# Patient Record
Sex: FEMALE | Race: WHITE | Employment: OTHER | URBAN - NONMETROPOLITAN AREA
[De-identification: names, ages, dates, MRNs, and addresses within clinical notes are randomized per-mention and may not be internally consistent; named-entity substitution may affect disease eponyms.]

---

## 2017-01-17 RX ORDER — PRASUGREL 10 MG/1
10 TABLET, FILM COATED ORAL DAILY
Qty: 90 TABLET | Refills: 3 | Status: SHIPPED | OUTPATIENT
Start: 2017-01-17 | End: 2017-09-13 | Stop reason: SDUPTHER

## 2017-01-17 RX ORDER — ISOSORBIDE MONONITRATE 30 MG/1
30 TABLET, EXTENDED RELEASE ORAL DAILY
Qty: 90 TABLET | Refills: 3 | Status: SHIPPED | OUTPATIENT
Start: 2017-01-17 | End: 2017-09-13 | Stop reason: SDUPTHER

## 2017-01-17 RX ORDER — PRAVASTATIN SODIUM 20 MG
20 TABLET ORAL NIGHTLY
Qty: 90 TABLET | Refills: 3 | Status: SHIPPED | OUTPATIENT
Start: 2017-01-17 | End: 2017-09-13 | Stop reason: SDUPTHER

## 2017-01-17 RX ORDER — PROPAFENONE HYDROCHLORIDE 150 MG/1
150 TABLET, FILM COATED ORAL EVERY 8 HOURS
Qty: 270 TABLET | Refills: 3 | Status: SHIPPED | OUTPATIENT
Start: 2017-01-17 | End: 2017-09-13 | Stop reason: SDUPTHER

## 2017-03-08 ENCOUNTER — OFFICE VISIT (OUTPATIENT)
Dept: CARDIOLOGY | Age: 67
End: 2017-03-08

## 2017-03-08 VITALS
DIASTOLIC BLOOD PRESSURE: 84 MMHG | HEART RATE: 68 BPM | HEIGHT: 64 IN | BODY MASS INDEX: 24.92 KG/M2 | WEIGHT: 146 LBS | SYSTOLIC BLOOD PRESSURE: 142 MMHG

## 2017-03-08 DIAGNOSIS — I25.810 CORONARY ARTERY DISEASE INVOLVING CORONARY BYPASS GRAFT OF NATIVE HEART WITHOUT ANGINA PECTORIS: ICD-10-CM

## 2017-03-08 DIAGNOSIS — R55 SYNCOPE AND COLLAPSE: ICD-10-CM

## 2017-03-08 DIAGNOSIS — I10 ESSENTIAL HYPERTENSION: Primary | ICD-10-CM

## 2017-03-08 DIAGNOSIS — E78.01 FAMILIAL HYPERCHOLESTEROLEMIA: ICD-10-CM

## 2017-03-08 PROCEDURE — G8420 CALC BMI NORM PARAMETERS: HCPCS | Performed by: NUCLEAR MEDICINE

## 2017-03-08 PROCEDURE — 1090F PRES/ABSN URINE INCON ASSESS: CPT | Performed by: NUCLEAR MEDICINE

## 2017-03-08 PROCEDURE — G8427 DOCREV CUR MEDS BY ELIG CLIN: HCPCS | Performed by: NUCLEAR MEDICINE

## 2017-03-08 PROCEDURE — 1123F ACP DISCUSS/DSCN MKR DOCD: CPT | Performed by: NUCLEAR MEDICINE

## 2017-03-08 PROCEDURE — 3017F COLORECTAL CA SCREEN DOC REV: CPT | Performed by: NUCLEAR MEDICINE

## 2017-03-08 PROCEDURE — 99213 OFFICE O/P EST LOW 20 MIN: CPT | Performed by: NUCLEAR MEDICINE

## 2017-03-08 PROCEDURE — G8400 PT W/DXA NO RESULTS DOC: HCPCS | Performed by: NUCLEAR MEDICINE

## 2017-03-08 PROCEDURE — 4040F PNEUMOC VAC/ADMIN/RCVD: CPT | Performed by: NUCLEAR MEDICINE

## 2017-03-08 PROCEDURE — G8598 ASA/ANTIPLAT THER USED: HCPCS | Performed by: NUCLEAR MEDICINE

## 2017-03-08 PROCEDURE — 3014F SCREEN MAMMO DOC REV: CPT | Performed by: NUCLEAR MEDICINE

## 2017-03-08 PROCEDURE — 1036F TOBACCO NON-USER: CPT | Performed by: NUCLEAR MEDICINE

## 2017-03-08 PROCEDURE — G8484 FLU IMMUNIZE NO ADMIN: HCPCS | Performed by: NUCLEAR MEDICINE

## 2017-09-13 ENCOUNTER — OFFICE VISIT (OUTPATIENT)
Dept: CARDIOLOGY CLINIC | Age: 67
End: 2017-09-13
Payer: MEDICARE

## 2017-09-13 VITALS
HEART RATE: 79 BPM | HEIGHT: 63 IN | SYSTOLIC BLOOD PRESSURE: 140 MMHG | DIASTOLIC BLOOD PRESSURE: 86 MMHG | WEIGHT: 144 LBS | BODY MASS INDEX: 25.52 KG/M2

## 2017-09-13 DIAGNOSIS — I10 ESSENTIAL HYPERTENSION: ICD-10-CM

## 2017-09-13 DIAGNOSIS — E78.01 FAMILIAL HYPERCHOLESTEROLEMIA: ICD-10-CM

## 2017-09-13 DIAGNOSIS — I25.10 CORONARY ARTERY DISEASE INVOLVING NATIVE CORONARY ARTERY OF NATIVE HEART WITHOUT ANGINA PECTORIS: Primary | ICD-10-CM

## 2017-09-13 PROCEDURE — G8419 CALC BMI OUT NRM PARAM NOF/U: HCPCS | Performed by: NUCLEAR MEDICINE

## 2017-09-13 PROCEDURE — 1123F ACP DISCUSS/DSCN MKR DOCD: CPT | Performed by: NUCLEAR MEDICINE

## 2017-09-13 PROCEDURE — 1036F TOBACCO NON-USER: CPT | Performed by: NUCLEAR MEDICINE

## 2017-09-13 PROCEDURE — 93000 ELECTROCARDIOGRAM COMPLETE: CPT | Performed by: NUCLEAR MEDICINE

## 2017-09-13 PROCEDURE — 4040F PNEUMOC VAC/ADMIN/RCVD: CPT | Performed by: NUCLEAR MEDICINE

## 2017-09-13 PROCEDURE — G8400 PT W/DXA NO RESULTS DOC: HCPCS | Performed by: NUCLEAR MEDICINE

## 2017-09-13 PROCEDURE — 3017F COLORECTAL CA SCREEN DOC REV: CPT | Performed by: NUCLEAR MEDICINE

## 2017-09-13 PROCEDURE — G8598 ASA/ANTIPLAT THER USED: HCPCS | Performed by: NUCLEAR MEDICINE

## 2017-09-13 PROCEDURE — 1090F PRES/ABSN URINE INCON ASSESS: CPT | Performed by: NUCLEAR MEDICINE

## 2017-09-13 PROCEDURE — G8427 DOCREV CUR MEDS BY ELIG CLIN: HCPCS | Performed by: NUCLEAR MEDICINE

## 2017-09-13 PROCEDURE — 99213 OFFICE O/P EST LOW 20 MIN: CPT | Performed by: NUCLEAR MEDICINE

## 2017-09-13 PROCEDURE — 3014F SCREEN MAMMO DOC REV: CPT | Performed by: NUCLEAR MEDICINE

## 2017-09-13 RX ORDER — PRAVASTATIN SODIUM 20 MG
20 TABLET ORAL NIGHTLY
Qty: 90 TABLET | Refills: 3 | Status: SHIPPED | OUTPATIENT
Start: 2017-09-13 | End: 2018-05-09 | Stop reason: SDUPTHER

## 2017-09-13 RX ORDER — PROPAFENONE HYDROCHLORIDE 150 MG/1
150 TABLET, FILM COATED ORAL EVERY 8 HOURS
Qty: 270 TABLET | Refills: 3 | Status: SHIPPED | OUTPATIENT
Start: 2017-09-13 | End: 2018-05-09 | Stop reason: SDUPTHER

## 2017-09-13 RX ORDER — ISOSORBIDE MONONITRATE 30 MG/1
30 TABLET, EXTENDED RELEASE ORAL DAILY
Qty: 90 TABLET | Refills: 3 | Status: SHIPPED | OUTPATIENT
Start: 2017-09-13 | End: 2018-05-09 | Stop reason: SDUPTHER

## 2017-09-13 RX ORDER — PRASUGREL 10 MG/1
10 TABLET, FILM COATED ORAL DAILY
Qty: 90 TABLET | Refills: 3 | Status: SHIPPED | OUTPATIENT
Start: 2017-09-13 | End: 2018-05-09 | Stop reason: SDUPTHER

## 2018-05-09 ENCOUNTER — OFFICE VISIT (OUTPATIENT)
Dept: CARDIOLOGY CLINIC | Age: 68
End: 2018-05-09
Payer: MEDICARE

## 2018-05-09 VITALS
HEART RATE: 64 BPM | BODY MASS INDEX: 25.34 KG/M2 | SYSTOLIC BLOOD PRESSURE: 134 MMHG | DIASTOLIC BLOOD PRESSURE: 80 MMHG | WEIGHT: 143 LBS | HEIGHT: 63 IN

## 2018-05-09 DIAGNOSIS — I10 ESSENTIAL HYPERTENSION: ICD-10-CM

## 2018-05-09 DIAGNOSIS — E78.01 FAMILIAL HYPERCHOLESTEROLEMIA: ICD-10-CM

## 2018-05-09 DIAGNOSIS — I25.810 CORONARY ARTERY DISEASE INVOLVING CORONARY BYPASS GRAFT OF NATIVE HEART WITHOUT ANGINA PECTORIS: Primary | ICD-10-CM

## 2018-05-09 PROCEDURE — 99213 OFFICE O/P EST LOW 20 MIN: CPT | Performed by: NUCLEAR MEDICINE

## 2018-05-09 RX ORDER — PROPAFENONE HYDROCHLORIDE 150 MG/1
150 TABLET, FILM COATED ORAL EVERY 8 HOURS
Qty: 270 TABLET | Refills: 3 | Status: SHIPPED | OUTPATIENT
Start: 2018-05-09 | End: 2018-06-25 | Stop reason: SDUPTHER

## 2018-05-09 RX ORDER — ISOSORBIDE MONONITRATE 30 MG/1
30 TABLET, EXTENDED RELEASE ORAL DAILY
Qty: 90 TABLET | Refills: 3 | Status: SHIPPED | OUTPATIENT
Start: 2018-05-09 | End: 2018-06-25 | Stop reason: SDUPTHER

## 2018-05-09 RX ORDER — PRAVASTATIN SODIUM 20 MG
20 TABLET ORAL NIGHTLY
Qty: 90 TABLET | Refills: 3 | Status: SHIPPED | OUTPATIENT
Start: 2018-05-09 | End: 2018-06-25 | Stop reason: SDUPTHER

## 2018-05-09 RX ORDER — PRASUGREL 10 MG/1
10 TABLET, FILM COATED ORAL DAILY
Qty: 90 TABLET | Refills: 3 | Status: SHIPPED | OUTPATIENT
Start: 2018-05-09 | End: 2018-06-25 | Stop reason: SDUPTHER

## 2018-06-25 RX ORDER — FUROSEMIDE 20 MG/1
20 TABLET ORAL PRN
Qty: 90 TABLET | Refills: 3 | Status: SHIPPED | OUTPATIENT
Start: 2018-06-25 | End: 2020-06-08 | Stop reason: SDUPTHER

## 2018-06-25 RX ORDER — PRAVASTATIN SODIUM 20 MG
20 TABLET ORAL NIGHTLY
Qty: 90 TABLET | Refills: 3 | Status: SHIPPED | OUTPATIENT
Start: 2018-06-25 | End: 2019-05-10 | Stop reason: SDUPTHER

## 2018-06-25 RX ORDER — PRASUGREL 10 MG/1
10 TABLET, FILM COATED ORAL DAILY
Qty: 90 TABLET | Refills: 3 | Status: SHIPPED | OUTPATIENT
Start: 2018-06-25 | End: 2019-05-09 | Stop reason: SDUPTHER

## 2018-06-25 RX ORDER — ISOSORBIDE MONONITRATE 30 MG/1
30 TABLET, EXTENDED RELEASE ORAL DAILY
Qty: 90 TABLET | Refills: 3 | Status: SHIPPED | OUTPATIENT
Start: 2018-06-25 | End: 2019-05-09 | Stop reason: SDUPTHER

## 2018-06-25 RX ORDER — PROPAFENONE HYDROCHLORIDE 150 MG/1
150 TABLET, FILM COATED ORAL EVERY 8 HOURS
Qty: 270 TABLET | Refills: 3 | Status: SHIPPED | OUTPATIENT
Start: 2018-06-25 | End: 2019-05-09 | Stop reason: SDUPTHER

## 2018-06-28 ENCOUNTER — OFFICE VISIT (OUTPATIENT)
Dept: CARDIOLOGY CLINIC | Age: 68
End: 2018-06-28
Payer: MEDICARE

## 2018-06-28 VITALS
SYSTOLIC BLOOD PRESSURE: 128 MMHG | BODY MASS INDEX: 24.99 KG/M2 | WEIGHT: 141.1 LBS | HEART RATE: 78 BPM | DIASTOLIC BLOOD PRESSURE: 79 MMHG

## 2018-06-28 DIAGNOSIS — I48.0 PAROXYSMAL ATRIAL FIBRILLATION (HCC): ICD-10-CM

## 2018-06-28 DIAGNOSIS — I25.10 CORONARY ARTERY DISEASE INVOLVING NATIVE CORONARY ARTERY OF NATIVE HEART WITHOUT ANGINA PECTORIS: ICD-10-CM

## 2018-06-28 DIAGNOSIS — I50.32 CHRONIC DIASTOLIC CHF (CONGESTIVE HEART FAILURE) (HCC): ICD-10-CM

## 2018-06-28 DIAGNOSIS — I10 ESSENTIAL HYPERTENSION: ICD-10-CM

## 2018-06-28 DIAGNOSIS — Z95.1 S/P CABG X 1: ICD-10-CM

## 2018-06-28 DIAGNOSIS — E78.5 HYPERLIPIDEMIA, UNSPECIFIED HYPERLIPIDEMIA TYPE: ICD-10-CM

## 2018-06-28 DIAGNOSIS — R55 SYNCOPE, UNSPECIFIED SYNCOPE TYPE: Primary | ICD-10-CM

## 2018-06-28 PROCEDURE — 93000 ELECTROCARDIOGRAM COMPLETE: CPT | Performed by: NURSE PRACTITIONER

## 2018-06-28 PROCEDURE — 99213 OFFICE O/P EST LOW 20 MIN: CPT | Performed by: NURSE PRACTITIONER

## 2018-06-29 ENCOUNTER — HOSPITAL ENCOUNTER (OUTPATIENT)
Age: 68
Discharge: HOME | End: 2018-06-29
Payer: MEDICARE

## 2018-06-29 DIAGNOSIS — E78.5 HYPERLIPIDEMIA, UNSPECIFIED HYPERLIPIDEMIA TYPE: ICD-10-CM

## 2018-06-29 DIAGNOSIS — R55 SYNCOPE, UNSPECIFIED SYNCOPE TYPE: ICD-10-CM

## 2018-06-29 LAB
ANION GAP SERPL CALCULATED.3IONS-SCNC: 11 MEQ/L (ref 8–16)
BASOPHILS # BLD: 0.4 %
BASOPHILS ABSOLUTE: 0 THOU/MM3 (ref 0–0.1)
BUN BLDV-MCNC: 17 MG/DL (ref 7–22)
CALCIUM SERPL-MCNC: 9.3 MG/DL (ref 8.5–10.5)
CHLORIDE BLD-SCNC: 106 MEQ/L (ref 98–111)
CHOLESTEROL, TOTAL: 138 MG/DL (ref 100–199)
CO2: 26 MEQ/L (ref 23–33)
CREAT SERPL-MCNC: 0.9 MG/DL (ref 0.4–1.2)
EOSINOPHIL # BLD: 6.5 %
EOSINOPHILS ABSOLUTE: 0.5 THOU/MM3 (ref 0–0.4)
ERYTHROCYTE [DISTWIDTH] IN BLOOD BY AUTOMATED COUNT: 13.5 % (ref 11.5–14.5)
ERYTHROCYTE [DISTWIDTH] IN BLOOD BY AUTOMATED COUNT: 44.3 FL (ref 35–45)
GFR SERPL CREATININE-BSD FRML MDRD: 62 ML/MIN/1.73M2
GLUCOSE BLD-MCNC: 98 MG/DL (ref 70–108)
HCT VFR BLD CALC: 42.7 % (ref 37–47)
HDLC SERPL-MCNC: 56 MG/DL
HEMOGLOBIN: 14.2 GM/DL (ref 12–16)
IMMATURE GRANS (ABS): 0.03 THOU/MM3 (ref 0–0.07)
IMMATURE GRANULOCYTES: 0.4 %
LDL CHOLESTEROL CALCULATED: 71 MG/DL
LYMPHOCYTES # BLD: 44 %
LYMPHOCYTES ABSOLUTE: 3.3 THOU/MM3 (ref 1–4.8)
MCH RBC QN AUTO: 30 PG (ref 26–33)
MCHC RBC AUTO-ENTMCNC: 33.3 GM/DL (ref 32.2–35.5)
MCV RBC AUTO: 90.3 FL (ref 81–99)
MONOCYTES # BLD: 8.2 %
MONOCYTES ABSOLUTE: 0.6 THOU/MM3 (ref 0.4–1.3)
NUCLEATED RED BLOOD CELLS: 0 /100 WBC
PLATELET # BLD: 201 THOU/MM3 (ref 130–400)
PMV BLD AUTO: 9.7 FL (ref 9.4–12.4)
POTASSIUM SERPL-SCNC: 4.6 MEQ/L (ref 3.5–5.2)
RBC # BLD: 4.73 MILL/MM3 (ref 4.2–5.4)
SEG NEUTROPHILS: 40.5 %
SEGMENTED NEUTROPHILS ABSOLUTE COUNT: 3.1 THOU/MM3 (ref 1.8–7.7)
SODIUM BLD-SCNC: 143 MEQ/L (ref 135–145)
TRIGL SERPL-MCNC: 57 MG/DL (ref 0–199)
WBC # BLD: 7.6 THOU/MM3 (ref 4.8–10.8)

## 2018-06-29 PROCEDURE — 85025 COMPLETE CBC W/AUTO DIFF WBC: CPT

## 2018-06-29 PROCEDURE — 80048 BASIC METABOLIC PNL TOTAL CA: CPT

## 2018-06-29 PROCEDURE — 36415 COLL VENOUS BLD VENIPUNCTURE: CPT

## 2018-06-29 PROCEDURE — 80061 LIPID PANEL: CPT

## 2018-07-09 ENCOUNTER — TELEPHONE (OUTPATIENT)
Dept: CARDIOLOGY CLINIC | Age: 68
End: 2018-07-09

## 2018-08-21 ENCOUNTER — TELEPHONE (OUTPATIENT)
Dept: CARDIOLOGY CLINIC | Age: 68
End: 2018-08-21

## 2018-08-21 NOTE — TELEPHONE ENCOUNTER
Patient no showed her stress and echo on 8/20/18. She stated that she is feeling fine and blood work came back fine and she didn't want to reschedule at this time.

## 2019-02-18 ENCOUNTER — HOSPITAL ENCOUNTER (EMERGENCY)
Dept: GENERAL RADIOLOGY | Age: 69
Discharge: HOME | End: 2019-02-18
Payer: MEDICARE

## 2019-02-18 ENCOUNTER — HOSPITAL ENCOUNTER (EMERGENCY)
Age: 69
Discharge: HOME | End: 2019-02-18
Payer: MEDICARE

## 2019-02-18 ENCOUNTER — APPOINTMENT (OUTPATIENT)
Dept: CT IMAGING | Age: 69
End: 2019-02-18
Payer: MEDICARE

## 2019-02-18 VITALS
OXYGEN SATURATION: 94 % | HEART RATE: 79 BPM | HEIGHT: 63 IN | SYSTOLIC BLOOD PRESSURE: 109 MMHG | RESPIRATION RATE: 18 BRPM | WEIGHT: 125 LBS | DIASTOLIC BLOOD PRESSURE: 66 MMHG | TEMPERATURE: 98.2 F | BODY MASS INDEX: 22.15 KG/M2

## 2019-02-18 DIAGNOSIS — R55 SYNCOPE AND COLLAPSE: ICD-10-CM

## 2019-02-18 DIAGNOSIS — J40 BRONCHITIS: Primary | ICD-10-CM

## 2019-02-18 LAB
ALBUMIN SERPL-MCNC: 3.8 G/DL (ref 3.5–5.1)
ALP BLD-CCNC: 92 U/L (ref 38–126)
ALT SERPL-CCNC: 8 U/L (ref 11–66)
ANION GAP SERPL CALCULATED.3IONS-SCNC: 15 MEQ/L (ref 8–16)
AST SERPL-CCNC: 18 U/L (ref 5–40)
BASOPHILS # BLD: 0.1 %
BASOPHILS ABSOLUTE: 0 THOU/MM3 (ref 0–0.1)
BILIRUB SERPL-MCNC: 1.6 MG/DL (ref 0.3–1.2)
BUN BLDV-MCNC: 13 MG/DL (ref 7–22)
CALCIUM SERPL-MCNC: 8.8 MG/DL (ref 8.5–10.5)
CHLORIDE BLD-SCNC: 99 MEQ/L (ref 98–111)
CO2: 24 MEQ/L (ref 23–33)
CREAT SERPL-MCNC: 1 MG/DL (ref 0.4–1.2)
EKG ATRIAL RATE: 86 BPM
EKG P AXIS: 54 DEGREES
EKG P-R INTERVAL: 194 MS
EKG Q-T INTERVAL: 396 MS
EKG QRS DURATION: 106 MS
EKG QTC CALCULATION (BAZETT): 473 MS
EKG R AXIS: 61 DEGREES
EKG T AXIS: 61 DEGREES
EKG VENTRICULAR RATE: 86 BPM
EOSINOPHIL # BLD: 1.5 %
EOSINOPHILS ABSOLUTE: 0.1 THOU/MM3 (ref 0–0.4)
ERYTHROCYTE [DISTWIDTH] IN BLOOD BY AUTOMATED COUNT: 13.3 % (ref 11.5–14.5)
ERYTHROCYTE [DISTWIDTH] IN BLOOD BY AUTOMATED COUNT: 42.5 FL (ref 35–45)
FLU A ANTIGEN: NEGATIVE
FLU B ANTIGEN: NEGATIVE
GFR SERPL CREATININE-BSD FRML MDRD: 55 ML/MIN/1.73M2
GLUCOSE BLD-MCNC: 118 MG/DL (ref 70–108)
GROUP A STREP CULTURE, REFLEX: NEGATIVE
HCT VFR BLD CALC: 36 % (ref 37–47)
HEMOGLOBIN: 12.1 GM/DL (ref 12–16)
IMMATURE GRANS (ABS): 0.1 THOU/MM3 (ref 0–0.07)
IMMATURE GRANULOCYTES: 1 %
LACTIC ACID: 1.7 MMOL/L (ref 0.5–2.2)
LYMPHOCYTES # BLD: 20 %
LYMPHOCYTES ABSOLUTE: 2 THOU/MM3 (ref 1–4.8)
MCH RBC QN AUTO: 29.4 PG (ref 26–33)
MCHC RBC AUTO-ENTMCNC: 33.6 GM/DL (ref 32.2–35.5)
MCV RBC AUTO: 87.4 FL (ref 81–99)
MONOCYTES # BLD: 4.1 %
MONOCYTES ABSOLUTE: 0.4 THOU/MM3 (ref 0.4–1.3)
NUCLEATED RED BLOOD CELLS: 0 /100 WBC
OSMOLALITY CALCULATION: 276.9 MOSMOL/KG (ref 275–300)
PLATELET # BLD: 287 THOU/MM3 (ref 130–400)
PMV BLD AUTO: 9.3 FL (ref 9.4–12.4)
POTASSIUM REFLEX MAGNESIUM: 3.8 MEQ/L (ref 3.5–5.2)
PRO-BNP: 717.7 PG/ML (ref 0–900)
PROCALCITONIN: 0.06 NG/ML (ref 0.01–0.09)
RBC # BLD: 4.12 MILL/MM3 (ref 4.2–5.4)
REFLEX THROAT C + S: NORMAL
SEG NEUTROPHILS: 73.3 %
SEGMENTED NEUTROPHILS ABSOLUTE COUNT: 7.3 THOU/MM3 (ref 1.8–7.7)
SODIUM BLD-SCNC: 138 MEQ/L (ref 135–145)
TOTAL PROTEIN: 6.6 G/DL (ref 6.1–8)
TROPONIN T: < 0.01 NG/ML
WBC # BLD: 9.9 THOU/MM3 (ref 4.8–10.8)

## 2019-02-18 PROCEDURE — 6360000004 HC RX CONTRAST MEDICATION: Performed by: PHYSICIAN ASSISTANT

## 2019-02-18 PROCEDURE — 6360000002 HC RX W HCPCS: Performed by: NURSE PRACTITIONER

## 2019-02-18 PROCEDURE — 6370000000 HC RX 637 (ALT 250 FOR IP): Performed by: NURSE PRACTITIONER

## 2019-02-18 PROCEDURE — 87880 STREP A ASSAY W/OPTIC: CPT

## 2019-02-18 PROCEDURE — 85025 COMPLETE CBC W/AUTO DIFF WBC: CPT

## 2019-02-18 PROCEDURE — 84484 ASSAY OF TROPONIN QUANT: CPT

## 2019-02-18 PROCEDURE — 80053 COMPREHEN METABOLIC PANEL: CPT

## 2019-02-18 PROCEDURE — 87070 CULTURE OTHR SPECIMN AEROBIC: CPT

## 2019-02-18 PROCEDURE — 96374 THER/PROPH/DIAG INJ IV PUSH: CPT

## 2019-02-18 PROCEDURE — 94640 AIRWAY INHALATION TREATMENT: CPT

## 2019-02-18 PROCEDURE — 96375 TX/PRO/DX INJ NEW DRUG ADDON: CPT

## 2019-02-18 PROCEDURE — 84145 PROCALCITONIN (PCT): CPT

## 2019-02-18 PROCEDURE — 2709999900 HC NON-CHARGEABLE SUPPLY

## 2019-02-18 PROCEDURE — 70450 CT HEAD/BRAIN W/O DYE: CPT

## 2019-02-18 PROCEDURE — 99284 EMERGENCY DEPT VISIT MOD MDM: CPT

## 2019-02-18 PROCEDURE — 6360000002 HC RX W HCPCS: Performed by: PHYSICIAN ASSISTANT

## 2019-02-18 PROCEDURE — 83605 ASSAY OF LACTIC ACID: CPT

## 2019-02-18 PROCEDURE — 93005 ELECTROCARDIOGRAM TRACING: CPT | Performed by: EMERGENCY MEDICINE

## 2019-02-18 PROCEDURE — 6370000000 HC RX 637 (ALT 250 FOR IP): Performed by: PHYSICIAN ASSISTANT

## 2019-02-18 PROCEDURE — 87804 INFLUENZA ASSAY W/OPTIC: CPT

## 2019-02-18 PROCEDURE — 71046 X-RAY EXAM CHEST 2 VIEWS: CPT

## 2019-02-18 PROCEDURE — 71275 CT ANGIOGRAPHY CHEST: CPT

## 2019-02-18 PROCEDURE — 36415 COLL VENOUS BLD VENIPUNCTURE: CPT

## 2019-02-18 PROCEDURE — 2580000003 HC RX 258: Performed by: PHYSICIAN ASSISTANT

## 2019-02-18 PROCEDURE — 99215 OFFICE O/P EST HI 40 MIN: CPT

## 2019-02-18 PROCEDURE — 83880 ASSAY OF NATRIURETIC PEPTIDE: CPT

## 2019-02-18 RX ORDER — 0.9 % SODIUM CHLORIDE 0.9 %
1000 INTRAVENOUS SOLUTION INTRAVENOUS ONCE
Status: COMPLETED | OUTPATIENT
Start: 2019-02-18 | End: 2019-02-18

## 2019-02-18 RX ORDER — DIPHENHYDRAMINE HYDROCHLORIDE 50 MG/ML
25 INJECTION INTRAMUSCULAR; INTRAVENOUS ONCE
Status: COMPLETED | OUTPATIENT
Start: 2019-02-18 | End: 2019-02-18

## 2019-02-18 RX ORDER — IPRATROPIUM BROMIDE AND ALBUTEROL SULFATE 2.5; .5 MG/3ML; MG/3ML
1 SOLUTION RESPIRATORY (INHALATION) EVERY 4 HOURS
Qty: 360 ML | Refills: 0 | Status: SHIPPED | OUTPATIENT
Start: 2019-02-18 | End: 2020-12-14

## 2019-02-18 RX ORDER — PREDNISONE 10 MG/1
TABLET ORAL
Qty: 20 TABLET | Refills: 0 | Status: SHIPPED | OUTPATIENT
Start: 2019-02-18 | End: 2019-02-28

## 2019-02-18 RX ORDER — IPRATROPIUM BROMIDE AND ALBUTEROL SULFATE 2.5; .5 MG/3ML; MG/3ML
1 SOLUTION RESPIRATORY (INHALATION) ONCE
Status: COMPLETED | OUTPATIENT
Start: 2019-02-18 | End: 2019-02-18

## 2019-02-18 RX ORDER — METHYLPREDNISOLONE SODIUM SUCCINATE 125 MG/2ML
80 INJECTION, POWDER, LYOPHILIZED, FOR SOLUTION INTRAMUSCULAR; INTRAVENOUS ONCE
Status: COMPLETED | OUTPATIENT
Start: 2019-02-18 | End: 2019-02-18

## 2019-02-18 RX ORDER — GUAIFENESIN 1200 MG/1
1200 TABLET, EXTENDED RELEASE ORAL 2 TIMES DAILY
Qty: 20 TABLET | Refills: 0 | Status: SHIPPED | OUTPATIENT
Start: 2019-02-18 | End: 2019-12-11

## 2019-02-18 RX ORDER — METHYLPREDNISOLONE SODIUM SUCCINATE 40 MG/ML
40 INJECTION, POWDER, LYOPHILIZED, FOR SOLUTION INTRAMUSCULAR; INTRAVENOUS ONCE
Status: COMPLETED | OUTPATIENT
Start: 2019-02-18 | End: 2019-02-18

## 2019-02-18 RX ADMIN — SODIUM CHLORIDE 1000 ML: 9 INJECTION, SOLUTION INTRAVENOUS at 15:51

## 2019-02-18 RX ADMIN — DIPHENHYDRAMINE HYDROCHLORIDE 25 MG: 50 INJECTION, SOLUTION INTRAMUSCULAR; INTRAVENOUS at 15:51

## 2019-02-18 RX ADMIN — IOPAMIDOL 85 ML: 755 INJECTION, SOLUTION INTRAVENOUS at 16:23

## 2019-02-18 RX ADMIN — IPRATROPIUM BROMIDE AND ALBUTEROL SULFATE 1 AMPULE: .5; 3 SOLUTION RESPIRATORY (INHALATION) at 13:25

## 2019-02-18 RX ADMIN — METHYLPREDNISOLONE SODIUM SUCCINATE 40 MG: 40 INJECTION, POWDER, FOR SOLUTION INTRAMUSCULAR; INTRAVENOUS at 15:51

## 2019-02-18 RX ADMIN — METHYLPREDNISOLONE SODIUM SUCCINATE 80 MG: 125 INJECTION, POWDER, FOR SOLUTION INTRAMUSCULAR; INTRAVENOUS at 13:24

## 2019-02-18 RX ADMIN — IPRATROPIUM BROMIDE AND ALBUTEROL SULFATE 1 AMPULE: .5; 3 SOLUTION RESPIRATORY (INHALATION) at 14:54

## 2019-02-18 ASSESSMENT — ENCOUNTER SYMPTOMS
DIARRHEA: 0
WHEEZING: 0
VOMITING: 0
SORE THROAT: 0
COUGH: 1
BACK PAIN: 0
EYE DISCHARGE: 0
EYE PAIN: 0
RHINORRHEA: 0
WHEEZING: 1
ABDOMINAL PAIN: 0
NAUSEA: 0
SHORTNESS OF BREATH: 1

## 2019-02-19 PROCEDURE — 93010 ELECTROCARDIOGRAM REPORT: CPT | Performed by: INTERNAL MEDICINE

## 2019-02-20 LAB — THROAT/NOSE CULTURE: NORMAL

## 2019-05-08 ENCOUNTER — TELEPHONE (OUTPATIENT)
Dept: CARDIOLOGY CLINIC | Age: 69
End: 2019-05-08

## 2019-05-09 NOTE — TELEPHONE ENCOUNTER
Santiago Morrow called requesting a refill on the following medications:  Requested Prescriptions     Pending Prescriptions Disp Refills    pravastatin (PRAVACHOL) 20 MG tablet 90 tablet 3     Sig: Take 1 tablet by mouth nightly    metoprolol tartrate (LOPRESSOR) 25 MG tablet 180 tablet 3     Sig: Take 1 tablet by mouth 2 times daily    propafenone (RYTHMOL) 150 MG tablet 270 tablet 3     Sig: Take 1 tablet by mouth every 8 hours    isosorbide mononitrate (IMDUR) 30 MG extended release tablet 90 tablet 3     Sig: Take 1 tablet by mouth daily    prasugrel (EFFIENT) 10 MG TABS 90 tablet 3     Sig: Take 1 tablet by mouth daily     Pharm[facility]      Date of last visit: 05-09-18   Date of next visit (if applicable): 1/3/0471

## 2019-05-10 RX ORDER — PRASUGREL 10 MG/1
10 TABLET, FILM COATED ORAL DAILY
Qty: 90 TABLET | Refills: 3 | Status: SHIPPED | OUTPATIENT
Start: 2019-05-10 | End: 2020-06-08 | Stop reason: SDUPTHER

## 2019-05-10 RX ORDER — PRAVASTATIN SODIUM 20 MG
20 TABLET ORAL NIGHTLY
Qty: 90 TABLET | Refills: 3 | Status: SHIPPED | OUTPATIENT
Start: 2019-05-10 | End: 2020-06-08 | Stop reason: SDUPTHER

## 2019-05-10 RX ORDER — PROPAFENONE HYDROCHLORIDE 150 MG/1
150 TABLET, FILM COATED ORAL EVERY 8 HOURS
Qty: 270 TABLET | Refills: 3 | Status: SHIPPED | OUTPATIENT
Start: 2019-05-10 | End: 2020-06-08 | Stop reason: SDUPTHER

## 2019-05-10 RX ORDER — ISOSORBIDE MONONITRATE 30 MG/1
30 TABLET, EXTENDED RELEASE ORAL DAILY
Qty: 90 TABLET | Refills: 3 | Status: SHIPPED | OUTPATIENT
Start: 2019-05-10 | End: 2020-06-08 | Stop reason: SDUPTHER

## 2019-06-03 ENCOUNTER — OFFICE VISIT (OUTPATIENT)
Dept: CARDIOLOGY CLINIC | Age: 69
End: 2019-06-03
Payer: MEDICARE

## 2019-06-03 VITALS
DIASTOLIC BLOOD PRESSURE: 62 MMHG | HEIGHT: 62 IN | BODY MASS INDEX: 24.29 KG/M2 | HEART RATE: 64 BPM | WEIGHT: 132 LBS | SYSTOLIC BLOOD PRESSURE: 126 MMHG

## 2019-06-03 DIAGNOSIS — I10 ESSENTIAL HYPERTENSION: ICD-10-CM

## 2019-06-03 DIAGNOSIS — Z95.1 S/P CABG (CORONARY ARTERY BYPASS GRAFT): ICD-10-CM

## 2019-06-03 DIAGNOSIS — I25.10 CORONARY ARTERY DISEASE INVOLVING NATIVE CORONARY ARTERY OF NATIVE HEART WITHOUT ANGINA PECTORIS: Primary | ICD-10-CM

## 2019-06-03 PROCEDURE — 99213 OFFICE O/P EST LOW 20 MIN: CPT | Performed by: PHYSICIAN ASSISTANT

## 2019-06-03 NOTE — PROGRESS NOTES
Patient here for 1 yr fu. Patient complains of SOB with exertion, chest pain when coughing, dizziness before she passes out, and DONOVAN. Pt states she has a cough that has been going on for at least 2 months, drainage from nose, and she has passed out a couple times (last one was this past Saturday). Patient denies palpations.

## 2019-06-03 NOTE — PROGRESS NOTES
[facility] PROFESSIONAL SERVICES  [facility] [address]   [address] [address]   Dept: [phone]   Dept Fax: [phone]   Loc: [phone]      Chief Complaint   Patient presents with    1 Year Follow Up     Patient with a history of CABG presents for follow-up. Patient states she's noticed this spring that she's had nasal congestion with clear nasal drainage and a chronic nonproductive cough. She's never had any issues with allergies in the past.  She did have a CTA of her chest and earlier this year which did not reveal any significant abnormalities. She has pleuritic chest pain with the cough. She did have his episode of syncope in the past and underwent a tilt table test which did not reveal any abnormality. She also had a stress test and echo ordered which she did not get done. Cardiologist:  Dr. Herrera Found:   No fever, no chills, No fatigue or weight loss  Pulmonary:   Intermittent dry cough with pleuritic chest painCardiac:    Denies recent chest pain   GI:     No nausea or vomiting, no abdominal pain  Neuro:    No dizziness or light headedness  Musculoskeletal:  No recent active issues  Extremities:   No edema, good peripheral pulses      Past Medical History:   Diagnosis Date    Asthma     Atrial fibrillation ([address]     Blood clot associated with vein wall inflammation     CAD (coronary artery disease)     Cerebral artery occlusion with cerebral infarction ([address]     CHF (congestive heart failure) (HCC)     COPD (chronic obstructive pulmonary disease) (HCC)     Hyperlipidemia     MI, old     Migraine     Pneumonia     PONV (postoperative nausea and vomiting)     Psychiatric problem        Allergies   Allergen Reactions    Celebrex [Celecoxib] Anaphylaxis     Unable to breath Turns red all over    Contrast [Iodides] Hives    Statins      Will obtain lipid panel and re-evaluate.      Naprosyn [Naproxen] Nausea And Vomiting    Shrimp Flavor Nausea And Vomiting       Current Outpatient Medications   Medication Sig Dispense Refill    pravastatin (PRAVACHOL) 20 MG tablet Take 1 tablet by mouth nightly 90 tablet 3    metoprolol tartrate (LOPRESSOR) 25 MG tablet Take 1 tablet by mouth 2 times daily 180 tablet 3    propafenone (RYTHMOL) 150 MG tablet Take 1 tablet by mouth every 8 hours 270 tablet 3    isosorbide mononitrate (IMDUR) 30 MG extended release tablet Take 1 tablet by mouth daily 90 tablet 3    prasugrel (EFFIENT) 10 MG TABS Take 1 tablet by mouth daily 90 tablet 3    ipratropium-albuterol (DUONEB) 0.5-2.5 (3) MG/3ML SOLN nebulizer solution Inhale 3 mLs into the lungs every 4 hours 360 mL 0    guaiFENesin 1200 MG TB12 Take 1,200 mg by mouth 2 times daily 20 tablet 0    furosemide (LASIX) 20 MG tablet Take 1 tablet by mouth as needed (leg edema) 90 tablet 3    ondansetron (ZOFRAN ODT) 4 MG disintegrating tablet Take 4 mg by mouth 4 times daily as needed for Nausea or Vomiting      aspirin 81 MG tablet Take 81 mg by mouth daily      Cetirizine HCl (ZYRTEC ALLERGY PO) Take 1 tablet by mouth daily      nitroGLYCERIN (NITROSTAT) 0.4 MG SL tablet Place 1 tablet under the tongue every 5 minutes as needed for Chest pain. 25 tablet 0     No current facility-administered medications for this visit. Social History     Socioeconomic History    Marital status:       Spouse name: Larissa Fox Number of children: 0    Years of education: 15    Highest education level: None   Occupational History    None   Social Needs    Financial resource strain: None    Food insecurity:     Worry: None     Inability: None    Transportation needs:     Medical: None     Non-medical: None   Tobacco Use    Smoking status: Former Smoker     Types: Cigarettes     Last attempt to quit: 3/3/1992     Years since quittin.2    Smokeless tobacco: Never Used   Substance and Sexual Activity    Alcohol use: Yes     Comment: occasional    Continue Dr Breann Melendez current treatment plan    I discussed with her that I would take a nonsedating antihistamine daily over the next week or 2 to see if that helps with her nasal congestion and cough. She also can take Benadryl at night as needed. Continue same current medications and with constant vigilance to changes in symptoms and also any potential side effects. Return for care if become worse or seek medical attention immediately. The patient is educated on symptoms of heart disease that include chest pain and passing out, dizziness, etc and to report them if there is any change or go to emergency room.        Follow up with Dr Sarah Kaufman as scheduled or sooner if needed  (Please note that portions of this note were completed with a voice recognition program.  Efforts were made to edit the dictation but occasionally words are mis-transcribed.)      Vonda Walton PA-C

## 2019-07-01 ENCOUNTER — HOSPITAL ENCOUNTER (OUTPATIENT)
Dept: MAMMOGRAPHY | Age: 69
Discharge: HOME | End: 2019-07-01
Payer: MEDICARE

## 2019-07-01 DIAGNOSIS — Z12.39 BREAST CANCER SCREENING: ICD-10-CM

## 2019-07-01 PROCEDURE — 77067 SCR MAMMO BI INCL CAD: CPT

## 2019-07-11 ENCOUNTER — APPOINTMENT (OUTPATIENT)
Dept: GENERAL RADIOLOGY | Age: 69
End: 2019-07-11
Payer: MEDICARE

## 2019-07-11 ENCOUNTER — APPOINTMENT (OUTPATIENT)
Dept: CT IMAGING | Age: 69
End: 2019-07-11
Payer: MEDICARE

## 2019-07-11 ENCOUNTER — HOSPITAL ENCOUNTER (EMERGENCY)
Age: 69
Discharge: HOME | End: 2019-07-11
Attending: INTERNAL MEDICINE
Payer: MEDICARE

## 2019-07-11 VITALS
HEART RATE: 82 BPM | SYSTOLIC BLOOD PRESSURE: 139 MMHG | HEIGHT: 63 IN | RESPIRATION RATE: 16 BRPM | WEIGHT: 120 LBS | BODY MASS INDEX: 21.26 KG/M2 | TEMPERATURE: 98 F | DIASTOLIC BLOOD PRESSURE: 69 MMHG | OXYGEN SATURATION: 100 %

## 2019-07-11 DIAGNOSIS — H81.13 BENIGN PAROXYSMAL POSITIONAL VERTIGO DUE TO BILATERAL VESTIBULAR DISORDER: Primary | ICD-10-CM

## 2019-07-11 LAB
ALBUMIN SERPL-MCNC: 3.8 G/DL (ref 3.5–5.1)
ALP BLD-CCNC: 83 U/L (ref 38–126)
ALT SERPL-CCNC: 6 U/L (ref 11–66)
ANION GAP SERPL CALCULATED.3IONS-SCNC: 10 MEQ/L (ref 8–16)
AST SERPL-CCNC: 14 U/L (ref 5–40)
BASOPHILS # BLD: 0.4 %
BASOPHILS ABSOLUTE: 0 THOU/MM3 (ref 0–0.1)
BILIRUB SERPL-MCNC: 0.7 MG/DL (ref 0.3–1.2)
BILIRUBIN DIRECT: < 0.2 MG/DL (ref 0–0.3)
BUN BLDV-MCNC: 19 MG/DL (ref 7–22)
CALCIUM SERPL-MCNC: 9.4 MG/DL (ref 8.5–10.5)
CHLORIDE BLD-SCNC: 107 MEQ/L (ref 98–111)
CO2: 27 MEQ/L (ref 23–33)
CREAT SERPL-MCNC: 1.1 MG/DL (ref 0.4–1.2)
EKG ATRIAL RATE: 77 BPM
EKG P AXIS: 62 DEGREES
EKG P-R INTERVAL: 142 MS
EKG Q-T INTERVAL: 398 MS
EKG QRS DURATION: 102 MS
EKG QTC CALCULATION (BAZETT): 450 MS
EKG R AXIS: 39 DEGREES
EKG T AXIS: 51 DEGREES
EKG VENTRICULAR RATE: 77 BPM
EOSINOPHIL # BLD: 4.8 %
EOSINOPHILS ABSOLUTE: 0.4 THOU/MM3 (ref 0–0.4)
ERYTHROCYTE [DISTWIDTH] IN BLOOD BY AUTOMATED COUNT: 14.5 % (ref 11.5–14.5)
ERYTHROCYTE [DISTWIDTH] IN BLOOD BY AUTOMATED COUNT: 47.3 FL (ref 35–45)
GFR SERPL CREATININE-BSD FRML MDRD: 49 ML/MIN/1.73M2
GLUCOSE BLD-MCNC: 95 MG/DL (ref 70–108)
HCT VFR BLD CALC: 42 % (ref 37–47)
HEMOGLOBIN: 13.9 GM/DL (ref 12–16)
IMMATURE GRANS (ABS): 0.01 THOU/MM3 (ref 0–0.07)
IMMATURE GRANULOCYTES: 0.1 %
LYMPHOCYTES # BLD: 35.2 %
LYMPHOCYTES ABSOLUTE: 2.6 THOU/MM3 (ref 1–4.8)
MCH RBC QN AUTO: 29.5 PG (ref 26–33)
MCHC RBC AUTO-ENTMCNC: 33.1 GM/DL (ref 32.2–35.5)
MCV RBC AUTO: 89.2 FL (ref 81–99)
MONOCYTES # BLD: 7.6 %
MONOCYTES ABSOLUTE: 0.6 THOU/MM3 (ref 0.4–1.3)
NUCLEATED RED BLOOD CELLS: 0 /100 WBC
OSMOLALITY CALCULATION: 288.9 MOSMOL/KG (ref 275–300)
PLATELET # BLD: 188 THOU/MM3 (ref 130–400)
PMV BLD AUTO: 9.5 FL (ref 9.4–12.4)
POTASSIUM SERPL-SCNC: 4.6 MEQ/L (ref 3.5–5.2)
RBC # BLD: 4.71 MILL/MM3 (ref 4.2–5.4)
SEG NEUTROPHILS: 51.9 %
SEGMENTED NEUTROPHILS ABSOLUTE COUNT: 3.8 THOU/MM3 (ref 1.8–7.7)
SODIUM BLD-SCNC: 144 MEQ/L (ref 135–145)
TOTAL PROTEIN: 6.3 G/DL (ref 6.1–8)
TROPONIN T: < 0.01 NG/ML
WBC # BLD: 7.3 THOU/MM3 (ref 4.8–10.8)

## 2019-07-11 PROCEDURE — 6360000002 HC RX W HCPCS: Performed by: INTERNAL MEDICINE

## 2019-07-11 PROCEDURE — 70450 CT HEAD/BRAIN W/O DYE: CPT

## 2019-07-11 PROCEDURE — 6370000000 HC RX 637 (ALT 250 FOR IP): Performed by: INTERNAL MEDICINE

## 2019-07-11 PROCEDURE — 2709999900 HC NON-CHARGEABLE SUPPLY

## 2019-07-11 PROCEDURE — 85025 COMPLETE CBC W/AUTO DIFF WBC: CPT

## 2019-07-11 PROCEDURE — 93005 ELECTROCARDIOGRAM TRACING: CPT | Performed by: INTERNAL MEDICINE

## 2019-07-11 PROCEDURE — 80053 COMPREHEN METABOLIC PANEL: CPT

## 2019-07-11 PROCEDURE — 36415 COLL VENOUS BLD VENIPUNCTURE: CPT

## 2019-07-11 PROCEDURE — 82248 BILIRUBIN DIRECT: CPT

## 2019-07-11 PROCEDURE — 99284 EMERGENCY DEPT VISIT MOD MDM: CPT

## 2019-07-11 PROCEDURE — 93010 ELECTROCARDIOGRAM REPORT: CPT | Performed by: INTERNAL MEDICINE

## 2019-07-11 PROCEDURE — 71045 X-RAY EXAM CHEST 1 VIEW: CPT

## 2019-07-11 PROCEDURE — 96374 THER/PROPH/DIAG INJ IV PUSH: CPT

## 2019-07-11 PROCEDURE — 96375 TX/PRO/DX INJ NEW DRUG ADDON: CPT

## 2019-07-11 PROCEDURE — 84484 ASSAY OF TROPONIN QUANT: CPT

## 2019-07-11 RX ORDER — MECLIZINE HCL 12.5 MG/1
25 TABLET ORAL ONCE
Status: COMPLETED | OUTPATIENT
Start: 2019-07-11 | End: 2019-07-11

## 2019-07-11 RX ORDER — ONDANSETRON 2 MG/ML
4 INJECTION INTRAMUSCULAR; INTRAVENOUS ONCE
Status: COMPLETED | OUTPATIENT
Start: 2019-07-11 | End: 2019-07-11

## 2019-07-11 RX ORDER — METHYLPREDNISOLONE SODIUM SUCCINATE 40 MG/ML
40 INJECTION, POWDER, LYOPHILIZED, FOR SOLUTION INTRAMUSCULAR; INTRAVENOUS ONCE
Status: COMPLETED | OUTPATIENT
Start: 2019-07-11 | End: 2019-07-11

## 2019-07-11 RX ORDER — CETIRIZINE HYDROCHLORIDE 10 MG/1
10 TABLET ORAL DAILY
Qty: 30 TABLET | Refills: 0 | Status: SHIPPED | OUTPATIENT
Start: 2019-07-11 | End: 2019-08-10

## 2019-07-11 RX ORDER — MECLIZINE HYDROCHLORIDE 25 MG/1
25 TABLET ORAL 3 TIMES DAILY PRN
Qty: 30 TABLET | Refills: 0 | Status: SHIPPED | OUTPATIENT
Start: 2019-07-11 | End: 2019-07-21

## 2019-07-11 RX ORDER — FAMOTIDINE 20 MG/1
20 TABLET, FILM COATED ORAL 2 TIMES DAILY
Qty: 60 TABLET | Refills: 0 | Status: SHIPPED | OUTPATIENT
Start: 2019-07-11 | End: 2020-12-14 | Stop reason: SDUPTHER

## 2019-07-11 RX ADMIN — MECLIZINE HYDROCHLORIDE 25 MG: 12.5 TABLET, FILM COATED ORAL at 11:48

## 2019-07-11 RX ADMIN — ONDANSETRON 4 MG: 2 INJECTION INTRAMUSCULAR; INTRAVENOUS at 11:46

## 2019-07-11 RX ADMIN — METHYLPREDNISOLONE SODIUM SUCCINATE 40 MG: 40 INJECTION, POWDER, FOR SOLUTION INTRAMUSCULAR; INTRAVENOUS at 11:46

## 2019-07-11 ASSESSMENT — ENCOUNTER SYMPTOMS
SHORTNESS OF BREATH: 0
SORE THROAT: 0
EYE DISCHARGE: 0
RHINORRHEA: 0
DIARRHEA: 0
BACK PAIN: 0
VOMITING: 0
NAUSEA: 1
WHEEZING: 0
COUGH: 1
EYE PAIN: 0
ABDOMINAL PAIN: 0

## 2019-07-11 NOTE — ED TRIAGE NOTES
Pt to rm 04 per intake c/o room spinning dizziness x 3-4 days. Pt states it gets worse with movement, makes her feel nauseated. Gets better while laying down with eyes closed. Pt appears in no acute distress,  VSS.  Will monitor

## 2019-07-11 NOTE — ED PROVIDER NOTES
treatment to the patient, who was amenable to my decision, and I answered all questions that were asked. She was discharged home in stable condition with prescriptions for Antivert, Pepcid, Zyrtec, and the patient will return to the ED if her symptoms become more severe in nature or otherwise change. I advised the patient to follow-up with her PCP in 1 day. I also discussed return to ED precautions with the patient who verbalized understanding. epley's maneuver was also done patient started feeling much better after that    CRITICAL CARE:   None     CONSULTS:  None    PROCEDURES:  None     FINAL IMPRESSION      1. Benign paroxysmal positional vertigo due to bilateral vestibular disorder          DISPOSITION/PLAN   Discharge    PATIENT REFERRED TO:  Isabelle Jalloh MD  [address] [place]  [phone]    In 1 day      [facility] [address] [address] EMERGENCY DEPT  [address] [address]  [phone]  Go in 1 day  If symptoms worsen      DISCHARGE MEDICATIONS:  Discharge Medication List as of 7/11/2019  1:00 PM      START taking these medications    Details   meclizine (ANTIVERT) 25 MG tablet Take 1 tablet by mouth 3 times daily as needed for Dizziness, Disp-30 tablet, R-0Print      famotidine (PEPCID) 20 MG tablet Take 1 tablet by mouth 2 times daily, Disp-60 tablet, R-0Print             (Please note that portions of this note were completed with a voice recognition program.  Efforts were made to edit the dictations but occasionally words are mis-transcribed.)    Scribe:  Neri Plaza 7/11/19 10:51 AM Scribing for and in the presence of Ashly Arroyo MD.    Signed by: Marcial Forte, 07/11/19 2:23 PM    Provider:  I personally performed the services described in the documentation, reviewed and edited the documentation which was dictated to the scribe in my presence, and it accurately records my words and actions.     Ashly Arroyo MD 7/11/19 2:23 PM        Ashly Arroyo MD  07/11/19 [id number]

## 2019-11-05 ENCOUNTER — OFFICE VISIT (OUTPATIENT)
Dept: CARDIOLOGY CLINIC | Age: 69
End: 2019-11-05
Payer: MEDICARE

## 2019-11-05 VITALS
WEIGHT: 136.2 LBS | HEART RATE: 100 BPM | HEIGHT: 63 IN | BODY MASS INDEX: 24.13 KG/M2 | SYSTOLIC BLOOD PRESSURE: 131 MMHG | DIASTOLIC BLOOD PRESSURE: 77 MMHG

## 2019-11-05 DIAGNOSIS — I25.10 CORONARY ARTERY DISEASE INVOLVING NATIVE CORONARY ARTERY OF NATIVE HEART WITHOUT ANGINA PECTORIS: ICD-10-CM

## 2019-11-05 DIAGNOSIS — Z95.1 S/P CABG (CORONARY ARTERY BYPASS GRAFT): ICD-10-CM

## 2019-11-05 DIAGNOSIS — I48.0 PAROXYSMAL ATRIAL FIBRILLATION (HCC): ICD-10-CM

## 2019-11-05 DIAGNOSIS — I10 ESSENTIAL HYPERTENSION: ICD-10-CM

## 2019-11-05 DIAGNOSIS — R07.2 PRECORDIAL CHEST PAIN: Primary | ICD-10-CM

## 2019-11-05 PROCEDURE — 99213 OFFICE O/P EST LOW 20 MIN: CPT | Performed by: PHYSICIAN ASSISTANT

## 2019-11-05 PROCEDURE — 93000 ELECTROCARDIOGRAM COMPLETE: CPT | Performed by: PHYSICIAN ASSISTANT

## 2019-12-03 ENCOUNTER — TELEPHONE (OUTPATIENT)
Dept: CARDIOLOGY CLINIC | Age: 69
End: 2019-12-03

## 2019-12-11 ENCOUNTER — APPOINTMENT (OUTPATIENT)
Dept: GENERAL RADIOLOGY | Age: 69
End: 2019-12-11
Payer: MEDICARE

## 2019-12-11 ENCOUNTER — APPOINTMENT (OUTPATIENT)
Dept: CT IMAGING | Age: 69
End: 2019-12-11
Payer: MEDICARE

## 2019-12-11 ENCOUNTER — HOSPITAL ENCOUNTER (EMERGENCY)
Age: 69
Discharge: HOME | End: 2019-12-11
Payer: MEDICARE

## 2019-12-11 VITALS
OXYGEN SATURATION: 98 % | BODY MASS INDEX: 21.26 KG/M2 | HEIGHT: 63 IN | RESPIRATION RATE: 18 BRPM | SYSTOLIC BLOOD PRESSURE: 132 MMHG | TEMPERATURE: 98.4 F | HEART RATE: 75 BPM | DIASTOLIC BLOOD PRESSURE: 86 MMHG | WEIGHT: 120 LBS

## 2019-12-11 DIAGNOSIS — H81.10 BENIGN PAROXYSMAL POSITIONAL VERTIGO, UNSPECIFIED LATERALITY: Primary | ICD-10-CM

## 2019-12-11 LAB
ALBUMIN SERPL-MCNC: 4 G/DL (ref 3.5–5.1)
ALP BLD-CCNC: 81 U/L (ref 38–126)
ALT SERPL-CCNC: 7 U/L (ref 11–66)
ANION GAP SERPL CALCULATED.3IONS-SCNC: 12 MEQ/L (ref 8–16)
AST SERPL-CCNC: 18 U/L (ref 5–40)
BASOPHILS # BLD: 0.3 %
BASOPHILS ABSOLUTE: 0 THOU/MM3 (ref 0–0.1)
BILIRUB SERPL-MCNC: 0.9 MG/DL (ref 0.3–1.2)
BUN BLDV-MCNC: 10 MG/DL (ref 7–22)
CALCIUM SERPL-MCNC: 9 MG/DL (ref 8.5–10.5)
CHLORIDE BLD-SCNC: 105 MEQ/L (ref 98–111)
CO2: 26 MEQ/L (ref 23–33)
CREAT SERPL-MCNC: 0.8 MG/DL (ref 0.4–1.2)
EKG ATRIAL RATE: 78 BPM
EKG P AXIS: 60 DEGREES
EKG P-R INTERVAL: 160 MS
EKG Q-T INTERVAL: 400 MS
EKG QRS DURATION: 84 MS
EKG QTC CALCULATION (BAZETT): 456 MS
EKG R AXIS: 13 DEGREES
EKG T AXIS: 42 DEGREES
EKG VENTRICULAR RATE: 78 BPM
EOSINOPHIL # BLD: 4.7 %
EOSINOPHILS ABSOLUTE: 0.4 THOU/MM3 (ref 0–0.4)
ERYTHROCYTE [DISTWIDTH] IN BLOOD BY AUTOMATED COUNT: 13.5 % (ref 11.5–14.5)
ERYTHROCYTE [DISTWIDTH] IN BLOOD BY AUTOMATED COUNT: 45.9 FL (ref 35–45)
FLU A ANTIGEN: NEGATIVE
FLU B ANTIGEN: NEGATIVE
GFR SERPL CREATININE-BSD FRML MDRD: 71 ML/MIN/1.73M2
GLUCOSE BLD-MCNC: 100 MG/DL (ref 70–108)
HCT VFR BLD CALC: 39.5 % (ref 37–47)
HEMOGLOBIN: 12.9 GM/DL (ref 12–16)
IMMATURE GRANS (ABS): 0.02 THOU/MM3 (ref 0–0.07)
IMMATURE GRANULOCYTES: 0.3 %
INR BLD: 0.96 (ref 0.85–1.13)
LYMPHOCYTES # BLD: 47.2 %
LYMPHOCYTES ABSOLUTE: 3.6 THOU/MM3 (ref 1–4.8)
MCH RBC QN AUTO: 30.4 PG (ref 26–33)
MCHC RBC AUTO-ENTMCNC: 32.7 GM/DL (ref 32.2–35.5)
MCV RBC AUTO: 93.2 FL (ref 81–99)
MONOCYTES # BLD: 6.4 %
MONOCYTES ABSOLUTE: 0.5 THOU/MM3 (ref 0.4–1.3)
NUCLEATED RED BLOOD CELLS: 0 /100 WBC
OSMOLALITY CALCULATION: 284.1 MOSMOL/KG (ref 275–300)
PLATELET # BLD: 195 THOU/MM3 (ref 130–400)
PMV BLD AUTO: 9.6 FL (ref 9.4–12.4)
POTASSIUM REFLEX MAGNESIUM: 3.6 MEQ/L (ref 3.5–5.2)
PRO-BNP: 959.5 PG/ML (ref 0–900)
RBC # BLD: 4.24 MILL/MM3 (ref 4.2–5.4)
SEG NEUTROPHILS: 41.1 %
SEGMENTED NEUTROPHILS ABSOLUTE COUNT: 3.1 THOU/MM3 (ref 1.8–7.7)
SODIUM BLD-SCNC: 143 MEQ/L (ref 135–145)
TOTAL PROTEIN: 6.4 G/DL (ref 6.1–8)
TROPONIN T: < 0.01 NG/ML
WBC # BLD: 7.6 THOU/MM3 (ref 4.8–10.8)

## 2019-12-11 PROCEDURE — 80053 COMPREHEN METABOLIC PANEL: CPT

## 2019-12-11 PROCEDURE — 6360000004 HC RX CONTRAST MEDICATION: Performed by: PHYSICIAN ASSISTANT

## 2019-12-11 PROCEDURE — 2500000003 HC RX 250 WO HCPCS: Performed by: PHYSICIAN ASSISTANT

## 2019-12-11 PROCEDURE — 71045 X-RAY EXAM CHEST 1 VIEW: CPT

## 2019-12-11 PROCEDURE — 83880 ASSAY OF NATRIURETIC PEPTIDE: CPT

## 2019-12-11 PROCEDURE — 70450 CT HEAD/BRAIN W/O DYE: CPT

## 2019-12-11 PROCEDURE — 6360000002 HC RX W HCPCS: Performed by: PHYSICIAN ASSISTANT

## 2019-12-11 PROCEDURE — 85610 PROTHROMBIN TIME: CPT

## 2019-12-11 PROCEDURE — 87804 INFLUENZA ASSAY W/OPTIC: CPT

## 2019-12-11 PROCEDURE — 6370000000 HC RX 637 (ALT 250 FOR IP): Performed by: PHYSICIAN ASSISTANT

## 2019-12-11 PROCEDURE — 96375 TX/PRO/DX INJ NEW DRUG ADDON: CPT

## 2019-12-11 PROCEDURE — 85025 COMPLETE CBC W/AUTO DIFF WBC: CPT

## 2019-12-11 PROCEDURE — 93010 ELECTROCARDIOGRAM REPORT: CPT | Performed by: NUCLEAR MEDICINE

## 2019-12-11 PROCEDURE — 2580000003 HC RX 258: Performed by: PHYSICIAN ASSISTANT

## 2019-12-11 PROCEDURE — 93005 ELECTROCARDIOGRAM TRACING: CPT | Performed by: PHYSICIAN ASSISTANT

## 2019-12-11 PROCEDURE — 84484 ASSAY OF TROPONIN QUANT: CPT

## 2019-12-11 PROCEDURE — 70498 CT ANGIOGRAPHY NECK: CPT

## 2019-12-11 PROCEDURE — 99284 EMERGENCY DEPT VISIT MOD MDM: CPT

## 2019-12-11 PROCEDURE — 70496 CT ANGIOGRAPHY HEAD: CPT

## 2019-12-11 PROCEDURE — 36415 COLL VENOUS BLD VENIPUNCTURE: CPT

## 2019-12-11 PROCEDURE — 96374 THER/PROPH/DIAG INJ IV PUSH: CPT

## 2019-12-11 RX ORDER — 0.9 % SODIUM CHLORIDE 0.9 %
500 INTRAVENOUS SOLUTION INTRAVENOUS ONCE
Status: COMPLETED | OUTPATIENT
Start: 2019-12-11 | End: 2019-12-11

## 2019-12-11 RX ORDER — MECLIZINE HYDROCHLORIDE CHEWABLE TABLETS 25 MG/1
50 TABLET, CHEWABLE ORAL ONCE
Status: COMPLETED | OUTPATIENT
Start: 2019-12-11 | End: 2019-12-11

## 2019-12-11 RX ORDER — DIAZEPAM 5 MG/1
5 TABLET ORAL ONCE
Status: COMPLETED | OUTPATIENT
Start: 2019-12-11 | End: 2019-12-11

## 2019-12-11 RX ORDER — SODIUM CHLORIDE 9 MG/ML
INJECTION, SOLUTION INTRAVENOUS CONTINUOUS
Status: DISCONTINUED | OUTPATIENT
Start: 2019-12-11 | End: 2019-12-11 | Stop reason: HOSPADM

## 2019-12-11 RX ORDER — DIAZEPAM 5 MG/1
5 TABLET ORAL EVERY 8 HOURS PRN
Qty: 10 TABLET | Refills: 0 | Status: SHIPPED | OUTPATIENT
Start: 2019-12-11 | End: 2019-12-21

## 2019-12-11 RX ORDER — MECLIZINE HYDROCHLORIDE 25 MG/1
25 TABLET ORAL 3 TIMES DAILY PRN
Qty: 30 TABLET | Refills: 0 | Status: SHIPPED | OUTPATIENT
Start: 2019-12-11 | End: 2019-12-21

## 2019-12-11 RX ORDER — METHYLPREDNISOLONE SODIUM SUCCINATE 125 MG/2ML
125 INJECTION, POWDER, LYOPHILIZED, FOR SOLUTION INTRAMUSCULAR; INTRAVENOUS ONCE
Status: COMPLETED | OUTPATIENT
Start: 2019-12-11 | End: 2019-12-11

## 2019-12-11 RX ORDER — ONDANSETRON 2 MG/ML
4 INJECTION INTRAMUSCULAR; INTRAVENOUS ONCE
Status: COMPLETED | OUTPATIENT
Start: 2019-12-11 | End: 2019-12-11

## 2019-12-11 RX ORDER — DIPHENHYDRAMINE HYDROCHLORIDE 50 MG/ML
25 INJECTION INTRAMUSCULAR; INTRAVENOUS ONCE
Status: COMPLETED | OUTPATIENT
Start: 2019-12-11 | End: 2019-12-11

## 2019-12-11 RX ADMIN — IOPAMIDOL 80 ML: 755 INJECTION, SOLUTION INTRAVENOUS at 14:50

## 2019-12-11 RX ADMIN — MECLIZINE HCL 50 MG: 25 TABLET, CHEWABLE ORAL at 14:24

## 2019-12-11 RX ADMIN — SODIUM CHLORIDE 500 ML: 9 INJECTION, SOLUTION INTRAVENOUS at 14:20

## 2019-12-11 RX ADMIN — DIPHENHYDRAMINE HYDROCHLORIDE 25 MG: 50 INJECTION INTRAMUSCULAR; INTRAVENOUS at 14:22

## 2019-12-11 RX ADMIN — ONDANSETRON 4 MG: 2 INJECTION INTRAMUSCULAR; INTRAVENOUS at 14:20

## 2019-12-11 RX ADMIN — FAMOTIDINE 20 MG: 10 INJECTION, SOLUTION INTRAVENOUS at 14:24

## 2019-12-11 RX ADMIN — METHYLPREDNISOLONE SODIUM SUCCINATE 125 MG: 125 INJECTION, POWDER, FOR SOLUTION INTRAMUSCULAR; INTRAVENOUS at 14:23

## 2019-12-11 RX ADMIN — DIAZEPAM 5 MG: 5 TABLET ORAL at 16:08

## 2019-12-11 ASSESSMENT — PAIN DESCRIPTION - LOCATION: LOCATION: HEAD

## 2019-12-11 ASSESSMENT — PAIN DESCRIPTION - DESCRIPTORS: DESCRIPTORS: THROBBING

## 2019-12-11 ASSESSMENT — ENCOUNTER SYMPTOMS
CONSTIPATION: 0
DIARRHEA: 0
NAUSEA: 0
BACK PAIN: 0
PHOTOPHOBIA: 0
VOMITING: 0
SHORTNESS OF BREATH: 0
ABDOMINAL PAIN: 0
COLOR CHANGE: 0

## 2019-12-11 ASSESSMENT — PAIN SCALES - GENERAL: PAINLEVEL_OUTOF10: 6

## 2019-12-11 ASSESSMENT — PAIN DESCRIPTION - PAIN TYPE: TYPE: ACUTE PAIN

## 2019-12-13 ENCOUNTER — CARE COORDINATION (OUTPATIENT)
Dept: CASE MANAGEMENT | Age: 69
End: 2019-12-13

## 2019-12-27 ENCOUNTER — HOSPITAL ENCOUNTER (OUTPATIENT)
Dept: PHYSICAL THERAPY | Age: 69
Setting detail: THERAPIES SERIES
Discharge: HOME | End: 2019-12-27
Payer: MEDICARE

## 2020-06-03 ENCOUNTER — HOSPITAL ENCOUNTER (OUTPATIENT)
Dept: CT IMAGING | Age: 70
Discharge: HOME | End: 2020-06-03
Payer: MEDICARE

## 2020-06-03 LAB — POC CREATININE WHOLE BLOOD: 0.8 MG/DL (ref 0.5–1.2)

## 2020-06-03 PROCEDURE — 74177 CT ABD & PELVIS W/CONTRAST: CPT

## 2020-06-03 PROCEDURE — 82565 ASSAY OF CREATININE: CPT

## 2020-06-03 PROCEDURE — 6360000004 HC RX CONTRAST MEDICATION: Performed by: FAMILY MEDICINE

## 2020-06-03 RX ADMIN — IOPAMIDOL 85 ML: 755 INJECTION, SOLUTION INTRAVENOUS at 08:47

## 2020-06-08 ENCOUNTER — OFFICE VISIT (OUTPATIENT)
Dept: CARDIOLOGY CLINIC | Age: 70
End: 2020-06-08
Payer: COMMERCIAL

## 2020-06-08 VITALS
DIASTOLIC BLOOD PRESSURE: 78 MMHG | BODY MASS INDEX: 23.14 KG/M2 | HEIGHT: 63 IN | WEIGHT: 130.6 LBS | HEART RATE: 78 BPM | SYSTOLIC BLOOD PRESSURE: 120 MMHG

## 2020-06-08 PROCEDURE — 99213 OFFICE O/P EST LOW 20 MIN: CPT | Performed by: NUCLEAR MEDICINE

## 2020-06-08 RX ORDER — PRAVASTATIN SODIUM 20 MG
20 TABLET ORAL NIGHTLY
Qty: 90 TABLET | Refills: 3 | Status: ON HOLD | OUTPATIENT
Start: 2020-06-08 | End: 2020-12-16 | Stop reason: HOSPADM

## 2020-06-08 RX ORDER — PROPAFENONE HYDROCHLORIDE 150 MG/1
150 TABLET, FILM COATED ORAL EVERY 8 HOURS
Qty: 270 TABLET | Refills: 3 | Status: SHIPPED | OUTPATIENT
Start: 2020-06-08 | End: 2021-06-10 | Stop reason: SDUPTHER

## 2020-06-08 RX ORDER — PRASUGREL 10 MG/1
10 TABLET, FILM COATED ORAL DAILY
Qty: 90 TABLET | Refills: 3 | Status: SHIPPED | OUTPATIENT
Start: 2020-06-08 | End: 2021-06-10 | Stop reason: SDUPTHER

## 2020-06-08 RX ORDER — FUROSEMIDE 20 MG/1
20 TABLET ORAL PRN
Qty: 90 TABLET | Refills: 3 | Status: ON HOLD | OUTPATIENT
Start: 2020-06-08 | End: 2021-06-05 | Stop reason: HOSPADM

## 2020-06-08 RX ORDER — ISOSORBIDE MONONITRATE 30 MG/1
30 TABLET, EXTENDED RELEASE ORAL DAILY
Qty: 90 TABLET | Refills: 3 | Status: SHIPPED | OUTPATIENT
Start: 2020-06-08 | End: 2021-06-10 | Stop reason: SDUPTHER

## 2020-10-20 ENCOUNTER — TELEPHONE (OUTPATIENT)
Dept: SURGERY | Age: 70
End: 2020-10-20

## 2020-11-03 PROBLEM — I10 HTN (HYPERTENSION): Status: RESOLVED | Noted: 2020-11-03 | Resolved: 2020-11-03

## 2020-12-14 ENCOUNTER — HOSPITAL ENCOUNTER (INPATIENT)
Age: 70
LOS: 2 days | Discharge: HOME | DRG: 287 | End: 2020-12-16
Attending: FAMILY MEDICINE | Admitting: INTERNAL MEDICINE
Payer: MEDICARE

## 2020-12-14 ENCOUNTER — APPOINTMENT (OUTPATIENT)
Dept: GENERAL RADIOLOGY | Age: 70
DRG: 287 | End: 2020-12-14
Payer: MEDICARE

## 2020-12-14 LAB
ALBUMIN SERPL-MCNC: 4.4 G/DL (ref 3.5–5.1)
ALP BLD-CCNC: 89 U/L (ref 38–126)
ALT SERPL-CCNC: 9 U/L (ref 11–66)
ANION GAP SERPL CALCULATED.3IONS-SCNC: 16 MEQ/L (ref 8–16)
APTT: 21.8 SECONDS (ref 22–38)
APTT: 81.6 SECONDS (ref 22–38)
AST SERPL-CCNC: 22 U/L (ref 5–40)
BILIRUB SERPL-MCNC: 1.2 MG/DL (ref 0.3–1.2)
BUN BLDV-MCNC: 18 MG/DL (ref 7–22)
CALCIUM SERPL-MCNC: 9.6 MG/DL (ref 8.5–10.5)
CHLORIDE BLD-SCNC: 102 MEQ/L (ref 98–111)
CO2: 24 MEQ/L (ref 23–33)
CREAT SERPL-MCNC: 1.1 MG/DL (ref 0.4–1.2)
EKG ATRIAL RATE: 98 BPM
EKG P AXIS: 45 DEGREES
EKG P-R INTERVAL: 148 MS
EKG Q-T INTERVAL: 352 MS
EKG QRS DURATION: 104 MS
EKG QTC CALCULATION (BAZETT): 449 MS
EKG R AXIS: 29 DEGREES
EKG T AXIS: 56 DEGREES
EKG VENTRICULAR RATE: 98 BPM
GFR SERPL CREATININE-BSD FRML MDRD: 49 ML/MIN/1.73M2
GLUCOSE BLD-MCNC: 106 MG/DL (ref 70–108)
INR BLD: 0.95 (ref 0.85–1.13)
OSMOLALITY CALCULATION: 285.4 MOSMOL/KG (ref 275–300)
POTASSIUM SERPL-SCNC: 3.7 MEQ/L (ref 3.5–5.2)
REASON FOR REJECTION: NORMAL
REJECTED TEST: NORMAL
SODIUM BLD-SCNC: 142 MEQ/L (ref 135–145)
TOTAL PROTEIN: 7 G/DL (ref 6.1–8)
TROPONIN T: < 0.01 NG/ML

## 2020-12-14 PROCEDURE — 2500000003 HC RX 250 WO HCPCS: Performed by: FAMILY MEDICINE

## 2020-12-14 PROCEDURE — 80053 COMPREHEN METABOLIC PANEL: CPT

## 2020-12-14 PROCEDURE — 84484 ASSAY OF TROPONIN QUANT: CPT

## 2020-12-14 PROCEDURE — 6370000000 HC RX 637 (ALT 250 FOR IP)

## 2020-12-14 PROCEDURE — 85610 PROTHROMBIN TIME: CPT

## 2020-12-14 PROCEDURE — 99284 EMERGENCY DEPT VISIT MOD MDM: CPT

## 2020-12-14 PROCEDURE — 71045 X-RAY EXAM CHEST 1 VIEW: CPT

## 2020-12-14 PROCEDURE — 2060000000 HC ICU INTERMEDIATE R&B

## 2020-12-14 PROCEDURE — 93005 ELECTROCARDIOGRAM TRACING: CPT | Performed by: FAMILY MEDICINE

## 2020-12-14 PROCEDURE — 96375 TX/PRO/DX INJ NEW DRUG ADDON: CPT

## 2020-12-14 PROCEDURE — 6360000002 HC RX W HCPCS: Performed by: FAMILY MEDICINE

## 2020-12-14 PROCEDURE — 85730 THROMBOPLASTIN TIME PARTIAL: CPT

## 2020-12-14 PROCEDURE — 36415 COLL VENOUS BLD VENIPUNCTURE: CPT

## 2020-12-14 PROCEDURE — 96365 THER/PROPH/DIAG IV INF INIT: CPT

## 2020-12-14 PROCEDURE — 6370000000 HC RX 637 (ALT 250 FOR IP): Performed by: STUDENT IN AN ORGANIZED HEALTH CARE EDUCATION/TRAINING PROGRAM

## 2020-12-14 PROCEDURE — 93010 ELECTROCARDIOGRAM REPORT: CPT | Performed by: INTERNAL MEDICINE

## 2020-12-14 RX ORDER — ONDANSETRON 4 MG/1
4 TABLET, ORALLY DISINTEGRATING ORAL 4 TIMES DAILY PRN
Status: DISCONTINUED | OUTPATIENT
Start: 2020-12-14 | End: 2020-12-16 | Stop reason: HOSPADM

## 2020-12-14 RX ORDER — HEPARIN SODIUM 1000 [USP'U]/ML
30 INJECTION, SOLUTION INTRAVENOUS; SUBCUTANEOUS PRN
Status: DISCONTINUED | OUTPATIENT
Start: 2020-12-14 | End: 2020-12-15

## 2020-12-14 RX ORDER — SODIUM CHLORIDE 0.9 % (FLUSH) 0.9 %
10 SYRINGE (ML) INJECTION EVERY 12 HOURS SCHEDULED
Status: DISCONTINUED | OUTPATIENT
Start: 2020-12-14 | End: 2020-12-15 | Stop reason: SDUPTHER

## 2020-12-14 RX ORDER — METOPROLOL TARTRATE 5 MG/5ML
5 INJECTION INTRAVENOUS ONCE
Status: COMPLETED | OUTPATIENT
Start: 2020-12-14 | End: 2020-12-14

## 2020-12-14 RX ORDER — ACETAMINOPHEN 500 MG
1000 TABLET ORAL ONCE
Status: COMPLETED | OUTPATIENT
Start: 2020-12-14 | End: 2020-12-14

## 2020-12-14 RX ORDER — ATORVASTATIN CALCIUM 80 MG/1
80 TABLET, FILM COATED ORAL NIGHTLY
Status: DISCONTINUED | OUTPATIENT
Start: 2020-12-14 | End: 2020-12-16 | Stop reason: HOSPADM

## 2020-12-14 RX ORDER — HYDROCODONE BITARTRATE AND ACETAMINOPHEN 7.5; 325 MG/1; MG/1
TABLET ORAL
Status: DISCONTINUED
Start: 2020-12-14 | End: 2020-12-14 | Stop reason: WASHOUT

## 2020-12-14 RX ORDER — PROMETHAZINE HYDROCHLORIDE 25 MG/1
12.5 TABLET ORAL EVERY 6 HOURS PRN
Status: DISCONTINUED | OUTPATIENT
Start: 2020-12-14 | End: 2020-12-16 | Stop reason: HOSPADM

## 2020-12-14 RX ORDER — POLYETHYLENE GLYCOL 3350 17 G/17G
17 POWDER, FOR SOLUTION ORAL DAILY PRN
Status: DISCONTINUED | OUTPATIENT
Start: 2020-12-14 | End: 2020-12-16 | Stop reason: HOSPADM

## 2020-12-14 RX ORDER — SODIUM CHLORIDE 0.9 % (FLUSH) 0.9 %
10 SYRINGE (ML) INJECTION PRN
Status: DISCONTINUED | OUTPATIENT
Start: 2020-12-14 | End: 2020-12-15 | Stop reason: SDUPTHER

## 2020-12-14 RX ORDER — ACETAMINOPHEN 500 MG
TABLET ORAL
Status: COMPLETED
Start: 2020-12-14 | End: 2020-12-14

## 2020-12-14 RX ORDER — NITROGLYCERIN 20 MG/100ML
10 INJECTION INTRAVENOUS CONTINUOUS
Status: DISCONTINUED | OUTPATIENT
Start: 2020-12-14 | End: 2020-12-16 | Stop reason: HOSPADM

## 2020-12-14 RX ORDER — PRASUGREL 10 MG/1
10 TABLET, FILM COATED ORAL DAILY
Status: DISCONTINUED | OUTPATIENT
Start: 2020-12-15 | End: 2020-12-16 | Stop reason: HOSPADM

## 2020-12-14 RX ORDER — PROPAFENONE HYDROCHLORIDE 150 MG/1
150 TABLET, FILM COATED ORAL EVERY 8 HOURS
Status: DISCONTINUED | OUTPATIENT
Start: 2020-12-14 | End: 2020-12-16 | Stop reason: HOSPADM

## 2020-12-14 RX ORDER — HEPARIN SODIUM 1000 [USP'U]/ML
60 INJECTION, SOLUTION INTRAVENOUS; SUBCUTANEOUS ONCE
Status: COMPLETED | OUTPATIENT
Start: 2020-12-14 | End: 2020-12-14

## 2020-12-14 RX ORDER — IPRATROPIUM BROMIDE AND ALBUTEROL SULFATE 2.5; .5 MG/3ML; MG/3ML
1 SOLUTION RESPIRATORY (INHALATION) EVERY 4 HOURS PRN
Status: DISCONTINUED | OUTPATIENT
Start: 2020-12-14 | End: 2020-12-16 | Stop reason: HOSPADM

## 2020-12-14 RX ORDER — ONDANSETRON 2 MG/ML
4 INJECTION INTRAMUSCULAR; INTRAVENOUS EVERY 6 HOURS PRN
Status: DISCONTINUED | OUTPATIENT
Start: 2020-12-14 | End: 2020-12-16 | Stop reason: HOSPADM

## 2020-12-14 RX ORDER — FAMOTIDINE 20 MG/1
20 TABLET, FILM COATED ORAL DAILY
Status: DISCONTINUED | OUTPATIENT
Start: 2020-12-15 | End: 2020-12-16 | Stop reason: HOSPADM

## 2020-12-14 RX ORDER — SODIUM CHLORIDE 9 MG/ML
INJECTION, SOLUTION INTRAVENOUS CONTINUOUS
Status: DISCONTINUED | OUTPATIENT
Start: 2020-12-14 | End: 2020-12-15 | Stop reason: ALTCHOICE

## 2020-12-14 RX ORDER — ASPIRIN 81 MG/1
81 TABLET ORAL DAILY
Status: DISCONTINUED | OUTPATIENT
Start: 2020-12-15 | End: 2020-12-16 | Stop reason: HOSPADM

## 2020-12-14 RX ORDER — HEPARIN SODIUM 1000 [USP'U]/ML
60 INJECTION, SOLUTION INTRAVENOUS; SUBCUTANEOUS PRN
Status: DISCONTINUED | OUTPATIENT
Start: 2020-12-14 | End: 2020-12-15

## 2020-12-14 RX ORDER — METOPROLOL TARTRATE 50 MG/1
25 TABLET, FILM COATED ORAL 2 TIMES DAILY
Status: DISCONTINUED | OUTPATIENT
Start: 2020-12-14 | End: 2020-12-16 | Stop reason: HOSPADM

## 2020-12-14 RX ORDER — ACETAMINOPHEN 325 MG/1
TABLET ORAL
Status: DISCONTINUED
Start: 2020-12-14 | End: 2020-12-14 | Stop reason: WASHOUT

## 2020-12-14 RX ORDER — ACETAMINOPHEN 325 MG/1
650 TABLET ORAL EVERY 4 HOURS PRN
Status: DISCONTINUED | OUTPATIENT
Start: 2020-12-14 | End: 2020-12-16 | Stop reason: HOSPADM

## 2020-12-14 RX ORDER — NITROGLYCERIN 0.3 MG/1
0.3 TABLET SUBLINGUAL EVERY 5 MIN PRN
Status: DISCONTINUED | OUTPATIENT
Start: 2020-12-14 | End: 2020-12-16 | Stop reason: HOSPADM

## 2020-12-14 RX ORDER — HEPARIN SODIUM 10000 [USP'U]/100ML
12 INJECTION, SOLUTION INTRAVENOUS CONTINUOUS
Status: DISCONTINUED | OUTPATIENT
Start: 2020-12-14 | End: 2020-12-15

## 2020-12-14 RX ORDER — FAMOTIDINE 20 MG/1
20 TABLET, FILM COATED ORAL 2 TIMES DAILY
Status: DISCONTINUED | OUTPATIENT
Start: 2020-12-14 | End: 2020-12-14

## 2020-12-14 RX ORDER — ACETAMINOPHEN 325 MG/1
650 TABLET ORAL EVERY 6 HOURS PRN
Status: DISCONTINUED | OUTPATIENT
Start: 2020-12-14 | End: 2020-12-14

## 2020-12-14 RX ADMIN — ACETAMINOPHEN 1000 MG: 500 TABLET ORAL at 15:42

## 2020-12-14 RX ADMIN — METOROPROLOL TARTRATE 5 MG: 5 INJECTION, SOLUTION INTRAVENOUS at 16:44

## 2020-12-14 RX ADMIN — ACETAMINOPHEN 650 MG: 325 TABLET ORAL at 21:35

## 2020-12-14 RX ADMIN — Medication 1000 MG: at 15:42

## 2020-12-14 RX ADMIN — PROPAFENONE HYDROCHLORIDE 150 MG: 150 TABLET, FILM COATED ORAL at 21:35

## 2020-12-14 RX ADMIN — HEPARIN SODIUM 3540 UNITS: 1000 INJECTION INTRAVENOUS; SUBCUTANEOUS at 16:49

## 2020-12-14 RX ADMIN — ATORVASTATIN CALCIUM 80 MG: 80 TABLET, FILM COATED ORAL at 21:35

## 2020-12-14 RX ADMIN — NITROGLYCERIN 10 MCG/MIN: 20 INJECTION INTRAVENOUS at 15:39

## 2020-12-14 RX ADMIN — HEPARIN SODIUM 12 UNITS/KG/HR: 10000 INJECTION, SOLUTION INTRAVENOUS at 16:51

## 2020-12-14 ASSESSMENT — PAIN DESCRIPTION - LOCATION
LOCATION: CHEST
LOCATION: BREAST;CHEST
LOCATION: CHEST

## 2020-12-14 ASSESSMENT — PAIN DESCRIPTION - FREQUENCY
FREQUENCY: CONTINUOUS

## 2020-12-14 ASSESSMENT — PAIN SCALES - GENERAL
PAINLEVEL_OUTOF10: 7
PAINLEVEL_OUTOF10: 7
PAINLEVEL_OUTOF10: 5
PAINLEVEL_OUTOF10: 7
PAINLEVEL_OUTOF10: 6
PAINLEVEL_OUTOF10: 7
PAINLEVEL_OUTOF10: 5

## 2020-12-14 ASSESSMENT — ENCOUNTER SYMPTOMS
NAUSEA: 0
VOMITING: 0
SHORTNESS OF BREATH: 0
ABDOMINAL PAIN: 0

## 2020-12-14 ASSESSMENT — PAIN DESCRIPTION - DESCRIPTORS: DESCRIPTORS: HEADACHE

## 2020-12-14 ASSESSMENT — PAIN DESCRIPTION - PAIN TYPE
TYPE: ACUTE PAIN

## 2020-12-14 ASSESSMENT — PAIN DESCRIPTION - ORIENTATION
ORIENTATION: LEFT

## 2020-12-14 NOTE — ED PROVIDER NOTES
[facility]  eMERGENCY dEPARTMENT eNCOUnter          CHIEF COMPLAINT       Chief Complaint   Patient presents with    Chest Pain       Nurses Notes reviewed and I agree except as noted in the HPI. HISTORY OF PRESENT ILLNESS    Dennise Wynn is a 69 y.o. female who presents chest pain    Location/Symptom: Left-sided chest pain  Timing/Onset: 12/14/2020 around 1400  Context/Setting: Known coronary artery disease  Previous coronary bypasses, stents  Remote pericardial window  Quit smoking many years ago  Quality: Left-sided pain  Continuous for the last 90 minutes  Slightly sweaty  No fevers or cough  Duration: 90 minutes  Modifying Factors: None  Severity: 5/10    REVIEW OF SYSTEMS     Review of Systems   Constitutional: Positive for diaphoresis. HENT: Negative for congestion. Respiratory: Negative for shortness of breath. Cardiovascular: Positive for chest pain. Negative for palpitations and leg swelling. Gastrointestinal: Negative for abdominal pain, nausea and vomiting. Genitourinary: Negative for flank pain. Musculoskeletal: Negative for joint swelling. Skin: Negative for rash. Neurological: Negative for light-headedness. Hematological:        On aspirin plus Effient   Psychiatric/Behavioral: Negative for confusion. PAST MEDICAL HISTORY    has a past medical history of Asthma, Atrial fibrillation ([address] Blood clot associated with vein wall inflammation, CAD (coronary artery disease), Cerebral artery occlusion with cerebral infarction ([address] CHF (congestive heart failure) ([address] COPD (chronic obstructive pulmonary disease) ([address] Hyperlipidemia, MI, old, Migraine, Pneumonia, PONV (postoperative nausea and vomiting), and Psychiatric problem.     SURGICAL HISTORY has a past surgical history that includes Hysterectomy; Appendectomy; Tonsillectomy; Cardiac surgery (1998 in [facility]); Cardiac surgery ( [facility]); Coronary angioplasty with stent ( ); Coronary artery bypass graft; Colonoscopy; and Endoscopy, colon, diagnostic. CURRENT MEDICATIONS       Previous Medications    ASPIRIN 81 MG TABLET    Take 81 mg by mouth daily    FAMOTIDINE (PEPCID) 20 MG TABLET    Take 1 tablet by mouth 2 times daily    FUROSEMIDE (LASIX) 20 MG TABLET    Take 1 tablet by mouth as needed (leg edema)    IPRATROPIUM-ALBUTEROL (DUONEB) 0.5-2.5 (3) MG/3ML SOLN NEBULIZER SOLUTION    Inhale 3 mLs into the lungs every 4 hours    ISOSORBIDE MONONITRATE (IMDUR) 30 MG EXTENDED RELEASE TABLET    Take 1 tablet by mouth daily    METOPROLOL TARTRATE (LOPRESSOR) 25 MG TABLET    Take 1 tablet by mouth 2 times daily    NITROGLYCERIN (NITROSTAT) 0.4 MG SL TABLET    Place 1 tablet under the tongue every 5 minutes as needed for Chest pain. ONDANSETRON (ZOFRAN ODT) 4 MG DISINTEGRATING TABLET    Take 4 mg by mouth 4 times daily as needed for Nausea or Vomiting    PRASUGREL (EFFIENT) 10 MG TABS    Take 1 tablet by mouth daily    PRAVASTATIN (PRAVACHOL) 20 MG TABLET    Take 1 tablet by mouth nightly    PROPAFENONE (RYTHMOL) 150 MG TABLET    Take 1 tablet by mouth every 8 hours       ALLERGIES     is allergic to celebrex [celecoxib]; contrast [iodides]; statins; naprosyn [naproxen]; and shrimp flavor. FAMILY HISTORY     She indicated that her mother is . She indicated that her father is . She indicated that two of her three sisters are alive. She indicated that two of her three brothers are alive. She indicated that her maternal grandmother is . She indicated that her maternal grandfather is . She indicated that her paternal grandfather is . She indicated that her maternal aunt is . She indicated that her maternal uncle is . family history includes Cancer in her brother, brother, sister, and sister; Diabetes in her brother and mother; Heart Disease in her brother, father, maternal aunt, maternal grandfather, maternal grandmother, maternal uncle, mother, and sister; High Blood Pressure in her mother; Stroke in her paternal grandfather and sister. SOCIAL HISTORY      reports that she quit smoking about 28 years ago. Her smoking use included cigarettes. She has never used smokeless tobacco. She reports current alcohol use. She reports that she does not use drugs. PHYSICAL EXAM     INITIAL VITALS:  height is 5' 3\" (1.6 m) and weight is 130 lb (59 kg). Her oral temperature is 97.9 °F (36.6 °C). Her blood pressure is 103/67 and her pulse is 75. Her respiration is 18 and oxygen saturation is 94%. Physical Exam  Vitals signs and nursing note reviewed. Constitutional:       Comments: GCS 15   HENT:      Head: Normocephalic and atraumatic. Eyes:      General: No scleral icterus. Extraocular Movements: Extraocular movements intact. Pupils: Pupils are equal, round, and reactive to light. Neck:      Musculoskeletal: Normal range of motion and neck supple. Comments: No JVD  Cardiovascular:      Rate and Rhythm: Normal rate and regular rhythm. Pulses: Normal pulses. Heart sounds: Normal heart sounds. Pulmonary:      Effort: Pulmonary effort is normal.      Breath sounds: Normal breath sounds. No wheezing. Abdominal:      Palpations: Abdomen is soft. Tenderness: There is no abdominal tenderness. There is no guarding or rebound. Musculoskeletal:         General: No swelling. Skin:     General: Skin is warm and dry. Neurological:      General: No focal deficit present. Mental Status: She is alert.    Psychiatric:         Mood and Affect: Mood normal.         Behavior: Behavior normal.           DIFFERENTIAL DIAGNOSIS:     Chest pain with known coronary disease; stents and bypasses remotely We will treat as ischemic      DIAGNOSTIC RESULTS     EKG: All EKG's are interpreted by the Emergency Department Physician who either signs or Co-signs this chart in the absence of a cardiologist.    EKG showed sinus rhythm with rate 98. QRS complexes show normal axis, 104 ms conduction. ST-T waves show ST segment sagging V1 to V3 possible anterior ischemia  Compared to prior EKG, ST segment sagging was previously present but is more prominent today      RADIOLOGY: non-plain film images(s) such as CT, Ultrasound and MRI are read by the radiologist.  The patient had a single view X-ray of the chest which demonstrates lungs were clear. Heart normal size. Anterior sternotomy wires are seen.     [x] Visualized and interpreted by me   [x] Radiologist's Wet Read Report Reviewed   [] Discussed with Radiologist.    LABS:   Labs Reviewed   APTT - Abnormal; Notable for the following components:       Result Value    aPTT 21.8 (*)     All other components within normal limits   COMPREHENSIVE METABOLIC PANEL - Abnormal; Notable for the following components:    ALT 9 (*)     All other components within normal limits   GLOMERULAR FILTRATION RATE, ESTIMATED - Abnormal; Notable for the following components:    Est, Glom Filt Rate 49 (*)     All other components within normal limits   PROTIME-INR   TROPONIN   SPECIMEN REJECTION   ANION GAP   OSMOLALITY   APTT   APTT   CBC WITH AUTO DIFFERENTIAL       EMERGENCY DEPARTMENT COURSE:   Vitals:    Vitals:    12/14/20 1526 12/14/20 1627 12/14/20 1656 12/14/20 1736   BP: 138/85 116/80 108/68 103/67   Pulse: 106 114 95 75   Resp: 16 16 16 18   Temp: 97.9 °F (36.6 °C)      TempSrc: Oral      SpO2: 97% 95% 95% 94%   Weight: 130 lb (59 kg)      Height: 5' 3\" (1.6 m)        Nursing notes reviewed    Started on nitroglycerin drip    We will start on heparin    Given 5 mg Lopressor  IV Interventional cardiology was contacted and reviewed her EKG and he was comfortable with medical admission and not acute intervention at this time    Troponin was normal    Creatinine normal    Admit to hospitalist        CRITICAL CARE:   none    CONSULTS:  Dr. Daniela Stone. Ochoa PAC    PROCEDURES:  None    FINAL IMPRESSION      1. Chest pain, unspecified type          DISPOSITION/PLAN   Admit      PATIENT REFERRED TO:  No follow-up provider specified.     DISCHARGE MEDICATIONS:  New Prescriptions    No medications on file       (Please note that portions of this note were completed with a voice recognition program.  Efforts were made to edit the dictations but occasionally words are mis-transcribed.)    MD Sandie Valdes MD  12/14/20 [id number]

## 2020-12-14 NOTE — ED NOTES
Patient presents to ED for chest pain that began approximately 1 hour ago. States this morning she woke up around 0300hrs with dizziness. 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 Patient states her sister picked her up and she took [place] when she got home and laid down for a nap. Patient reports when she woke up she had left sided chest pain. Rates pain 7/10. Shows no signs of distress at this time. Skin warm and dry. Respirations easy and unlabored.       Luisana Hooper RN  12/14/20 [id number]

## 2020-12-14 NOTE — ED NOTES
Patient resting in bed at this time, patient denies any needs. Rates pain 5/10, gradually improving. Call light within reach. Will continue to monitor.       Marco Amezcua, [place]  43/55/31 [id number]

## 2020-12-14 NOTE — ED NOTES
ED nurse-to-nurse bedside report    Chief Complaint   Patient presents with    Chest Pain      LOC: alert and orientated to name, place, date  Vital signs   Vitals:    12/14/20 1526 12/14/20 1627 12/14/20 1656 12/14/20 1736   BP: 138/85 116/80 108/68 103/67   Pulse: 106 114 95 75   Resp: 16 16 16 18   Temp: 97.9 °F (36.6 °C)      TempSrc: Oral      SpO2: 97% 95% 95% 94%   Weight: 130 lb (59 kg)      Height: 5' 3\" (1.6 m)         Pain:    Pain Interventions: nitro  Pain Goal:   Oxygen: No    Current needs required none   Telemetry: Yes  LDAs:   Peripheral IV 12/14/20 Right Antecubital (Active)   Site Assessment Clean;Dry; Intact 12/14/20 1656   Line Status Normal saline locked; Infusing 12/14/20 1656   Dressing Status Dry; Intact; Clean 12/14/20 1656   Dressing Intervention New 12/14/20 1531     Continuous Infusions:    nitroGLYCERIN 15 mcg/min (12/14/20 1655)    heparin (PORCINE) Infusion 12 Units/kg/hr (12/14/20 1651)     Mobility: Requires assistance * 1  Barrios Fall Risk Score: No flowsheet data found.   Fall Interventions: call light within reach, bed rails up  Report given to: Salma Devi RN  12/14/20 780 [facility]

## 2020-12-14 NOTE — ED NOTES
Consult to cardiology done. Dr. Jerome Dooley. Waiting on response.       Aristeo Dockery, RN  12/14/20 6862

## 2020-12-14 NOTE — H&P
David Born in a 69year old who presents to the emergency department for evaluation of chest pain. Patient has significant PMHx including previous MI, CAD s/p CABG distal LMA, ostial LAD and left circ (2016), previous pericardial window, atrial fibrillation, HLD, Asthma. Patient states that when she woke up this morning, she did not feel good, felt nauseous but did not have any chest pain or shortness of breath. 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 Patient went to her sister's home and took a nap but was awaken by the chest pain. Describes that the pain was sharp, stabbing, located on the left side of the chest, underneath the breast bone, radiating to the left back, 8/10, associated with nausea and diaphoresis but no shortness of breath. States that moving and taking a deep breath made the pain worse. She did not take any medications to make the pain better. Reports that she has been having shortness of breath on exertion and she does sleep on four pillows. Feels \"suffocating\" if she lays down. At the time of history taking, her chest pain decreased in intensity, but continues to be present. Initial vital signs: temp 97.9 afebrile, respiratory rate 16, heart rate 106, blood prssure 138/85, SpO2 97% in room air. Initial lab reveals: Sodium level 142, potassium 3.7, chloride 102, bicarb 24, BUN 18, creatinine 1.1, glucose 106, Troponin level <0.010, Chest xray does not reveal cardiopulmonary pathology, EKG reveals ST segment sagging V1 to V3 possible anterior ischemia. ED course: Patient received Lopressor 5 mg, started on Nitroglycerin gtt, Heparin gtt. Interventional cardiology was consulted.        Past Medical History:        Diagnosis Date    Asthma     Atrial fibrillation ([facility] [address]     Blood clot associated with vein wall inflammation     CAD (coronary artery disease)     Cerebral artery occlusion with cerebral infarction ([address]  CHF (congestive heart failure) (HCC)     COPD (chronic obstructive pulmonary disease) (HCC)     Hyperlipidemia     MI, old     Migraine     Pneumonia     PONV (postoperative nausea and vomiting)     Psychiatric problem        Past Surgical History:        Procedure Laterality Date    APPENDECTOMY      [facility]AC SURGERY  [facility] in [facility]    pericardial window   [address]    ablation    COLONOSCOPY      CORONARY ANGIOPLASTY WITH STENT PLACEMENT  2013     [address] [facility]    CORONARY ARTERY BYPASS GRAFT      ENDOSCOPY, COLON, DIAGNOSTIC      HYSTERECTOMY      TONSILLECTOMY         Home Medications:   No current facility-administered medications on file prior to encounter. Current Outpatient Medications on File Prior to Encounter   Medication Sig Dispense Refill    furosemide (LASIX) 20 MG tablet Take 1 tablet by mouth as needed (leg edema) 90 tablet 3    isosorbide mononitrate (IMDUR) 30 MG extended release tablet Take 1 tablet by mouth daily 90 tablet 3    metoprolol tartrate (LOPRESSOR) 25 MG tablet Take 1 tablet by mouth 2 times daily 180 tablet 3    prasugrel (EFFIENT) 10 MG TABS Take 1 tablet by mouth daily 90 tablet 3    pravastatin (PRAVACHOL) 20 MG tablet Take 1 tablet by mouth nightly 90 tablet 3    propafenone (RYTHMOL) 150 MG tablet Take 1 tablet by mouth every 8 hours 270 tablet 3    famotidine (PEPCID) 20 MG tablet Take 1 tablet by mouth 2 times daily 60 tablet 0    ipratropium-albuterol (DUONEB) 0.5-2.5 (3) MG/3ML SOLN nebulizer solution Inhale 3 mLs into the lungs every 4 hours 360 mL 0    ondansetron (ZOFRAN ODT) 4 MG disintegrating tablet Take 4 mg by mouth 4 times daily as needed for Nausea or Vomiting      aspirin 81 MG tablet Take 81 mg by mouth daily      nitroGLYCERIN (NITROSTAT) 0.4 MG SL tablet Place 1 tablet under the tongue every 5 minutes as needed for Chest pain.  25 tablet 0       Allergies: Celebrex [celecoxib], Contrast [iodides], Statins, Naprosyn [naproxen], and Shrimp flavor    Social History:    reports that she quit smoking about 28 years ago. Her smoking use included cigarettes. She has never used smokeless tobacco. She reports current alcohol use. She reports that she does not use drugs. Family History:       Problem Relation Age of Onset    Diabetes Mother     Heart Disease Mother     High Blood Pressure Mother     Cancer Sister     Heart Disease Father     Cancer Brother     Heart Disease Maternal Aunt         MI    Heart Disease Maternal Uncle         MI    Heart Disease Maternal Grandmother         MI    Heart Disease Maternal Grandfather         MI    Stroke Paternal Grandfather     Cancer Sister     Heart Disease Sister         atrial fib    Cancer Brother     Heart Disease Brother         needs stents and has atrial fib    Diabetes Brother     Stroke Sister        Diet:  No diet orders on file    Review of systems:   Pertinent positives as noted in the HPI. All other systems reviewed and negative. PHYSICAL EXAM:  /68   Pulse 95   Temp 97.9 °F (36.6 °C) (Oral)   Resp 16   Ht 5' 3\" (1.6 m)   Wt 130 lb (59 kg)   SpO2 95%   BMI 23.03 kg/m²   General appearance: No apparent distress, appears stated age and cooperative. HEENT: Normal cephalic, atraumatic without obvious deformity. Pupils equal, round, and reactive to light. Extra ocular muscles intact. Conjunctivae/corneas clear. Neck: Supple, with full range of motion. No jugular venous distention. Trachea midline. Respiratory:  Normal respiratory effort. Clear to auscultation, bilaterally without Rales/Wheezes/Rhonchi. Cardiovascular: Tachycardia present, normal S1/S2 without murmurs, rubs or gallops. Abdomen: Soft, non-tender, non-distended with normal bowel sounds. Musculoskeletal:  No clubbing, cyanosis or edema bilaterally. Skin: Skin color, texture, turgor normal.  No rashes or lesions. Neurologic:  Neurovascularly intact without any focal sensory/motor deficits. Cranial nerves: II-XII intact, grossly non-focal.  Psychiatric: Alert and oriented, thought content appropriate, normal insight  Capillary Refill: Brisk,< 3 seconds   Peripheral Pulses: +2 palpable, equal bilaterally     Labs:   No results for input(s): WBC, HGB, HCT, PLT in the last 72 hours. Recent Labs     12/14/20  1530      K 3.7      CO2 24   BUN 18   CREATININE 1.1   CALCIUM 9.6     Recent Labs     12/14/20  1530   AST 22   ALT 9*   BILITOT 1.2   ALKPHOS 89     Recent Labs     12/14/20  1530   INR 0.95     No results for input(s): Troy Herrera in the last 72 hours. Urinalysis:    Lab Results   Component Value Date    NITRU NEGATIVE 07/22/2016    WBCUA 2-4 07/22/2016    BACTERIA NONE 07/22/2016    RBCUA 0-2 07/22/2016    BLOODU NEGATIVE 07/22/2016    SPECGRAV 1.009 07/22/2016    GLUCOSEU NEGATIVE 05/20/2013       Radiology:   XR CHEST PORTABLE   Final Result   No acute findings               **This report has been created using voice recognition software. It may contain minor errors which are inherent in voice recognition technology. **      Final report electronically signed by Dr. Karen Hurley on 12/14/2020 3:51 PM        Xr Chest Portable    Result Date: 12/14/2020  PROCEDURE: XR CHEST PORTABLE CLINICAL INFORMATION: Left-sided chest pain. COMPARISON: December 11, 2019 TECHNIQUE: Portable chest. FINDINGS: Median sternotomy wires No lobar consolidation. Costophrenic angles are preserved. Cardiomediastinal silhouette is within normal limits. Ectatic thoracic aorta. No acute osseous findings. Eventration of the right hemidiaphragm. Mild elevation of the left hemidiaphragm. No acute findings **This report has been created using voice recognition software. It may contain minor errors which are inherent in voice recognition technology. ** Final report electronically signed by Dr. Karen Hurley on 12/14/2020 3:51 PM EKG: ST&T wave abnormality, consider anterior ischemia     Electronically signed by Stephen Lee MD on 12/14/2020 at 5:32 PM

## 2020-12-15 ENCOUNTER — APPOINTMENT (OUTPATIENT)
Dept: CARDIAC CATH/INVASIVE PROCEDURES | Age: 70
DRG: 287 | End: 2020-12-15
Payer: MEDICARE

## 2020-12-15 LAB
APTT: 60.3 SECONDS (ref 22–38)
APTT: 74 SECONDS (ref 22–38)
EKG ATRIAL RATE: 67 BPM
EKG P AXIS: 90 DEGREES
EKG P-R INTERVAL: 206 MS
EKG Q-T INTERVAL: 434 MS
EKG QRS DURATION: 104 MS
EKG QTC CALCULATION (BAZETT): 458 MS
EKG R AXIS: 38 DEGREES
EKG T AXIS: 44 DEGREES
EKG VENTRICULAR RATE: 67 BPM
ERYTHROCYTE [DISTWIDTH] IN BLOOD BY AUTOMATED COUNT: 13.5 % (ref 11.5–14.5)
ERYTHROCYTE [DISTWIDTH] IN BLOOD BY AUTOMATED COUNT: 46.3 FL (ref 35–45)
HCT VFR BLD CALC: 40.6 % (ref 37–47)
HEMOGLOBIN: 13.1 GM/DL (ref 12–16)
LV EF: 43 %
LVEF MODALITY: NORMAL
MCH RBC QN AUTO: 30.1 PG (ref 26–33)
MCHC RBC AUTO-ENTMCNC: 32.3 GM/DL (ref 32.2–35.5)
MCV RBC AUTO: 93.3 FL (ref 81–99)
PLATELET # BLD: 189 THOU/MM3 (ref 130–400)
PMV BLD AUTO: 10 FL (ref 9.4–12.4)
RBC # BLD: 4.35 MILL/MM3 (ref 4.2–5.4)
WBC # BLD: 7.8 THOU/MM3 (ref 4.8–10.8)

## 2020-12-15 PROCEDURE — 2580000003 HC RX 258: Performed by: INTERNAL MEDICINE

## 2020-12-15 PROCEDURE — 85027 COMPLETE CBC AUTOMATED: CPT

## 2020-12-15 PROCEDURE — 99223 1ST HOSP IP/OBS HIGH 75: CPT | Performed by: INTERNAL MEDICINE

## 2020-12-15 PROCEDURE — 2580000003 HC RX 258: Performed by: STUDENT IN AN ORGANIZED HEALTH CARE EDUCATION/TRAINING PROGRAM

## 2020-12-15 PROCEDURE — 6360000002 HC RX W HCPCS: Performed by: INTERNAL MEDICINE

## 2020-12-15 PROCEDURE — 2060000000 HC ICU INTERMEDIATE R&B

## 2020-12-15 PROCEDURE — 36415 COLL VENOUS BLD VENIPUNCTURE: CPT

## 2020-12-15 PROCEDURE — 93459 L HRT ART/GRFT ANGIO: CPT | Performed by: INTERNAL MEDICINE

## 2020-12-15 PROCEDURE — C1894 INTRO/SHEATH, NON-LASER: HCPCS

## 2020-12-15 PROCEDURE — B41F0ZZ FLUOROSCOPY OF RIGHT LOWER EXTREMITY ARTERIES USING HIGH OSMOLAR CONTRAST: ICD-10-PCS | Performed by: INTERNAL MEDICINE

## 2020-12-15 PROCEDURE — 93010 ELECTROCARDIOGRAM REPORT: CPT | Performed by: INTERNAL MEDICINE

## 2020-12-15 PROCEDURE — B2151ZZ FLUOROSCOPY OF LEFT HEART USING LOW OSMOLAR CONTRAST: ICD-10-PCS | Performed by: INTERNAL MEDICINE

## 2020-12-15 PROCEDURE — 85730 THROMBOPLASTIN TIME PARTIAL: CPT

## 2020-12-15 PROCEDURE — 2500000003 HC RX 250 WO HCPCS

## 2020-12-15 PROCEDURE — 6360000004 HC RX CONTRAST MEDICATION: Performed by: INTERNAL MEDICINE

## 2020-12-15 PROCEDURE — 2709999900 HC NON-CHARGEABLE SUPPLY

## 2020-12-15 PROCEDURE — G0278 ILIAC ART ANGIO,CARDIAC CATH: HCPCS | Performed by: INTERNAL MEDICINE

## 2020-12-15 PROCEDURE — 93306 TTE W/DOPPLER COMPLETE: CPT

## 2020-12-15 PROCEDURE — 94761 N-INVAS EAR/PLS OXIMETRY MLT: CPT

## 2020-12-15 PROCEDURE — 93005 ELECTROCARDIOGRAM TRACING: CPT | Performed by: STUDENT IN AN ORGANIZED HEALTH CARE EDUCATION/TRAINING PROGRAM

## 2020-12-15 PROCEDURE — B2111ZZ FLUOROSCOPY OF MULTIPLE CORONARY ARTERIES USING LOW OSMOLAR CONTRAST: ICD-10-PCS | Performed by: INTERNAL MEDICINE

## 2020-12-15 PROCEDURE — 6360000002 HC RX W HCPCS

## 2020-12-15 PROCEDURE — C1769 GUIDE WIRE: HCPCS

## 2020-12-15 PROCEDURE — 6370000000 HC RX 637 (ALT 250 FOR IP): Performed by: STUDENT IN AN ORGANIZED HEALTH CARE EDUCATION/TRAINING PROGRAM

## 2020-12-15 PROCEDURE — 99232 SBSQ HOSP IP/OBS MODERATE 35: CPT | Performed by: INTERNAL MEDICINE

## 2020-12-15 PROCEDURE — B2121ZZ FLUOROSCOPY OF SINGLE CORONARY ARTERY BYPASS GRAFT USING LOW OSMOLAR CONTRAST: ICD-10-PCS | Performed by: INTERNAL MEDICINE

## 2020-12-15 RX ORDER — SODIUM CHLORIDE 0.9 % (FLUSH) 0.9 %
10 SYRINGE (ML) INJECTION PRN
Status: DISCONTINUED | OUTPATIENT
Start: 2020-12-15 | End: 2020-12-16 | Stop reason: HOSPADM

## 2020-12-15 RX ORDER — DIPHENHYDRAMINE HYDROCHLORIDE 50 MG/ML
25 INJECTION INTRAMUSCULAR; INTRAVENOUS ONCE
Status: COMPLETED | OUTPATIENT
Start: 2020-12-15 | End: 2020-12-15

## 2020-12-15 RX ORDER — ACETAMINOPHEN 325 MG/1
650 TABLET ORAL EVERY 4 HOURS PRN
Status: DISCONTINUED | OUTPATIENT
Start: 2020-12-15 | End: 2020-12-15 | Stop reason: ALTCHOICE

## 2020-12-15 RX ORDER — SODIUM CHLORIDE 0.9 % (FLUSH) 0.9 %
10 SYRINGE (ML) INJECTION EVERY 12 HOURS SCHEDULED
Status: DISCONTINUED | OUTPATIENT
Start: 2020-12-15 | End: 2020-12-16 | Stop reason: HOSPADM

## 2020-12-15 RX ORDER — ATROPINE SULFATE 0.4 MG/ML
0.5 AMPUL (ML) INJECTION
Status: ACTIVE | OUTPATIENT
Start: 2020-12-15 | End: 2020-12-15

## 2020-12-15 RX ORDER — SODIUM CHLORIDE 9 MG/ML
INJECTION, SOLUTION INTRAVENOUS CONTINUOUS
Status: DISCONTINUED | OUTPATIENT
Start: 2020-12-15 | End: 2020-12-16 | Stop reason: HOSPADM

## 2020-12-15 RX ORDER — METHYLPREDNISOLONE SODIUM SUCCINATE 125 MG/2ML
125 INJECTION, POWDER, LYOPHILIZED, FOR SOLUTION INTRAMUSCULAR; INTRAVENOUS ONCE
Status: COMPLETED | OUTPATIENT
Start: 2020-12-15 | End: 2020-12-15

## 2020-12-15 RX ADMIN — PRASUGREL 10 MG: 10 TABLET, FILM COATED ORAL at 09:21

## 2020-12-15 RX ADMIN — PROPAFENONE HYDROCHLORIDE 150 MG: 150 TABLET, FILM COATED ORAL at 20:45

## 2020-12-15 RX ADMIN — METHYLPREDNISOLONE SODIUM SUCCINATE 125 MG: 125 INJECTION, POWDER, FOR SOLUTION INTRAMUSCULAR; INTRAVENOUS at 12:43

## 2020-12-15 RX ADMIN — SODIUM CHLORIDE: 9 INJECTION, SOLUTION INTRAVENOUS at 12:48

## 2020-12-15 RX ADMIN — FAMOTIDINE 20 MG: 20 TABLET, FILM COATED ORAL at 09:21

## 2020-12-15 RX ADMIN — IOPAMIDOL 100 ML: 755 INJECTION, SOLUTION INTRAVENOUS at 14:10

## 2020-12-15 RX ADMIN — PROPAFENONE HYDROCHLORIDE 150 MG: 150 TABLET, FILM COATED ORAL at 12:29

## 2020-12-15 RX ADMIN — METOPROLOL TARTRATE 25 MG: 50 TABLET, FILM COATED ORAL at 08:58

## 2020-12-15 RX ADMIN — ASPIRIN 81 MG: 81 TABLET, COATED ORAL at 09:21

## 2020-12-15 RX ADMIN — Medication 10 ML: at 12:28

## 2020-12-15 RX ADMIN — SODIUM CHLORIDE: 9 INJECTION, SOLUTION INTRAVENOUS at 14:30

## 2020-12-15 RX ADMIN — ACETAMINOPHEN 650 MG: 325 TABLET ORAL at 06:26

## 2020-12-15 RX ADMIN — DIPHENHYDRAMINE HYDROCHLORIDE 25 MG: 50 INJECTION, SOLUTION INTRAMUSCULAR; INTRAVENOUS at 12:45

## 2020-12-15 RX ADMIN — ATORVASTATIN CALCIUM 80 MG: 80 TABLET, FILM COATED ORAL at 23:01

## 2020-12-15 ASSESSMENT — PAIN SCALES - GENERAL
PAINLEVEL_OUTOF10: 0
PAINLEVEL_OUTOF10: 0
PAINLEVEL_OUTOF10: 5

## 2020-12-15 NOTE — ED NOTES
ED to inpatient nurses report    Chief Complaint   Patient presents with    Chest Pain      Present to ED from home  LOC: alert and orientated to name, place, date  Vital signs   Vitals:    12/15/20 0029 12/15/20 0154 12/15/20 0428 12/15/20 0614   BP: 108/72 96/65 98/62 115/74   Pulse: 71 69 74 73   Resp: 16 15 14 15   Temp:       TempSrc:       SpO2: 94% 92% 94% 97%   Weight:       Height:          Oxygen Baseline RA    Current needs required RA Bipap/Cpap No  LDAs:   Peripheral IV 12/14/20 Right Antecubital (Active)   Site Assessment Clean;Dry; Intact 12/14/20 1656   Line Status Normal saline locked; Infusing 12/14/20 1656   Dressing Status Dry; Intact; Clean 12/14/20 1656   Dressing Intervention New 12/14/20 1531     Mobility: Independent  Pending ED orders: n/a  Present condition: Resting comfortably on cot with even and unlabored respirations. Family at bedside.           Electronically signed by Gloria Temple RN on 12/15/2020 at Jordyn Duarte RN  12/15/20 2011

## 2020-12-15 NOTE — PROGRESS NOTES
Pharmacy Renal Adjustment    Bouchra Mares is a 69 y.o. female. Pharmacy renally adjust the following medications per P&T approved policy: famotidine    Recent Labs     12/14/20  1530   BUN 18       Recent Labs     12/14/20  1530   CREATININE 1.1       Estimated Creatinine Clearance: 39 mL/min (based on SCr of 1.1 mg/dL). Height:   Ht Readings from Last 1 Encounters:   12/14/20 5' 3\" (1.6 m)     Weight:  Wt Readings from Last 1 Encounters:   12/14/20 130 lb (59 kg)       Baseline SCr: 1.0    Plan: Adjustments based on renal function:          Decrease famotidine 20 mg BID to famotidine 20 mg daily.     Heather Vásquez, PharmD 12/14/2020  8:20 PM

## 2020-12-15 NOTE — ED NOTES
ED nurse-to-nurse bedside report    Chief Complaint   Patient presents with    Chest Pain      LOC: alert and orientated to name, place, date  Vital signs   Vitals:    12/14/20 1656 12/14/20 1736 12/14/20 1850 12/14/20 1932   BP: 108/68 103/67 115/63 111/67   Pulse: 95 75 73 79   Resp: 16 18 20 18   Temp:       TempSrc:       SpO2: 95% 94% 95% 95%   Weight:       Height:          Pain:    Pain Interventions: tylenol  Pain Goal:   Oxygen: No    Current needs required RA   Telemetry: Yes  LDAs:   Peripheral IV 12/14/20 Right Antecubital (Active)   Site Assessment Clean;Dry; Intact 12/14/20 1656   Line Status Normal saline locked; Infusing 12/14/20 1656   Dressing Status Dry; Intact; Clean 12/14/20 1656   Dressing Intervention New 12/14/20 1531     Continuous Infusions:    nitroGLYCERIN 15 mcg/min (12/14/20 1655)    heparin (PORCINE) Infusion 12 Units/kg/hr (12/14/20 1651)    sodium chloride       Mobility: Requires assistance * 2  Barrios Fall Risk Score: No flowsheet data found. Fall Interventions: SR up x 2.  Bed low position  Report given to: [place] RN Otilio Romberg, RN  12/14/20 3390

## 2020-12-15 NOTE — ED NOTES
EKG complete and sent to [facility] via [employer] message.      Estela Gonzalez RN  12/15/20 3207

## 2020-12-15 NOTE — ED NOTES
Blood work collected and sent to lab. Pt resting quietly in room no needs expressed. Side rails up x2 with call light in reach. Will continue to monitor.        Steph Lizama RN  12/15/20 4316

## 2020-12-15 NOTE — ED NOTES
Perfect serve message to Dr. Marcia Mccoy. Pt requesting something for HA. Waiting for further orders.       Jeannette Lopes RN  12/14/20 2007       Jeannette Lopes RN  12/14/20 2008 None Done

## 2020-12-15 NOTE — ED NOTES
Pt resting with eyes closed. Respirations are even and unlabored. Family at bedside.       Arina Rivas RN  12/15/20 7580

## 2020-12-15 NOTE — PROGRESS NOTES
Pharmacy Heparin Consult    Rossana Mendoza is a 69 y.o. female. Pharmacy has been consulted to adjust heparin.     Height:   Ht Readings from Last 1 Encounters:   12/14/20 5' 3\" (1.6 m)     Weight:  Wt Readings from Last 1 Encounters:   12/14/20 130 lb (59 kg)       Heparin Indication: ACS  Bolus Permitted: yes  Goal aPTT: 60-95    Plan:  Bolus: 3540 units (per ED)  Rate: 12 units/kg/hr  Next aPTT: 2300     Brianda Parrish RPh, BCPS, BCGP  12/15/2020     1:28 AM

## 2020-12-15 NOTE — PROGRESS NOTES
Heparin Consult  Lab Results   Component Value Date    APTT 81.6 12/14/2020     Lab Results   Component Value Date    HGB 12.9 12/11/2019    HCT 39.5 12/11/2019     12/11/2019    INR 0.95 12/14/2020       Current Rate: 12 units per kg per hour    Plan:  Bolus: none  Rate: continue at 12 units/kg/hr  Next aPTT: 0500    Brianda Parrish RP, BCPS, BCGP  12/15/2020     1:29 AM

## 2020-12-15 NOTE — ED NOTES
Pt in bed, side rails up x2. EKG completed, vitals assessed. Requesting Tylenol for pain 5/10. No other needs at this time. Will continue to monitor.       Anthony Pacheco  12/15/20 3224

## 2020-12-15 NOTE — ED NOTES
Pt resting quietly in room with eyes closed. Respirations are even and unlabored. Side rails up x2 with call light in reach. Will continue to monitor.        Helen Pope RN  12/15/20 0035

## 2020-12-15 NOTE — CONSULTS
[facility]'s  Cardiology Consult        Patient:  Johnnie Mtat  YOB: 1950  MRN: [id number]     Acct: [de-identified]    PCP: Neyda Toledo MD    Date of Admission: 12/14/2020      Reason for Consultation:  Chest pain/ Unstable angina       History Of Present Illness:    69 y.o. pleasant female c hx of CAD s/p CABG 2016, pericardial window, CVA, Asthma, HTN and HLD who presented to the hospital with complaints of chest pain. The chest pain started at 4 pm, woke her up from sleep, felt nauseous, nonradiating, no aggravating factors, but alleviated by NTG, associated with nausea. Somes times she did feel like it was worse on deep breathing. EKG shows SR with ST changes in V1-V3 slightly worse than previous. Trops x 3 negative. She reports taking her Aspirin, and Effient regularly. Cardiology was consulted for chest pain.         Past Medical History:          Diagnosis Date    Asthma     Atrial fibrillation ([address]     Blood clot associated with vein wall inflammation     CAD (coronary artery disease)     Cerebral artery occlusion with cerebral infarction ([address]     CHF (congestive heart failure) (HCC)     COPD (chronic obstructive pulmonary disease) (HCC)     History of blood transfusion     Hx of blood clots     Hyperlipidemia     Hypertension     MI, old     Migraine     Pneumonia     PONV (postoperative nausea and vomiting)     Psychiatric problem        Past Surgical History:          Procedure Laterality Date    APPENDECTOMY      CARDIAC SURGERY  1998 in [facility]    pericardial window   [address]    ablation    COLONOSCOPY      CORONARY ANGIOPLASTY WITH STENT PLACEMENT  [facility]    CORONARY ARTERY BYPASS GRAFT      ENDOSCOPY, COLON, DIAGNOSTIC      EYE SURGERY Bilateral 07/2020    cataract removal    HERNIA REPAIR      long ago Dr. Louisa Alpers @ [facility] Medications Prior to Admission:      Prior to Admission medications    Medication Sig Start Date End Date Taking? Authorizing Provider   furosemide (LASIX) 20 MG tablet Take 1 tablet by mouth as needed (leg edema) 6/8/20  Yes Braxton Duval MD   isosorbide mononitrate (IMDUR) 30 MG extended release tablet Take 1 tablet by mouth daily 6/8/20  Yes Braxton Duval MD   metoprolol tartrate (LOPRESSOR) 25 MG tablet Take 1 tablet by mouth 2 times daily 6/8/20  Yes Braxton Duval MD   prasugrel (EFFIENT) 10 MG TABS Take 1 tablet by mouth daily 6/8/20  Yes Braxton Duval MD   ondansetron (ZOFRAN ODT) 4 MG disintegrating tablet Take 4 mg by mouth 4 times daily as needed for Nausea or Vomiting   Yes Historical Provider, MD   aspirin 81 MG tablet Take 81 mg by mouth daily   Yes Historical Provider, MD   pravastatin (PRAVACHOL) 20 MG tablet Take 1 tablet by mouth nightly 6/8/20   Braxton Duval MD   propafenone (RYTHMOL) 150 MG tablet Take 1 tablet by mouth every 8 hours  Patient taking differently: Take 150 mg by mouth 2 times daily  6/8/20   Braxton Duval MD   nitroGLYCERIN (NITROSTAT) 0.4 MG SL tablet Place 1 tablet under the tongue every 5 minutes as needed for Chest pain.  2/25/15   Darylene Distad, APRN - CNP       Current Facility-Administered Medications   Medication Dose Route Frequency Provider Last Rate Last Admin    nitroGLYCERIN 50 mg in dextrose 5% 250 mL infusion  10 mcg/min Intravenous Continuous Leroy Hodges MD 4.5 mL/hr at 12/15/20 0809 15 mcg/min at 12/15/20 0809    heparin (porcine) injection 3,540 Units  60 Units/kg Intravenous PRN Leroy Hodges MD        heparin (porcine) injection 1,770 Units  30 Units/kg Intravenous PRN Leroy Hodges MD        heparin 25,000 unit in sodium chloride 0.45% 250 mL infusion  12 Units/kg/hr Intravenous Continuous Leroy Hodges MD 7.1 mL/hr at 12/15/20 0800 12 Units/kg/hr at 12/15/20 0800  aspirin EC tablet 81 mg  81 mg Oral Daily Leroy Hodges MD   81 mg at 12/15/20 0921    ipratropium-albuterol (DUONEB) nebulizer solution 3 mL  1 vial Inhalation Q4H PRN Leroy Hodges MD        metoprolol tartrate (LOPRESSOR) tablet 25 mg  25 mg Oral BID Leroy Hodges MD   25 mg at 12/15/20 0858    nitroGLYCERIN (NITROSTAT) SL tablet 0.3 mg  0.3 mg Sublingual Q5 Min PRN Leroy Hodges MD        ondansetron (ZOFRAN-ODT) disintegrating tablet 4 mg  4 mg Oral 4x Daily PRN Leroy Hodges MD        prasugrel (EFFIENT) tablet 10 mg  10 mg Oral Daily Leroy Hodges MD   10 mg at 12/15/20 0921    propafenone (RYTHMOL) tablet 150 mg  150 mg Oral Q8H Leroy Hodges MD   150 mg at 12/14/20 2135    sodium chloride flush 0.9 % injection 10 mL  10 mL Intravenous 2 times per day Leroy Hodges MD        sodium chloride flush 0.9 % injection 10 mL  10 mL Intravenous PRN Leroy Hodges MD        promethazine (PHENERGAN) tablet 12.5 mg  12.5 mg Oral Q6H PRN Leroy Hodges MD        Or    ondansetron (ZOFRAN) injection 4 mg  4 mg Intravenous Q6H PRN Leroy Hodges MD        polyethylene glycol (GLYCOLAX) packet 17 g  17 g Oral Daily PRN Leroy Hodges MD        atorvastatin (LIPITOR) tablet 80 mg  80 mg Oral Nightly Leroy Hodges MD   80 mg at 12/14/20 2135    0.9 % sodium chloride infusion   Intravenous Continuous Leroy Hodges MD        acetaminophen (TYLENOL) tablet 650 mg  650 mg Oral Q4H PRN Leroy Hodges MD   650 mg at 12/15/20 9911    Or    acetaminophen (TYLENOL) suppository 650 mg  650 mg Rectal Q6H PRN Leroy Hodges MD        famotidine (PEPCID) tablet 20 mg  20 mg Oral Daily Leroy Hodges MD   20 mg at 12/15/20 9299       Allergies:  Celebrex [celecoxib], Contrast [iodides], Statins, Naprosyn [naproxen], and Shrimp flavor    Social History: TOBACCO:   reports that she quit smoking about 28 years ago. Her smoking use included cigarettes. She has never used smokeless tobacco.  ETOH:   reports current alcohol use. Family History:        Problem Relation Age of Onset    Diabetes Mother     Heart Disease Mother     High Blood Pressure Mother     Cancer Sister     Heart Disease Father     Cancer Brother     Heart Disease Maternal Aunt         MI    Heart Disease Maternal Uncle         MI    Heart Disease Maternal Grandmother         MI    Heart Disease Maternal Grandfather         MI    Stroke Paternal Grandfather     Cancer Sister     Heart Disease Sister         atrial fib    Cancer Brother     Heart Disease Brother         needs stents and has atrial fib    Diabetes Brother     Stroke Sister          Review of Systems -   General ROS: negative  Psychological ROS: negative  Hematological and Lymphatic ROS: No history of blood clots or bleeding disorder. Respiratory ROS: no cough, shortness of breath, or wheezing  Cardiovascular ROS: As per HPI  Gastrointestinal ROS: negative  Genito-Urinary ROS: no dysuria, trouble voiding, or hematuria  Musculoskeletal ROS: negative  Neurological ROS: no TIA or stroke symptoms  Dermatological ROS: negative    All others reviewed and are negative.        /75   Pulse 63   Temp 98 °F (36.7 °C) (Oral)   Resp 20   Ht 5' 3\" (1.6 m)   Wt 131 lb 6.3 oz (59.6 kg)   SpO2 94%   BMI 23.28 kg/m²       Physical Examination:   General appearance - alert, in no distress  Mental status - alert, oriented to person, place, and time  Neck - supple, no significant adenopathy, no JVD, or carotid bruits  Chest - clear to auscultation, no wheezes, rales or rhonchi, symmetric air entry  Heart - normal rate, regular rhythm, normal S1, S2, no murmurs, rubs, clicks or gallops  Abdomen - soft, nontender, nondistended, no masses or organomegaly  Hyperlipidemia with target LDL less than 70    Atrial fibrillation (HCC)    Chest pain    Gastroesophageal reflux disease without esophagitis    Coronary artery disease involving native coronary artery of native heart without angina pectoris    [facility] [address]    Aphasia    ACS (acute coronary syndrome) ([facility] [address] [facility]    Coronary artery disease involving native coronary artery of native heart with unstable angina pectoris ([facility] [address]    Essential hypertension    Hyperlipidemia LDL goal <70    S/P CABG x 1    Transient cerebral ischemia     Chest pain concerning for unstable angina  CAD s/p CABG x1  CVA  HTN  HLD  Asthma  Ex smoker    Trops x 3 negative  Continue DAPT, imdur, metoprolol  On IV heparin, monitor PTT  Needs ischemic work-up discussed stress test versus cath  Patient was told by her primary cardiologist to have a stress test previously but she refused  She agrees to proceed with cardiac cath  Risk benefits and alternatives were discussed  Keep n.p.o.  IV fluids  2D echocardiogram to evaluate LV systolic function/wall motion abnormalities  Further work-up based on results and clinical course      Please do note hesitate to contact me for any further questions. Thank you for the opportunity to be involved in this patient's care.     Code Status: Full Code    Electronically signed by Alvarado Trevino MD on 12/15/2020 at 9:43 AM

## 2020-12-15 NOTE — ED NOTES
Pt resting quietly in room no needs expressed. Side rails up x2 with call light in reach. Will continue to monitor.        Bryan Lujan RN  12/14/20 8826

## 2020-12-15 NOTE — PROGRESS NOTES
Respiratory:  Normal respiratory effort. Clear to auscultation, bilaterally without Rales/Wheezes/Rhonchi. Cardiovascular: Regular rate and rhythm with normal S1/S2 without murmurs, rubs or gallops. Abdomen: Soft, non-tender, non-distended with normal bowel sounds. Musculoskeletal: passive and active ROM x 4 extremities. Skin: Skin color, texture, turgor normal.  No rashes or lesions. Neurologic:  Neurovascularly intact without any focal sensory/motor deficits. Cranial nerves: II-XII intact, grossly non-focal.  Psychiatric: Alert and oriented, thought content appropriate, normal insight  Capillary Refill: Brisk,< 3 seconds   Peripheral Pulses: +2 palpable, equal bilaterally     Labs:   Recent Labs     12/15/20  1057   WBC 7.8   HGB 13.1   HCT 40.6        Recent Labs     12/14/20  1530      K 3.7      CO2 24   BUN 18   CREATININE 1.1   CALCIUM 9.6     Recent Labs     12/14/20  1530   AST 22   ALT 9*   BILITOT 1.2   ALKPHOS 89     Recent Labs     12/14/20  1530   INR 0.95     No results for input(s): Troy Herrera in the last 72 hours. Microbiology:    Blood culture #1: No results found for: BC    Blood culture #2:No results found for: Varsha Flethcer    Organism:  Lab Results   Component Value Date    ORG Mixed Growth 07/22/2016       No results found for: LABGRAM    MRSA culture only:No results found for: [facility]    Urine culture:   Lab Results   Component Value Date    Asenath Peak  07/22/2016     No growth-preliminary  Mixed growth. The mixture of organisms present represents  both organisms that may cause urinary tract infections and  organisms that are not a common cause of urinary tract  infections and are possibly skin libra or distal urethral  libra.          Respiratory culture: No results found for: CULTRESP    Aerobic and Anaerobic :  No results found for: LABAERO  No results found for: LABANAE    Urinalysis:      Lab Results   Component Value Date    NITRU NEGATIVE 07/22/2016 WBCUA 2-4 07/22/2016    BACTERIA NONE 07/22/2016    RBCUA 0-2 07/22/2016    BLOODU NEGATIVE 07/22/2016    SPECGRAV 1.009 07/22/2016    GLUCOSEU NEGATIVE 05/20/2013       Radiology:  XR CHEST PORTABLE   Final Result   No acute findings               **This report has been created using voice recognition software. It may contain minor errors which are inherent in voice recognition technology. **      Final report electronically signed by Dr. Michaele Castleman on 12/14/2020 3:51 PM        Xr Chest Portable    Result Date: 12/14/2020  PROCEDURE: XR CHEST PORTABLE CLINICAL INFORMATION: Left-sided chest pain. COMPARISON: December 11, 2019 TECHNIQUE: Portable chest. FINDINGS: Median sternotomy wires No lobar consolidation. Costophrenic angles are preserved. Cardiomediastinal silhouette is within normal limits. Ectatic thoracic aorta. No acute osseous findings. Eventration of the right hemidiaphragm. Mild elevation of the left hemidiaphragm. No acute findings **This report has been created using voice recognition software. It may contain minor errors which are inherent in voice recognition technology. ** Final report electronically signed by Dr. Michaele Castleman on 12/14/2020 3:51 PM      Electronically signed by Freda Gonzalez MD on 12/15/2020 at 4:19 PM

## 2020-12-15 NOTE — PRE SEDATION
[facility]  Sedation/Analgesia History & Physical    Pt Name: Lorin Fry  Account number: [de-identified]  MRN: [id number]  YOB: 1950  Provider Performing Procedure: Denver Hakim MD  Referring Provider: Antonio Webb MD   Primary Care Physician: Maryanne Olivares MD  Date: 12/15/2020    PRE-PROCEDURE    Code Status: FULL CODE  Brief History/Pre-Procedure Diagnosis:   [place]  CABG     Consent: : I have discussed with the patient risks, benefits, and alternatives (and relevant risks, benefits, and side effects related to alternatives or not receiving care), and likelihood of the success. The patient and/or representative appear to understand and agree to proceed. The discussion encompasses risks, benefits, and side effects related to the alternatives and the risks related to not receiving the proposed care, treatment, and services. The indication, risks and benefits of the procedure and possible therapeutic consequences and alternatives were discussed with the patient. The patient was given the opportunity to ask questions and to have them answered to his/her satisfaction. Risks of the procedure include but are not limited to the following: Bleeding, hematoma including retroperitoneal hemmorhage, infection, pain and discomfort, injury to the aorta and other blood vessels, rhythm disturbance, low blood pressure, myocardial infarction, stroke, kidney damage/failure, myocardial perforation, allergic reactions to sedatives/contrast material, loss of pulse/vascular compromise requiring surgery, aneurysm/pseudoaneurysm formation, possible loss of a limb/hand/leg, needing blood transfusion, requiring emergent open heart surgery or emergent coronary intervention, the need for intubation/respiratory support, the requirement for defibrillation/cardioversion, and death. Alternatives to and omission of the suggested procedure were discussed. The patient had no further questions and wished to proceed; the consent form was signed. MEDICAL HISTORY  [x]ASHD/ANGINA/MI/CHF   [x]Hypertension  []Diabetes  [x]Hyperlipidemia  []Smoking  []Family Hx of ASHD  [x]Additional information:       has a past medical history of Asthma, Atrial fibrillation ([address] Blood clot associated with vein wall inflammation, CAD (coronary artery disease), Cerebral artery occlusion with cerebral infarction ([address] CHF (congestive heart failure) (HCC), COPD (chronic obstructive pulmonary disease) ([address] History of blood transfusion, Hx of blood clots, Hyperlipidemia, Hypertension, MI, old, Migraine, Pneumonia, PONV (postoperative nausea and vomiting), and Psychiatric problem.     SURGICAL HISTORY has a past surgical history that includes Hysterectomy; Appendectomy; Tonsillectomy; Cardiac surgery (1998 in [facility]); Cardiac surgery (2011 [facility] [place]); Coronary angioplasty with stent (2013 ); Coronary artery bypass graft; Colonoscopy; Endoscopy, colon, diagnostic; eye surgery (Bilateral, 07/2020); and hernia repair.   Additional information:       ALLERGIES   Allergies as of 12/14/2020 - Review Complete 12/14/2020   Allergen Reaction Noted    Celebrex [celecoxib] Anaphylaxis 03/03/2013    Contrast [iodides] Hives 11/21/2016    Statins  06/28/2018    Naprosyn [naproxen] Nausea And Vomiting 03/03/2013    Shrimp flavor Nausea And Vomiting 11/06/2013     Additional information:       MEDICATIONS   Aspirin  [x] 81 mg  [] 325 mg  [] None  Antiplatelet drug therapy use last 5 days  []No [x]Yes  Coumadin Use Last 5 Days [x]No []Yes  Other anticoagulant use last 5 days  [x]No []Yes    Current Facility-Administered Medications:     nitroGLYCERIN 50 mg in dextrose 5% 250 mL infusion, 10 mcg/min, Intravenous, Continuous, Uri Weaver MD, Last Rate: 4.5 mL/hr at 12/15/20 0809, 15 mcg/min at 12/15/20 0809    heparin 25,000 unit in sodium chloride 0.45% 250 mL infusion, 12 Units/kg/hr, Intravenous, Continuous, Uri Weaver MD, Last Rate: 7.1 mL/hr at 12/15/20 0800, 12 Units/kg/hr at 12/15/20 0800    aspirin EC tablet 81 mg, 81 mg, Oral, Daily, Uri Weaver MD, 81 mg at 12/15/20 0921    ipratropium-albuterol (DUONEB) nebulizer solution 3 mL, 1 vial, Inhalation, Q4H PRN, Uri Weaver MD    metoprolol tartrate (LOPRESSOR) tablet 25 mg, 25 mg, Oral, BID, Uri Weaver MD, 25 mg at 12/15/20 0858    nitroGLYCERIN (NITROSTAT) SL tablet 0.3 mg, 0.3 mg, Sublingual, Q5 Min PRN, Uri Weaver MD    ondansetron (ZOFRAN-ODT) disintegrating tablet 4 mg, 4 mg, Oral, 4x Daily PRN, Uri Weaver MD   prasugrel (EFFIENT) tablet 10 mg, 10 mg, Oral, Daily, Paul Barrios MD, 10 mg at 12/15/20 0921    propafenone (RYTHMOL) tablet 150 mg, 150 mg, Oral, Q8H, Paul Barrios MD, 150 mg at 12/15/20 1229    sodium chloride flush 0.9 % injection 10 mL, 10 mL, Intravenous, 2 times per day, Paul Barrios MD, 10 mL at 12/15/20 1228    sodium chloride flush 0.9 % injection 10 mL, 10 mL, Intravenous, PRN, Paul Barrios MD    promethazine (PHENERGAN) tablet 12.5 mg, 12.5 mg, Oral, Q6H PRN **OR** ondansetron (ZOFRAN) injection 4 mg, 4 mg, Intravenous, Q6H PRN, Paul Barrios MD    polyethylene glycol (GLYCOLAX) packet 17 g, 17 g, Oral, Daily PRN, Paul Barrios MD    atorvastatin (LIPITOR) tablet 80 mg, 80 mg, Oral, Nightly, Paul Barrios MD, 80 mg at 12/14/20 2135    0.9 % sodium chloride infusion, , Intravenous, Continuous, Paul Barrios MD, Last Rate: 50 mL/hr at 12/15/20 1248, New Bag at 12/15/20 1248    acetaminophen (TYLENOL) tablet 650 mg, 650 mg, Oral, Q4H PRN, 650 mg at 12/15/20 0626 **OR** acetaminophen (TYLENOL) suppository 650 mg, 650 mg, Rectal, Q6H PRN, Paul Barrios MD    famotidine (PEPCID) tablet 20 mg, 20 mg, Oral, Daily, Paul Barrios MD, 20 mg at 12/15/20 5374  Prior to Admission medications    Medication Sig Start Date End Date Taking?  Authorizing Provider   furosemide (LASIX) 20 MG tablet Take 1 tablet by mouth as needed (leg edema) 6/8/20  Yes Nicole Ho MD   isosorbide mononitrate (IMDUR) 30 MG extended release tablet Take 1 tablet by mouth daily 6/8/20  Yes Nicole Ho MD   metoprolol tartrate (LOPRESSOR) 25 MG tablet Take 1 tablet by mouth 2 times daily 6/8/20  Yes Nicole Ho MD   prasugrel (EFFIENT) 10 MG TABS Take 1 tablet by mouth daily 6/8/20  Yes Nicole Ho MD ondansetron (ZOFRAN ODT) 4 MG disintegrating tablet Take 4 mg by mouth 4 times daily as needed for Nausea or Vomiting   Yes Historical Provider, MD   aspirin 81 MG tablet Take 81 mg by mouth daily   Yes Historical Provider, MD   pravastatin (PRAVACHOL) 20 MG tablet Take 1 tablet by mouth nightly 6/8/20   uRdy Welch MD   propafenone (RYTHMOL) 150 MG tablet Take 1 tablet by mouth every 8 hours  Patient taking differently: Take 150 mg by mouth 2 times daily  6/8/20   Rudy Welch MD   nitroGLYCERIN (NITROSTAT) 0.4 MG SL tablet Place 1 tablet under the tongue every 5 minutes as needed for Chest pain. 2/25/15   PEDRO LUIS Muro - CNP     Additional information:       VITAL SIGNS   Vitals:    12/15/20 1022   BP: 104/65   Pulse: 64   Resp: 20   Temp: 98.4 °F (36.9 °C)   SpO2: 95%       PHYSICAL:   General: No acute distress  HEENT:  Unremarkable for age  Neck: without increased JVD, carotid pulses 2+ bilaterally without bruits  Heart: RRR, S1 & S2 WNL, S4 gallop, without murmurs or rubs   NYHA: 2   Lungs: Clear to auscultation    Abdomen: BS present, without HSM, masses, or tendernes    Extremities: without C,C,E.  Pulses 2+ bilaterally  Mental Status: Alert & Oriented        PLANNED PROCEDURE   [x]Cath  [x]PCI                []Pacemaker/AICD  []TRINA             []Cardioversion []Peripheral angiography/PTA  []Other:      SEDATION  Planned agent:[x]Midazolam []Meperidine [x]Sublimaze []Morphine  []Diazepam  []Other:     ASA Classification:  []1 []2 [x]3 []4 []5  Class 1: A normal healthy patient  Class 2: Pt with mild to moderate systemic disease  Class 3: Severe systemic disease or disturbance  Class 4: Severe systemic disorders that are already life threatening. Class 5: Moribund pt with little chances of survival, for more than 24 hours. Mallampati I Airway Classification:   []1 [x]2 []3 []4    [x]Pre-procedure diagnostic studies complete and results available.    Comment: [x]Previous sedation/anesthesia experiences assessed. Comment:    [x]The patient is an appropriate candidate to undergo the planned procedure sedation and anesthesia. (Refer to nursing sedation/analgesia documentation record)  [x]Formulation and discussion of sedation/procedure plan, risks, and expectations with patient and/or responsible adult completed. [x]Patient examined immediately prior to the procedure.  (Refer to nursing sedation/analgesia documentation record)    Kale Santiago MD   Electronically signed 12/15/2020 at 1:33 PM

## 2020-12-15 NOTE — ED NOTES
Called [facility] and informed them that the patient is on their way to the unit.      Kirt George  12/15/20 5648

## 2020-12-15 NOTE — BRIEF OP NOTE
[facility]  Sedation/Analgesia Post Sedation Record    Pt Name: Kirk Corcoran  Account number: [de-identified]  MRN: [id number]  YOB: 1950  Procedure Performed By: [address] Badillo, [address]  Primary Care Physician: Lawrence Landrum MD  Date: 12/15/2020    POST-PROCEDURE    Physicians/Assistants: 400 CIELO Jordan MD    Procedure Performed:Cath      Sedation/Anesthesia: Versed/ Fentanyl and 2% xylocaine local anesthesia. Estimated Blood Loss: < 50 ml. Specimens Removed: None         Disposition of Specimen: N/A        Complications: No Immediate Complications.        Post-procedure Diagnosis/Findings:     Ostial LAD 95% ISR  LCX is 100%  in-stent getting L-L collaterals  LIMA to DX patent - retrograde flow into the LAD is compromised by proximal Dx stenosis  RCA patent    CT surgery vs high risk PCI               400 CIELO Jordan MD  Electronically signed 12/15/2020 at 2:17 PM  Interventional Cardiology

## 2020-12-15 NOTE — CARE COORDINATION
DISASTER CHARTING    12/15/20, 9:09 AM EST    DISCHARGE ONGOING EVALUATION:     Martita Okeefe day: 1  Location: [facility]17/017-A Reason for admit: Chest pain [R07.9]   Barriers to Discharge: To ER with CP. Trops neg x 3. Cardiology consulted. IVF at 50/hr. Aspirin. Statin. BB. Heparin gtt and Ntg gtt. Hx MI, CAD and CABG. PCP: John Le MD  Patient Goals/Plan/Treatment Preferences: Met with pt this am. From home alone with no services or DME. She drives and has PCP. No discharge needs voiced presently.

## 2020-12-16 ENCOUNTER — TELEPHONE (OUTPATIENT)
Dept: CARDIOLOGY CLINIC | Age: 70
End: 2020-12-16

## 2020-12-16 VITALS
WEIGHT: 131.39 LBS | OXYGEN SATURATION: 97 % | TEMPERATURE: 98.4 F | BODY MASS INDEX: 23.28 KG/M2 | HEIGHT: 63 IN | RESPIRATION RATE: 18 BRPM | HEART RATE: 69 BPM | SYSTOLIC BLOOD PRESSURE: 113 MMHG | DIASTOLIC BLOOD PRESSURE: 75 MMHG

## 2020-12-16 LAB
ANION GAP SERPL CALCULATED.3IONS-SCNC: 11 MEQ/L (ref 8–16)
BUN BLDV-MCNC: 18 MG/DL (ref 7–22)
CALCIUM SERPL-MCNC: 9.1 MG/DL (ref 8.5–10.5)
CHLORIDE BLD-SCNC: 109 MEQ/L (ref 98–111)
CO2: 22 MEQ/L (ref 23–33)
CREAT SERPL-MCNC: 1 MG/DL (ref 0.4–1.2)
ERYTHROCYTE [DISTWIDTH] IN BLOOD BY AUTOMATED COUNT: 13.5 % (ref 11.5–14.5)
ERYTHROCYTE [DISTWIDTH] IN BLOOD BY AUTOMATED COUNT: 45.9 FL (ref 35–45)
GFR SERPL CREATININE-BSD FRML MDRD: 55 ML/MIN/1.73M2
GLUCOSE BLD-MCNC: 163 MG/DL (ref 70–108)
HCT VFR BLD CALC: 38 % (ref 37–47)
HEMOGLOBIN: 12.4 GM/DL (ref 12–16)
MCH RBC QN AUTO: 30.1 PG (ref 26–33)
MCHC RBC AUTO-ENTMCNC: 32.6 GM/DL (ref 32.2–35.5)
MCV RBC AUTO: 92.2 FL (ref 81–99)
PLATELET # BLD: 193 THOU/MM3 (ref 130–400)
PMV BLD AUTO: 9.7 FL (ref 9.4–12.4)
POTASSIUM SERPL-SCNC: 4.5 MEQ/L (ref 3.5–5.2)
RBC # BLD: 4.12 MILL/MM3 (ref 4.2–5.4)
SODIUM BLD-SCNC: 142 MEQ/L (ref 135–145)
WBC # BLD: 10.2 THOU/MM3 (ref 4.8–10.8)

## 2020-12-16 PROCEDURE — 2580000003 HC RX 258: Performed by: INTERNAL MEDICINE

## 2020-12-16 PROCEDURE — 99239 HOSP IP/OBS DSCHRG MGMT >30: CPT | Performed by: FAMILY MEDICINE

## 2020-12-16 PROCEDURE — 85027 COMPLETE CBC AUTOMATED: CPT

## 2020-12-16 PROCEDURE — 4A023N7 MEASUREMENT OF CARDIAC SAMPLING AND PRESSURE, LEFT HEART, PERCUTANEOUS APPROACH: ICD-10-PCS | Performed by: INTERNAL MEDICINE

## 2020-12-16 PROCEDURE — 6370000000 HC RX 637 (ALT 250 FOR IP): Performed by: STUDENT IN AN ORGANIZED HEALTH CARE EDUCATION/TRAINING PROGRAM

## 2020-12-16 PROCEDURE — 80048 BASIC METABOLIC PNL TOTAL CA: CPT

## 2020-12-16 PROCEDURE — 6370000000 HC RX 637 (ALT 250 FOR IP): Performed by: PHYSICIAN ASSISTANT

## 2020-12-16 PROCEDURE — 99232 SBSQ HOSP IP/OBS MODERATE 35: CPT | Performed by: PHYSICIAN ASSISTANT

## 2020-12-16 PROCEDURE — 36415 COLL VENOUS BLD VENIPUNCTURE: CPT

## 2020-12-16 RX ORDER — RANOLAZINE 500 MG/1
500 TABLET, EXTENDED RELEASE ORAL 2 TIMES DAILY
Status: DISCONTINUED | OUTPATIENT
Start: 2020-12-16 | End: 2020-12-16 | Stop reason: HOSPADM

## 2020-12-16 RX ORDER — ISOSORBIDE MONONITRATE 30 MG/1
30 TABLET, EXTENDED RELEASE ORAL DAILY
Status: DISCONTINUED | OUTPATIENT
Start: 2020-12-16 | End: 2020-12-16 | Stop reason: HOSPADM

## 2020-12-16 RX ORDER — RANOLAZINE 500 MG/1
500 TABLET, EXTENDED RELEASE ORAL 2 TIMES DAILY
Qty: 60 TABLET | Refills: 0 | Status: SHIPPED | OUTPATIENT
Start: 2020-12-16 | End: 2021-02-19 | Stop reason: SDUPTHER

## 2020-12-16 RX ORDER — ATORVASTATIN CALCIUM 80 MG/1
80 TABLET, FILM COATED ORAL NIGHTLY
Qty: 30 TABLET | Refills: 0 | Status: SHIPPED | OUTPATIENT
Start: 2020-12-16 | End: 2021-02-19 | Stop reason: SDUPTHER

## 2020-12-16 RX ADMIN — RANOLAZINE 500 MG: 500 TABLET, FILM COATED, EXTENDED RELEASE ORAL at 11:17

## 2020-12-16 RX ADMIN — ACETAMINOPHEN 650 MG: 325 TABLET ORAL at 04:55

## 2020-12-16 RX ADMIN — ISOSORBIDE MONONITRATE 30 MG: 30 TABLET ORAL at 13:21

## 2020-12-16 RX ADMIN — ASPIRIN 81 MG: 81 TABLET, COATED ORAL at 09:14

## 2020-12-16 RX ADMIN — PRASUGREL 10 MG: 10 TABLET, FILM COATED ORAL at 09:14

## 2020-12-16 RX ADMIN — SODIUM CHLORIDE: 9 INJECTION, SOLUTION INTRAVENOUS at 02:53

## 2020-12-16 RX ADMIN — METOPROLOL TARTRATE 25 MG: 50 TABLET, FILM COATED ORAL at 09:14

## 2020-12-16 RX ADMIN — PROPAFENONE HYDROCHLORIDE 150 MG: 150 TABLET, FILM COATED ORAL at 04:56

## 2020-12-16 RX ADMIN — PROPAFENONE HYDROCHLORIDE 150 MG: 150 TABLET, FILM COATED ORAL at 13:22

## 2020-12-16 RX ADMIN — FAMOTIDINE 20 MG: 20 TABLET, FILM COATED ORAL at 09:14

## 2020-12-16 ASSESSMENT — PAIN DESCRIPTION - LOCATION: LOCATION: HEAD

## 2020-12-16 ASSESSMENT — PAIN DESCRIPTION - ORIENTATION: ORIENTATION: MID;ANTERIOR

## 2020-12-16 ASSESSMENT — PAIN DESCRIPTION - PAIN TYPE: TYPE: ACUTE PAIN

## 2020-12-16 ASSESSMENT — PAIN DESCRIPTION - DESCRIPTORS: DESCRIPTORS: HEADACHE

## 2020-12-16 ASSESSMENT — PAIN SCALES - GENERAL
PAINLEVEL_OUTOF10: 0
PAINLEVEL_OUTOF10: 5

## 2020-12-16 ASSESSMENT — PAIN DESCRIPTION - FREQUENCY: FREQUENCY: CONTINUOUS

## 2020-12-16 NOTE — TELEPHONE ENCOUNTER
[facility] disch 12/16, chest pain, Afib, cath. Needs 2 wk followup with Dr Maria R Brooks.   Call patient at home to schedule

## 2020-12-16 NOTE — PROGRESS NOTES
Cardiology Progress Note      Patient:  Bran Dc  YOB: 1950  MRN: [id number]   Acct: [de-identified]  Admit Date:  12/14/2020  Primary Cardiologist: Caitlin Baron MD    Note per dr Marino Nesbitt for Consultation:  Chest pain/ Unstable angina         History Of Present Illness:    69 y.o. pleasant female c hx of CAD s/p CABG 2016, pericardial window, CVA, Asthma, HTN and HLD who presented to the hospital with complaints of chest pain. The chest pain started at 4 pm, woke her up from sleep, felt nauseous, nonradiating, no aggravating factors, but alleviated by NTG, associated with nausea. Somes times she did feel like it was worse on deep breathing. EKG shows SR with ST changes in V1-V3 slightly worse than previous. Trops x 3 negative. She reports taking her Aspirin, and Effient regularly. Cardiology was consulted for chest pain. \"    Subjective (Events in last 24 hours): pt awake and alert. NAD. No cp or sob.   Pt feels good and hoping to go home  On ntg gtt at 10mcg/min    Objective:   /62   Pulse 73   Temp 98.4 °F (36.9 °C) (Oral)   Resp 16   Ht 5' 3\" (1.6 m)   Wt 131 lb 6.3 oz (59.6 kg)   SpO2 95%   BMI 23.28 kg/m²        TELEMETRY: nsr    Physical Exam:  General Appearance: alert and oriented to person, place and time, in no acute distress  Cardiovascular: normal rate, regular rhythm, normal S1 and S2, no murmurs, rubs, clicks, or gallops, distal pulses intact, no carotid bruits, no JVD  Pulmonary/Chest: clear to auscultation bilaterally- no wheezes, rales or rhonchi, normal air movement, no respiratory distress  Abdomen: soft, non-tender, non-distended, normal bowel sounds, no masses Extremities: no cyanosis, clubbing or edema, pulse   Skin: warm and dry, no rash or erythema  Head: normocephalic and atraumatic  Eyes: pupils equal, round, and reactive to light  Neck: supple and non-tender without mass, no thyromegaly Musculoskeletal: normal range of motion, no joint swelling, deformity or tenderness  Neurological: alert, oriented, normal speech, no focal findings or movement disorder noted  Right groin - no hematoma, +DP, +PT    Medications:    ranolazine  500 mg Oral BID    sodium chloride flush  10 mL Intravenous 2 times per day    aspirin  81 mg Oral Daily    metoprolol tartrate  25 mg Oral BID    prasugrel  10 mg Oral Daily    propafenone  150 mg Oral Q8H    atorvastatin  80 mg Oral Nightly    famotidine  20 mg Oral Daily      sodium chloride 75 mL/hr at 12/16/20 0253    nitroGLYCERIN 10 mcg/min (12/16/20 0118)         sodium chloride flush, 10 mL, PRN      ipratropium-albuterol, 1 vial, Q4H PRN      nitroGLYCERIN, 0.3 mg, Q5 Min PRN      ondansetron, 4 mg, 4x Daily PRN      promethazine, 12.5 mg, Q6H PRN    Or      ondansetron, 4 mg, Q6H PRN      polyethylene glycol, 17 g, Daily PRN      acetaminophen, 650 mg, Q4H PRN    Or      acetaminophen, 650 mg, Q6H PRN        Diagnostics:  Cath 12/15/20- dictation pending    Lab Data:    Cardiac Enzymes:  No results for input(s): CKTOTAL, CKMB, CKMBINDEX, TROPONINI in the last 72 hours.     CBC:   Lab Results   Component Value Date    WBC 10.2 12/16/2020    RBC 4.12 12/16/2020    HGB 12.4 12/16/2020    HCT 38.0 12/16/2020     12/16/2020       CMP:    Lab Results   Component Value Date     12/16/2020    K 4.5 12/16/2020    K 3.6 12/11/2019     12/16/2020    CO2 22 12/16/2020    BUN 18 12/16/2020    CREATININE 1.0 12/16/2020    LABGLOM 55 12/16/2020    GLUCOSE 163 12/16/2020    CALCIUM 9.1 12/16/2020       Hepatic Function Panel:    Lab Results   Component Value Date    ALKPHOS 89 12/14/2020    ALT 9 12/14/2020    AST 22 12/14/2020    PROT 7.0 12/14/2020    BILITOT 1.2 12/14/2020    BILIDIR <0.2 07/11/2019    LABALBU 4.4 12/14/2020       Magnesium:    Lab Results   Component Value Date    MG 2.2 07/02/2016       PT/INR:    Lab Results Component Value Date    INR 0.95 12/14/2020       HgBA1c:    Lab Results   Component Value Date    LABA1C 5.0 06/27/2016       FLP:    Lab Results   Component Value Date    TRIG 57 06/29/2018    HDL 56 06/29/2018    LDLCALC 71 06/29/2018       TSH:    Lab Results   Component Value Date    TSH 2.320 06/23/2016         Assessment:    S/p chest pain/? [address] neg x3    S/p cath 12/15/20 -   Ostial LAD 95% ISR  LCX is 100%  in-stent getting L-L collaterals  LIMA to DX patent - retrograde flow into the LAD is compromised by proximal Dx stenosis  RCA patent   CT surgery vs high risk PCI       Hx CAD - s/p CABG 2016  Hx pericardial window  HTN  HLD  Hx CVA  Hx asthma      Plan:    Cont asa/effient/statin/BB  Resume home imdur 30 daily, uptitrate if bp will allow  Stop ntg gtt  Add ranexa  Dr Francesca Cooks discussd with pt, family and [facility] - plan is for medical therapy for now and no PCI and no CT surgery. she has many stents and if we try to open, they will likely close again and risk of PCI is very high.   Pt agrees with above plan  F/up dr Vivian Jarvis 2 weeks  Will see prn         Electronically signed by Lauren Davis PA-C on 12/16/2020 at 11:00 AM

## 2020-12-16 NOTE — DISCHARGE INSTR - DIET

## 2020-12-16 NOTE — PROCEDURES
[address] [place] [address]                            CARDIAC CATHETERIZATION    PATIENT NAME: Celine Gamboa                     :        1950  MED REC NO:   [id number]                           ROOM:       0017  ACCOUNT NO:   [de-identified]                           ADMIT DATE: 2020  PROVIDER:     Josh Lafleur MD    DATE OF PROCEDURE:  12/15/2020    INDICATION:  Unstable angina with ischemic ECG changes with history of  CABG x1. DESCRIPTION OF PROCEDURE:  After informed consent was obtained from the  patient, she was brought to the cardiac catheterization laboratory and  prepped in sterile fashion. The right femoral artery was chosen as the  primary point of access. A preprocedure timeout was completed. After  infiltration of the right inguinal region with 2% lidocaine using  micropuncture and modified Seldinger technique under fluoroscopic  guidance and ultrasound guidance, I was able to insert a 5-[place] sheath  in the right femoral artery. I then performed diagnostic coronary  angiography using 5-[place] JL-4, 5-[place] JR-4, and 5-[place] Irish IM  catheters. I performed left ventriculography using a 5-[place] angled  pigtail. CORONARY ANGIOGRAM:  LEFT MAIN:  Patent, but small. LAD:  Ostial LAD has 95% stenosis with multiple overlapping previously  placed stents supplying a patent distal LAD. There is a large diagonal  branch that has retrograde flow into the LIMA. LCX:  Previously placed stent in the ostium of the circumflex with 100%   in the ostium. There is retrograde flow from the bypass diagonal  branch into the OM, which appears to be patent and reconstitute at the  level of the stent. RCA:  Patent without any significant obstruction. It bifurcates into  PDA and PLB, both without any significant obstruction. BYPASS ANGIOGRAPHY:  LIMA TO DIAGONAL:  Small, but patent.   The ostium, body and anastomotic segments do not appear to have any significant stenosis. The LIMA  supply the diagonal branch and the diagonal branch retrograde fills the  LAD. The ostium of the diagonal branch may have some disease, difficult  to assess. There is an area of tension at the ostium of the diagonal  branch that may limit the flow into the native LAD. LV:  LV systolic pressure is 719. No significant LV to AO systolic  gradient. LVEDP is 11. LVEF is 50% to 55%. Aortic valve is tricuspid. No significant aneurysm or dissection in the visualized portion of the  aorta. PERIPHERAL ANGIOGRAM:  Peripheral angiography with the aortobifemoral  angiogram with digital substraction was done in case we needed to  proceed with distal left main, LAD and circumflex intervention for  hemodynamic support. ABDOMINAL AORTA:  Patent. COMMON ILIAC ARTERIES:  Bilaterally patent. EXTERNAL ILIAC ARTERIES:  Bilaterally patent. COMMON FEMORAL ARTERIES:  Bilaterally patent. IMMEDIATE COMPLICATION:  None. MEDICATIONS:  See EMR. ACCESS:  Manual pressure was used for hemostasis. ESTIMATED BLOOD LOSS:  Less than 10 mL. SUMMARY:  Severe left system disease with 95% ostial LAD in-stent  restenosis, 100% in-stent LCX  with left-to-left collaterals; patent  LIMA to diagonal.    PLAN:  1. I reviewed the old images. She clearly had this LAD stenosis prior  to her left bypass. The diagonal branch seems to be providing antegrade  flow into the diagonal; however, the retrograde portion of it may not be  filling the LAD adequately because of her ischemic ST changes  inferiorly. She had a relatively preserved EF. We will discuss repeat  CABG versus PCI; however, both options are quite high risk and may not  result in patency with poor targets in terms of bypass and the fact that  she has had multiple stents in the distal left main and the ostium of  the circumflex, which would result in recurrent instent restenosis.  If PCI, the plan would be for Impella for PCI of the entire LAD and the circumflex  reconstitution  With, but at this point given that she relatively preserved  EF, plan would most optimally be to medically manage the patient due to lack of discomfort and   Resolution of ECG changes. 2.  Bedrest.  3.  CT surgery consultation. 4. DAPT. 5.  Uptitrate antianginals. 6.  Follow up 2-4 weeks post procedure    All the above was explained to the patient and the patient's family. They are agreeable and amenable to the above plan.         Diana England MD    D: 12/16/2020 9:22:34       T: 12/16/2020 10:46:31     [email or web address]  Job#: [id number]     Doc#: [id number]    CC:

## 2020-12-16 NOTE — TELEPHONE ENCOUNTER
LM for patient to call our office back. Patient could follow-up in [facility]?

## 2020-12-16 NOTE — PROGRESS NOTES
Patient discharged at this time. 30 day supply of new medications called  In to  [address] [facility]. And [facility]. Patient left via transport with her sister. Groin sight cath instructions provided. New medication education provided. Patient has no further questions at this time. Discharged in stable condition.

## 2020-12-16 NOTE — CARE COORDINATION
12/16/20, 2:06 PM EST    Patient goals/plan/ treatment preferences discussed by  and . Patient goals/plan/ treatment preferences reviewed with patient/ family. Patient/ family verbalize understanding of discharge plan and are in agreement with goal/plan/treatment preferences. Understanding was demonstrated using the teach back method. AVS provided by RN at time of discharge, which includes all necessary medical information pertaining to the patients current course of illness, treatment, post-discharge goals of care, and treatment preferences. Discharging home independently. Follow-up with PCP made. Dr. Jaxon Shaw office is booked, they will speak with his nurse to fit patient in and will call her to schedule apt. IMM Letter  IMM Letter given to Patient/Family/Significant other/Guardian/POA/by[de-identified] Intake.   IMM Letter date given[de-identified] 12/14/20  IMM Letter time given[de-identified] [id number]

## 2020-12-16 NOTE — DISCHARGE SUMMARY
Hospitalist Discharge Summary        Patient: Andrade Meza  YOB: 1950  MRN: [id number]   Acct: [de-identified]    Primary Care Physician: Kenan Jacobsen MD    Admit date  12/14/2020    Discharge date:  12/16/2020 1:54 PM  Discharge Diagnoses: Active Hospital Problems    Diagnosis Date Noted    Chest pain [R07.9]        Code Status:  Full Code     Chief Complaint on presentation :-  Atypical chest pain      Initial admission HPI as below:-  69year old who presents to the emergency department for evaluation of chest pain. Patient has significant PMHx including previous MI, CAD s/p CABG distal LMA, ostial LAD and left circ (2016), previous pericardial window, atrial fibrillation, HLD, Asthma. Patient states that when she woke up this morning, she did not feel good, felt nauseous but did not have any chest pain or shortness of breath. 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 Patient went to her sister's home and took a nap but was awaken by the chest pain. Describes that the pain was sharp, stabbing, located on the left side of the chest, underneath the breast bone, radiating to the left back, 8/10, associated with nausea and diaphoresis but no shortness of breath. States that moving and taking a deep breath made the pain worse. She did not take any medications to make the pain better. Reports that she has been having shortness of breath on exertion and she does sleep on four pillows. Feels \"suffocating\" if she lays down.  At the time of history taking, her chest pain decreased in intensity, but continues to be present.      Initial vital signs: temp 97.9 afebrile, respiratory rate 16, heart rate 106, blood prssure 138/85, SpO2 97% in room air. Initial lab reveals: Sodium level 142, potassium 3.7, chloride 102, bicarb 24, BUN 18, creatinine 1.1, glucose 106, Troponin level <0.010, Chest xray does not reveal cardiopulmonary pathology, EKG reveals ST segment sagging V1 to V3 possible anterior [facility] problem list with assessment and plan updates:-  1. Unstable angina -  - Continue ASA, echo, DAPT, imdur, BB,   - Plan for cath today     2. Hyperlipidemia - continue Lipitor 80 mg daily     3. CAD s/p CABG              - continue medications.      4. Asthma - remains asymptomatic.  - continue Duoneb q4h PRN     PMH, PSH, SH, FHX reviewed in chart review snap shot in EPIC    Additional discharge final recommendations as below:-  Patient was unpleasant with her experience at the hospital.  Patient wanted to go home. Had failed stenting as of 12/15/2020. Cardiology team discussed in depth with the patient consequences. Patient started on Ranexa by cardiology and on Effient. Also restarted her Imdur before discharge which tolerated perfectly. Patient was ambulating down the hallway with no issues with the nurse. Patient instructed to follow-up with Dr. Janis Castillo in 2 weeks. Patient instructed to come back to our ER or call 911 if she experience severe chest pain or shortness of breath or any other warning symptoms discussed with the patient. Please refer to cardiology note from 12/16/2020. Patient planned to go home with no further needs.     Discharge Nurse Elizabeth Rachel RN) note at the time of discharge as below:- Patient discharged at this time. 30 day supply of new medications called  In to  [address] [facility]. [facility]. Patient left via transport with her sister. Groin sight cath instructions provided. New medication education provided. Patient has no further questions at this time. Discharged in stable condition. All appointments obtained for the patient at the day of discharge with other specialities including PCP in one week. All questions and concerns addressed. Patient verbalized understandings and was agreeable with discharge planning. Physical Exam:-  Vitals:   Patient Vitals for the past 24 hrs:   BP Temp Temp src Pulse Resp SpO2   12/16/20 1145 117/66 98.4 °F (36.9 °C) Oral 68 16 97 %   12/16/20 0745 113/62 98.4 °F (36.9 °C) Oral 73 16 95 %   12/16/20 0445 109/63        12/16/20 0245 (!) 102/53 98.1 °F (36.7 °C) Oral 72 16 94 %   12/16/20 0115 (!) 91/53   70     12/15/20 2330 104/62        12/15/20 2245 (!) 91/56 97.9 °F (36.6 °C) Oral 71 16 93 %   12/15/20 2030 108/65 98.1 °F (36.7 °C) Oral 70 16 95 %   12/15/20 1730 116/73   70     12/15/20 1630 105/84   75  96 %   12/15/20 1600 115/88   74  96 %   12/15/20 1530 110/66   74  96 %   12/15/20 1515      96 %   12/15/20 1500 107/61   72  96 %   12/15/20 1445 114/61   71  96 %   12/15/20 1430 119/69   76  95 %     Weight:   Weight: 131 lb 6.3 oz (59.6 kg)   24 hour intake/output:     Intake/Output Summary (Last 24 hours) at 12/16/2020 1354  Last data filed at 12/16/2020 1242  Gross per 24 hour   Intake 3023.76 ml   Output 700 ml   Net 2323.76 ml       1. General appearance: No apparent distress, appears stated age and cooperative. Slightly anxious  2. HEENT: Normal cephalic, atraumatic without obvious deformity. Pupils equal, round, and reactive to light. Extra ocular muscles intact. Conjunctivae/corneas clear. 3. Neck: Supple, with full range of motion. No jugular venous distention. Trachea midline. 4. Respiratory:  Normal respiratory effort. Clear to auscultation, bilaterally without Rales/Wheezes/Rhonchi. 5. Cardiovascular: Regular rate and rhythm with normal S1/S2 without murmurs, rubs or gallops. 6. Abdomen: Soft, increased abdominal girth, non-tender, non-distended with normal bowel sounds. 7. Musculoskeletal:  No clubbing, cyanosis or edema bilaterally. 8. Skin: Skin color, texture, turgor normal.  No rashes or lesions. 9. Neurologic:  Neurovascularly intact without any focal sensory/motor deficits. Cranial nerves: II-XII intact, grossly non-focal.  10. Psychiatric: Alert and oriented, thought content appropriate, normal insight  11. Capillary Refill: Brisk,< 3 seconds   12. Peripheral Pulses: +2 palpable, equal bilaterally       Discharge Medications:-      Medication List      START taking these medications    atorvastatin 80 MG tablet  Commonly known as: LIPITOR  Take 1 tablet by mouth nightly     ranolazine 500 MG extended release tablet  Commonly known as: RANEXA  Take 1 tablet by mouth 2 times daily        CHANGE how you take these medications    propafenone 150 MG tablet  Commonly known as: RYTHMOL  Take 1 tablet by mouth every 8 hours  What changed: when to take this        CONTINUE taking these medications    aspirin 81 MG tablet     furosemide 20 MG tablet  Commonly known as: LASIX  Take 1 tablet by mouth as needed (leg edema)     isosorbide mononitrate 30 MG extended release tablet  Commonly known as: IMDUR  Take 1 tablet by mouth daily     metoprolol tartrate 25 MG tablet  Commonly known as: LOPRESSOR  Take 1 tablet by mouth 2 times daily     nitroGLYCERIN 0.4 MG SL tablet  Commonly known as: NITROSTAT  Place 1 tablet under the tongue every 5 minutes as needed for Chest pain.      prasugrel 10 MG Tabs  Commonly known as: EFFIENT  Take 1 tablet by mouth daily Zofran ODT 4 MG disintegrating tablet  Generic drug: ondansetron        STOP taking these medications    pravastatin 20 MG tablet  Commonly known as: PRAVACHOL           Where to Get Your Medications      These medications were sent to [address], [address] [facility] [phone]  [place] [address]    Phone: [phone]   · atorvastatin 80 MG tablet  · ranolazine 500 MG extended release tablet          Labs :-  Recent Results (from the past 72 hour(s))   EKG Chest Pain    Collection Time: 12/14/20  3:19 PM   Result Value Ref Range    Ventricular Rate 98 BPM    Atrial Rate 98 BPM    P-R Interval 148 ms    QRS Duration 104 ms    Q-T Interval 352 ms    QTc Calculation (Bazett) 449 ms    P Axis 45 degrees    R Axis 29 degrees    T Axis 56 degrees   APTT    Collection Time: 12/14/20  3:30 PM   Result Value Ref Range    aPTT 21.8 (L) 22.0 - 38.0 seconds   Protime-INR    Collection Time: 12/14/20  3:30 PM   Result Value Ref Range    INR 0.95 0.85 - 1.13   Comprehensive Metabolic Panel    Collection Time: 12/14/20  3:30 PM   Result Value Ref Range    Glucose 106 70 - 108 mg/dL    CREATININE 1.1 0.4 - 1.2 mg/dL    BUN 18 7 - 22 mg/dL    Sodium 142 135 - 145 meq/L    Potassium 3.7 3.5 - 5.2 meq/L    Chloride 102 98 - 111 meq/L    CO2 24 23 - 33 meq/L    Calcium 9.6 8.5 - 10.5 mg/dL    AST 22 5 - 40 U/L    Alkaline Phosphatase 89 38 - 126 U/L    Total Protein 7.0 6.1 - 8.0 g/dL    Alb 4.4 3.5 - 5.1 g/dL    Total Bilirubin 1.2 0.3 - 1.2 mg/dL    ALT 9 (L) 11 - 66 U/L   Troponin    Collection Time: 12/14/20  3:30 PM   Result Value Ref Range    Troponin T < 0.010 ng/ml   Anion Gap    Collection Time: 12/14/20  3:30 PM   Result Value Ref Range    Anion Gap 16.0 8.0 - 16.0 meq/L   Glomerular Filtration Rate, Estimated    Collection Time: 12/14/20  3:30 PM   Result Value Ref Range    Est, Glom Filt Rate 49 (A) ml/min/1.73m2   Osmolality Collection Time: 12/14/20  3:30 PM   Result Value Ref Range    Osmolality Calc 285.4 275.0 - 300.0 mOsmol/kg   SPECIMEN REJECTION    Collection Time: 12/14/20  4:00 PM   Result Value Ref Range    Rejected Test cbcwd     Reason for Rejection see below    Troponin    Collection Time: 12/14/20  7:04 PM   Result Value Ref Range    Troponin T < 0.010 ng/ml   APTT    Collection Time: 12/14/20 10:39 PM   Result Value Ref Range    aPTT 81.6 (H) 22.0 - 38.0 seconds   Troponin    Collection Time: 12/14/20 10:39 PM   Result Value Ref Range    Troponin T < 0.010 ng/ml   APTT    Collection Time: 12/15/20  4:00 AM   Result Value Ref Range    aPTT 74.0 (H) 22.0 - 38.0 seconds   EKG 12 lead    Collection Time: 12/15/20  5:59 AM   Result Value Ref Range    Ventricular Rate 67 BPM    Atrial Rate 67 BPM    P-R Interval 206 ms    QRS Duration 104 ms    Q-T Interval 434 ms    QTc Calculation (Bazett) 458 ms    P Axis 90 degrees    R Axis 38 degrees    T Axis 44 degrees   CBC    Collection Time: 12/15/20 10:57 AM   Result Value Ref Range    WBC 7.8 4.8 - 10.8 thou/mm3    RBC 4.35 4.20 - 5.40 mill/mm3    Hemoglobin 13.1 12.0 - 16.0 gm/dl    Hematocrit 40.6 37.0 - 47.0 %    MCV 93.3 81.0 - 99.0 fL    MCH 30.1 26.0 - 33.0 pg    MCHC 32.3 32.2 - 35.5 gm/dl    RDW-CV 13.5 11.5 - 14.5 %    RDW-SD 46.3 (H) 35.0 - 45.0 fL    Platelets 122 903 - 358 thou/mm3    MPV 10.0 9.4 - 12.4 fL   APTT    Collection Time: 12/15/20 10:57 AM   Result Value Ref Range    aPTT 60.3 (H) 22.0 - 38.0 seconds   CBC    Collection Time: 12/16/20  8:39 AM   Result Value Ref Range    WBC 10.2 4.8 - 10.8 thou/mm3    RBC 4.12 (L) 4.20 - 5.40 mill/mm3    Hemoglobin 12.4 12.0 - 16.0 gm/dl    Hematocrit 38.0 37.0 - 47.0 %    MCV 92.2 81.0 - 99.0 fL    MCH 30.1 26.0 - 33.0 pg    MCHC 32.6 32.2 - 35.5 gm/dl    RDW-CV 13.5 11.5 - 14.5 %    RDW-SD 45.9 (H) 35.0 - 45.0 fL    Platelets 638 770 - 186 thou/mm3    MPV 9.7 9.4 - 12.4 fL   Basic Metabolic Panel Collection Time: 12/16/20  8:39 AM   Result Value Ref Range    Sodium 142 135 - 145 meq/L    Potassium 4.5 3.5 - 5.2 meq/L    Chloride 109 98 - 111 meq/L    CO2 22 (L) 23 - 33 meq/L    Glucose 163 (H) 70 - 108 mg/dL    BUN 18 7 - 22 mg/dL    CREATININE 1.0 0.4 - 1.2 mg/dL    Calcium 9.1 8.5 - 10.5 mg/dL   Anion Gap    Collection Time: 12/16/20  8:39 AM   Result Value Ref Range    Anion Gap 11.0 8.0 - 16.0 meq/L   Glomerular Filtration Rate, Estimated    Collection Time: 12/16/20  8:39 AM   Result Value Ref Range    Est, Glom Filt Rate 55 (A) ml/min/1.73m2        Microbiology:    Blood culture #1: No results found for: BC    Blood culture #2:No results found for: BLOODCULT2    Organism:  No results found for: LABGRAM    MRSA culture only:No results found for: [address] [place] [address]    Urine culture:   Lab Results   Component Value Date    LABURIN  07/22/2016     No growth-preliminary  Mixed growth. The mixture of organisms present represents  both organisms that may cause urinary tract infections and  organisms that are not a common cause of urinary tract  infections and are possibly skin libra or distal urethral  libra.        Lab Results   Component Value Date    ORG Mixed Growth 07/22/2016        Respiratory culture: No results found for: CULTRESP    Aerobic and Anaerobic :  No results found for: LABAERO  No results found for: LABANAE    Urinalysis:      Lab Results   Component Value Date    NITRU NEGATIVE 07/22/2016    WBCUA 2-4 07/22/2016    BACTERIA NONE 07/22/2016    RBCUA 0-2 07/22/2016    BLOODU NEGATIVE 07/22/2016    SPECGRAV 1.009 07/22/2016    GLUCOSEU NEGATIVE 05/20/2013       Radiology:-  Echo Complete 2d W Doppler W Color    Result Date: 12/16/2020 Transthoracic Echocardiography Report (TTE)  Demographics   Patient Name   Garry Simms  Gender               Female                 S   MR #           [id number]     Race                                                 Ethnicity   Account #      [de-identified]     Room Number          0017   Accession      [id number]    Date of Study        12/15/2020  Number   Date of Birth  1950    Referring Physician  Dayna Kelley MD   Age            69 year(s)    Sonographer          Shanae Ferguson                                                    T                                Interpreting         Edgar Kelley MD                               Physician  Procedure Type of Study   TTE procedure:ECHOCARDIOGRAM COMPLETE 2D W DOPPLER W COLOR. Procedure Date Date: 12/15/2020 Start: 03:37 PM Study Location: Bedside Technical Quality: Limited visualization due to poor acoustical window. Indications:Chest pain. Additional Medical History: Former smoker, atrial fibrillation, atrial fibrillation, coronary artery disease, hyperlipidemia, COPD, asthma, GERD, hypertension, CABG Patient Status: Routine Height: 62.99 inches Weight: 131.4 pounds BSA: 1.62 m^2 BMI: 23.28 kg/m^2 BP: 118/74 mmHg Allergies   - See Epic. - See Epic. Conclusions   Summary  Technically difficult study due to poor acoustic windows. Left ventricular size is normal and systolic function is moderately  reduced. Ejection fraction was estimated at 40-45%. LV wall thickness is  within normal limits. Mild-to-moderate tricuspid regurgitation. Signature   ----------------------------------------------------------------  Electronically signed by Edgar Kelley MD (Interpreting  physician) on 12/16/2020 at 12:20 PM  ----------------------------------------------------------------   Findings   Mitral Valve  The mitral valve was not well visualized .   Mild mitral regurgitation is TV Peak E-Wave: 38.6 cm/s  MV Deceleration Time: 194                        TV Peak A-Wave: 49.3 cm/s  msec  MV P1/2t: 57 msec                                TV Peak Gradient: 0.6  MVA by PHT:3.86 cm^2                             mmHg                                                   TR Velocity:228 cm/s  MV E' Septal Velocity: 7.1 AV DVI (Vmax):0.71    TR Gradient:20.79 mmHg  cm/s                                             PV Peak Velocity: 96.4  MV A' Septal Velocity: 7.4                       cm/s  cm/s                                             PV Peak Gradient: 3.72  MV E' Lateral Velocity:                          mmHg  11.2 cm/s  MV A' Lateral Velocity:  12.9 cm/s  E/E' septal: 8.45  E/E' lateral: 5.36  MR Velocity: 343 cm/s  [email or web address] [id number]    Xr Chest Portable    Result Date: 12/14/2020  PROCEDURE: XR CHEST PORTABLE CLINICAL INFORMATION: Left-sided chest pain. COMPARISON: December 11, 2019 TECHNIQUE: Portable chest. FINDINGS: Median sternotomy wires No lobar consolidation. Costophrenic angles are preserved. Cardiomediastinal silhouette is within normal limits. Ectatic thoracic aorta. No acute osseous findings. Eventration of the right hemidiaphragm. Mild elevation of the left hemidiaphragm. No acute findings **This report has been created using voice recognition software. It may contain minor errors which are inherent in voice recognition technology. ** Final report electronically signed by Dr. Herbie Buck on 12/14/2020 3:51 PM       Follow-up scheduled after discharge :-    today with Leatha Kong MD  in the next few days     Consultations during this hospital stay:-  [] NONE [x] Cardiology  [] Nephrology  [] Hemo onco   [] GI   [] ID  [] Endocrine  [] Pulm    [] Neuro    [] Psych   [] Urology  [] ENT   [] [address]   []Ortho    []CV surg [] Palliative  [] Hospice [] Pain management   []    []TCU   [] PT/OT  OTHERS:-    Disposition: home  Condition at Discharge: guarded    Discharge Medications:      Angie Avila   Home Medication Instructions TWE:[id number]    Printed on:12/16/20 0239   Medication Information                      aspirin 81 MG tablet  Take 81 mg by mouth daily             atorvastatin (LIPITOR) 80 MG tablet  Take 1 tablet by mouth nightly             furosemide (LASIX) 20 MG tablet  Take 1 tablet by mouth as needed (leg edema)             isosorbide mononitrate (IMDUR) 30 MG extended release tablet  Take 1 tablet by mouth daily             metoprolol tartrate (LOPRESSOR) 25 MG tablet  Take 1 tablet by mouth 2 times daily             nitroGLYCERIN (NITROSTAT) 0.4 MG SL tablet  Place 1 tablet under the tongue every 5 minutes as needed for Chest pain.              ondansetron (ZOFRAN ODT) 4 MG disintegrating tablet  Take 4 mg by mouth 4 times daily as needed for Nausea or Vomiting             prasugrel (EFFIENT) 10 MG TABS  Take 1 tablet by mouth daily             propafenone (RYTHMOL) 150 MG tablet  Take 1 tablet by mouth every 8 hours             ranolazine (RANEXA) 500 MG extended release tablet  Take 1 tablet by mouth 2 times daily                 Discharge Medications for PCI/MI (performed or attempted):   ASA:   Yes    Statin:   Yes  ACE/ARB: No blood pressure not allowing, did not added by cardiology   P2Y12 Inhibitor:  Yes   Beta Blocker:   Yes  Nitro SL:   Yes   Cardiac Rehab:  Yes  Dietary Consult:  Yes     Time Spent:- 45 minutes    Electronically signed by Mg Gonzales MD on 12/16/2020 at 1:54 PM  Discharging Hospitalist

## 2020-12-29 ENCOUNTER — OFFICE VISIT (OUTPATIENT)
Dept: CARDIOLOGY CLINIC | Age: 70
End: 2020-12-29
Payer: MEDICARE

## 2020-12-29 VITALS
DIASTOLIC BLOOD PRESSURE: 68 MMHG | BODY MASS INDEX: 24.98 KG/M2 | HEIGHT: 63 IN | WEIGHT: 141 LBS | HEART RATE: 89 BPM | SYSTOLIC BLOOD PRESSURE: 114 MMHG

## 2020-12-29 PROBLEM — I50.32 CHRONIC DIASTOLIC CHF (CONGESTIVE HEART FAILURE) (HCC): Status: ACTIVE | Noted: 2020-12-29

## 2020-12-29 PROCEDURE — 99214 OFFICE O/P EST MOD 30 MIN: CPT | Performed by: NUCLEAR MEDICINE

## 2020-12-29 NOTE — PROGRESS NOTES
[address] [facility]  [facility]  [address] [place] [address]  Dept: [phone]  Dept Fax: [phone]  Loc: [phone]    Visit Date: 12/29/2020    Colt Amin is a 69 y.o. female who presents todayfor:  Chief Complaint   Patient presents with    Check-Up    Chest Pain    Hypertension    Coronary Artery Disease    Hyperlipidemia     Known CAB[facility] 4 years   Lately admitted for UA  Seen [facility]   Had a cath   Severe disease   Occluded LAD and left circ  Patent LIMA yet it feed a small diagonal   Previous stents all occluded  Treated medically   Does have dyspnea  And chronic symptoms  Some palpitation  Dyspnea  No chest pain per se  BP is stable  Some dizziness  One syncope after discharge  On statins       HPI:  HPI  Past Medical History:   Diagnosis Date    Asthma     Atrial fibrillation ([address]     Blood clot associated with vein wall inflammation     CAD (coronary artery disease)     Cerebral artery occlusion with cerebral infarction ([address]     CHF (congestive heart failure) ([address]     COPD (chronic obstructive pulmonary disease) ([address]     History of blood transfusion     Hx of blood clots     Hyperlipidemia     Hypertension     MI, old     Migraine     Pneumonia     PONV (postoperative nausea and vomiting)     Psychiatric problem       Past Surgical History:   Procedure Laterality Date    APPENDECTOMY      CARDIAC SURGERY  1998 in [facility]    pericardial window   [place] WITH STENT PLACEMENT  2013     [facility]    CORONARY ARTERY BYPASS GRAFT      ENDOSCOPY, COLON, DIAGNOSTIC      EYE SURGERY Bilateral 07/2020    cataract removal    HERNIA REPAIR      long ago Dr. Gillian Lombardo @ [facility]       Family History   Problem Relation Age of Onset    Diabetes Mother     Heart Disease Mother     High Blood Pressure Mother     Cancer Sister    [facility] Heart Disease Father     Cancer Brother     Heart Disease Maternal Aunt         MI    Heart Disease Maternal Uncle         MI    Heart Disease Maternal Grandmother         MI    Heart Disease Maternal Grandfather         MI    Stroke Paternal Grandfather     Cancer Sister     Heart Disease Sister         atrial fib    Cancer Brother     Heart Disease Brother         needs stents and has atrial fib    Diabetes Brother     Stroke Sister      Social History     Tobacco Use    Smoking status: Former Smoker     Types: Cigarettes     Quit date: 3/3/1992     Years since quittin.8    Smokeless tobacco: Never Used   Substance Use Topics    Alcohol use: Yes     Comment: occasional      Current Outpatient Medications   Medication Sig Dispense Refill    ranolazine (RANEXA) 500 MG extended release tablet Take 1 tablet by mouth 2 times daily 60 tablet 0    atorvastatin (LIPITOR) 80 MG tablet Take 1 tablet by mouth nightly 30 tablet 0    furosemide (LASIX) 20 MG tablet Take 1 tablet by mouth as needed (leg edema) 90 tablet 3    isosorbide mononitrate (IMDUR) 30 MG extended release tablet Take 1 tablet by mouth daily 90 tablet 3    metoprolol tartrate (LOPRESSOR) 25 MG tablet Take 1 tablet by mouth 2 times daily 180 tablet 3    prasugrel (EFFIENT) 10 MG TABS Take 1 tablet by mouth daily 90 tablet 3    propafenone (RYTHMOL) 150 MG tablet Take 1 tablet by mouth every 8 hours (Patient taking differently: Take 150 mg by mouth 2 times daily ) 270 tablet 3    ondansetron (ZOFRAN ODT) 4 MG disintegrating tablet Take 4 mg by mouth 4 times daily as needed for Nausea or Vomiting      aspirin 81 MG tablet Take 81 mg by mouth daily      nitroGLYCERIN (NITROSTAT) 0.4 MG SL tablet Place 1 tablet under the tongue every 5 minutes as needed for Chest pain. 25 tablet 0     No current facility-administered medications for this visit.       Allergies   Allergen Reactions    Celebrex [Celecoxib] Anaphylaxis     Unable to breath Turns red all over    Contrast [Iodides] Hives    Statins      Will obtain lipid panel and re-evaluate.  Naprosyn [Naproxen] Nausea And Vomiting    Shrimp Flavor Nausea And Vomiting     Health Maintenance   Topic Date Due    Hepatitis C screen  1950    DTaP/Tdap/Td vaccine (1 - Tdap) 03/27/1968    Shingles Vaccine (1 of 2) 03/27/1999    Colon cancer screen colonoscopy  03/27/1999    DEXA (modify frequency per FRAX score)  03/27/2004    Pneumococcal 65+ years Vaccine (1 of 1 - PPSV23) 03/27/2014    Lipid screen  06/29/2019    Flu vaccine (1) 09/01/2020    Annual Wellness Visit (AWV)  12/14/2020    Breast cancer screen  07/01/2021    Potassium monitoring  12/16/2021    Creatinine monitoring  12/16/2021    Hepatitis A vaccine  Aged Out    Hepatitis B vaccine  Aged Out    Hib vaccine  Aged Out    Meningococcal (ACWY) vaccine  Aged Out       Subjective:  Review of Systems  General:   No fever, no chills, some fatigue or weight loss  Pulmonary:    some dyspnea, no wheezing  Cardiac:    Denies recent chest pain,   GI:     No nausea or vomiting, no abdominal pain  Neuro:     some dizziness or light headedness,   Musculoskeletal:  No recent active issues  Extremities:   No edema, no obvious claudication       Objective:  Physical Exam  /68   Pulse 89   Ht 5' 3\" (1.6 m)   Wt 141 lb (64 kg)   BMI 24.98 kg/m²   General:   Well developed, well nourished  Lungs:   Clear to auscultation  Heart:    Normal S1 S2, Slight murmur. no rubs, no gallops  Abdomen:   Soft, non tender, no organomegalies, positive bowel sounds  Extremities:   No edema, no cyanosis, good peripheral pulses  Neurological:   Awake, alert, oriented. No obvious focal deficits  Musculoskelatal:  No obvious deformities    Assessment:      Diagnosis Orders   1. Shortness of breath     2. Chronic diastolic CHF (congestive heart failure) (HCC)     3. Paroxysmal atrial fibrillation ([address]     4.  Familial hypercholesterolemia 5. Coronary artery disease of bypass graft of native heart with stable angina pectoris [facility])     higher risk patient   Chronic symptoms  Not the best candidate for intervention       Plan:  No follow-ups on file. As above  Medical RX  Monitor the BP  Adjust the meds if needed  Continue risk factor modification and medical management  Thank you for allowing me to participate in the care of your patient. Please don't hesitate to contact me regarding any further issues related to the patient care    Orders Placed:  No orders of the defined types were placed in this encounter. Medications Prescribed:  No orders of the defined types were placed in this encounter. Discussed use, benefit, and side effects of prescribed medications. All patient questions answered. Pt voicedunderstanding. Instructed to continue current medications, diet and exercise. Continue risk factor modification and medical management. Patient agreed with treatment plan. Follow up as directed.     Electronically signedby Garland Loco MD on 12/29/2020 at 7:51 AM

## 2021-02-10 ENCOUNTER — TELEPHONE (OUTPATIENT)
Dept: CARDIOLOGY CLINIC | Age: 71
End: 2021-02-10

## 2021-02-10 NOTE — LETTER
[address] [facility]  [facility] [address] [place] [address]  Phone: [phone]  Fax: [phone]    Adarsh Esquivel MD        February 11, 2021     Patient: Adenike Mayorga   YOB: 1950   Date of Visit: 2/10/2021       To Whom It May Concern: It is my medical opinion that Shavonne Estevez can get the covid vaccine from a cardiac standpoint. If you have any questions or concerns, please don't hesitate to call.     Sincerely,        Adarsh Esquivel MD

## 2021-02-10 NOTE — TELEPHONE ENCOUNTER
Pt works at [employer] and needs a note from [phone] that states she is ok to get the covid vaccine from a cardiology standpoint.   Please advise if ok to write letter

## 2021-02-11 NOTE — TELEPHONE ENCOUNTER
LM for pt to return call. Does letter need picked up/mailed/faxed? Letter out for signature. Letter will be in Dr. Luis Antonio Nielson box.

## 2021-02-19 ENCOUNTER — TELEPHONE (OUTPATIENT)
Dept: CARDIOLOGY CLINIC | Age: 71
End: 2021-02-19

## 2021-02-19 RX ORDER — RANOLAZINE 500 MG/1
500 TABLET, EXTENDED RELEASE ORAL 2 TIMES DAILY
Qty: 180 TABLET | Refills: 3 | Status: SHIPPED | OUTPATIENT
Start: 2021-02-19 | End: 2021-02-22

## 2021-02-19 RX ORDER — ATORVASTATIN CALCIUM 80 MG/1
80 TABLET, FILM COATED ORAL NIGHTLY
Qty: 90 TABLET | Refills: 3 | Status: SHIPPED | OUTPATIENT
Start: 2021-02-19 | End: 2021-06-10 | Stop reason: SDUPTHER

## 2021-06-03 ENCOUNTER — HOSPITAL ENCOUNTER (INPATIENT)
Age: 71
LOS: 2 days | Discharge: HOME | DRG: 190 | End: 2021-06-05
Attending: EMERGENCY MEDICINE | Admitting: FAMILY MEDICINE
Payer: MEDICARE

## 2021-06-03 ENCOUNTER — APPOINTMENT (OUTPATIENT)
Dept: GENERAL RADIOLOGY | Age: 71
DRG: 190 | End: 2021-06-03
Payer: MEDICARE

## 2021-06-03 DIAGNOSIS — R31.9 URINARY TRACT INFECTION WITH HEMATURIA, SITE UNSPECIFIED: ICD-10-CM

## 2021-06-03 DIAGNOSIS — J18.9 PNEUMONIA DUE TO ORGANISM: ICD-10-CM

## 2021-06-03 DIAGNOSIS — J44.1 COPD WITH ACUTE EXACERBATION (HCC): Primary | ICD-10-CM

## 2021-06-03 DIAGNOSIS — I50.32 CHRONIC DIASTOLIC CHF (CONGESTIVE HEART FAILURE) (HCC): ICD-10-CM

## 2021-06-03 DIAGNOSIS — N39.0 URINARY TRACT INFECTION WITH HEMATURIA, SITE UNSPECIFIED: ICD-10-CM

## 2021-06-03 LAB
ALBUMIN SERPL-MCNC: 4.1 G/DL (ref 3.5–5.1)
ALP BLD-CCNC: 80 U/L (ref 38–126)
ALT SERPL-CCNC: 9 U/L (ref 11–66)
ANION GAP SERPL CALCULATED.3IONS-SCNC: 10 MEQ/L (ref 8–16)
AST SERPL-CCNC: 20 U/L (ref 5–40)
BACTERIA: ABNORMAL /HPF
BASOPHILS # BLD: 0.2 %
BASOPHILS ABSOLUTE: 0 THOU/MM3 (ref 0–0.1)
BILIRUB SERPL-MCNC: 1.1 MG/DL (ref 0.3–1.2)
BILIRUBIN DIRECT: < 0.2 MG/DL (ref 0–0.3)
BILIRUBIN URINE: NEGATIVE
BLOOD, URINE: ABNORMAL
BUN BLDV-MCNC: 18 MG/DL (ref 7–22)
CALCIUM SERPL-MCNC: 9.3 MG/DL (ref 8.5–10.5)
CASTS 2: ABNORMAL /LPF
CASTS UA: ABNORMAL /LPF
CHARACTER, URINE: ABNORMAL
CHLORIDE BLD-SCNC: 105 MEQ/L (ref 98–111)
CO2: 26 MEQ/L (ref 23–33)
COLOR: YELLOW
CREAT SERPL-MCNC: 1 MG/DL (ref 0.4–1.2)
CRYSTALS, UA: ABNORMAL
EOSINOPHIL # BLD: 3.1 %
EOSINOPHILS ABSOLUTE: 0.3 THOU/MM3 (ref 0–0.4)
EPITHELIAL CELLS, UA: ABNORMAL /HPF
ERYTHROCYTE [DISTWIDTH] IN BLOOD BY AUTOMATED COUNT: 13.6 % (ref 11.5–14.5)
ERYTHROCYTE [DISTWIDTH] IN BLOOD BY AUTOMATED COUNT: 45.5 FL (ref 35–45)
GFR SERPL CREATININE-BSD FRML MDRD: 54 ML/MIN/1.73M2
GLUCOSE BLD-MCNC: 113 MG/DL (ref 70–108)
GLUCOSE URINE: NEGATIVE MG/DL
HCT VFR BLD CALC: 41.2 % (ref 37–47)
HEMOGLOBIN: 13.2 GM/DL (ref 12–16)
IMMATURE GRANS (ABS): 0.02 THOU/MM3 (ref 0–0.07)
IMMATURE GRANULOCYTES: 0.2 %
KETONES, URINE: NEGATIVE
LEUKOCYTE ESTERASE, URINE: ABNORMAL
LIPASE: 24.9 U/L (ref 5.6–51.3)
LYMPHOCYTES # BLD: 33.6 %
LYMPHOCYTES ABSOLUTE: 3.5 THOU/MM3 (ref 1–4.8)
MCH RBC QN AUTO: 29.3 PG (ref 26–33)
MCHC RBC AUTO-ENTMCNC: 32 GM/DL (ref 32.2–35.5)
MCV RBC AUTO: 91.6 FL (ref 81–99)
MISCELLANEOUS 2: ABNORMAL
MONOCYTES # BLD: 6.5 %
MONOCYTES ABSOLUTE: 0.7 THOU/MM3 (ref 0.4–1.3)
NITRITE, URINE: POSITIVE
NUCLEATED RED BLOOD CELLS: 0 /100 WBC
OSMOLALITY CALCULATION: 284 MOSMOL/KG (ref 275–300)
PH UA: 7 (ref 5–9)
PLATELET # BLD: 170 THOU/MM3 (ref 130–400)
PLATELET ESTIMATE: ABNORMAL
PMV BLD AUTO: 10.1 FL (ref 9.4–12.4)
POTASSIUM SERPL-SCNC: 4.6 MEQ/L (ref 3.5–5.2)
PRO-BNP: 915.7 PG/ML (ref 0–900)
PROTEIN UA: ABNORMAL
RBC # BLD: 4.5 MILL/MM3 (ref 4.2–5.4)
RBC URINE: ABNORMAL /HPF
RENAL EPITHELIAL, UA: ABNORMAL
SCAN OF BLOOD SMEAR: NORMAL
SEG NEUTROPHILS: 56.4 %
SEGMENTED NEUTROPHILS ABSOLUTE COUNT: 5.8 THOU/MM3 (ref 1.8–7.7)
SODIUM BLD-SCNC: 141 MEQ/L (ref 135–145)
SPECIFIC GRAVITY, URINE: 1.01 (ref 1–1.03)
TOTAL PROTEIN: 7 G/DL (ref 6.1–8)
TROPONIN T: < 0.01 NG/ML
UROBILINOGEN, URINE: 0.2 EU/DL (ref 0–1)
WBC # BLD: 10.3 THOU/MM3 (ref 4.8–10.8)
WBC UA: > 200 /HPF
YEAST: ABNORMAL

## 2021-06-03 PROCEDURE — 80053 COMPREHEN METABOLIC PANEL: CPT

## 2021-06-03 PROCEDURE — 94640 AIRWAY INHALATION TREATMENT: CPT

## 2021-06-03 PROCEDURE — 94760 N-INVAS EAR/PLS OXIMETRY 1: CPT

## 2021-06-03 PROCEDURE — 99222 1ST HOSP IP/OBS MODERATE 55: CPT | Performed by: PHYSICIAN ASSISTANT

## 2021-06-03 PROCEDURE — 84484 ASSAY OF TROPONIN QUANT: CPT

## 2021-06-03 PROCEDURE — 93005 ELECTROCARDIOGRAM TRACING: CPT | Performed by: STUDENT IN AN ORGANIZED HEALTH CARE EDUCATION/TRAINING PROGRAM

## 2021-06-03 PROCEDURE — 93005 ELECTROCARDIOGRAM TRACING: CPT | Performed by: PHYSICIAN ASSISTANT

## 2021-06-03 PROCEDURE — 83880 ASSAY OF NATRIURETIC PEPTIDE: CPT

## 2021-06-03 PROCEDURE — 6360000002 HC RX W HCPCS: Performed by: STUDENT IN AN ORGANIZED HEALTH CARE EDUCATION/TRAINING PROGRAM

## 2021-06-03 PROCEDURE — 82248 BILIRUBIN DIRECT: CPT

## 2021-06-03 PROCEDURE — 6370000000 HC RX 637 (ALT 250 FOR IP): Performed by: EMERGENCY MEDICINE

## 2021-06-03 PROCEDURE — 99283 EMERGENCY DEPT VISIT LOW MDM: CPT

## 2021-06-03 PROCEDURE — 87077 CULTURE AEROBIC IDENTIFY: CPT

## 2021-06-03 PROCEDURE — 6370000000 HC RX 637 (ALT 250 FOR IP): Performed by: STUDENT IN AN ORGANIZED HEALTH CARE EDUCATION/TRAINING PROGRAM

## 2021-06-03 PROCEDURE — 96375 TX/PRO/DX INJ NEW DRUG ADDON: CPT

## 2021-06-03 PROCEDURE — 85025 COMPLETE CBC W/AUTO DIFF WBC: CPT

## 2021-06-03 PROCEDURE — 71045 X-RAY EXAM CHEST 1 VIEW: CPT

## 2021-06-03 PROCEDURE — 87186 SC STD MICRODIL/AGAR DIL: CPT

## 2021-06-03 PROCEDURE — 96365 THER/PROPH/DIAG IV INF INIT: CPT

## 2021-06-03 PROCEDURE — 1200000003 HC TELEMETRY R&B

## 2021-06-03 PROCEDURE — 2580000003 HC RX 258: Performed by: STUDENT IN AN ORGANIZED HEALTH CARE EDUCATION/TRAINING PROGRAM

## 2021-06-03 PROCEDURE — 36415 COLL VENOUS BLD VENIPUNCTURE: CPT

## 2021-06-03 PROCEDURE — 81001 URINALYSIS AUTO W/SCOPE: CPT

## 2021-06-03 PROCEDURE — 87086 URINE CULTURE/COLONY COUNT: CPT

## 2021-06-03 PROCEDURE — 83690 ASSAY OF LIPASE: CPT

## 2021-06-03 RX ORDER — SODIUM CHLORIDE 0.9 % (FLUSH) 0.9 %
5-40 SYRINGE (ML) INJECTION EVERY 12 HOURS SCHEDULED
Status: DISCONTINUED | OUTPATIENT
Start: 2021-06-03 | End: 2021-06-05 | Stop reason: HOSPADM

## 2021-06-03 RX ORDER — ONDANSETRON 2 MG/ML
4 INJECTION INTRAMUSCULAR; INTRAVENOUS EVERY 6 HOURS PRN
Status: DISCONTINUED | OUTPATIENT
Start: 2021-06-03 | End: 2021-06-05 | Stop reason: HOSPADM

## 2021-06-03 RX ORDER — ACETAMINOPHEN 650 MG/1
650 SUPPOSITORY RECTAL EVERY 6 HOURS PRN
Status: DISCONTINUED | OUTPATIENT
Start: 2021-06-03 | End: 2021-06-05 | Stop reason: HOSPADM

## 2021-06-03 RX ORDER — ATORVASTATIN CALCIUM 80 MG/1
80 TABLET, FILM COATED ORAL NIGHTLY
Status: DISCONTINUED | OUTPATIENT
Start: 2021-06-04 | End: 2021-06-05 | Stop reason: HOSPADM

## 2021-06-03 RX ORDER — POTASSIUM CHLORIDE 20 MEQ/1
40 TABLET, EXTENDED RELEASE ORAL PRN
Status: DISCONTINUED | OUTPATIENT
Start: 2021-06-03 | End: 2021-06-05 | Stop reason: HOSPADM

## 2021-06-03 RX ORDER — IPRATROPIUM BROMIDE AND ALBUTEROL SULFATE 2.5; .5 MG/3ML; MG/3ML
1 SOLUTION RESPIRATORY (INHALATION) ONCE
Status: COMPLETED | OUTPATIENT
Start: 2021-06-03 | End: 2021-06-03

## 2021-06-03 RX ORDER — ALBUTEROL SULFATE 2.5 MG/3ML
2.5 SOLUTION RESPIRATORY (INHALATION) EVERY 6 HOURS PRN
Status: DISCONTINUED | OUTPATIENT
Start: 2021-06-03 | End: 2021-06-05 | Stop reason: HOSPADM

## 2021-06-03 RX ORDER — ISOSORBIDE MONONITRATE 30 MG/1
30 TABLET, EXTENDED RELEASE ORAL DAILY
Status: DISCONTINUED | OUTPATIENT
Start: 2021-06-04 | End: 2021-06-05 | Stop reason: HOSPADM

## 2021-06-03 RX ORDER — MAGNESIUM SULFATE IN WATER 40 MG/ML
2000 INJECTION, SOLUTION INTRAVENOUS PRN
Status: DISCONTINUED | OUTPATIENT
Start: 2021-06-03 | End: 2021-06-05 | Stop reason: HOSPADM

## 2021-06-03 RX ORDER — FUROSEMIDE 20 MG/1
20 TABLET ORAL DAILY PRN
Status: DISCONTINUED | OUTPATIENT
Start: 2021-06-04 | End: 2021-06-04

## 2021-06-03 RX ORDER — PRASUGREL 10 MG/1
10 TABLET, FILM COATED ORAL DAILY
Status: DISCONTINUED | OUTPATIENT
Start: 2021-06-04 | End: 2021-06-05 | Stop reason: HOSPADM

## 2021-06-03 RX ORDER — POLYETHYLENE GLYCOL 3350 17 G/17G
17 POWDER, FOR SOLUTION ORAL DAILY PRN
Status: DISCONTINUED | OUTPATIENT
Start: 2021-06-03 | End: 2021-06-05 | Stop reason: HOSPADM

## 2021-06-03 RX ORDER — POTASSIUM CHLORIDE 7.45 MG/ML
10 INJECTION INTRAVENOUS PRN
Status: DISCONTINUED | OUTPATIENT
Start: 2021-06-03 | End: 2021-06-05 | Stop reason: HOSPADM

## 2021-06-03 RX ORDER — IPRATROPIUM BROMIDE AND ALBUTEROL SULFATE 2.5; .5 MG/3ML; MG/3ML
1 SOLUTION RESPIRATORY (INHALATION)
Status: DISCONTINUED | OUTPATIENT
Start: 2021-06-03 | End: 2021-06-04 | Stop reason: HOSPADM

## 2021-06-03 RX ORDER — SODIUM CHLORIDE 0.9 % (FLUSH) 0.9 %
5-40 SYRINGE (ML) INJECTION PRN
Status: DISCONTINUED | OUTPATIENT
Start: 2021-06-03 | End: 2021-06-05 | Stop reason: HOSPADM

## 2021-06-03 RX ORDER — METHYLPREDNISOLONE SODIUM SUCCINATE 125 MG/2ML
125 INJECTION, POWDER, LYOPHILIZED, FOR SOLUTION INTRAMUSCULAR; INTRAVENOUS ONCE
Status: COMPLETED | OUTPATIENT
Start: 2021-06-03 | End: 2021-06-03

## 2021-06-03 RX ORDER — ASPIRIN 81 MG/1
81 TABLET ORAL DAILY
Status: DISCONTINUED | OUTPATIENT
Start: 2021-06-04 | End: 2021-06-05 | Stop reason: HOSPADM

## 2021-06-03 RX ORDER — ACETAMINOPHEN 325 MG/1
650 TABLET ORAL EVERY 6 HOURS PRN
Status: DISCONTINUED | OUTPATIENT
Start: 2021-06-03 | End: 2021-06-05 | Stop reason: HOSPADM

## 2021-06-03 RX ORDER — SODIUM CHLORIDE 9 MG/ML
25 INJECTION, SOLUTION INTRAVENOUS PRN
Status: DISCONTINUED | OUTPATIENT
Start: 2021-06-03 | End: 2021-06-05 | Stop reason: HOSPADM

## 2021-06-03 RX ORDER — ONDANSETRON 4 MG/1
4 TABLET, ORALLY DISINTEGRATING ORAL EVERY 8 HOURS PRN
Status: DISCONTINUED | OUTPATIENT
Start: 2021-06-03 | End: 2021-06-05 | Stop reason: HOSPADM

## 2021-06-03 RX ORDER — BUDESONIDE AND FORMOTEROL FUMARATE DIHYDRATE 160; 4.5 UG/1; UG/1
2 AEROSOL RESPIRATORY (INHALATION) 2 TIMES DAILY
Status: DISCONTINUED | OUTPATIENT
Start: 2021-06-03 | End: 2021-06-05 | Stop reason: HOSPADM

## 2021-06-03 RX ORDER — PROPAFENONE HYDROCHLORIDE 150 MG/1
150 TABLET, FILM COATED ORAL 2 TIMES DAILY
Status: DISCONTINUED | OUTPATIENT
Start: 2021-06-04 | End: 2021-06-05 | Stop reason: HOSPADM

## 2021-06-03 RX ADMIN — IPRATROPIUM BROMIDE AND ALBUTEROL SULFATE 1 AMPULE: .5; 3 SOLUTION RESPIRATORY (INHALATION) at 20:01

## 2021-06-03 RX ADMIN — AZITHROMYCIN DIHYDRATE 500 MG: 500 INJECTION, POWDER, LYOPHILIZED, FOR SOLUTION INTRAVENOUS at 21:28

## 2021-06-03 RX ADMIN — METHYLPREDNISOLONE SODIUM SUCCINATE 125 MG: 125 INJECTION, POWDER, FOR SOLUTION INTRAMUSCULAR; INTRAVENOUS at 17:25

## 2021-06-03 RX ADMIN — IPRATROPIUM BROMIDE AND ALBUTEROL SULFATE 1 AMPULE: .5; 3 SOLUTION RESPIRATORY (INHALATION) at 17:22

## 2021-06-03 ASSESSMENT — PAIN DESCRIPTION - ORIENTATION: ORIENTATION: RIGHT

## 2021-06-03 ASSESSMENT — ENCOUNTER SYMPTOMS
BACK PAIN: 0
EYE PAIN: 0
VOMITING: 0
NAUSEA: 0
RHINORRHEA: 0
DIARRHEA: 0
COUGH: 1
WHEEZING: 1
SHORTNESS OF BREATH: 1
ABDOMINAL PAIN: 0
CONSTIPATION: 1

## 2021-06-03 ASSESSMENT — PAIN DESCRIPTION - DESCRIPTORS: DESCRIPTORS: ACHING

## 2021-06-03 ASSESSMENT — PAIN DESCRIPTION - PAIN TYPE: TYPE: ACUTE PAIN

## 2021-06-03 ASSESSMENT — PAIN SCALES - GENERAL
PAINLEVEL_OUTOF10: 7
PAINLEVEL_OUTOF10: 0

## 2021-06-03 ASSESSMENT — PAIN DESCRIPTION - LOCATION: LOCATION: RIB CAGE

## 2021-06-03 NOTE — ED NOTES
Patient to ED from urgent care for cough. Patient states cough started 3 weeks ago. The last two days patient had to sleep in an upright position. Patient denies fevers. Patient has hx of COPD.      Tavo Diaz RN  06/03/21 0368

## 2021-06-03 NOTE — ED NOTES
ED to inpatient nurses report    Chief Complaint   Patient presents with    Cough    Urinary Tract Infection      Present to ED from home  LOC: alert and orientated to name, place, date  Vital signs   Vitals:    06/03/21 1611 06/03/21 1722 06/03/21 1732 06/03/21 1908   BP: 138/70  115/70 (!) 140/101   Pulse:   92 78   Resp:  22 28 16   Temp: 98.1 °F (36.7 °C)      TempSrc: Oral      SpO2:  95% 98% 93%   Weight:       Height:          Oxygen Baseline RA    Current needs required RA Bipap/Cpap No  LDAs:   Peripheral IV 12/14/20 Right Antecubital (Active)     Mobility: Requires assistance * 1  Pending ED orders: Breathing treatment  Present condition: Stable    Electronically signed by Galilea Dee RN on 6/3/2021 at 7:24 PM       Galilea Dee [facility]  06/03/21 1924

## 2021-06-03 NOTE — ED PROVIDER NOTES
[facility] ENCOUNTER          Pt Name: Deyvi Enciso  MRN: [id number]  Robertogfmariann 1950  Date of evaluation: 6/3/2021  Treating Resident Physician: Mishel Vasques MD  Supervising Physician: Dr. Neyda Chapman MD    [address] [place] [address]       Chief Complaint   Patient presents with    Cough    Urinary Tract Infection     History obtained from the patient. HISTORY OF PRESENT ILLNESS    HPI  Deyvi Monticello is a 66 y.o. female who presents to the emergency department for evaluation of shortness of breath and cough and wheezing. Patient states she has had nonproductive cough for approximately 3 weeks along with shortness of breath. Patient states she came in today because she can no longer take her symptoms. Patient denies any fevers or chills. Patient denies any congestion rhinorrhea. Patient denies any chest pain. Patient states her shortness of breath is worse when she is exerting herself and worse when lying down. Patient also mentions paroxysmal nocturnal dyspnea and orthopnea. Patient states she has a history of COPD and has tried her nebulizer treatments at home with provide mild relief. Patient denies any recent sick contacts. Patient states she has received both doses of the Covid vaccine and \"always wears a mask\" and has had no exposures. Patient denies Covid swab when I asked her. Patient denies any abdominal pain nausea vomiting diarrhea. Patient mentions chronic constipation with no changes. Patient mentions dysuria and foul-smelling urine. Patient states she went to urgent care earlier today who told her she has a UTI. Patient denies hematuria or drainage or discharge. Patient also received a nebulizer treatment at her urgent care which he said helped her shortness of breath slightly. Patient also mentions generalized weakness and fatigue.       Patient states she has a history of COPD has been admitted before for COPD exacerbations. Patient denies any intubations but states she has required BiPAP before. Chart review shows patient also has a past medical history that includes A. fib, COPD, GERD, hypertension, hyperlipidemia, and a CABG with stent placement. Patient is on Effient and states she is compliant. Patient has CHF with a EF of 40 to 45% as demonstrated on echo from 12/14/2020. Patient was last admitted on 12/14/2020 for atypical chest pain. Patient denies any new headache fever chills chest pain abdominal pain nausea vomiting diarrhea  . Mark Borges The patient has no other acute complaints at this time. REVIEW OF SYSTEMS   Review of Systems   Constitutional: Positive for fatigue. Negative for appetite change, chills and fever. HENT: Negative for congestion and rhinorrhea. Eyes: Negative for pain. Respiratory: Positive for cough, shortness of breath and wheezing. Cardiovascular: Negative for chest pain and palpitations. Gastrointestinal: Positive for constipation. Negative for abdominal pain, diarrhea, nausea and vomiting. Genitourinary: Positive for dysuria. Negative for hematuria. Musculoskeletal: Negative for back pain and neck pain. Skin: Negative for rash and wound. Neurological: Negative for syncope, light-headedness and headaches. Psychiatric/Behavioral: Negative for confusion.          PAST MEDICAL AND SURGICAL HISTORY     Past Medical History:   Diagnosis Date    Asthma     Atrial fibrillation ([facility] [address]     Blood clot associated with vein wall inflammation     CAD (coronary artery disease)     Cerebral artery occlusion with cerebral infarction (HCC)     CHF (congestive heart failure) (HCC)     COPD (chronic obstructive pulmonary disease) (HCC)     History of blood transfusion     Hx of blood clots     Hyperlipidemia     Hypertension     MI, old     Migraine     Pneumonia     PONV (postoperative nausea and vomiting)     Psychiatric problem      Past Surgical Rajani Serrano, PA-C    polyethylene glycol (GLYCOLAX) packet 17 g, 17 g, Oral, Daily PRN, [employer], PA-C    acetaminophen (TYLENOL) tablet 650 mg, 650 mg, Oral, Q6H PRN **OR** acetaminophen (TYLENOL) suppository 650 mg, 650 mg, Rectal, Q6H PRN, [employer], PA-C    potassium chloride (KLOR-CON M) extended release tablet 40 mEq, 40 mEq, Oral, PRN **OR** potassium bicarb-citric acid (EFFER-K) effervescent tablet 40 mEq, 40 mEq, Oral, PRN **OR** potassium chloride 10 mEq/100 mL IVPB (Peripheral Line), 10 mEq, Intravenous, PRN, [employer], PA-C    magnesium sulfate 2000 mg in 50 mL IVPB premix, 2,000 mg, Intravenous, PRN, [employer], PA-C    cefTRIAXone (ROCEPHIN) 1000 mg IVPB in 50 mL D5W minibag, 1,000 mg, Intravenous, Q24H, ALEXIS PoloC    azithromycin (ZITHROMAX) 500 mg in D5W 250ml addavial, 500 mg, Intravenous, Q24H, Crissy R CARLITO Noonan-C    albuterol (PROVENTIL) nebulizer solution 2.5 mg, 2.5 mg, Nebulization, Q6H PRN, Demar Ansari PA-C    budesonide-formoterol (SYMBICORT) 160-4.5 MCG/ACT inhaler 2 puff, 2 puff, Inhalation, BID, [employer], PA-C      SOCIAL HISTORY     Social History     Social History Narrative    Not on file     Social History     Tobacco Use    Smoking status: Former Smoker     Types: Cigarettes     Quit date: 3/3/1992     Years since quittin.2    Smokeless tobacco: Never Used   Vaping Use    Vaping Use: Never used   Substance Use Topics    Alcohol use: Yes     Comment: occasional    Drug use: No         ALLERGIES     Allergies   Allergen Reactions    Celebrex [Celecoxib] Anaphylaxis     Unable to breath Turns red all over    Contrast [Iodides] Hives    Statins      Will obtain lipid panel and re-evaluate.    Takes lipitor at home    Naprosyn [Naproxen] Nausea And Vomiting    Shrimp Flavor Nausea And Vomiting         FAMILY HISTORY     Family History   Problem Relation Age of Onset    Diabetes Mother     Heart Disease Mother     High Blood Pressure Mother    Nobie Juniper Sister     Heart Disease Father     Cancer Brother     Heart Disease Maternal Aunt         MI    Heart Disease Maternal Uncle         MI    Heart Disease Maternal Grandmother         MI    Heart Disease Maternal Grandfather         MI    Stroke Paternal Grandfather     Cancer Sister     Heart Disease Sister         atrial fib    Cancer Brother     Heart Disease Brother         needs stents and has atrial fib    Diabetes Brother     Stroke Sister          PREVIOUS RECORDS   Previous records reviewed: Patient seen last on 12/29/2020 for cardiology office visit regarding checkup. PHYSICAL EXAM     ED Triage Vitals   BP Temp Temp Source Pulse Resp SpO2 Height Weight   06/03/21 1611 06/03/21 1611 06/03/21 1611 06/03/21 1602 06/03/21 1602 06/03/21 1602 06/03/21 1602 06/03/21 1602   138/70 98.1 °F (36.7 °C) Oral 84 28 97 % 5' 3\" (1.6 m) 125 lb (56.7 kg)     Initial vital signs and nursing assessment reviewed and vitals are/show: normal. Pulsoximetry is normal per my interpretation. Additional Vital Signs:  Vitals:    06/04/21 0037   BP: 115/66   Pulse: 75   Resp: 16   Temp: 97.5 °F (36.4 °C)   SpO2: 90%       Physical Exam  Constitutional:       General: She is not in acute distress. Appearance: Normal appearance. She is normal weight. She is not ill-appearing, toxic-appearing or diaphoretic. HENT:      Head: Normocephalic and atraumatic. Right Ear: External ear normal.      Left Ear: External ear normal.   Eyes:      General: No scleral icterus. Right eye: No discharge. Left eye: No discharge. Cardiovascular:      Rate and Rhythm: Normal rate. Pulses: Normal pulses. Heart sounds: Normal heart sounds. No murmur heard. No friction rub. No gallop. Pulmonary:      Effort: No respiratory distress. Breath sounds: No stridor. Wheezing ( Bilateral expiratory wheezes) present. No rhonchi or rales. Chest:      Chest wall: No tenderness. Abdominal:      General: Abdomen is flat. There is no distension. Palpations: Abdomen is soft. Tenderness: There is no abdominal tenderness. There is no right CVA tenderness, left CVA tenderness, guarding or rebound. Musculoskeletal:         General: Normal range of motion. Cervical back: Neck supple. Right lower leg: No edema. Left lower leg: No edema. Skin:     General: Skin is warm and dry. Neurological:      Mental Status: She is alert and oriented to person, place, and time. Mental status is at baseline. Psychiatric:         Mood and Affect: Mood normal.         Behavior: Behavior normal.         Thought Content: Thought content normal.         Judgment: Judgment normal.             MEDICAL DECISION MAKING   Initial Assessment:       COPD versus pneumonia versus CHF    Differential diagnoses include but are not limited to: COPD exacerbation, CHF exacerbation, pneumonia, UTI, COVID-19, PE, ACS    Plan:   EKG  Labs. Imaging: Chest x-ray  DuoNeb  Methylprednisolone  Repeat EKG due to artifact. Additional DuoNeb treatment  Additional DuoNeb treatment  IV antibiotics. Admission    Patient was seen and evaluated in the emergency department for shortness of breath. Patient was in outside facility for similar symptoms. Upon initial examination. Had bilateral expiratory wheezes. Patient exhibited mild tachypnea but did not exhibit severe retractions or show signs of significantly increased work of breathing. Patient's pulse ox was stable. Patient required 3 DuoNeb treatments in the emergency department in addition to steroids for likely COPD exacerbation. Imaging also showed patient had a possible pneumonia, and with patient's increased sputum production, patient was administered antibiotics. Lab work also showed UTI which was also treated with antibiotics. Patient did not meet SIRS criteria, so no further septic work-up was performed.   Vital signs were stable in the emergency department patient did not require any supplemental oxygen. I discussed possibility for patient be discharged with oral antibiotics in at home nebulizer treatments, she did not feel safe with discharge due to her being alone at home and the constellation of her symptoms. I offered admission to the hospital for further management and treatment of her pathology, she agreed. Patient admitted for further management.           ED RESULTS   Laboratory results:  Labs Reviewed   BASIC METABOLIC PANEL - Abnormal; Notable for the following components:       Result Value    Glucose 113 (*)     All other components within normal limits   CBC WITH AUTO DIFFERENTIAL - Abnormal; Notable for the following components:    MCHC 32.0 (*)     RDW-SD 45.5 (*)     All other components within normal limits   HEPATIC FUNCTION PANEL - Abnormal; Notable for the following components:    ALT 9 (*)     All other components within normal limits   BRAIN NATRIURETIC PEPTIDE - Abnormal; Notable for the following components:    Pro-.7 (*)     All other components within normal limits   GLOMERULAR FILTRATION RATE, ESTIMATED - Abnormal; Notable for the following components:    Est, Glom Filt Rate 54 (*)     All other components within normal limits   URINE WITH REFLEXED MICRO - Abnormal; Notable for the following components:    Blood, Urine MODERATE (*)     Protein, UA TRACE (*)     Nitrite, Urine POSITIVE (*)     Leukocyte Esterase, Urine LARGE (*)     All other components within normal limits   CULTURE, REFLEXED, URINE    Narrative:     Source: urine, clean catch       Site:           Current Antibiotics: not stated   BORDETELLA PERTUSSIS / PARAPERTUSSIS PCR   LIPASE   TROPONIN   ANION GAP   OSMOLALITY   SCAN OF BLOOD SMEAR   BASIC METABOLIC PANEL W/ REFLEX TO MG FOR LOW K   CBC WITH AUTO DIFFERENTIAL       Radiologic studies results:  XR CHEST PORTABLE   Final Result   Right middle lobe atelectasis or possible developing infiltrate            **This report has been created using voice recognition software. It may contain minor errors which are inherent in voice recognition technology. **      Final report electronically signed by Dr. Dwayne Gale on 6/3/2021 4:30 PM          ED Medications administered this visit:   Medications   cefTRIAXone (ROCEPHIN) 2000 mg IVPB in D5W 50ml minibag (has no administration in time range)   aspirin EC tablet 81 mg (has no administration in time range)   atorvastatin (LIPITOR) tablet 80 mg (80 mg Oral Given 6/4/21 0103)   furosemide (LASIX) tablet 20 mg (has no administration in time range)   isosorbide mononitrate (IMDUR) extended release tablet 30 mg (has no administration in time range)   metoprolol tartrate (LOPRESSOR) tablet 25 mg (25 mg Oral Given 6/4/21 0103)   prasugrel (EFFIENT) tablet 10 mg (has no administration in time range)   propafenone (RYTHMOL) tablet 150 mg (has no administration in time range)   sodium chloride flush 0.9 % injection 5-40 mL (10 mLs Intravenous Given 6/4/21 0103)   sodium chloride flush 0.9 % injection 5-40 mL (has no administration in time range)   0.9 % sodium chloride infusion (has no administration in time range)   enoxaparin (LOVENOX) injection 40 mg (has no administration in time range)   ondansetron (ZOFRAN-ODT) disintegrating tablet 4 mg (has no administration in time range)     Or   ondansetron (ZOFRAN) injection 4 mg (has no administration in time range)   polyethylene glycol (GLYCOLAX) packet 17 g (has no administration in time range)   acetaminophen (TYLENOL) tablet 650 mg (has no administration in time range)     Or   acetaminophen (TYLENOL) suppository 650 mg (has no administration in time range)   potassium chloride (KLOR-CON M) extended release tablet 40 mEq (has no administration in time range)     Or   potassium bicarb-citric acid (EFFER-K) effervescent tablet 40 mEq (has no administration in time range)     Or   potassium chloride 10 mEq/100 mL IVPB (Peripheral Line) (has no administration in time range)   magnesium sulfate 2000 mg in 50 mL IVPB premix (has no administration in time range)   cefTRIAXone (ROCEPHIN) 1000 mg IVPB in 50 mL D5W minibag (has no administration in time range)   azithromycin (ZITHROMAX) 500 mg in D5W 250ml addavial (has no administration in time range)   albuterol (PROVENTIL) nebulizer solution 2.5 mg (has no administration in time range)   budesonide-formoterol (SYMBICORT) 160-4.5 MCG/ACT inhaler 2 puff (2 puffs Inhalation Not Given 6/3/21 2258)   methylPREDNISolone sodium (SOLU-MEDROL) injection 40 mg (has no administration in time range)   methylPREDNISolone sodium (SOLU-MEDROL) injection 125 mg (125 mg Intravenous Given 6/3/21 1725)   ipratropium-albuterol (DUONEB) nebulizer solution 1 ampule (1 ampule Inhalation Given 6/3/21 2001)   azithromycin (ZITHROMAX) 500 mg in D5W 250ml addavial (0 mg Intravenous Stopped 6/3/21 2245)         ED COURSE     ED Course as of Jun 04 0210   Thu Jun 03, 2021   1636 CXR:IMPRESSION:  Right middle lobe atelectasis or possible developing infiltrate    [CR]   1656 Patient denied COVID-19 swab    [CR]   1733 Repeat EKG ordered due to artifact    [CR]   1734 Troponin WNL    [CR]   1736 BNP elevated at 915; similar to 950 1-year ago. [CR]   9911 Repeat Duoneb treatment ordered by attending for continued wheezing    [CR]   1827 UA suggestive of UTI.    [CR]   1858 CBC unremarkable    [CR]   1936 Reevaluated patient after second DuoNeb treatment, patient continues to have bilateral expiratory wheezes, improved from initial evaluation. Will order 1 more treatment and reevaluate for potential admission versus discharge.    [CR]   2047 Re-evaluated patient after 3rd duoneb treatment; patient continues to have expiratory wheezing. Discussed admission vs discharge; patient prefers admission due to social factors.  Will page for admission & order rocephin.     [CR]      ED Course User Index  [CR] Rcahel Cerrato [address] Monse Hamilton MD            MEDICATION CHANGES     Current Discharge Medication List            FINAL DISPOSITION     Final diagnoses:   Urinary tract infection with hematuria, site unspecified   Pneumonia due to organism   COPD with acute exacerbation ([facility] [address]     Condition: condition: fair  Dispo: Admit to med/surg floor    This transcription was electronically signed. Parts of this transcriptions may have been dictated by use of voice recognition software and electronically transcribed, and parts may have been transcribed with the assistance of an ED scribe. The transcription may contain errors not detected in proofreading. Please refer to my supervising physician's documentation if my documentation differs.     Electronically Signed: Haider Toro MD, 06/04/21, 2:10 AM         Haider Toro MD  Resident  06/04/21 8718

## 2021-06-04 PROBLEM — R06.02 SOB (SHORTNESS OF BREATH): Status: ACTIVE | Noted: 2021-06-03

## 2021-06-04 LAB
ANION GAP SERPL CALCULATED.3IONS-SCNC: 13 MEQ/L (ref 8–16)
BASOPHILS # BLD: 0.1 %
BASOPHILS ABSOLUTE: 0 THOU/MM3 (ref 0–0.1)
BUN BLDV-MCNC: 21 MG/DL (ref 7–22)
CALCIUM SERPL-MCNC: 9.4 MG/DL (ref 8.5–10.5)
CHLORIDE BLD-SCNC: 104 MEQ/L (ref 98–111)
CO2: 23 MEQ/L (ref 23–33)
CREAT SERPL-MCNC: 1 MG/DL (ref 0.4–1.2)
EKG Q-T INTERVAL: 398 MS
EKG Q-T INTERVAL: 402 MS
EKG QRS DURATION: 84 MS
EKG QRS DURATION: 94 MS
EKG QTC CALCULATION (BAZETT): 453 MS
EKG QTC CALCULATION (BAZETT): 460 MS
EKG R AXIS: -8 DEGREES
EKG R AXIS: 4 DEGREES
EKG T AXIS: 37 DEGREES
EKG T AXIS: 73 DEGREES
EKG VENTRICULAR RATE: 78 BPM
EKG VENTRICULAR RATE: 79 BPM
EOSINOPHIL # BLD: 0 %
EOSINOPHILS ABSOLUTE: 0 THOU/MM3 (ref 0–0.4)
ERYTHROCYTE [DISTWIDTH] IN BLOOD BY AUTOMATED COUNT: 13.8 % (ref 11.5–14.5)
ERYTHROCYTE [DISTWIDTH] IN BLOOD BY AUTOMATED COUNT: 46.2 FL (ref 35–45)
GFR SERPL CREATININE-BSD FRML MDRD: 54 ML/MIN/1.73M2
GLUCOSE BLD-MCNC: 133 MG/DL (ref 70–108)
GLUCOSE BLD-MCNC: 141 MG/DL (ref 70–108)
GLUCOSE BLD-MCNC: 152 MG/DL (ref 70–108)
GLUCOSE BLD-MCNC: 163 MG/DL (ref 70–108)
HCT VFR BLD CALC: 37.8 % (ref 37–47)
HEMOGLOBIN: 12.3 GM/DL (ref 12–16)
IMMATURE GRANS (ABS): 0.03 THOU/MM3 (ref 0–0.07)
IMMATURE GRANULOCYTES: 0.4 %
LYMPHOCYTES # BLD: 18.6 %
LYMPHOCYTES ABSOLUTE: 1.5 THOU/MM3 (ref 1–4.8)
MCH RBC QN AUTO: 29.7 PG (ref 26–33)
MCHC RBC AUTO-ENTMCNC: 32.5 GM/DL (ref 32.2–35.5)
MCV RBC AUTO: 91.3 FL (ref 81–99)
MONOCYTES # BLD: 1.5 %
MONOCYTES ABSOLUTE: 0.1 THOU/MM3 (ref 0.4–1.3)
NUCLEATED RED BLOOD CELLS: 0 /100 WBC
PLATELET # BLD: 190 THOU/MM3 (ref 130–400)
PMV BLD AUTO: 9.8 FL (ref 9.4–12.4)
POTASSIUM REFLEX MAGNESIUM: 4.8 MEQ/L (ref 3.5–5.2)
RBC # BLD: 4.14 MILL/MM3 (ref 4.2–5.4)
SEG NEUTROPHILS: 79.4 %
SEGMENTED NEUTROPHILS ABSOLUTE COUNT: 6.4 THOU/MM3 (ref 1.8–7.7)
SODIUM BLD-SCNC: 140 MEQ/L (ref 135–145)
WBC # BLD: 8 THOU/MM3 (ref 4.8–10.8)

## 2021-06-04 PROCEDURE — 6360000002 HC RX W HCPCS: Performed by: STUDENT IN AN ORGANIZED HEALTH CARE EDUCATION/TRAINING PROGRAM

## 2021-06-04 PROCEDURE — 93307 TTE W/O DOPPLER COMPLETE: CPT

## 2021-06-04 PROCEDURE — 6370000000 HC RX 637 (ALT 250 FOR IP): Performed by: PHYSICIAN ASSISTANT

## 2021-06-04 PROCEDURE — 36415 COLL VENOUS BLD VENIPUNCTURE: CPT

## 2021-06-04 PROCEDURE — 1200000003 HC TELEMETRY R&B

## 2021-06-04 PROCEDURE — 2580000003 HC RX 258: Performed by: PHYSICIAN ASSISTANT

## 2021-06-04 PROCEDURE — 99233 SBSQ HOSP IP/OBS HIGH 50: CPT | Performed by: HOSPITALIST

## 2021-06-04 PROCEDURE — 6360000002 HC RX W HCPCS: Performed by: PHYSICIAN ASSISTANT

## 2021-06-04 PROCEDURE — 94640 AIRWAY INHALATION TREATMENT: CPT

## 2021-06-04 PROCEDURE — 2580000003 HC RX 258: Performed by: STUDENT IN AN ORGANIZED HEALTH CARE EDUCATION/TRAINING PROGRAM

## 2021-06-04 PROCEDURE — 80048 BASIC METABOLIC PNL TOTAL CA: CPT

## 2021-06-04 PROCEDURE — 6370000000 HC RX 637 (ALT 250 FOR IP): Performed by: STUDENT IN AN ORGANIZED HEALTH CARE EDUCATION/TRAINING PROGRAM

## 2021-06-04 PROCEDURE — 94760 N-INVAS EAR/PLS OXIMETRY 1: CPT

## 2021-06-04 PROCEDURE — 82948 REAGENT STRIP/BLOOD GLUCOSE: CPT

## 2021-06-04 PROCEDURE — 85025 COMPLETE CBC W/AUTO DIFF WBC: CPT

## 2021-06-04 RX ORDER — METHYLPREDNISOLONE SODIUM SUCCINATE 40 MG/ML
40 INJECTION, POWDER, LYOPHILIZED, FOR SOLUTION INTRAMUSCULAR; INTRAVENOUS DAILY
Status: DISCONTINUED | OUTPATIENT
Start: 2021-06-04 | End: 2021-06-04

## 2021-06-04 RX ORDER — DEXTROSE MONOHYDRATE 50 MG/ML
100 INJECTION, SOLUTION INTRAVENOUS PRN
Status: DISCONTINUED | OUTPATIENT
Start: 2021-06-04 | End: 2021-06-05 | Stop reason: HOSPADM

## 2021-06-04 RX ORDER — FUROSEMIDE 10 MG/ML
40 INJECTION INTRAMUSCULAR; INTRAVENOUS 2 TIMES DAILY
Status: DISCONTINUED | OUTPATIENT
Start: 2021-06-04 | End: 2021-06-05

## 2021-06-04 RX ORDER — DEXTROSE MONOHYDRATE 25 G/50ML
12.5 INJECTION, SOLUTION INTRAVENOUS PRN
Status: DISCONTINUED | OUTPATIENT
Start: 2021-06-04 | End: 2021-06-05 | Stop reason: HOSPADM

## 2021-06-04 RX ORDER — NITROFURANTOIN 25; 75 MG/1; MG/1
100 CAPSULE ORAL EVERY 12 HOURS SCHEDULED
Status: DISCONTINUED | OUTPATIENT
Start: 2021-06-04 | End: 2021-06-05 | Stop reason: HOSPADM

## 2021-06-04 RX ORDER — FUROSEMIDE 10 MG/ML
40 INJECTION INTRAMUSCULAR; INTRAVENOUS DAILY
Status: DISCONTINUED | OUTPATIENT
Start: 2021-06-04 | End: 2021-06-04

## 2021-06-04 RX ORDER — NICOTINE POLACRILEX 4 MG
15 LOZENGE BUCCAL PRN
Status: DISCONTINUED | OUTPATIENT
Start: 2021-06-04 | End: 2021-06-05 | Stop reason: HOSPADM

## 2021-06-04 RX ORDER — BENZONATATE 100 MG/1
100 CAPSULE ORAL 3 TIMES DAILY PRN
Status: DISCONTINUED | OUTPATIENT
Start: 2021-06-04 | End: 2021-06-05 | Stop reason: HOSPADM

## 2021-06-04 RX ADMIN — SODIUM CHLORIDE, PRESERVATIVE FREE 10 ML: 5 INJECTION INTRAVENOUS at 20:54

## 2021-06-04 RX ADMIN — FUROSEMIDE 40 MG: 10 INJECTION, SOLUTION INTRAMUSCULAR; INTRAVENOUS at 16:55

## 2021-06-04 RX ADMIN — ATORVASTATIN CALCIUM 80 MG: 80 TABLET, FILM COATED ORAL at 01:03

## 2021-06-04 RX ADMIN — ATORVASTATIN CALCIUM 80 MG: 80 TABLET, FILM COATED ORAL at 20:54

## 2021-06-04 RX ADMIN — ISOSORBIDE MONONITRATE 30 MG: 30 TABLET ORAL at 08:36

## 2021-06-04 RX ADMIN — BENZONATATE 100 MG: 100 CAPSULE ORAL at 16:47

## 2021-06-04 RX ADMIN — CEFTRIAXONE SODIUM 2000 MG: 2 INJECTION, POWDER, FOR SOLUTION INTRAMUSCULAR; INTRAVENOUS at 02:18

## 2021-06-04 RX ADMIN — ENOXAPARIN SODIUM 40 MG: 40 INJECTION SUBCUTANEOUS at 08:35

## 2021-06-04 RX ADMIN — FUROSEMIDE 40 MG: 10 INJECTION, SOLUTION INTRAMUSCULAR; INTRAVENOUS at 10:25

## 2021-06-04 RX ADMIN — BUDESONIDE AND FORMOTEROL FUMARATE DIHYDRATE 2 PUFF: 160; 4.5 AEROSOL RESPIRATORY (INHALATION) at 17:38

## 2021-06-04 RX ADMIN — METOPROLOL TARTRATE 25 MG: 25 TABLET, FILM COATED ORAL at 20:54

## 2021-06-04 RX ADMIN — ASPIRIN 81 MG: 81 TABLET, COATED ORAL at 08:35

## 2021-06-04 RX ADMIN — METOPROLOL TARTRATE 25 MG: 25 TABLET, FILM COATED ORAL at 08:36

## 2021-06-04 RX ADMIN — PROPAFENONE HYDROCHLORIDE 150 MG: 150 TABLET, FILM COATED ORAL at 16:47

## 2021-06-04 RX ADMIN — SODIUM CHLORIDE, PRESERVATIVE FREE 10 ML: 5 INJECTION INTRAVENOUS at 01:03

## 2021-06-04 RX ADMIN — BUDESONIDE AND FORMOTEROL FUMARATE DIHYDRATE 2 PUFF: 160; 4.5 AEROSOL RESPIRATORY (INHALATION) at 07:32

## 2021-06-04 RX ADMIN — SODIUM CHLORIDE, PRESERVATIVE FREE 10 ML: 5 INJECTION INTRAVENOUS at 08:37

## 2021-06-04 RX ADMIN — NITROFURANTOIN MONOHYDRATE/MACROCRYSTALLINE 100 MG: 25; 75 CAPSULE ORAL at 20:54

## 2021-06-04 RX ADMIN — NITROFURANTOIN MONOHYDRATE/MACROCRYSTALLINE 100 MG: 25; 75 CAPSULE ORAL at 12:12

## 2021-06-04 RX ADMIN — METOPROLOL TARTRATE 25 MG: 25 TABLET, FILM COATED ORAL at 01:03

## 2021-06-04 RX ADMIN — PRASUGREL 10 MG: 10 TABLET, FILM COATED ORAL at 08:37

## 2021-06-04 RX ADMIN — METHYLPREDNISOLONE SODIUM SUCCINATE 40 MG: 40 INJECTION, POWDER, FOR SOLUTION INTRAMUSCULAR; INTRAVENOUS at 08:40

## 2021-06-04 RX ADMIN — PROPAFENONE HYDROCHLORIDE 150 MG: 150 TABLET, FILM COATED ORAL at 08:36

## 2021-06-04 ASSESSMENT — ENCOUNTER SYMPTOMS
ALLERGIC/IMMUNOLOGIC NEGATIVE: 1
SHORTNESS OF BREATH: 1
EYES NEGATIVE: 1
VOMITING: 1
COUGH: 1

## 2021-06-04 ASSESSMENT — PAIN SCALES - GENERAL
PAINLEVEL_OUTOF10: 0

## 2021-06-04 NOTE — PROGRESS NOTES
cough for the last 3 weeks and it is gradually getting worse. She also reports shortness of breath with exertion for the past few days. She also reports orthopnea, using about 4 pillows every night to sleep for the past 2 days. She does report wheezing at times. Also reports some burning with urination and foul smell to her urine for the last 2 days. She denies any lower extremity swelling. She denies any fevers or chills. Patient follows with Dr. Johnson Riding in states that she is on Lasix as needed. She denies any sick contacts at home. She denies any recent travel. She denies loss of taste or smell. In the ER, BP noted to be 138/70, HR 84, RR 28, temp 98.1 degrees F, SpO2 97% on room air. BMP showed glucose of 113, CBC wnl, Troponin negative, pro-.7, U/A positive for nitrite and LE. CXR showed right middle lobe atelectasis or possible developing infiltrate. Hospitalist team was contacted for admission for further evaluation and management. Subjective:   She is seen and examined at bedside. She still reports a bad cough. She is not able to lie flat. She says at rest she is not short of breath. Denies any fevers, chills, headache, chest pain, abdominal pain, nausea vomiting diarrhea.       Medications:  Reviewed    Infusion Medications    sodium chloride       Scheduled Medications    methylPREDNISolone  40 mg Intravenous Daily    aspirin  81 mg Oral Daily    atorvastatin  80 mg Oral Nightly    isosorbide mononitrate  30 mg Oral Daily    metoprolol tartrate  25 mg Oral BID    prasugrel  10 mg Oral Daily    propafenone  150 mg Oral BID    sodium chloride flush  5-40 mL Intravenous 2 times per day    enoxaparin  40 mg Subcutaneous Daily    cefTRIAXone (ROCEPHIN) IV  1,000 mg Intravenous Q24H    azithromycin  500 mg Intravenous Q24H    budesonide-formoterol  2 puff Inhalation BID     PRN Meds: furosemide, sodium chloride flush, sodium chloride, ondansetron **OR** ondansetron, polyethylene glycol, acetaminophen **OR** acetaminophen, potassium chloride **OR** potassium alternative oral replacement **OR** potassium chloride, magnesium sulfate, albuterol      Intake/Output Summary (Last 24 hours) at 6/4/2021 0751  Last data filed at 6/4/2021 0435  Gross per 24 hour   Intake 665.75 ml   Output 650 ml   Net 15.75 ml       Diet:  ADULT DIET; Regular; Low Sodium (2 gm)    Exam:  BP (!) 107/55   Pulse 78   Temp 97.6 °F (36.4 °C) (Oral)   Resp 16   Ht 5' 3\" (1.6 m)   Wt 144 lb 12.8 oz (65.7 kg)   SpO2 93% Comment: evaluated o2, no o2 needed at this time  BMI 25.65 kg/m²     General appearance: Elderly female, in some distress due to harsh cough. Appears stated age and cooperative. HEENT: Pupils equal, round, and reactive to light. Conjunctivae/corneas clear. Neck: Supple, with full range of motion. No jugular venous distention. Trachea midline. Respiratory:  Normal respiratory effort. Expiratory wheezing heard throughout. Crackles at bases bilaterally. (+) dry cough  Cardiovascular: Regular rate and rhythm with normal S1/S2 without murmurs, rubs or gallops. Abdomen: Soft, non-tender, non-distended with normal bowel sounds. Musculoskeletal: Trace edema noted bilateral LEs  Skin: Skin color, texture, turgor normal.  No rashes or lesions. Neurologic:  Neurovascularly intact without any focal sensory/motor deficits.  Cranial nerves: II-XII intact, grossly non-focal.  Psychiatric: Alert and oriented, thought content appropriate, normal insight  Peripheral Pulses: +2 palpable, equal bilaterally       Labs:   Recent Labs     06/03/21  1620 06/04/21  0509   WBC 10.3 8.0   HGB 13.2 12.3   HCT 41.2 37.8    190     Recent Labs     06/03/21  1620 06/04/21  0509    140   K 4.6 4.8    104   CO2 26 23   BUN 18 21   CREATININE 1.0 1.0   CALCIUM 9.3 9.4     Recent Labs     06/03/21  1620   AST 20   ALT 9*   BILIDIR <0.2   BILITOT 1.1   ALKPHOS 80     No results for input(s): INR in

## 2021-06-04 NOTE — H&P
associated with vein wall inflammation     CAD (coronary artery disease)     Cerebral artery occlusion with cerebral infarction ([facility] [address]     CHF (congestive heart failure) (HCC)     COPD (chronic obstructive pulmonary disease) (HCC)     History of blood transfusion     Hx of blood clots     Hyperlipidemia     Hypertension     MI, old     Migraine     Pneumonia     PONV (postoperative nausea and vomiting)     Psychiatric problem      SHX:    Social History     Socioeconomic History    Marital status:      Spouse name: Renuka Brower Number of children: 0    Years of education: 15    Highest education level: Not on file   Occupational History    Not on file   Tobacco Use    Smoking status: Former Smoker     Types: Cigarettes     Quit date: 3/3/1992     Years since quittin.2    Smokeless tobacco: Never Used   Vaping Use    Vaping Use: Never used   Substance and Sexual Activity    Alcohol use: Yes     Comment: occasional    Drug use: No    Sexual activity: Yes     Partners: Male   Other Topics Concern    Not on file   Social History Narrative    Not on file     Social Determinants of Health     Financial Resource Strain:     Difficulty of Paying Living Expenses:    Food Insecurity:     Worried About Running Out of Food in the Last Year:     [address] in the Last Year:    Transportation Needs:     Lack of Transportation (Medical):      Lack of Transportation (Non-Medical):    Physical Activity:     Days of Exercise per Week:     Minutes of Exercise per Session:    Stress:     Feeling of Stress :    Social Connections:     Frequency of Communication with Friends and Family:     Frequency of Social Gatherings with Friends and Family:     Attends Anab[employer] Services:     Active Member of Clubs or Organizations:     Attends Club or Organization Meetings:     Marital Status:    Intimate Partner Violence:     Fear of Current or Ex-Partner:     Emotionally Abused:     Physically Abused:     Sexually Abused:      FHX:   Family History   Problem Relation Age of Onset    Diabetes Mother     Heart Disease Mother     High Blood Pressure Mother     Cancer Sister     Heart Disease Father     Cancer Brother     Heart Disease Maternal Aunt         MI    Heart Disease Maternal Uncle         MI    Heart Disease Maternal Grandmother         MI    Heart Disease Maternal Grandfather         MI    Stroke Paternal Grandfather     Cancer Sister     Heart Disease Sister         atrial fib    Cancer Brother     Heart Disease Brother         needs stents and has atrial fib    Diabetes Brother     Stroke Sister      Allergies: Allergies   Allergen Reactions    Celebrex [Celecoxib] Anaphylaxis     Unable to breath Turns red all over    Contrast [Iodides] Hives    Statins      Will obtain lipid panel and re-evaluate.  Naprosyn [Naproxen] Nausea And Vomiting    Shrimp Flavor Nausea And Vomiting     Medications:       ipratropium-albuterol  1 ampule Inhalation Q4H WA    ipratropium-albuterol  1 ampule Inhalation Q4H WA    cefTRIAXone (ROCEPHIN) IV  2,000 mg Intravenous Once    azithromycin  500 mg Intravenous Once          Labs:   Recent Results (from the past 24 hour(s))   EKG SOB    Collection Time: 06/03/21  4:12 PM   Result Value Ref Range    Ventricular Rate 79 BPM    QRS Duration 84 ms    Q-T Interval 402 ms    QTc Calculation (Bazett) 460 ms    R Axis 4 degrees    T Axis 37 degrees   Basic Metabolic Panel    Collection Time: 06/03/21  4:20 PM   Result Value Ref Range    Sodium 141 135 - 145 meq/L    Potassium 4.6 3.5 - 5.2 meq/L    Chloride 105 98 - 111 meq/L    CO2 26 23 - 33 meq/L    Glucose 113 (H) 70 - 108 mg/dL    BUN 18 7 - 22 mg/dL    CREATININE 1.0 0.4 - 1.2 mg/dL    Calcium 9.3 8.5 - 10.5 mg/dL   CBC auto differential    Collection Time: 06/03/21  4:20 PM   Result Value Ref Range    WBC 10.3 4.8 - 10.8 thou/mm3    RBC 4.50 4. 20 - 5.40 mill/mm3    Hemoglobin 13.2 12.0 - 16.0 gm/dl    Hematocrit 41.2 37.0 - 47.0 %    MCV 91.6 81.0 - 99.0 fL    MCH 29.3 26.0 - 33.0 pg    MCHC 32.0 (L) 32.2 - 35.5 gm/dl    RDW-CV 13.6 11.5 - 14.5 %    RDW-SD 45.5 (H) 35.0 - 45.0 fL    Platelets 459 965 - 592 thou/mm3    MPV 10.1 9.4 - 12.4 fL    Seg Neutrophils 56.4 %    Lymphocytes 33.6 %    Monocytes 6.5 %    Eosinophils 3.1 %    Basophils 0.2 %    Immature Granulocytes 0.2 %    Platelet Estimate CLUMPED Adequate    Segs Absolute 5.8 1 - 7 thou/mm3    Lymphocytes Absolute 3.5 1.0 - 4.8 thou/mm3    Monocytes Absolute 0.7 0.4 - 1.3 thou/mm3    Eosinophils Absolute 0.3 0.0 - 0.4 thou/mm3    Basophils Absolute 0.0 0.0 - 0.1 thou/mm3    Immature Grans (Abs) 0.02 0.00 - 0.07 thou/mm3    nRBC 0 /100 wbc   Hepatic function panel    Collection Time: 06/03/21  4:20 PM   Result Value Ref Range    Albumin 4.1 3.5 - 5.1 g/dL    Total Bilirubin 1.1 0.3 - 1.2 mg/dL    Bilirubin, Direct <0.2 0.0 - 0.3 mg/dL    Alkaline Phosphatase 80 38 - 126 U/L    AST 20 5 - 40 U/L    ALT 9 (L) 11 - 66 U/L    Total Protein 7.0 6.1 - 8.0 g/dL   Lipase    Collection Time: 06/03/21  4:20 PM   Result Value Ref Range    Lipase 24.9 5.6 - 51.3 U/L   Troponin    Collection Time: 06/03/21  4:20 PM   Result Value Ref Range    Troponin T < 0.010 ng/ml   Brain Natriuretic Peptide    Collection Time: 06/03/21  4:20 PM   Result Value Ref Range    Pro-.7 (H) 0.0 - 900.0 pg/mL   Anion Gap    Collection Time: 06/03/21  4:20 PM   Result Value Ref Range    Anion Gap 10.0 8.0 - 16.0 meq/L   Glomerular Filtration Rate, Estimated    Collection Time: 06/03/21  4:20 PM   Result Value Ref Range    Est, Glom Filt Rate 54 (A) ml/min/1.73m2   Osmolality    Collection Time: 06/03/21  4:20 PM   Result Value Ref Range    Osmolality Calc 284.0 275.0 - 300.0 mOsmol/kg   Scan of Blood Smear    Collection Time: 06/03/21  4:20 PM   Result Value Ref Range    SCAN OF BLOOD SMEAR see below    Urine with Reflexed Micro    Collection Time: 06/03/21 5:45 PM   Result Value Ref Range    Glucose, Ur NEGATIVE NEGATIVE mg/dl    Bilirubin Urine NEGATIVE NEGATIVE    Ketones, Urine NEGATIVE NEGATIVE    Specific Gravity, Urine 1.007 1.002 - 1.030    Blood, Urine MODERATE (A) NEGATIVE    pH, UA 7.0 5.0 - 9.0    Protein, UA TRACE (A) NEGATIVE    Urobilinogen, Urine 0.2 0.0 - 1.0 eu/dl    Nitrite, Urine POSITIVE (A) NEGATIVE    Leukocyte Esterase, Urine LARGE (A) NEGATIVE    Color, UA YELLOW STRAW-YELLOW    Character, Urine SL CLOUDY CLEAR-SL CLOUD    RBC, UA 3-5 0-2/hpf /hpf    WBC, UA > 200 0-4/hpf /hpf    Epithelial Cells, UA 0-2 3-5/hpf /hpf    Bacteria, UA MANY FEW/NONE SEEN /hpf    Casts UA NONE SEEN NONE SEEN /lpf    Crystals, UA NONE SEEN NONE SEEN    Renal Epithelial, UA NONE SEEN NONE SEEN    Yeast, UA NONE SEEN NONE SEEN    CASTS 2 NONE SEEN NONE SEEN /lpf    MISCELLANEOUS 2 NONE SEEN    EKG SOB    Collection Time: 06/03/21  7:06 PM   Result Value Ref Range    Ventricular Rate 78 BPM    QRS Duration 94 ms    Q-T Interval 398 ms    QTc Calculation (Bazett) 453 ms    R Axis -8 degrees    T Axis 73 degrees         Vital Signs: T: 98.1F P: 82 RR: 24 B/P: 115/94: FiO2: RA: O2 Sat:96%: I/O: No intake or output data in the 24 hours ending 06/03/21 2119      General:   Elderly appearing, no acute distress  HEENT:  normocephalic and atraumatic. No scleral icterus. PEARLA, mucous membranes moist, hoarse voice  Neck: supple. Trachea midline. No JVD. Full ROM, no meningismus. Lungs: clear to diminished on auscultation. No retractions, no accessory muscle use. Cardiac: RRR, no murmur, 2+ pulses  Abdomen: soft. Nontender. Bowel sounds active  Extremities:  No clubbing, cyanosis x 4, no edema    Vasculature: capillary refill < 3 seconds. Skin:  warm and dry. no visible rashes  Psych:  Alert and oriented x3. Affect appropriate  Lymph:  No supraclavicular adenopathy. Neurologic:  CN II-XII grossly intact. No focal deficit.     Data: (All radiographs, tracings, PFTs, and

## 2021-06-04 NOTE — PROGRESS NOTES
Pt admitted to  [phone] from ED. Complaints: Shortness of breath. IV site free of s/s of infection or infiltration. Vital signs obtained. Assessment and data collection initiated. Two nurse skin assessment performed by Connie Muñoz and [facility] RN. Oriented to room. Policies and procedures for [facility]K explained. [facility] RN discussed hourly rounding with patient addressing 5 P's. Fall prevention and safety brochure discussed with patient. Bed alarm on. Call light in reach. The best day to schedule a follow up Dr appointment is:  Monday a.m. Explained patients right to have family, representative or physician notified of their admission. Patient has Declined for physician to be notified. Patient has Declined for family/representative to be notified. All questions answered with no further questions at this time.

## 2021-06-04 NOTE — ED NOTES
Patient transferred to [facility] room 013 nurse informed.       Raulito Simpson  06/03/21 [id number]

## 2021-06-04 NOTE — ED NOTES
Patient resting on recliner with family at bedside, denies needs     Lilly Boston, WENDY  06/03/21 2027

## 2021-06-04 NOTE — CARE COORDINATION
6/4/21, 7:17 AM EDT  DISCHARGE PLANNING EVALUATION:    Chey Nicole       Admitted: 6/3/2021/ [address] day: 1   Location: [facility]13/013-A Reason for admit: COPD exacerbation ([facility] [J44.1]   PMH:  has a past medical history of Asthma, Atrial fibrillation ([facility] [address] Blood clot associated with vein wall inflammation, CAD (coronary artery disease), Cerebral artery occlusion with cerebral infarction ([facility] [address] CHF (congestive heart failure) ([facility] [address] COPD (chronic obstructive pulmonary disease) ([facility] [address] History of blood transfusion, Hx of blood clots, Hyperlipidemia, Hypertension, MI, old, Migraine, Pneumonia, PONV (postoperative nausea and vomiting), and Psychiatric problem. Procedure: none  Barriers to Discharge:  UA+nitrites. IV rocephin, IV zithromax, IV solumedrol, nebs. PCP: Anamaria Garrido MD  Readmission Risk Score: 13%    Patient Goals/Plan/Treatment Preferences: Met with Chandler Hawley, she is from home alone. She has a nebulizer but does not use any other DME. Discussed HH, she does not feel services will be needed. She has a friend who checks on her and helps out with home tasks if needed. She denies discharge needs at this time. Transportation/Food Security/Housekeeping Addressed:  No issues identified.

## 2021-06-04 NOTE — PLAN OF CARE
Problem: Falls - Risk of:  Goal: Will remain free from falls  Description: Will remain free from falls  Outcome: Ongoing  Note: No falls noted this shift. Falling star protocol in place and call light within reach. Bed alarm active and nonskid socks on. Patient utilizes call light appropriately     Goal: Absence of physical injury  Description: Absence of physical injury  Outcome: Ongoing     Problem: Discharge Planning:  Goal: Participates in care planning  Description: Participates in care planning  Outcome: Ongoing  Note: Patient plans to be discharged to home when medically stable. Goal: Discharged to appropriate level of care  Description: Discharged to appropriate level of care  Outcome: Ongoing     Problem: Airway Clearance - Ineffective:  Goal: Ability to maintain a clear airway will improve  Description: Ability to maintain a clear airway will improve  Outcome: Ongoing  Note: Nonproductive persistent cough. Will continue to monitor. Problem: Pain:  Goal: Pain level will decrease  Description: Pain level will decrease  Outcome: Ongoing  Note: Patient free from pain this shift. Pain rated 0/10 on 0-10 pain rating scale. Will continue to reassess. Goal: Recognizes and communicates pain  Description: Recognizes and communicates pain  Outcome: Ongoing  Goal: Control of acute pain  Description: Control of acute pain  Outcome: Ongoing  Goal: Control of chronic pain  Description: Control of chronic pain  Outcome: Ongoing     Problem: Skin Integrity - Impaired:  Goal: Will show no infection signs and symptoms  Description: Will show no infection signs and symptoms  Outcome: Ongoing  Goal: Absence of new skin breakdown  Description: Absence of new skin breakdown  Outcome: Ongoing  Note: No new skin breakdown noted this shift. Patient encouraged to turn and make frequent positional changes. Will continue to monitor. Care plan reviewed with patient.  Patient verbalize understanding of the plan of care and contribute to goal setting.

## 2021-06-05 VITALS
SYSTOLIC BLOOD PRESSURE: 114 MMHG | WEIGHT: 139 LBS | OXYGEN SATURATION: 95 % | DIASTOLIC BLOOD PRESSURE: 61 MMHG | HEIGHT: 63 IN | BODY MASS INDEX: 24.63 KG/M2 | TEMPERATURE: 97.7 F | RESPIRATION RATE: 20 BRPM | HEART RATE: 75 BPM

## 2021-06-05 LAB
ANION GAP SERPL CALCULATED.3IONS-SCNC: 9 MEQ/L (ref 8–16)
BUN BLDV-MCNC: 30 MG/DL (ref 7–22)
CALCIUM SERPL-MCNC: 9.2 MG/DL (ref 8.5–10.5)
CHLORIDE BLD-SCNC: 105 MEQ/L (ref 98–111)
CO2: 28 MEQ/L (ref 23–33)
CREAT SERPL-MCNC: 1.1 MG/DL (ref 0.4–1.2)
ERYTHROCYTE [DISTWIDTH] IN BLOOD BY AUTOMATED COUNT: 13.7 % (ref 11.5–14.5)
ERYTHROCYTE [DISTWIDTH] IN BLOOD BY AUTOMATED COUNT: 45.6 FL (ref 35–45)
GFR SERPL CREATININE-BSD FRML MDRD: 49 ML/MIN/1.73M2
GLUCOSE BLD-MCNC: 108 MG/DL (ref 70–108)
GLUCOSE BLD-MCNC: 118 MG/DL (ref 70–108)
GLUCOSE BLD-MCNC: 95 MG/DL (ref 70–108)
HCT VFR BLD CALC: 39.5 % (ref 37–47)
HEMOGLOBIN: 12.7 GM/DL (ref 12–16)
MCH RBC QN AUTO: 29.3 PG (ref 26–33)
MCHC RBC AUTO-ENTMCNC: 32.2 GM/DL (ref 32.2–35.5)
MCV RBC AUTO: 91 FL (ref 81–99)
ORGANISM: ABNORMAL
PLATELET # BLD: 225 THOU/MM3 (ref 130–400)
PMV BLD AUTO: 9.6 FL (ref 9.4–12.4)
POTASSIUM SERPL-SCNC: 3.7 MEQ/L (ref 3.5–5.2)
RBC # BLD: 4.34 MILL/MM3 (ref 4.2–5.4)
SODIUM BLD-SCNC: 142 MEQ/L (ref 135–145)
URINE CULTURE REFLEX: ABNORMAL
WBC # BLD: 13.2 THOU/MM3 (ref 4.8–10.8)

## 2021-06-05 PROCEDURE — 6360000002 HC RX W HCPCS: Performed by: STUDENT IN AN ORGANIZED HEALTH CARE EDUCATION/TRAINING PROGRAM

## 2021-06-05 PROCEDURE — 2580000003 HC RX 258: Performed by: PHYSICIAN ASSISTANT

## 2021-06-05 PROCEDURE — 82948 REAGENT STRIP/BLOOD GLUCOSE: CPT

## 2021-06-05 PROCEDURE — 36415 COLL VENOUS BLD VENIPUNCTURE: CPT

## 2021-06-05 PROCEDURE — 6370000000 HC RX 637 (ALT 250 FOR IP): Performed by: STUDENT IN AN ORGANIZED HEALTH CARE EDUCATION/TRAINING PROGRAM

## 2021-06-05 PROCEDURE — 80048 BASIC METABOLIC PNL TOTAL CA: CPT

## 2021-06-05 PROCEDURE — 85027 COMPLETE CBC AUTOMATED: CPT

## 2021-06-05 PROCEDURE — 99239 HOSP IP/OBS DSCHRG MGMT >30: CPT | Performed by: HOSPITALIST

## 2021-06-05 PROCEDURE — 94640 AIRWAY INHALATION TREATMENT: CPT

## 2021-06-05 PROCEDURE — 6370000000 HC RX 637 (ALT 250 FOR IP): Performed by: PHYSICIAN ASSISTANT

## 2021-06-05 PROCEDURE — 94760 N-INVAS EAR/PLS OXIMETRY 1: CPT

## 2021-06-05 PROCEDURE — 6360000002 HC RX W HCPCS: Performed by: PHYSICIAN ASSISTANT

## 2021-06-05 RX ORDER — BENZONATATE 100 MG/1
100 CAPSULE ORAL 3 TIMES DAILY PRN
Qty: 10 CAPSULE | Refills: 0 | Status: SHIPPED
Start: 2021-06-05 | End: 2021-08-28 | Stop reason: DRUGHIGH

## 2021-06-05 RX ORDER — FUROSEMIDE 40 MG/1
40 TABLET ORAL DAILY
Qty: 30 TABLET | Refills: 0 | Status: SHIPPED
Start: 2021-06-06 | End: 2021-06-10 | Stop reason: DRUGHIGH

## 2021-06-05 RX ORDER — LISINOPRIL 10 MG/1
10 TABLET ORAL DAILY
Qty: 30 TABLET | Refills: 0 | Status: SHIPPED
Start: 2021-06-05 | End: 2021-06-10 | Stop reason: DRUGHIGH

## 2021-06-05 RX ORDER — NITROFURANTOIN 25; 75 MG/1; MG/1
100 CAPSULE ORAL EVERY 12 HOURS SCHEDULED
Qty: 8 CAPSULE | Refills: 0 | Status: SHIPPED | OUTPATIENT
Start: 2021-06-05 | End: 2021-06-09

## 2021-06-05 RX ORDER — FUROSEMIDE 40 MG/1
40 TABLET ORAL DAILY
Status: DISCONTINUED | OUTPATIENT
Start: 2021-06-06 | End: 2021-06-05 | Stop reason: HOSPADM

## 2021-06-05 RX ORDER — LISINOPRIL 10 MG/1
10 TABLET ORAL DAILY
Status: DISCONTINUED | OUTPATIENT
Start: 2021-06-05 | End: 2021-06-05 | Stop reason: HOSPADM

## 2021-06-05 RX ADMIN — ISOSORBIDE MONONITRATE 30 MG: 30 TABLET ORAL at 09:12

## 2021-06-05 RX ADMIN — FUROSEMIDE 40 MG: 10 INJECTION, SOLUTION INTRAMUSCULAR; INTRAVENOUS at 09:12

## 2021-06-05 RX ADMIN — NITROFURANTOIN MONOHYDRATE/MACROCRYSTALLINE 100 MG: 25; 75 CAPSULE ORAL at 09:12

## 2021-06-05 RX ADMIN — LISINOPRIL 10 MG: 10 TABLET ORAL at 12:28

## 2021-06-05 RX ADMIN — PROPAFENONE HYDROCHLORIDE 150 MG: 150 TABLET, FILM COATED ORAL at 09:12

## 2021-06-05 RX ADMIN — ASPIRIN 81 MG: 81 TABLET, COATED ORAL at 09:12

## 2021-06-05 RX ADMIN — SODIUM CHLORIDE, PRESERVATIVE FREE 10 ML: 5 INJECTION INTRAVENOUS at 09:12

## 2021-06-05 RX ADMIN — PRASUGREL 10 MG: 10 TABLET, FILM COATED ORAL at 09:12

## 2021-06-05 RX ADMIN — ENOXAPARIN SODIUM 40 MG: 40 INJECTION SUBCUTANEOUS at 09:13

## 2021-06-05 RX ADMIN — BUDESONIDE AND FORMOTEROL FUMARATE DIHYDRATE 2 PUFF: 160; 4.5 AEROSOL RESPIRATORY (INHALATION) at 07:36

## 2021-06-05 ASSESSMENT — PAIN SCALES - GENERAL
PAINLEVEL_OUTOF10: 0

## 2021-06-05 NOTE — PROGRESS NOTES
PROGRESS NOTE      Patient:  Chey Nicole      Unit/Bed:6-13/013-A    YOB: 1950    MRN: [id number]       Acct: [de-identified]     PCP: Anamaria Garrido MD    Date of Admission: 6/3/2021      Assessment/Plan:  Acute on chronic HFrEF, improving:  - (+) orthopnea, dyspnea on exertion, cough. BNP elevated on admission.   - Echo 12/2020 wth EF 40-45%. Limited Echo 6/4 with same EF.  - Will transition to PO Lasix, 40mg daily  - Continue Imdur, Lopressor  - Monitor Is&Os, daily weights, fluid and salt restriction  - CHF clinic referral on discharge  - BMP in the AM    UTI with E.coli bacteruria:  - (+) dysuria and foul-smelling odor. U/A with nitrites, LEs.   - Final UCx with E.coli  colony count >100,000 CFU  - Sensitive to Macrobid, will continue    COPD, compensated:  - Wheezing noted secondary to cardiac asthma due to acute decompensated CHF.  - Continue with Symbicort    Essential HTN:  - Continue with Lopressor  - Monitor VS as per protocol    Mild-to-moderate tricuspid regurgitation:  - As noted on Echo 12/2020    Hx of CAD, s/p CABG:  - Follows with Dr. Bryan Coles  - Continue with ASA, Effient, Statin    Hx of paroxysmal atrial fibrillation:  - Not on anticoagulation. EKG with accelerated junctional rhythm  - Continue with Telemetry monitoring     DVT prophylaxis: [x] Lovenox                                 [] SCDs                                 [] SQ Heparin                                 [] Encourage ambulation           [] Already on Anticoagulation    Anticipated Discharge in: Likely today  Disposition: Patient with noted improvement in symptoms since admission. Chief Complaint: Cough, SOB    Hospital Course:   66year old female presented to [facility] on 6/3/2021 with complaints of cough. PMHx significant COPD, CHF, CAD, HTN, HLD, and atrial fibrillation. Patient reports that she has had a cough for the last 3 weeks and it is gradually getting worse.   She also reports shortness of breath with exertion for the past few days. She also reports orthopnea, using about 4 pillows every night to sleep for the past 2 days. She does report wheezing at times. Also reports some burning with urination and foul smell to her urine for the last 2 days. She denies any lower extremity swelling. She denies any fevers or chills. Patient follows with Dr. Ayush Beltran in states that she is on Lasix as needed. She denies any sick contacts at home. She denies any recent travel. She denies loss of taste or smell. In the ER, BP noted to be 138/70, HR 84, RR 28, temp 98.1 degrees F, SpO2 97% on room air. BMP showed glucose of 113, CBC wnl, Troponin negative, pro-.7, U/A positive for nitrite and LE. CXR showed right middle lobe atelectasis or possible developing infiltrate. Hospitalist team was contacted for admission for further evaluation and management. Subjective:   She is seen and examined sitting in chair. States she feels much better. Cough has improved. No longer feeling SOB. Denies any fevers, chills, headache, chest pain, abdominal pain, N/V/D.        Medications:  Reviewed    Infusion Medications    dextrose      sodium chloride       Scheduled Medications    insulin lispro  0-6 Units Subcutaneous TID WC    insulin lispro  0-3 Units Subcutaneous Nightly    furosemide  40 mg Intravenous BID    nitrofurantoin (macrocrystal-monohydrate)  100 mg Oral 2 times per day    aspirin  81 mg Oral Daily    atorvastatin  80 mg Oral Nightly    isosorbide mononitrate  30 mg Oral Daily    metoprolol tartrate  25 mg Oral BID    prasugrel  10 mg Oral Daily    propafenone  150 mg Oral BID    sodium chloride flush  5-40 mL Intravenous 2 times per day    enoxaparin  40 mg Subcutaneous Daily    budesonide-formoterol  2 puff Inhalation BID     PRN Meds: glucose, dextrose, glucagon (rDNA), dextrose, benzonatate, sodium chloride flush, sodium chloride, ondansetron **OR** ondansetron, polyethylene glycol, acetaminophen Value Date    NITRU POSITIVE 06/03/2021    WBCUA > 200 06/03/2021    BACTERIA MANY 06/03/2021    RBCUA 3-5 06/03/2021    BLOODU MODERATE 06/03/2021    SPECGRAV 1.009 07/22/2016    GLUCOSEU NEGATIVE 06/03/2021       Radiology:  XR CHEST PORTABLE   Final Result   Right middle lobe atelectasis or possible developing infiltrate            **This report has been created using voice recognition software. It may contain minor errors which are inherent in voice recognition technology. **      Final report electronically signed by Dr. Jaycob Crowell on 6/3/2021 4:30 PM          Diet: ADULT DIET; Regular;  Low Sodium (2 gm); 2000 ml    Code Status: Full Code    PT/OT Eval Status: N/A    Electronically signed by Tha Chaparro MD on 6/5/2021 at 7:50 AM

## 2021-06-05 NOTE — DISCHARGE SUMMARY
DISCHARGE SUMMARY      Patient Identification:   Baron Winston   : 1950  MRN: [id number]   Account: [de-identified]      Patient's PCP: Quinn Diamond MD    Admit Date: 6/3/2021     Discharge Date:  21    Admitting Physician: Ronni Pulliam MD     Discharge Physician: Jaime Norris MD     Discharge Diagnoses:  Acute on chronic HFREF  UTI with E.coli bacteruria  COPD, compensated  Essential HTN  Mild-to-moderate tricuspid regurgitation  Hx of CAD, s/p CABG  Hx of paroxysmal atrial fibrillation      Hospital Course: The patient was seen and examined on day of discharge and this discharge summary is in conjunction with any daily progress note from day of discharge. On day of discharge, patient noted to have clinical improvement in her symptoms. Educated patient on fluid and salt restriction diet on discharge. Lisinopril was added given patient's EF. Discussed adverse effect of low BP and dry cough. Her Lasix dose was increased to 40mg daily. She was also given a referral to the CHF clinic. She was encouraged to schedule an appointment with her Cardiologist, Dr. Philip Pinto for hospital follow-up. Patient agreeable and understands plan. She was asked to repeat BMP in 1 week given increase in her Lasix dosage + addition of Lisinopril. Exam:     Vitals:  Vitals:    21 0334 21 0736 21 0900 21 1038   BP: (!) 100/54  113/61 114/61   Pulse: 61  65 75   Resp: 18  20 20   Temp: 97.9 °F (36.6 °C)  96.2 °F (35.7 °C) 97.7 °F (36.5 °C)   TempSrc: Oral  Oral Oral   SpO2: 91% 93% 92% 95%   Weight: 139 lb (63 kg)      Height:         Weight: Weight: 139 lb (63 kg)     24 hour intake/output:    Intake/Output Summary (Last 24 hours) at 2021 1130  Last data filed at 2021 0912  Gross per 24 hour   Intake 710 ml   Output 2650 ml   Net -1940 ml         General appearance: Elderly female; appears stated age and cooperative. HEENT: Pupils equal, round, and reactive to light. Conjunctivae/corneas clear. Neck: Supple, with full range of motion. No jugular venous distention. Trachea midline. Respiratory:  Normal respiratory effort. Mild wheezing RLB. Mild crackles at bases bilaterally. Cardiovascular: Regular rate and rhythm with normal S1/S2 without murmurs, rubs or gallops. Abdomen: Soft, non-tender, non-distended with normal bowel sounds. Musculoskeletal: No LE edema noted  Skin: Skin color, texture, turgor normal.  No rashes or lesions. Neurologic:  Neurovascularly intact without any focal sensory/motor deficits. Cranial nerves: II-XII intact, grossly non-focal.  Psychiatric: Alert and oriented, thought content appropriate, normal insight  Peripheral Pulses: +2 palpable, equal bilaterally       Labs: For convenience and continuity at follow-up the following most recent labs are provided:      CBC:    Lab Results   Component Value Date    WBC 13.2 06/05/2021    HGB 12.7 06/05/2021    HCT 39.5 06/05/2021     06/05/2021       Renal:    Lab Results   Component Value Date     06/05/2021    K 3.7 06/05/2021    K 4.8 06/04/2021     06/05/2021    CO2 28 06/05/2021    BUN 30 06/05/2021    CREATININE 1.1 06/05/2021    CALCIUM 9.2 06/05/2021    PHOS 4.4 05/13/2016         Significant Diagnostic Studies    Radiology:   XR CHEST PORTABLE   Final Result   Right middle lobe atelectasis or possible developing infiltrate            **This report has been created using voice recognition software. It may contain minor errors which are inherent in voice recognition technology. **      Final report electronically signed by Dr. Milton Begum on 6/3/2021 4:30 PM             Consults:     IP CONSULT TO HEART FAILURE NURSE/COORDINATOR    Disposition:    [x] Home       [] TCU       [] Rehab       [] Psych       [] SNF       [] [place]       [] Other-    Condition at Discharge: Stable    Code Status:  Full Code     Patient Instructions:    Discharge lab work: BMP  Activity:

## 2021-06-09 ENCOUNTER — HOSPITAL ENCOUNTER (OUTPATIENT)
Age: 71
Discharge: HOME | End: 2021-06-09
Payer: MEDICARE

## 2021-06-09 LAB
ANION GAP SERPL CALCULATED.3IONS-SCNC: 9 MEQ/L (ref 8–16)
BUN BLDV-MCNC: 34 MG/DL (ref 7–22)
CALCIUM SERPL-MCNC: 8.9 MG/DL (ref 8.5–10.5)
CHLORIDE BLD-SCNC: 105 MEQ/L (ref 98–111)
CO2: 25 MEQ/L (ref 23–33)
CREAT SERPL-MCNC: 1.3 MG/DL (ref 0.4–1.2)
GFR SERPL CREATININE-BSD FRML MDRD: 40 ML/MIN/1.73M2
GLUCOSE BLD-MCNC: 113 MG/DL (ref 70–108)
POTASSIUM SERPL-SCNC: 4 MEQ/L (ref 3.5–5.2)
SODIUM BLD-SCNC: 139 MEQ/L (ref 135–145)

## 2021-06-09 PROCEDURE — 36415 COLL VENOUS BLD VENIPUNCTURE: CPT

## 2021-06-09 PROCEDURE — 80048 BASIC METABOLIC PNL TOTAL CA: CPT

## 2021-06-10 ENCOUNTER — OFFICE VISIT (OUTPATIENT)
Dept: CARDIOLOGY CLINIC | Age: 71
End: 2021-06-10
Payer: MEDICARE

## 2021-06-10 VITALS
BODY MASS INDEX: 24.89 KG/M2 | DIASTOLIC BLOOD PRESSURE: 62 MMHG | WEIGHT: 140.5 LBS | HEART RATE: 74 BPM | HEIGHT: 63 IN | SYSTOLIC BLOOD PRESSURE: 98 MMHG

## 2021-06-10 DIAGNOSIS — I10 ESSENTIAL HYPERTENSION: ICD-10-CM

## 2021-06-10 DIAGNOSIS — I25.10 CORONARY ARTERY DISEASE INVOLVING NATIVE CORONARY ARTERY OF NATIVE HEART WITHOUT ANGINA PECTORIS: Primary | ICD-10-CM

## 2021-06-10 DIAGNOSIS — R06.02 SHORTNESS OF BREATH: ICD-10-CM

## 2021-06-10 PROCEDURE — 99214 OFFICE O/P EST MOD 30 MIN: CPT | Performed by: NUCLEAR MEDICINE

## 2021-06-10 RX ORDER — LISINOPRIL 10 MG/1
10 TABLET ORAL DAILY
Qty: 90 TABLET | Refills: 3 | Status: CANCELLED | OUTPATIENT
Start: 2021-06-10

## 2021-06-10 RX ORDER — LISINOPRIL 5 MG/1
5 TABLET ORAL DAILY
COMMUNITY
End: 2021-06-10 | Stop reason: SDUPTHER

## 2021-06-10 RX ORDER — NITROFURANTOIN 25; 75 MG/1; MG/1
100 CAPSULE ORAL 2 TIMES DAILY
COMMUNITY
End: 2021-08-31 | Stop reason: ALTCHOICE

## 2021-06-10 RX ORDER — ATORVASTATIN CALCIUM 80 MG/1
80 TABLET, FILM COATED ORAL NIGHTLY
Qty: 90 TABLET | Refills: 3 | Status: SHIPPED | OUTPATIENT
Start: 2021-06-10 | End: 2022-10-25 | Stop reason: SDUPTHER

## 2021-06-10 RX ORDER — PROMETHAZINE HYDROCHLORIDE AND CODEINE PHOSPHATE 6.25; 1 MG/5ML; MG/5ML
SOLUTION ORAL
COMMUNITY
Start: 2021-04-12 | End: 2021-08-31

## 2021-06-10 RX ORDER — PROPAFENONE HYDROCHLORIDE 150 MG/1
150 TABLET, FILM COATED ORAL 2 TIMES DAILY
Qty: 180 TABLET | Refills: 3 | Status: SHIPPED | OUTPATIENT
Start: 2021-06-10 | End: 2022-07-05

## 2021-06-10 RX ORDER — ISOSORBIDE MONONITRATE 30 MG/1
30 TABLET, EXTENDED RELEASE ORAL DAILY
Qty: 90 TABLET | Refills: 3 | Status: SHIPPED | OUTPATIENT
Start: 2021-06-10 | End: 2022-06-06 | Stop reason: SINTOL

## 2021-06-10 RX ORDER — FUROSEMIDE 20 MG/1
20 TABLET ORAL DAILY
Qty: 90 TABLET | Refills: 3 | Status: SHIPPED | OUTPATIENT
Start: 2021-06-10 | End: 2022-07-20

## 2021-06-10 RX ORDER — LISINOPRIL 5 MG/1
5 TABLET ORAL DAILY
Qty: 90 TABLET | Refills: 3 | Status: SHIPPED | OUTPATIENT
Start: 2021-06-10 | End: 2021-07-06

## 2021-06-10 RX ORDER — FUROSEMIDE 20 MG/1
20 TABLET ORAL DAILY
COMMUNITY
End: 2021-06-10 | Stop reason: SDUPTHER

## 2021-06-10 RX ORDER — FUROSEMIDE 40 MG/1
40 TABLET ORAL DAILY
Qty: 90 TABLET | Refills: 3 | Status: CANCELLED | OUTPATIENT
Start: 2021-06-10

## 2021-06-10 RX ORDER — PRASUGREL 10 MG/1
10 TABLET, FILM COATED ORAL DAILY
Qty: 90 TABLET | Refills: 3 | Status: SHIPPED | OUTPATIENT
Start: 2021-06-10 | End: 2022-08-15

## 2021-06-10 NOTE — PROGRESS NOTES
Pt here for [facility] -CHF    Pt c/o low energy since d/c , not sleeping well

## 2021-06-10 NOTE — PROGRESS NOTES
[address] [facility]  [address]  [address]  [facility] [address]  Dept: [phone]  Dept Fax: [phone]  Loc: [phone]    Visit Date: 6/10/2021    Rubin Jennings is a 66 y.o. female who presents todayfor:  Chief Complaint   Patient presents with    Follow[facility]Up from [address] [place] [address] of [facility]    Coronary Artery Disease    Hypertension   admitted lately   COPD / CHF   Fatigue   Dyspnea  Not feeling well  Cath done  Failed CABG and stents  No chest pain  Bp is lower at times  Some dizziness  No syncope      HPI:  HPI  Past Medical History:   Diagnosis Date    Asthma     Atrial fibrillation ([address]     Blood clot associated with vein wall inflammation     CAD (coronary artery disease)     Cerebral artery occlusion with cerebral infarction ([address]     CHF (congestive heart failure) ([address]     COPD (chronic obstructive pulmonary disease) ([address]     History of blood transfusion     Hx of blood clots     Hyperlipidemia     Hypertension     MI, old     Migraine     Pneumonia     PONV (postoperative nausea and vomiting)     Psychiatric problem       Past Surgical History:   Procedure Laterality Date    APPENDECTOMY      CARDIAC SURGERY  1998 in [facility]    pericardial window   [address]    ablation    COLONOSCOPY      CORONARY ANGIOPLASTY WITH STENT PLACEMENT  2013     [facility]    CORONARY ARTERY BYPASS GRAFT      ENDOSCOPY, COLON, DIAGNOSTIC      EYE SURGERY Bilateral 07/2020    cataract removal    HERNIA REPAIR      long ago Dr[facility] Alicia Ahmadi @ [facility]       Family History   Problem Relation Age of Onset    Diabetes Mother     Heart Disease Mother     High Blood Pressure Mother     Cancer Sister     Heart Disease Father     Cancer Brother     Heart Disease Maternal Aunt         MI    Heart Disease Maternal Uncle         MI    Heart Disease Maternal Grandmother         MI  Heart Disease Maternal Grandfather         MI    Stroke Paternal Grandfather     Cancer Sister     Heart Disease Sister         atrial fib    Cancer Brother     Heart Disease Brother         needs stents and has atrial fib    Diabetes Brother     Stroke Sister      Social History     Tobacco Use    Smoking status: Former Smoker     Types: Cigarettes     Quit date: 3/3/1992     Years since quittin.2    Smokeless tobacco: Never Used   Substance Use Topics    Alcohol use: Yes     Comment: occasional      Current Outpatient Medications   Medication Sig Dispense Refill    promethazine-codeine (PHENERGAN WITH CODEINE) 6.25-10 MG/5ML SOLN solution       nitrofurantoin, macrocrystal-monohydrate, (MACROBID) 100 MG capsule Take 100 mg by mouth 2 times daily      lisinopril (PRINIVIL;ZESTRIL) 10 MG tablet Take 1 tablet by mouth daily 30 tablet 0    benzonatate (TESSALON) 100 MG capsule Take 1 capsule by mouth 3 times daily as needed for Cough 10 capsule 0    furosemide (LASIX) 40 MG tablet Take 1 tablet by mouth daily 30 tablet 0    atorvastatin (LIPITOR) 80 MG tablet Take 1 tablet by mouth nightly 90 tablet 3    isosorbide mononitrate (IMDUR) 30 MG extended release tablet Take 1 tablet by mouth daily 90 tablet 3    metoprolol tartrate (LOPRESSOR) 25 MG tablet Take 1 tablet by mouth 2 times daily 180 tablet 3    prasugrel (EFFIENT) 10 MG TABS Take 1 tablet by mouth daily 90 tablet 3    propafenone (RYTHMOL) 150 MG tablet Take 1 tablet by mouth every 8 hours (Patient taking differently: Take 150 mg by mouth 2 times daily ) 270 tablet 3    ondansetron (ZOFRAN ODT) 4 MG disintegrating tablet Take 4 mg by mouth 4 times daily as needed for Nausea or Vomiting      aspirin 81 MG tablet Take 81 mg by mouth daily      nitroGLYCERIN (NITROSTAT) 0.4 MG SL tablet Place 1 tablet under the tongue every 5 minutes as needed for Chest pain.  25 tablet 0     No current facility-administered medications for this Orders   1. Coronary artery disease involving native coronary artery of native heart without angina pectoris     2. Essential hypertension     3. Shortness of breath     as above  Low BP   Likely causing part of the problem   Known severe CAD     Plan:  No follow-ups on file. Cut lisinopril in half  Monitor the BP at home  Cut lasix in half as well   Consider further accordingly   Continue risk factor modification and medical management  Thank you for allowing me to participate in the care of your patient. Please don't hesitate to contact me regarding any further issues related to the patient care    Orders Placed:  No orders of the defined types were placed in this encounter. Medications Prescribed:  No orders of the defined types were placed in this encounter. Discussed use, benefit, and side effects of prescribed medications. All patient questions answered. Pt voicedunderstanding. Instructed to continue current medications, diet and exercise. Continue risk factor modification and medical management. Patient agreed with treatment plan. Follow up as directed.     Electronically signedby Jack Mitchell MD on 6/10/2021 at 1:23 PM

## 2021-06-24 ENCOUNTER — HOSPITAL ENCOUNTER (OUTPATIENT)
Dept: GENERAL RADIOLOGY | Age: 71
Discharge: HOME | End: 2021-06-24
Payer: MEDICARE

## 2021-06-24 ENCOUNTER — HOSPITAL ENCOUNTER (OUTPATIENT)
Age: 71
Discharge: HOME | End: 2021-06-24
Payer: MEDICARE

## 2021-06-24 DIAGNOSIS — R52 PAIN: ICD-10-CM

## 2021-06-24 PROCEDURE — 71100 X-RAY EXAM RIBS UNI 2 VIEWS: CPT

## 2021-06-24 PROCEDURE — 71046 X-RAY EXAM CHEST 2 VIEWS: CPT

## 2021-07-06 ENCOUNTER — OFFICE VISIT (OUTPATIENT)
Dept: CARDIOLOGY CLINIC | Age: 71
End: 2021-07-06
Payer: MEDICARE

## 2021-07-06 VITALS
DIASTOLIC BLOOD PRESSURE: 60 MMHG | BODY MASS INDEX: 25.02 KG/M2 | SYSTOLIC BLOOD PRESSURE: 100 MMHG | OXYGEN SATURATION: 97 % | WEIGHT: 141.2 LBS | HEIGHT: 63 IN | HEART RATE: 73 BPM

## 2021-07-06 DIAGNOSIS — I50.22 CHRONIC SYSTOLIC CHF (CONGESTIVE HEART FAILURE), NYHA CLASS 3 (HCC): Primary | ICD-10-CM

## 2021-07-06 PROCEDURE — 99214 OFFICE O/P EST MOD 30 MIN: CPT | Performed by: PHYSICIAN ASSISTANT

## 2021-07-06 NOTE — PROGRESS NOTES
Heart Failure Clinic       Visit Date: 7/6/2021  Cardiologist:  Dr. John Marrufo  Primary Care Physician: Dr. Selena Pallas, MD    Gwen Conner is a 66 y.o. female who presents today for:  Chief Complaint   Patient presents with    Congestive Heart Failure       HPI:   Gwen Conner is a 66 y.o. female who presents to the office for a new patient visit in the heart failure clinic. Accompanied by self    TYPE HF:  ICMP  Device: none  HX: CAD, s/p CABG 2016, pericardial window, HTN, HLD, CVA, asthma, PAF    Hospitalization:  6/3-6/5 for acute on chronic HFrEF, PNA, UTI with e.coli, COPD, HTN    Concerns today: pt overall feels slightly better, but still having right side pleurisy, cp is worse with deep breathing. Sob has improved, but gets sob with minimal activity. Seem not to improve much since diagnosed with PNA. Pt has 4 pillow orthopnea for past month. No edema. No recent weight gain. Has cough, improved, dry. Pt feels tired and fatigued a lot.     Patient has:  Chest Pain: right side pleurisy  SOB: yes  Orthopnea/PND: yes  DEIDRE: no  Edema: no  Fatigue: yes  Abdominal bloating: minimal  Cough: yes  Appetite: fair  Home blood pressure: 79-80s/45 at times - she does feel dizzy at times, and room spins at times    Past Medical History:   Diagnosis Date    Asthma     [facility]     Blood clot associated with vein wall inflammation     CAD (coronary artery disease)     Cerebral artery occlusion with cerebral infarction ([facility]     CHF (congestive heart failure) (HCC)     COPD (chronic obstructive pulmonary disease) ([facility]     History of blood transfusion     Hx of blood clots     Hyperlipidemia     Hypertension     MI, old     Migraine     Pneumonia     PONV (postoperative nausea and vomiting)     Psychiatric problem      Past Surgical History:   Procedure Laterality Date    APPENDECTOMY      CARDIAC SURGERY  1998 in [facility]    pericardial window   [address] [place]    ablation    COLONOSCOPY      CORONARY ANGIOPLASTY WITH STENT PLACEMENT       [facility]    CORONARY ARTERY BYPASS GRAFT      ENDOSCOPY, COLON, DIAGNOSTIC      EYE SURGERY Bilateral 2020    cataract removal    HERNIA REPAIR      [facility] Dom Lara [facility]       Family History   Problem Relation Age of Onset    Diabetes Mother     Heart Disease Mother     High Blood Pressure Mother     Cancer Sister     Heart Disease Father     Cancer Brother     Heart Disease Maternal Aunt         MI    Heart Disease Maternal Uncle         MI    Heart Disease Maternal Grandmother         MI    Heart Disease Maternal Grandfather         MI    Stroke Paternal Grandfather     Cancer Sister     Heart Disease Sister         atrial fib    Cancer Brother     Heart Disease Brother         needs stents and has atrial fib    Diabetes Brother     Stroke Sister      Social History     Tobacco Use    Smoking status: Former Smoker     Types: Cigarettes     Quit date: 3/3/1992     Years since quittin.3    Smokeless tobacco: Never Used   Substance Use Topics    Alcohol use: Yes     Comment: occasional     Current Outpatient Medications   Medication Sig Dispense Refill    promethazine-codeine (PHENERGAN WITH CODEINE) 6.25-10 MG/5ML SOLN solution       nitrofurantoin, macrocrystal-monohydrate, (MACROBID) 100 MG capsule Take 100 mg by mouth 2 times daily      isosorbide mononitrate (IMDUR) 30 MG extended release tablet Take 1 tablet by mouth daily 90 tablet 3    metoprolol tartrate (LOPRESSOR) 25 MG tablet Take 1 tablet by mouth 2 times daily 180 tablet 3    atorvastatin (LIPITOR) 80 MG tablet Take 1 tablet by mouth nightly 90 tablet 3    prasugrel (EFFIENT) 10 MG TABS Take 1 tablet by mouth daily 90 tablet 3    propafenone (RYTHMOL) 150 MG tablet Take 1 tablet by mouth 2 times daily 180 tablet 3    furosemide (LASIX) 20 MG tablet Take 1 tablet by mouth daily 90 tablet 3    benzonatate (TESSALON) 100 MG capsule Take 1 capsule by mouth 3 times daily as needed for Cough 10 capsule 0    ondansetron (ZOFRAN ODT) 4 MG disintegrating tablet Take 4 mg by mouth 4 times daily as needed for Nausea or Vomiting      aspirin 81 MG tablet Take 81 mg by mouth daily      nitroGLYCERIN (NITROSTAT) 0.4 MG SL tablet Place 1 tablet under the tongue every 5 minutes as needed for Chest pain. 25 tablet 0     No current facility-administered medications for this visit. Allergies   Allergen Reactions    Celebrex [Celecoxib] Anaphylaxis     Unable to breath Turns red all over    Contrast [Iodides] Hives    Statins      Will obtain lipid panel and re-evaluate.    Takes lipitor at home    Naprosyn [Naproxen] Nausea And Vomiting    Shrimp Flavor Nausea And Vomiting       SUBJECTIVE:   ROS:  As stated above otherwise unremarkable    OBJECTIVE:   Today's Vitals:  /60   Pulse 73   Ht 5' 3\" (1.6 m)   Wt 141 lb 3.2 oz (64 kg)   SpO2 97%   BMI 25.01 kg/m²     Physical Exam:  General Appearance: alert and oriented to person, place and time, in no acute distress  Cardiovascular: normal rate, regular rhythm, normal S1 and S2, no murmurs, rubs, clicks, or gallops, distal pulses intact, no carotid bruits, no JVD  Pulmonary/Chest: clear to auscultation bilaterally- no wheezes, rales or rhonchi, normal air movement, no respiratory distress  Abdomen: soft, non-tender, non-distended, normal bowel sounds, no masses   Extremities: no cyanosis, clubbing or edema, pulse   Skin: warm and dry  Head: normocephalic and atraumatic  Eyes: pupils equal, round, and reactive to light  Neck: supple and non-tender without mass, no thyromegaly     Wt Readings from Last 3 Encounters:   07/06/21 141 lb 3.2 oz (64 kg)   06/10/21 140 lb 8 oz (63.7 kg)   06/05/21 139 lb (63 kg)     BP Readings from Last 3 Encounters:   07/06/21 100/60   06/10/21 98/62   06/05/21 114/61     Pulse Readings from Last 3 Encounters:   07/06/21 73   06/10/21 74   06/05/21 75     Body mass index is 25.01 kg/m². ECHO:   TTE 6/4/21  Summary   limited echo   Ejection fraction is visually estimated at 45-50%. There was mild global hypokinesis of the left ventricle. The aortic valve leaflets were not well visualized. Aortic valve appears tricuspid. Signature      ----------------------------------------------------------------   Electronically signed by Vani Álvarez MD (Interpreting   physician) on 06/04/2021 at 03:59 PM    CATH/STRESS:   Cath 12/15/20  CORONARY ANGIOGRAM:  LEFT MAIN:  Patent, but small. LAD:  Ostial LAD has 95% stenosis with multiple overlapping previously  placed stents supplying a patent distal LAD. There is a large diagonal  branch that has retrograde flow into the LIMA. LCX:  Previously placed stent in the ostium of the circumflex with 100%   in the ostium. There is retrograde flow from the bypass diagonal  branch into the OM, which appears to be patent and reconstitute at the  level of the stent. RCA:  Patent without any significant obstruction. It bifurcates into  PDA and PLB, both without any significant obstruction.     BYPASS ANGIOGRAPHY:  LIMA TO DIAGONAL:  Small, but patent. The ostium, body and anastomotic  segments do not appear to have any significant stenosis. The LIMA  supply the diagonal branch and the diagonal branch retrograde fills the  LAD. The ostium of the diagonal branch may have some disease, difficult  to assess. There is an area of tension at the ostium of the diagonal  branch that may limit the flow into the native LAD.     LV:  LV systolic pressure is 571. No significant LV to AO systolic  gradient. LVEDP is 11. LVEF is 50% to 55%. Aortic valve is tricuspid.   No significant aneurysm or dissection in the visualized portion of the  aorta.     PERIPHERAL ANGIOGRAM:  Peripheral angiography with the aortobifemoral  angiogram with digital substraction was done in case we needed to  proceed with distal left main, LAD and circumflex intervention for  hemodynamic support.     ABDOMINAL AORTA:  Patent. COMMON ILIAC ARTERIES:  Bilaterally patent. EXTERNAL ILIAC ARTERIES:  Bilaterally patent. COMMON FEMORAL ARTERIES:  Bilaterally patent.     IMMEDIATE COMPLICATION:  None.     MEDICATIONS:  See EMR.     ACCESS:  Manual pressure was used for hemostasis.     ESTIMATED BLOOD LOSS:  Less than 10 mL.     SUMMARY:  Severe left system disease with 95% ostial LAD in-stent  restenosis, 100% in-stent LCX  with left-to-left collaterals; patent  LIMA to diagonal.     PLAN:  1. I reviewed the old images. She clearly had this LAD stenosis prior  to her left bypass. The diagonal branch seems to be providing antegrade  flow into the diagonal; however, the retrograde portion of it may not be  filling the LAD adequately because of her ischemic ST changes  inferiorly. She had a relatively preserved EF. We will discuss repeat  CABG versus PCI; however, both options are quite high risk and may not  result in patency with poor targets in terms of bypass and the fact that  she has had multiple stents in the distal left main and the ostium of  the circumflex, which would result in recurrent instent restenosis. If PCI,  the plan would be for Impella for PCI of the entire LAD and the circumflex  reconstitution  With, but at this point given that she relatively preserved  EF, plan would most optimally be to medically manage the patient due to lack of discomfort and   Resolution of ECG changes. 2.  Bedrest.  3.  CT surgery consultation. 4. DAPT. 5.  Uptitrate antianginals. 6.  Follow up 2-4 weeks post procedure     All the above was explained to the patient and the patient's family.    They are agreeable and amenable to the above plan.           Alla Fajardo MD      Results reviewed:  BNP:   Lab Results   Component Value Date    .9 (H) 11/06/2013     CBC:   Lab Results   Component Value Date    WBC 13.2 06/05/2021    RBC 4.34 06/05/2021    HGB 12.7 06/05/2021    HCT 39.5 06/05/2021     06/05/2021     CMP:    Lab Results   Component Value Date     06/09/2021    K 4.0 06/09/2021    K 4.8 06/04/2021     06/09/2021    CO2 25 06/09/2021    BUN 34 06/09/2021    CREATININE 1.3 06/09/2021    LABGLOM 40 06/09/2021    GLUCOSE 113 06/09/2021    CALCIUM 8.9 06/09/2021     Hepatic Function Panel:    Lab Results   Component Value Date    ALKPHOS 80 06/03/2021    ALT 9 06/03/2021    AST 20 06/03/2021    PROT 7.0 06/03/2021    BILITOT 1.1 06/03/2021    BILIDIR <0.2 06/03/2021    LABALBU 4.1 06/03/2021     Magnesium:    Lab Results   Component Value Date    MG 2.2 07/02/2016     PT/INR:    Lab Results   Component Value Date    INR 0.95 12/14/2020     Lipids:    Lab Results   Component Value Date    TRIG 57 06/29/2018    HDL 56 06/29/2018    LDLCALC 71 06/29/2018       ASSESSMENT AND PLAN:   The patient's condition/symptoms are Stable: No clinical evidence of fluid overload today. Continue current medical regimen without changes at present time. Chronic systolic CHF, NYHA III  ICMP - Ef 45-50 per TTE 6/4/21,, ef 40-45 per TTE 12/16/20  CAD - s/p CABG 2016, PCIs  Hx HTN  HLD  Hx pericardial window  Hx CVA  Hx asthma/? COPD  Recent PNA     Diagnosis Orders   1. Chronic systolic CHF (congestive heart failure), NYHA class 3 (HCC)       Continue:  GDMT:   ACE/ARB/ARNI - lisinopril 5 daily   BB - lopressor 15 bid   Diuretic - lasix 20 daily  AA - no  SGLT2 -  no  Vasodilator - imdur 30 daily  Continue Current medications lipitor 80 daily, effient 10 daily, rythmol 150 bid, asa 81 daily    Stop lisinopril  bp and hr log twice daily  F/up 10-12 weeks  Keep f/u appt with pulmonary as new patient 8/2/21      Tolerating above noted HF meds, no ill side effects noted. Will continue to monitor kidney function and electrolytes. Will optimize as tolerated. Pt is compliant w/ medications.     Total visit time of 40 minutes has been spent with patient on education of symptoms, management, medication, and plan of care; as well as review of chart: labs, ECHO, radiology reports, etc.   I personally spent more then 50% of the appt time face to face with the patient. Daily weights  Fluid restriction of 2 Liters per day  Limit sodium in diet to around 3726-5071 mg/day  Monitor BP  Activity as tolerated     Patient was instructed to call the [facility] for any changes in symptoms as noted in AVS.      Return in about 3 months (around 10/6/2021). or sooner if needed     Patient given educational materials - see patient instructions. We discussed the importance of weighing oneself and recording daily. We also discussed the importance of a low sodium diet, higher sodium foods to avoid and better low sodium food options. Patient verbalizes understanding of plan of care using teach back method, and is agreeable to the treatment plan.        Electronically signed by Monique Ellis PA-C on 7/6/2021 at 9:39 AM

## 2021-07-06 NOTE — PATIENT INSTRUCTIONS
You may receive a survey regarding the care you received during your visit. Your input is valuable to us. We encourage you to complete and return your survey. We hope you will choose us in the future for your healthcare needs.     Continue:  · Continue current medications  · Daily weights and record  · Fluid restriction of 2 Liters per day  · Limit sodium in diet to around 2903-1790 mg/day  · Monitor BP  · Activity as tolerated     Call the Heart Failure Clinic for any of the following symptoms: [phone]   Weight gain of 2-3 pounds in 1 day or 5 pounds in 1 week   Increased shortness of breath   Shortness of breath while laying down   Cough   Chest pain   Swelling in feet, ankles or legs   Tenderness or bloating in the abdomen   Fatigue    Decreased appetite or nausea    Confusion         Stop lisinopril  bp and hr log twice daily  F/up 10-12 weeks  Keep f/u appt with pulmonary as new patient 8/2/21

## 2021-08-02 ENCOUNTER — NURSE ONLY (OUTPATIENT)
Dept: LAB | Age: 71
End: 2021-08-02

## 2021-08-02 ENCOUNTER — OFFICE VISIT (OUTPATIENT)
Dept: PULMONOLOGY | Age: 71
End: 2021-08-02
Payer: MEDICARE

## 2021-08-02 VITALS
TEMPERATURE: 97.9 F | HEART RATE: 68 BPM | HEIGHT: 63 IN | BODY MASS INDEX: 25.34 KG/M2 | OXYGEN SATURATION: 95 % | WEIGHT: 143 LBS | SYSTOLIC BLOOD PRESSURE: 108 MMHG | DIASTOLIC BLOOD PRESSURE: 62 MMHG

## 2021-08-02 DIAGNOSIS — J44.9 CHRONIC OBSTRUCTIVE PULMONARY DISEASE, UNSPECIFIED COPD TYPE (HCC): ICD-10-CM

## 2021-08-02 DIAGNOSIS — R93.89 ABNORMAL CHEST X-RAY: ICD-10-CM

## 2021-08-02 DIAGNOSIS — R06.09 DYSPNEA ON EXERTION: Primary | ICD-10-CM

## 2021-08-02 DIAGNOSIS — J18.9 PNEUMONIA OF RIGHT LOWER LOBE DUE TO INFECTIOUS ORGANISM: ICD-10-CM

## 2021-08-02 DIAGNOSIS — Z77.22 TOBACCO SMOKE EXPOSURE: ICD-10-CM

## 2021-08-02 DIAGNOSIS — R06.09 DYSPNEA ON EXERTION: ICD-10-CM

## 2021-08-02 LAB — D-DIMER QUANTITATIVE: 425 NG/ML FEU (ref 0–500)

## 2021-08-02 PROCEDURE — 99204 OFFICE O/P NEW MOD 45 MIN: CPT | Performed by: INTERNAL MEDICINE

## 2021-08-02 NOTE — PROGRESS NOTES
Neck Circumference -   2  Mallampati -  14.5    Lung Nodule Screening     [] Qualifies    [x] Does not qualify   [] Declined    [] Completed

## 2021-08-02 NOTE — PATIENT INSTRUCTIONS
Recommendations/Plan:  -Continue start patient on Incruse Ellipta 62.5mcg/actuation DPI 1puff daily in am. Vitor Zavala  educated and demonstrated in my office how to use Incruse. She   verbalizes understanding. She was detailed about mechanism of action of drug along with associated side effects. She agreed to take the risk and medication. She verbalizes understanding.  -Continue patient on Albuterol HFA 90mcg/Spray MDI, 2puffs  Q6Hprn. She  was informed about adverse effects of Albuterol HFA. She verbalizes understanding.  -She was advised to follow-up with Dr. Kong Fajardo, MDs office regarding optimization of her congestive heart failure with a systolic dysfunction keeping her appointment with their office.  -Will send serum D- dimer today to evaluate for etiology of exertional dyspnea. She was advised and instructed to call my office in [facility] to go over the above test results and management. She verbalizes understanding.  -Schedule patient for CT scan of chest without IV contrast to further evaluate abnormal chest x-ray with a history of right lower lobe pneumonia and pleuritic chest pain to rule out a pleural effusion.  - Patient educated to update his pneumococcal vaccine with family physician and take influenza vaccine in coming season with out fail. Patient verbalizes understanding.  -Schedule patient for full pulmonary function tests before clinic visit.  -Vitor Zavala was advised to make early appointment with my clinic if she develops any constitutional symptoms including loss of weight, poor appetite or hemoptysis. She verbalizes under standing.  - Schedule patient for follow up with my clinic in 1month with recommended test/s CT scan of chest without contrast and full pulmonary function tests. Patient advised to make early appointment if needed. - Patient educated about my impression and plan. Patient verbalizes understanding.

## 2021-08-03 ENCOUNTER — TELEPHONE (OUTPATIENT)
Dept: PULMONOLOGY | Age: 71
End: 2021-08-03

## 2021-08-03 NOTE — TELEPHONE ENCOUNTER
I called Luisana Medina at given [employer] Home/mobile phone number. Unfortunately she   is not available to speak with me at this time. I left a message on her answering service regarding normal serum D-dimer level performed yesterday on 2 August 2021 I also left a message on her answering service to call back to our office if she needs any further information.

## 2021-08-03 NOTE — TELEPHONE ENCOUNTER
Patient was calling asking for her  lab results that she had done yesterday- D-Dimer. Please advise.

## 2021-08-28 ENCOUNTER — TELEPHONE (OUTPATIENT)
Dept: PULMONOLOGY | Age: 71
End: 2021-08-28

## 2021-08-28 DIAGNOSIS — J20.9 ACUTE BRONCHITIS, UNSPECIFIED ORGANISM: Primary | ICD-10-CM

## 2021-08-28 DIAGNOSIS — J44.9 CHRONIC OBSTRUCTIVE PULMONARY DISEASE, UNSPECIFIED COPD TYPE (HCC): ICD-10-CM

## 2021-08-28 RX ORDER — DOXYCYCLINE HYCLATE 100 MG/1
100 CAPSULE ORAL 2 TIMES DAILY
Qty: 14 CAPSULE | Refills: 0 | Status: SHIPPED
Start: 2021-08-28 | End: 2021-08-31 | Stop reason: ALTCHOICE

## 2021-08-28 RX ORDER — BENZONATATE 100 MG/1
200 CAPSULE ORAL 3 TIMES DAILY PRN
Qty: 90 CAPSULE | Refills: 1 | Status: SHIPPED | OUTPATIENT
Start: 2021-08-28 | End: 2021-10-27

## 2021-08-28 NOTE — TELEPHONE ENCOUNTER
I received a call from a Ms. Estephania Villa stating that she is having sore throat and a cough with yellow sputum production for the last few days and got worse today. I called patient at her given phone number and spoke with her over phone. She told me that she still having sore throat, cough with yellow sputum production for the last few days. She received her 2 doses of COVID-19 vaccine. She denies any recent exposure to COVID-19 patients. She still having smell and good taste (she denies any recent loss of smell or taste). She denies any worsening of shortness of breath. She also denied any fever, chills and rigors. Patient was given a prescription for doxycycline 100 mg p.o. twice daily for 7 days along with Tessalon Perles 200 mg p.o. 3 times daily. She was advised to call my office or come to the emergency room if her symptoms do not improve. She verbalized understanding of the plan.

## 2021-08-31 ENCOUNTER — HOSPITAL ENCOUNTER (EMERGENCY)
Age: 71
Discharge: HOME | End: 2021-08-31
Attending: EMERGENCY MEDICINE
Payer: MEDICARE

## 2021-08-31 ENCOUNTER — TELEPHONE (OUTPATIENT)
Dept: PULMONOLOGY | Age: 71
End: 2021-08-31

## 2021-08-31 ENCOUNTER — APPOINTMENT (OUTPATIENT)
Dept: GENERAL RADIOLOGY | Age: 71
End: 2021-08-31
Payer: MEDICARE

## 2021-08-31 VITALS
TEMPERATURE: 98.7 F | DIASTOLIC BLOOD PRESSURE: 76 MMHG | WEIGHT: 130 LBS | OXYGEN SATURATION: 98 % | HEIGHT: 63 IN | BODY MASS INDEX: 23.04 KG/M2 | RESPIRATION RATE: 18 BRPM | HEART RATE: 78 BPM | SYSTOLIC BLOOD PRESSURE: 128 MMHG

## 2021-08-31 DIAGNOSIS — J44.1 COPD EXACERBATION (HCC): ICD-10-CM

## 2021-08-31 DIAGNOSIS — Z86.79 HISTORY OF CARDIAC DISORDER: ICD-10-CM

## 2021-08-31 DIAGNOSIS — J40 BRONCHITIS: Primary | ICD-10-CM

## 2021-08-31 DIAGNOSIS — J04.0 LARYNGITIS: ICD-10-CM

## 2021-08-31 LAB
ALBUMIN SERPL-MCNC: 4.2 G/DL (ref 3.5–5.1)
ALP BLD-CCNC: 86 U/L (ref 38–126)
ALT SERPL-CCNC: 10 U/L (ref 11–66)
ANION GAP SERPL CALCULATED.3IONS-SCNC: 12 MEQ/L (ref 8–16)
AST SERPL-CCNC: 23 U/L (ref 5–40)
BASOPHILS # BLD: 0.3 %
BASOPHILS ABSOLUTE: 0 THOU/MM3 (ref 0–0.1)
BILIRUB SERPL-MCNC: 0.8 MG/DL (ref 0.3–1.2)
BILIRUBIN DIRECT: < 0.2 MG/DL (ref 0–0.3)
BUN BLDV-MCNC: 13 MG/DL (ref 7–22)
C-REACTIVE PROTEIN: 1.18 MG/DL (ref 0–1)
CALCIUM SERPL-MCNC: 9.1 MG/DL (ref 8.5–10.5)
CHLORIDE BLD-SCNC: 106 MEQ/L (ref 98–111)
CO2: 23 MEQ/L (ref 23–33)
CREAT SERPL-MCNC: 1 MG/DL (ref 0.4–1.2)
D-DIMER QUANTITATIVE: 344 NG/ML FEU (ref 0–500)
EOSINOPHIL # BLD: 7.4 %
EOSINOPHILS ABSOLUTE: 0.5 THOU/MM3 (ref 0–0.4)
ERYTHROCYTE [DISTWIDTH] IN BLOOD BY AUTOMATED COUNT: 14.2 % (ref 11.5–14.5)
ERYTHROCYTE [DISTWIDTH] IN BLOOD BY AUTOMATED COUNT: 48.8 FL (ref 35–45)
FERRITIN: 135 NG/ML (ref 10–291)
GFR SERPL CREATININE-BSD FRML MDRD: 54 ML/MIN/1.73M2
GLUCOSE BLD-MCNC: 105 MG/DL (ref 70–108)
HCT VFR BLD CALC: 36.8 % (ref 37–47)
HEMOGLOBIN: 12.1 GM/DL (ref 12–16)
IMMATURE GRANS (ABS): 0.01 THOU/MM3 (ref 0–0.07)
IMMATURE GRANULOCYTES: 0.1 %
LACTIC ACID: 1 MMOL/L (ref 0.5–2)
LD: 174 U/L (ref 100–190)
LIPASE: 29.9 U/L (ref 5.6–51.3)
LYMPHOCYTES # BLD: 35.5 %
LYMPHOCYTES ABSOLUTE: 2.4 THOU/MM3 (ref 1–4.8)
MCH RBC QN AUTO: 30.9 PG (ref 26–33)
MCHC RBC AUTO-ENTMCNC: 32.9 GM/DL (ref 32.2–35.5)
MCV RBC AUTO: 94.1 FL (ref 81–99)
MONOCYTES # BLD: 10 %
MONOCYTES ABSOLUTE: 0.7 THOU/MM3 (ref 0.4–1.3)
NUCLEATED RED BLOOD CELLS: 0 /100 WBC
OSMOLALITY CALCULATION: 281.7 MOSMOL/KG (ref 275–300)
PLATELET # BLD: 186 THOU/MM3 (ref 130–400)
PMV BLD AUTO: 9.3 FL (ref 9.4–12.4)
POTASSIUM SERPL-SCNC: 4.4 MEQ/L (ref 3.5–5.2)
PRO-BNP: 1275 PG/ML (ref 0–900)
PROCALCITONIN: 0.05 NG/ML (ref 0.01–0.09)
RBC # BLD: 3.91 MILL/MM3 (ref 4.2–5.4)
SEG NEUTROPHILS: 46.7 %
SEGMENTED NEUTROPHILS ABSOLUTE COUNT: 3.2 THOU/MM3 (ref 1.8–7.7)
SODIUM BLD-SCNC: 141 MEQ/L (ref 135–145)
TOTAL PROTEIN: 6 G/DL (ref 6.1–8)
TROPONIN T: < 0.01 NG/ML
WBC # BLD: 6.9 THOU/MM3 (ref 4.8–10.8)

## 2021-08-31 PROCEDURE — 87801 DETECT AGNT MULT DNA AMPLI: CPT

## 2021-08-31 PROCEDURE — 71046 X-RAY EXAM CHEST 2 VIEWS: CPT

## 2021-08-31 PROCEDURE — 85025 COMPLETE CBC W/AUTO DIFF WBC: CPT

## 2021-08-31 PROCEDURE — 96374 THER/PROPH/DIAG INJ IV PUSH: CPT

## 2021-08-31 PROCEDURE — 83690 ASSAY OF LIPASE: CPT

## 2021-08-31 PROCEDURE — 94640 AIRWAY INHALATION TREATMENT: CPT

## 2021-08-31 PROCEDURE — 84145 PROCALCITONIN (PCT): CPT

## 2021-08-31 PROCEDURE — 85379 FIBRIN DEGRADATION QUANT: CPT

## 2021-08-31 PROCEDURE — 99283 EMERGENCY DEPT VISIT LOW MDM: CPT

## 2021-08-31 PROCEDURE — 83880 ASSAY OF NATRIURETIC PEPTIDE: CPT

## 2021-08-31 PROCEDURE — 86140 C-REACTIVE PROTEIN: CPT

## 2021-08-31 PROCEDURE — 2580000003 HC RX 258: Performed by: NURSE PRACTITIONER

## 2021-08-31 PROCEDURE — 6360000002 HC RX W HCPCS: Performed by: NURSE PRACTITIONER

## 2021-08-31 PROCEDURE — 36415 COLL VENOUS BLD VENIPUNCTURE: CPT

## 2021-08-31 PROCEDURE — 93005 ELECTROCARDIOGRAM TRACING: CPT | Performed by: NURSE PRACTITIONER

## 2021-08-31 PROCEDURE — 83605 ASSAY OF LACTIC ACID: CPT

## 2021-08-31 PROCEDURE — 87040 BLOOD CULTURE FOR BACTERIA: CPT

## 2021-08-31 PROCEDURE — 84484 ASSAY OF TROPONIN QUANT: CPT

## 2021-08-31 PROCEDURE — 83615 LACTATE (LD) (LDH) ENZYME: CPT

## 2021-08-31 PROCEDURE — 96361 HYDRATE IV INFUSION ADD-ON: CPT

## 2021-08-31 PROCEDURE — 87147 CULTURE TYPE IMMUNOLOGIC: CPT

## 2021-08-31 PROCEDURE — 80053 COMPREHEN METABOLIC PANEL: CPT

## 2021-08-31 PROCEDURE — 82248 BILIRUBIN DIRECT: CPT

## 2021-08-31 PROCEDURE — 6370000000 HC RX 637 (ALT 250 FOR IP): Performed by: NURSE PRACTITIONER

## 2021-08-31 PROCEDURE — 82728 ASSAY OF FERRITIN: CPT

## 2021-08-31 RX ORDER — GUAIFENESIN 600 MG/1
600 TABLET, EXTENDED RELEASE ORAL 2 TIMES DAILY
Qty: 30 TABLET | Refills: 0 | Status: SHIPPED | OUTPATIENT
Start: 2021-08-31 | End: 2021-09-15

## 2021-08-31 RX ORDER — PROMETHAZINE HYDROCHLORIDE AND CODEINE PHOSPHATE 6.25; 1 MG/5ML; MG/5ML
5 SYRUP ORAL ONCE
Status: COMPLETED | OUTPATIENT
Start: 2021-08-31 | End: 2021-08-31

## 2021-08-31 RX ORDER — AZITHROMYCIN 250 MG/1
250 TABLET, FILM COATED ORAL SEE ADMIN INSTRUCTIONS
Qty: 6 TABLET | Refills: 0 | Status: SHIPPED | OUTPATIENT
Start: 2021-08-31 | End: 2021-09-05

## 2021-08-31 RX ORDER — IPRATROPIUM BROMIDE AND ALBUTEROL SULFATE 2.5; .5 MG/3ML; MG/3ML
1 SOLUTION RESPIRATORY (INHALATION) ONCE
Status: COMPLETED | OUTPATIENT
Start: 2021-08-31 | End: 2021-08-31

## 2021-08-31 RX ORDER — GUAIFENESIN 600 MG/1
600 TABLET, EXTENDED RELEASE ORAL ONCE
Status: COMPLETED | OUTPATIENT
Start: 2021-08-31 | End: 2021-08-31

## 2021-08-31 RX ORDER — METHYLPREDNISOLONE SODIUM SUCCINATE 125 MG/2ML
125 INJECTION, POWDER, LYOPHILIZED, FOR SOLUTION INTRAMUSCULAR; INTRAVENOUS ONCE
Status: DISCONTINUED | OUTPATIENT
Start: 2021-08-31 | End: 2021-09-01 | Stop reason: HOSPADM

## 2021-08-31 RX ORDER — DEXTROMETHORPHAN HYDROBROMIDE AND PROMETHAZINE HYDROCHLORIDE 15; 6.25 MG/5ML; MG/5ML
5 SYRUP ORAL 4 TIMES DAILY PRN
Qty: 240 ML | Refills: 0 | Status: SHIPPED | OUTPATIENT
Start: 2021-08-31 | End: 2021-09-07

## 2021-08-31 RX ORDER — MORPHINE SULFATE 2 MG/ML
2 INJECTION, SOLUTION INTRAMUSCULAR; INTRAVENOUS ONCE
Status: COMPLETED | OUTPATIENT
Start: 2021-08-31 | End: 2021-08-31

## 2021-08-31 RX ORDER — 0.9 % SODIUM CHLORIDE 0.9 %
500 INTRAVENOUS SOLUTION INTRAVENOUS ONCE
Status: COMPLETED | OUTPATIENT
Start: 2021-08-31 | End: 2021-08-31

## 2021-08-31 RX ORDER — PREDNISONE 10 MG/1
TABLET ORAL
Qty: 45 TABLET | Refills: 0 | Status: SHIPPED | OUTPATIENT
Start: 2021-08-31 | End: 2021-09-15

## 2021-08-31 RX ADMIN — IPRATROPIUM BROMIDE AND ALBUTEROL SULFATE 1 AMPULE: .5; 3 SOLUTION RESPIRATORY (INHALATION) at 20:47

## 2021-08-31 RX ADMIN — SODIUM CHLORIDE 500 ML: 9 INJECTION, SOLUTION INTRAVENOUS at 18:28

## 2021-08-31 RX ADMIN — IPRATROPIUM BROMIDE AND ALBUTEROL SULFATE 1 AMPULE: .5; 3 SOLUTION RESPIRATORY (INHALATION) at 18:04

## 2021-08-31 RX ADMIN — Medication 5 ML: at 20:47

## 2021-08-31 RX ADMIN — MORPHINE SULFATE 2 MG: 2 INJECTION, SOLUTION INTRAMUSCULAR; INTRAVENOUS at 18:28

## 2021-08-31 RX ADMIN — GUAIFENESIN 600 MG: 600 TABLET, EXTENDED RELEASE ORAL at 20:47

## 2021-08-31 ASSESSMENT — PAIN SCALES - GENERAL: PAINLEVEL_OUTOF10: 9

## 2021-08-31 NOTE — TELEPHONE ENCOUNTER
Patient called in very wheezy, SOB and coughing. She stated she has been taking her medication since 8/28/21, but is not getting any better. I advised patient that she needed to go to the ED or urgent care to be evaluated. Patient verbalized understanding.

## 2021-08-31 NOTE — ED TRIAGE NOTES
Presents to ER with complaints of an ongoing cough that started 6 days ago. Pt states she has a history of pneumonia and feels that is what she has. She states she was put on cough medicine prescribed by her PCP with no relief. Pt refusing COVID swab at this time.

## 2021-09-01 LAB
ACINETOBACTER BAUMANNII FILM ARRAY: NOT DETECTED
BOTTLE TYPE: ABNORMAL
CANDIDA ALBICANS FILM ARRAY: NOT DETECTED
CANDIDA GLABRATA FILM ARRAY: NOT DETECTED
CANDIDA KRUSEI FILM ARRAY: NOT DETECTED
CANDIDA PARAPSILOSIS FILM ARRAY: NOT DETECTED
CANDIDA TROPICALIS FILM ARRAY: NOT DETECTED
CARBAPENEM RESITANT FILM ARRAY: ABNORMAL
EKG ATRIAL RATE: 86 BPM
EKG P AXIS: 92 DEGREES
EKG P-R INTERVAL: 134 MS
EKG Q-T INTERVAL: 376 MS
EKG QRS DURATION: 94 MS
EKG QTC CALCULATION (BAZETT): 449 MS
EKG R AXIS: 27 DEGREES
EKG T AXIS: 41 DEGREES
EKG VENTRICULAR RATE: 86 BPM
ENTERBACTER CLOACAE FILM ARRAY: NOT DETECTED
ENTERBACTERIACEAE FILM ARRAY: NOT DETECTED
ENTEROCOCCUS FILM ARRAY: NOT DETECTED
ESCHERICHIA COLI FILM ARRAY: NOT DETECTED
HAEMOPHILUS INFLUENZA FILM ARRAY: NOT DETECTED
KLEBSIELLA OXYTOCA FILM ARRAY: NOT DETECTED
KLEBSIELLA PNEUMONIAE FILM ARRAY: NOT DETECTED
LISTERIA MONOCYTOGENES FILM ARRAY: NOT DETECTED
METHICILLIN RESISTANT FILM ARRAY: DETECTED
NEISSERIA MENIGITIDIS FILM ARRAY: NOT DETECTED
PROTEUS FILM ARRAY: NOT DETECTED
PSEUDOMONAS AERUGINOSA FILM ARRAY: NOT DETECTED
SERRATIA MARCESCENS FILM ARRAY: NOT DETECTED
SOURCE OF BLOOD CULTURE: ABNORMAL
STAPH AUREUS FILM ARRAY: NOT DETECTED
STAPHYLOCOCCUS FILM ARRAY: DETECTED
STREP AGALACTIAE FILM ARRAY: NOT DETECTED
STREP PNEUMONIAE FILM ARRAY: NOT DETECTED
STREP PYOCGENES FILM ARRAY: NOT DETECTED
STREPTOCOCCUS FILM ARRAY: NOT DETECTED
VANCOMYCIN RESISTANT FILM ARRAY: ABNORMAL

## 2021-09-01 PROCEDURE — 93010 ELECTROCARDIOGRAM REPORT: CPT | Performed by: NUCLEAR MEDICINE

## 2021-09-01 NOTE — ED NOTES
Pt refusing pertussis swab at this time. Explained to pt the need for swab but pt still refusing.  Updated Argentina Noonan RN  08/31/21 [id number]

## 2021-09-02 LAB
BLOOD CULTURE, ROUTINE: ABNORMAL
BLOOD CULTURE, ROUTINE: ABNORMAL
ORGANISM: ABNORMAL

## 2021-09-03 NOTE — PROGRESS NOTES
Pharmacy Note  ED Culture Follow-up    Johnnie Matt is a 66 y.o. female. Allergies: Celebrex [celecoxib], Contrast [iodides], Statins, Naprosyn [naproxen], and Shrimp flavor     Labs:  Lab Results   Component Value Date    BUN 13 08/31/2021    CREATININE 1.0 08/31/2021    WBC 6.9 08/31/2021     Estimated Creatinine Clearance: 42 mL/min (based on SCr of 1 mg/dL). Current antimicrobials:   Azithromycin     ASSESSMENT:  Micro results:   Blood culture: positive for Staphylococcus sp, +MecA (1/2 sets)     PLAN:  Need for intervention: No 2/2 likely skin libra contamination  Discussed with:  Dr. Neel May treatment:    No treatment indicated    Patient response:   No need to contact patient    Called/sent in prescription to: Not applicable    Please call with any questions.  Brien Cervantes, [facility], PharmD 8:35 PM 9/2/2021

## 2021-09-05 ASSESSMENT — ENCOUNTER SYMPTOMS
EYE REDNESS: 0
RHINORRHEA: 0
SHORTNESS OF BREATH: 1
VOMITING: 0
BACK PAIN: 0
WHEEZING: 1
COUGH: 1
ABDOMINAL PAIN: 0
NAUSEA: 0
CHEST TIGHTNESS: 0

## 2021-09-06 LAB — BLOOD CULTURE, ROUTINE: NORMAL

## 2021-12-20 ENCOUNTER — TELEPHONE (OUTPATIENT)
Dept: PULMONOLOGY | Age: 71
End: 2021-12-20

## 2021-12-20 DIAGNOSIS — J44.9 CHRONIC OBSTRUCTIVE PULMONARY DISEASE, UNSPECIFIED COPD TYPE (HCC): Primary | ICD-10-CM

## 2021-12-20 DIAGNOSIS — R05.9 COUGH IN ADULT: ICD-10-CM

## 2021-12-20 RX ORDER — BENZONATATE 100 MG/1
200 CAPSULE ORAL 3 TIMES DAILY PRN
Qty: 90 CAPSULE | Refills: 5 | Status: SHIPPED | OUTPATIENT
Start: 2021-12-20 | End: 2022-04-11 | Stop reason: SDUPTHER

## 2021-12-20 NOTE — TELEPHONE ENCOUNTER
Patient called stating she has a horrible cough. She is not coughing up anything and has not had any other symptoms. She was wondering if you can call in cough syrup and tessalon pearls for her?

## 2021-12-20 NOTE — TELEPHONE ENCOUNTER
I sent a prescription for Tessalon Perles 200 mg p.o. 3 times daily as needed for cough. 1 month supply with 5 refills to her local pharmacy. Please inform patient.

## 2022-01-05 ENCOUNTER — TELEPHONE (OUTPATIENT)
Dept: CARDIOLOGY CLINIC | Age: 72
End: 2022-01-05

## 2022-01-05 RX ORDER — LISINOPRIL 5 MG/1
5 TABLET ORAL DAILY
COMMUNITY
End: 2022-01-13

## 2022-01-05 NOTE — TELEPHONE ENCOUNTER
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

## 2022-01-06 ENCOUNTER — OFFICE VISIT (OUTPATIENT)
Dept: CARDIOLOGY CLINIC | Age: 72
End: 2022-01-06
Payer: MEDICARE

## 2022-01-06 VITALS
HEART RATE: 76 BPM | HEIGHT: 63 IN | DIASTOLIC BLOOD PRESSURE: 70 MMHG | SYSTOLIC BLOOD PRESSURE: 108 MMHG | BODY MASS INDEX: 25.52 KG/M2 | WEIGHT: 144 LBS

## 2022-01-06 DIAGNOSIS — E78.00 PURE HYPERCHOLESTEROLEMIA: ICD-10-CM

## 2022-01-06 DIAGNOSIS — I25.709 CORONARY ARTERY DISEASE INVOLVING CORONARY BYPASS GRAFT OF NATIVE HEART WITH ANGINA PECTORIS (HCC): ICD-10-CM

## 2022-01-06 DIAGNOSIS — R07.2 PRECORDIAL PAIN: Primary | ICD-10-CM

## 2022-01-06 DIAGNOSIS — I95.0 IDIOPATHIC HYPOTENSION: ICD-10-CM

## 2022-01-06 PROCEDURE — 99214 OFFICE O/P EST MOD 30 MIN: CPT | Performed by: NUCLEAR MEDICINE

## 2022-01-06 NOTE — TELEPHONE ENCOUNTER
Spoke with pt and she did not take Lisinopril this morning and BP 78/59.   Appt made at pt's request.

## 2022-01-06 NOTE — PROGRESS NOTES
[address] [facility]  [facility].  [address]  [place] [address]  Dept: [phone]  Dept Fax: [phone]  Loc: [phone]    Visit Date: 1/6/2022    Daxa Aragon is a 66 y.o. female who presents todayfor:  Chief Complaint   Patient presents with    Check-Up    Coronary Artery Disease    Hypotension    Hypertension    Hyperlipidemia     Had chest pain and sweating   Weakness  Felt like she had a heart attack  She didn't go to the hospital because of COVID situation   Lately more fatigue  More tired  Low BP and dizziness  No syncope  Does have known CABG   Cath 7800   Was very complicated then   Done by jyotsna   Was not a candidate for more stenting   Or for redo CABG   On statins for hyperlipidemia  High risk patient  On rhythmol !!!    HPI:  HPI  Past Medical History:   Diagnosis Date    Asthma     Atrial fibrillation ([address]     Blood clot associated with vein wall inflammation     CAD (coronary artery disease)     Cerebral artery occlusion with cerebral infarction ([address]     CHF (congestive heart failure) ([address]     COPD (chronic obstructive pulmonary disease) ([address]     History of blood transfusion     Hx of blood clots     Hyperlipidemia     Hypertension     MI, old     Migraine     Pneumonia     PONV (postoperative nausea and vomiting)     Psychiatric problem       Past Surgical History:   Procedure Laterality Date    APPENDECTOMY      CARDIAC SURGERY  1998 in [facility]    pericardial window   [address] [facility]    ablation    COLONOSCOPY     [address] WITH STENT PLACEMENT  2013     [facility]    CORONARY ARTERY BYPASS GRAFT      ENDOSCOPY, COLON, DIAGNOSTIC      EYE SURGERY Bilateral 07/2020    cataract removal    HERNIA REPAIR      long ago Dr[facility]    HYSTERECTOMY      TONSILLECTOMY       Family History   Problem Relation Age of Onset    Diabetes Mother     Heart Disease Mother medications for this visit. Allergies   Allergen Reactions    Celebrex [Celecoxib] Anaphylaxis     Unable to breath Turns red all over    Contrast [Iodides] Hives    Statins      Will obtain lipid panel and re-evaluate. Takes lipitor at home    Naprosyn [Naproxen] Nausea And Vomiting    Shrimp Flavor Nausea And Vomiting     Health Maintenance   Topic Date Due    Hepatitis C screen  Never done    COVID-19 Vaccine (1) Never done    Depression Screen  Never done    DTaP/Tdap/Td vaccine (1 - Tdap) Never done    Colon cancer screen colonoscopy  Never done    Shingles Vaccine (1 of 2) Never done    DEXA (modify frequency per FRAX score)  Never done    Pneumococcal 65+ years Vaccine (1 of 1 - PPSV23) Never done    Lipid screen  06/29/2019    Annual Wellness Visit (AWV)  Never done    Breast cancer screen  07/01/2021    Flu vaccine (1) Never done    Potassium monitoring  08/31/2022    Creatinine monitoring  08/31/2022    Hepatitis A vaccine  Aged Out    Hepatitis B vaccine  Aged Out    Hib vaccine  Aged Out    Meningococcal (ACWY) vaccine  Aged Out       Subjective:  Review of Systems  General:   No fever, no chills, some fatigue or weight loss  Pulmonary:    some dyspnea, no wheezing  Cardiac:    Did have recent chest pain,   GI:     No nausea or vomiting, no abdominal pain  Neuro:     No dizziness or light headedness,   Musculoskeletal:  No recent active issues  Extremities:   No edema, no obvious claudication       Objective:  Physical Exam  /70   Pulse 76   Ht 5' 3\" (1.6 m)   Wt 144 lb (65.3 kg)   BMI 25.51 kg/m²   General:   Well developed, well nourished  Lungs:    Clear to auscultation  Heart:    Normal S1 S2, Slight murmur. no rubs, no gallops  Abdomen:   Soft, non tender, no organomegalies, positive bowel sounds  Extremities:   No edema, no cyanosis, good peripheral pulses  Neurological:   Awake, alert, oriented.  No obvious focal deficits  Musculoskelatal:  No obvious deformities    Assessment:      Diagnosis Orders   1. Precordial pain     2. Idiopathic hypotension     3. Coronary artery disease involving coronary bypass graft of native heart with angina pectoris ([address]     4. Pure hypercholesterolemia     as above  Higher risk patient   Very severe underlying CAD  ? redo CABG       Plan:  No follow-ups on file. Discussed  Will refer to [address] [place] [address] for opinion   Consider an echo   Hold lisinopril for now for low BP  Continue risk factor modification and medical management  Thank you for allowing me to participate in the care of your patient. Please don't hesitate to contact me regarding any further issues related to the patient care    Orders Placed:  No orders of the defined types were placed in this encounter. Medications Prescribed:  No orders of the defined types were placed in this encounter. Discussed use, benefit, and side effects of prescribed medications. All patient questions answered. Pt voicedunderstanding. Instructed to continue current medications, diet and exercise. Continue risk factor modification and medical management. Patient agreed with treatment plan. Follow up as directed.     Electronically signedby Hemant Henry MD on 1/6/2022 at 11:05 AM

## 2022-01-06 NOTE — LETTER
[facility] [address] [place] [address]  Phone: [phone]  Fax: [phone]    Troy Christianson MD        January 6, 2022     Patient: Celia Vasquez   YOB: 1950   Date of Visit: 1/6/2022       To Whom It May Concern: It is my medical opinion that Kyle Scales should remain out of work until 1-24-22. If you have any questions or concerns, please don't hesitate to call.     Sincerely,        Troy Christianson MD

## 2022-01-10 ENCOUNTER — HOSPITAL ENCOUNTER (OUTPATIENT)
Dept: CT IMAGING | Age: 72
Discharge: HOME | End: 2022-01-10
Payer: MEDICARE

## 2022-01-10 ENCOUNTER — HOSPITAL ENCOUNTER (OUTPATIENT)
Dept: PULMONOLOGY | Age: 72
Discharge: HOME | End: 2022-01-10
Payer: MEDICARE

## 2022-01-10 DIAGNOSIS — J18.9 PNEUMONIA OF RIGHT LOWER LOBE DUE TO INFECTIOUS ORGANISM: ICD-10-CM

## 2022-01-10 DIAGNOSIS — R93.89 ABNORMAL CHEST X-RAY: ICD-10-CM

## 2022-01-10 DIAGNOSIS — R06.09 DYSPNEA ON EXERTION: ICD-10-CM

## 2022-01-10 DIAGNOSIS — Z77.22 TOBACCO SMOKE EXPOSURE: ICD-10-CM

## 2022-01-10 DIAGNOSIS — J44.9 CHRONIC OBSTRUCTIVE PULMONARY DISEASE, UNSPECIFIED COPD TYPE (HCC): ICD-10-CM

## 2022-01-10 PROCEDURE — 71250 CT THORAX DX C-: CPT

## 2022-01-10 PROCEDURE — 94010 BREATHING CAPACITY TEST: CPT

## 2022-01-10 PROCEDURE — 94726 PLETHYSMOGRAPHY LUNG VOLUMES: CPT

## 2022-01-10 PROCEDURE — 94729 DIFFUSING CAPACITY: CPT

## 2022-01-11 ENCOUNTER — OFFICE VISIT (OUTPATIENT)
Dept: PULMONOLOGY | Age: 72
End: 2022-01-11
Payer: MEDICARE

## 2022-01-11 VITALS
BODY MASS INDEX: 25.48 KG/M2 | DIASTOLIC BLOOD PRESSURE: 64 MMHG | HEIGHT: 63 IN | SYSTOLIC BLOOD PRESSURE: 106 MMHG | TEMPERATURE: 96.4 F | WEIGHT: 143.8 LBS | HEART RATE: 92 BPM | OXYGEN SATURATION: 97 %

## 2022-01-11 DIAGNOSIS — J43.2 CENTRILOBULAR EMPHYSEMA (HCC): ICD-10-CM

## 2022-01-11 DIAGNOSIS — Z87.891 EX-SMOKER: ICD-10-CM

## 2022-01-11 DIAGNOSIS — J44.9 MODERATE COPD (CHRONIC OBSTRUCTIVE PULMONARY DISEASE) (HCC): Primary | ICD-10-CM

## 2022-01-11 PROCEDURE — 99214 OFFICE O/P EST MOD 30 MIN: CPT | Performed by: NURSE PRACTITIONER

## 2022-01-11 ASSESSMENT — ENCOUNTER SYMPTOMS
COUGH: 1
EYES NEGATIVE: 1
ABDOMINAL PAIN: 0
CHEST TIGHTNESS: 0
NAUSEA: 0
VOMITING: 0
WHEEZING: 0
SHORTNESS OF BREATH: 1
DIARRHEA: 0

## 2022-01-11 NOTE — PROGRESS NOTES
[facility]     Patient: He Ross, 66 y.o.   : 1950  2022    Pt of Dr. Ashley Rabago   Patient presents with    Follow-up     Dyspnea on exertion 5 month pulmonary follow up CT/PFT 1/10/22         CORBY Wills is here for 5 month follow up for COPD with CT and PFT   C/o dry cough and chest tightness x 2 months - has been on prednisone, atb, tessalon, codeine cough syrup- the cough has improved still some intermittent cough   SOB with exertion./ when gets hot   No current chest congestion . Hospitalized for COPD exac 2021- CXR showed RLL pneumonia 2021     diarrhea this AM    Had 2 COVID vaccines   Taking Tessalon PRN     Current Inhalers:  Incruse/ Albuterol -was using 2-3 x per day and nebulizer 2-3 x per day . Currently using Albuterol nebs 2x per day and Incruse once a day     Cardiology has her off work, trouble with hypotension. Awaiting consult at [address] [place] [address]   History of pericardial effusion status post pericardial window placement in the past.  -Coronary artery disease status post stent placement.   She is currently following with Dr. Kanchan Acosta MD    Not on home O2     SOCIAL HISTORY:  Social History     Tobacco Use    Smoking status: Former Smoker     Types: Cigarettes     Quit date: 3/3/1992     Years since quittin.8    Smokeless tobacco: Never Used   Vaping Use    Vaping Use: Never used   Substance Use Topics    Alcohol use: Yes     Comment: occasional    Drug use: No         CURRENT MEDICATIONS:  Current Outpatient Medications   Medication Sig Dispense Refill    ALBUTEROL IN Inhale into the lungs      tiotropium (SPIRIVA RESPIMAT) 2.5 MCG/ACT AERS inhaler Inhale 2 puffs into the lungs daily 3 each 3    benzonatate (TESSALON PERLES) 100 MG capsule Take 2 capsules by mouth 3 times daily as needed for Cough 90 capsule 5    isosorbide mononitrate (IMDUR) 30 MG extended release tablet Take 1 tablet by mouth daily 90 tablet 3    metoprolol tartrate (LOPRESSOR) 25 MG tablet Take 1 tablet by mouth 2 times daily 180 tablet 3    atorvastatin (LIPITOR) 80 MG tablet Take 1 tablet by mouth nightly 90 tablet 3    prasugrel (EFFIENT) 10 MG TABS Take 1 tablet by mouth daily 90 tablet 3    propafenone (RYTHMOL) 150 MG tablet Take 1 tablet by mouth 2 times daily 180 tablet 3    furosemide (LASIX) 20 MG tablet Take 1 tablet by mouth daily 90 tablet 3    ondansetron (ZOFRAN ODT) 4 MG disintegrating tablet Take 4 mg by mouth 4 times daily as needed for Nausea or Vomiting      aspirin 81 MG tablet Take 81 mg by mouth daily      nitroGLYCERIN (NITROSTAT) 0.4 MG SL tablet Place 1 tablet under the tongue every 5 minutes as needed for Chest pain. 25 tablet 0    lisinopril (PRINIVIL;ZESTRIL) 5 MG tablet Take 5 mg by mouth daily (Patient not taking: Reported on 1/6/2022)       No current facility-administered medications for this visit. Angelica ELLISON   Review of Systems   Constitutional: Negative for activity change, chills and fever. HENT: Negative. Eyes: Negative. Respiratory: Positive for cough and shortness of breath. Negative for chest tightness and wheezing. Cardiovascular: Negative for chest pain, palpitations and leg swelling. Gastrointestinal: Negative for abdominal pain, diarrhea, nausea and vomiting. Genitourinary: Negative. Musculoskeletal: Negative. Skin: Negative. Allergic/Immunologic: Positive for environmental allergies. Neurological: Negative. Low BP    Hematological: Negative. Psychiatric/Behavioral: Negative.          Physical exam   /64 (Site: Left Upper Arm, Position: Sitting)   Pulse 92   Temp 96.4 °F (35.8 °C)   Ht 5' 3\" (1.6 m)   Wt 143 lb 12.8 oz (65.2 kg)   SpO2 97% Comment: on ra  BMI 25.47 kg/m²      Wt Readings from Last 3 Encounters:   01/11/22 143 lb 12.8 oz (65.2 kg)   01/06/22 144 lb (65.3 kg)   08/31/21 130 lb (59 kg)     Physical Exam  Vitals and nursing note reviewed. Constitutional:       General: She is not in acute distress. Appearance: Normal appearance. She is well-developed. HENT:      Mouth/Throat:      Lips: Pink. Mouth: Mucous membranes are moist.      Pharynx: Oropharynx is clear. No oropharyngeal exudate or posterior oropharyngeal erythema. Eyes:      Conjunctiva/sclera: Conjunctivae normal.   Neck:      Vascular: No JVD. Cardiovascular:      Rate and Rhythm: Normal rate and regular rhythm. Heart sounds: No murmur heard. No friction rub. Pulmonary:      Effort: Pulmonary effort is normal. No accessory muscle usage or respiratory distress. Breath sounds: Normal breath sounds. No wheezing, rhonchi or rales. Chest:      Chest wall: No tenderness. Musculoskeletal:      Right lower leg: No edema. Left lower leg: No edema. Skin:     General: Skin is warm and dry. Capillary Refill: Capillary refill takes less than 2 seconds. Nails: There is no clubbing. Neurological:      Mental Status: She is alert and oriented to person, place, and time. Psychiatric:         Mood and Affect: Mood normal.         Behavior: Behavior normal.         Thought Content: Thought content normal.         Judgment: Judgment normal.          Test results   Lung Nodule Screening     [] Qualifies    [x]Does not qualify   [] Declined    [] Completed     CT chest wo contrast 1/10/2022  COMPARISON: 2/18/2019           FINDINGS: Visualized thyroid gland appears normal.   There is no mediastinal or hilar adenopathy. Prior CABG. Heart size is normal.   Centrilobular emphysematous changes upper lungs. No infiltrates or pleural effusions. Plate atelectasis right base. No lung masses identified. Upper abdomen       Distended gallbladder with gallstones. Peripelvic cyst right kidney.       Bones   No suspicious bone lesions.               Impression   Centrilobular emphysematous changes.  No acute cardiopulmonary disease.               **This report has been created using voice recognition software.  It may contain minor errors which are inherent in voice recognition technology. **       Final report electronically signed by Dr. Jb Melgar on 1/10/2022 1:52 PM         Full PFT 1//10//2022        Assessment      Diagnosis Orders   1. Moderate COPD (chronic obstructive pulmonary disease) ([facility]     2. Centrilobular emphysema ([facility]     3. Ex-smoker       Cough mostly likely due to poorly controlled COPD vs. Allergic component to Dry powder inhaler   Plan    Stop Incruse   Start Spriva Respimat 2.5 mcg , sent with one sample, instructed on proper assembly and proper use with demo inhaler . Medication [facility]ed [facility]   With current cardiac issues, will hold on LABA component  Ok to continue PRN albuterol   CT results discussed, no acute findings   PFT results discussed.      Total time spent on encounter was 30 minutes    Will see Connie Larios in: 3 months    Electronically signed by PEDRO LUIS Navarro CNP on 1/11/2022 at 9:11 AM

## 2022-01-11 NOTE — PATIENT INSTRUCTIONS
Stop Incruse, start new inhaler: Spiriva Respimat- turn and click to activate and take in 2 inhalations back to back once a day .       Let my office know if this is not affordable

## 2022-01-13 ENCOUNTER — TELEPHONE (OUTPATIENT)
Dept: CARDIOLOGY CLINIC | Age: 72
End: 2022-01-13

## 2022-01-13 NOTE — TELEPHONE ENCOUNTER
Called to rs collint in [facility]     Pt states her BP is still running low   Today it was 78/66-has no energy   Please advise

## 2022-01-14 ENCOUNTER — HOSPITAL ENCOUNTER (OUTPATIENT)
Dept: PULMONOLOGY | Age: 72
End: 2022-01-14
Payer: MEDICARE

## 2022-01-27 ENCOUNTER — TELEPHONE (OUTPATIENT)
Dept: CARDIOLOGY CLINIC | Age: 72
End: 2022-01-27

## 2022-01-27 DIAGNOSIS — I25.10 CORONARY ARTERY DISEASE INVOLVING NATIVE CORONARY ARTERY OF NATIVE HEART WITHOUT ANGINA PECTORIS: Primary | ICD-10-CM

## 2022-01-27 NOTE — LETTER
[facility]  [place] [address] [place] [address]  Phone: [phone]  Fax: [phone]    Jazmin Pyle MD        January 27, 2022    [address] [place] [address] [address]      To Whom it May Concern: It is in my medical opinion, from a cardiac standpoint, that [place] [employer] may return to work with a no lifting restriction until further notice. If you have any questions or concerns, please don't hesitate to call.     Sincerely,        Jazmin Pyle MD

## 2022-01-27 NOTE — TELEPHONE ENCOUNTER
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 Ok to write a letter with lifting restrictions?   Fax [phone] attn Rigoberto Kaba

## 2022-02-01 NOTE — TELEPHONE ENCOUNTER
REFERRAL GIVEN TO [address] [place] [address] FOR DR Darío Navas. WE REFERRED HER TO DR aJs Mcpherson AND SHE WAS NOT SCHEDULED WITH HIM. SHE NOW HAS AN APPOINTMENT WITH DR Delia Main.

## 2022-02-02 ENCOUNTER — TELEPHONE (OUTPATIENT)
Dept: PULMONOLOGY | Age: 72
End: 2022-02-02

## 2022-02-02 DIAGNOSIS — J44.1 COPD EXACERBATION (HCC): Primary | ICD-10-CM

## 2022-02-02 RX ORDER — DOXYCYCLINE HYCLATE 100 MG/1
100 CAPSULE ORAL 2 TIMES DAILY
Qty: 14 CAPSULE | Refills: 0 | Status: SHIPPED | OUTPATIENT
Start: 2022-02-02 | End: 2022-02-09

## 2022-02-02 RX ORDER — PREDNISONE 10 MG/1
40 TABLET ORAL DAILY
Qty: 20 TABLET | Refills: 0 | Status: SHIPPED | OUTPATIENT
Start: 2022-02-02 | End: 2022-02-07

## 2022-02-02 NOTE — TELEPHONE ENCOUNTER
Please advise Moon Hammer is out, Patient last seen Loretta in office on 1/11/2022. Brock Platt is a patient of Dr. Tomer Sena as well. Patient last seen you on 8/2/2021. Patient called in stating that her cough has got a lot worse within the last few days. Patient is on Spriva inhaler, she took it 3 times yesterday and it would help for a little bit but then back to coughing. Patient does have breathing machine that she has been doing 2 times daily. Patient denies taking albuterol inhaler. Loretta advised patient to stop incruse which patient confirms she stopped the incruse. Patient states she is coughing up phlegm only sometimes and when she does it is green/gray color. Patient states she has been coughing for so long it is deep down in her chest and is causing her to vomit. Patient is also taking Tessalon 3 times daily. What would you like to do?

## 2022-02-02 NOTE — TELEPHONE ENCOUNTER
I sent E- script/s for the following drug/s to her local/ requested pharmacy. Patient to  her medications and start using. If her condition gets worse, she need to come to ER or call our office for further evaluation.    - Doxycycline 100mg po bid for 7days . No refills.  -Prednisone 40mg po daily for 5days with No refills. She also need to continue her inhalers as prescribed as follows:  Spiriva Respimat 2 INH once daily in am.   Albuterol HFA 90mcg/Spray MDI, 2puffs  Q6Hprn for shortness of breath.     Please inform patient regarding above plan

## 2022-02-10 ENCOUNTER — TELEPHONE (OUTPATIENT)
Dept: CARDIOLOGY CLINIC | Age: 72
End: 2022-02-10

## 2022-03-14 ENCOUNTER — HOSPITAL ENCOUNTER (OUTPATIENT)
Age: 72
Discharge: HOME | End: 2022-03-14
Payer: MEDICARE

## 2022-03-14 LAB
ANION GAP SERPL CALCULATED.3IONS-SCNC: 10 MEQ/L (ref 8–16)
ATYPICAL LYMPHOCYTES: ABNORMAL %
BASOPHILS # BLD: 0.2 %
BASOPHILS ABSOLUTE: 0 THOU/MM3 (ref 0–0.1)
BUN BLDV-MCNC: 17 MG/DL (ref 7–22)
CHLORIDE BLD-SCNC: 105 MEQ/L (ref 98–111)
CO2: 28 MEQ/L (ref 23–33)
CREAT SERPL-MCNC: 1.1 MG/DL (ref 0.4–1.2)
EOSINOPHIL # BLD: 5.6 %
EOSINOPHILS ABSOLUTE: 0.4 THOU/MM3 (ref 0–0.4)
ERYTHROCYTE [DISTWIDTH] IN BLOOD BY AUTOMATED COUNT: 14.3 % (ref 11.5–14.5)
ERYTHROCYTE [DISTWIDTH] IN BLOOD BY AUTOMATED COUNT: 48.9 FL (ref 35–45)
GFR SERPL CREATININE-BSD FRML MDRD: 49 ML/MIN/1.73M2
HCT VFR BLD CALC: 35 % (ref 37–47)
HEMOGLOBIN: 11.1 GM/DL (ref 12–16)
IMMATURE GRANS (ABS): 0.02 THOU/MM3 (ref 0–0.07)
IMMATURE GRANULOCYTES: 0.3 %
LYMPHOCYTES # BLD: 42.3 %
LYMPHOCYTES ABSOLUTE: 2.7 THOU/MM3 (ref 1–4.8)
MCH RBC QN AUTO: 29.8 PG (ref 26–33)
MCHC RBC AUTO-ENTMCNC: 31.7 GM/DL (ref 32.2–35.5)
MCV RBC AUTO: 93.8 FL (ref 81–99)
MONOCYTES # BLD: 11.6 %
MONOCYTES ABSOLUTE: 0.7 THOU/MM3 (ref 0.4–1.3)
NUCLEATED RED BLOOD CELLS: 0 /100 WBC
PLATELET # BLD: 189 THOU/MM3 (ref 130–400)
PLATELET ESTIMATE: ADEQUATE
PMV BLD AUTO: 9.3 FL (ref 9.4–12.4)
POTASSIUM SERPL-SCNC: 4 MEQ/L (ref 3.5–5.2)
RBC # BLD: 3.73 MILL/MM3 (ref 4.2–5.4)
SCAN OF BLOOD SMEAR: NORMAL
SEG NEUTROPHILS: 40 %
SEGMENTED NEUTROPHILS ABSOLUTE COUNT: 2.6 THOU/MM3 (ref 1.8–7.7)
SODIUM BLD-SCNC: 143 MEQ/L (ref 135–145)
WBC # BLD: 6.4 THOU/MM3 (ref 4.8–10.8)

## 2022-03-14 PROCEDURE — 82565 ASSAY OF CREATININE: CPT

## 2022-03-14 PROCEDURE — 84520 ASSAY OF UREA NITROGEN: CPT

## 2022-03-14 PROCEDURE — 36415 COLL VENOUS BLD VENIPUNCTURE: CPT

## 2022-03-14 PROCEDURE — 85025 COMPLETE CBC W/AUTO DIFF WBC: CPT

## 2022-03-14 PROCEDURE — 80051 ELECTROLYTE PANEL: CPT

## 2022-04-11 ENCOUNTER — OFFICE VISIT (OUTPATIENT)
Dept: PULMONOLOGY | Age: 72
End: 2022-04-11
Payer: MEDICARE

## 2022-04-11 VITALS
WEIGHT: 148.6 LBS | OXYGEN SATURATION: 95 % | DIASTOLIC BLOOD PRESSURE: 66 MMHG | SYSTOLIC BLOOD PRESSURE: 98 MMHG | TEMPERATURE: 97.8 F | HEART RATE: 101 BPM | BODY MASS INDEX: 26.33 KG/M2 | HEIGHT: 63 IN

## 2022-04-11 DIAGNOSIS — J43.2 CENTRILOBULAR EMPHYSEMA (HCC): ICD-10-CM

## 2022-04-11 DIAGNOSIS — J44.9 CHRONIC OBSTRUCTIVE PULMONARY DISEASE, UNSPECIFIED COPD TYPE (HCC): ICD-10-CM

## 2022-04-11 DIAGNOSIS — Z87.891 EX-SMOKER: ICD-10-CM

## 2022-04-11 DIAGNOSIS — R05.9 COUGH IN ADULT: ICD-10-CM

## 2022-04-11 DIAGNOSIS — J20.9 ACUTE BRONCHITIS, UNSPECIFIED ORGANISM: Primary | ICD-10-CM

## 2022-04-11 PROBLEM — R53.83 FATIGUE: Status: ACTIVE | Noted: 2022-02-09

## 2022-04-11 PROBLEM — R09.89 OTHER SPECIFIED SYMPTOMS AND SIGNS INVOLVING THE CIRCULATORY AND RESPIRATORY SYSTEMS: Status: ACTIVE | Noted: 2022-02-09

## 2022-04-11 PROCEDURE — 99214 OFFICE O/P EST MOD 30 MIN: CPT | Performed by: NURSE PRACTITIONER

## 2022-04-11 RX ORDER — BUDESONIDE AND FORMOTEROL FUMARATE DIHYDRATE 160; 4.5 UG/1; UG/1
2 AEROSOL RESPIRATORY (INHALATION) 2 TIMES DAILY
Qty: 30.6 G | Refills: 1 | Status: SHIPPED | OUTPATIENT
Start: 2022-04-11

## 2022-04-11 RX ORDER — PROMETHAZINE HYDROCHLORIDE AND CODEINE PHOSPHATE 6.25; 1 MG/5ML; MG/5ML
5 SYRUP ORAL NIGHTLY PRN
Qty: 35 ML | Refills: 0 | Status: SHIPPED | OUTPATIENT
Start: 2022-04-11 | End: 2022-04-18

## 2022-04-11 RX ORDER — AZITHROMYCIN 250 MG/1
250 TABLET, FILM COATED ORAL SEE ADMIN INSTRUCTIONS
Qty: 6 TABLET | Refills: 0 | Status: SHIPPED | OUTPATIENT
Start: 2022-04-11 | End: 2022-04-16

## 2022-04-11 RX ORDER — BENZONATATE 100 MG/1
200 CAPSULE ORAL 3 TIMES DAILY PRN
Qty: 90 CAPSULE | Refills: 5 | Status: SHIPPED | OUTPATIENT
Start: 2022-04-11 | End: 2022-10-08

## 2022-04-11 RX ORDER — INHALER, ASSIST DEVICES
1 SPACER (EA) MISCELLANEOUS 2 TIMES DAILY
Qty: 1 EACH | Refills: 0 | Status: SHIPPED | OUTPATIENT
Start: 2022-04-11

## 2022-04-11 ASSESSMENT — ENCOUNTER SYMPTOMS
EYES NEGATIVE: 1
WHEEZING: 1
COUGH: 1
DIARRHEA: 0
ABDOMINAL PAIN: 0
NAUSEA: 0
VOMITING: 0
SHORTNESS OF BREATH: 1
CHEST TIGHTNESS: 1

## 2022-04-11 NOTE — PROGRESS NOTES
[facility]     Patient: Luan Mckeon, 67 y.o.   : 1950  2022    Pt of Dr. Mariam Diaz     Chief Complaint   Patient presents with    Follow-up     COPD 3 month pulmonary follow up. Medication check         HPI  Yrn Mejia is here for 3 months follow up for Medication Follow Up   Stopped Incruse and started Spiriva 2.5 mcg - no change in symptoms. Was hoping the respimat device would get into the lungs better than DPI to help symptoms   Coughing \" bad\" , started wheezing last night   Gets flare ups with weather change gadiel when wet and cold   Hospitalized for COPD exac 2021- CXR showed RLL pneumonia 2021   Had heart cath w/ stents at [facility] 3/18/22, pre op CTA showed \"3 nodules in my lungs\"   CT 2022 no nodules seen ?  If these new ones are inflammatory vs. Infectious   Current infectious symptoms with chest pain/ cough/ wheezing/ fatigue   Taking Tessalon for the cough, mildly helpful, not helping at night     Last spirometry: 1/10/2022, mod obstruction , mild air trapping, normal diffusion   Is not on home oxygen        Internal Administration   First Dose COVID-19, Moderna, Primary or Immunocompromised, PF, 100mcg/0.5mL  2021   Second Dose COVID-19, Moderna, Primary or Immunocompromised, PF, 100mcg/0.5mL   2021       Last COVID Lab No results found for: SARS-COV-2, SARS-COV-2 RNA, SARS-COV-2, SARS-COV-2, SARS-COV-2 BY PCR, SARS-COV-2, SARS-COV-2, SARS-COV-2            SOCIAL HISTORY:  Social History     Tobacco Use    Smoking status: Former Smoker     Types: Cigarettes     Quit date: 3/3/1992     Years since quittin.1    Smokeless tobacco: Never Used   Vaping Use    Vaping Use: Never used   Substance Use Topics    Alcohol use: Yes     Comment: occasional    Drug use: No         CURRENT MEDICATIONS:  Current Outpatient Medications   Medication Sig Dispense Refill    azithromycin (ZITHROMAX) 250 MG tablet Take 1 tablet by mouth See Admin Instructions for 5 days 500mg on day 1 followed by 250mg on days 2 - 5 6 tablet 0    promethazine-codeine (PHENERGAN WITH CODEINE) 6.25-10 MG/5ML syrup Take 5 mLs by mouth nightly as needed for Cough for up to 7 days. 35 mL 0    budesonide-formoterol (SYMBICORT) 160-4.5 MCG/ACT AERO Inhale 2 puffs into the lungs 2 times daily Rinse mouth after use and use with spacer 30.6 g 1    Spacer/Aero-Holding Chambers (AEROCHAMBER MV) MISC 1 each by Does not apply route in the morning and at bedtime With Symbicort inhaler 1 each 0    benzonatate (TESSALON PERLES) 100 MG capsule Take 2 capsules by mouth 3 times daily as needed for Cough 90 capsule 5    ALBUTEROL IN Inhale into the lungs      tiotropium (SPIRIVA RESPIMAT) 2.5 MCG/ACT AERS inhaler Inhale 2 puffs into the lungs daily 3 each 3    isosorbide mononitrate (IMDUR) 30 MG extended release tablet Take 1 tablet by mouth daily 90 tablet 3    metoprolol tartrate (LOPRESSOR) 25 MG tablet Take 1 tablet by mouth 2 times daily 180 tablet 3    atorvastatin (LIPITOR) 80 MG tablet Take 1 tablet by mouth nightly 90 tablet 3    prasugrel (EFFIENT) 10 MG TABS Take 1 tablet by mouth daily 90 tablet 3    propafenone (RYTHMOL) 150 MG tablet Take 1 tablet by mouth 2 times daily 180 tablet 3    furosemide (LASIX) 20 MG tablet Take 1 tablet by mouth daily 90 tablet 3    ondansetron (ZOFRAN ODT) 4 MG disintegrating tablet Take 4 mg by mouth 4 times daily as needed for Nausea or Vomiting      aspirin 81 MG tablet Take 81 mg by mouth daily      nitroGLYCERIN (NITROSTAT) 0.4 MG SL tablet Place 1 tablet under the tongue every 5 minutes as needed for Chest pain. 25 tablet 0     No current facility-administered medications for this visit. Eusebio ELLISON   Review of Systems   Constitutional: Positive for fatigue. Negative for activity change, appetite change, chills, fever and unexpected weight change. HENT: Negative. Eyes: Negative.     Respiratory: Positive for cough, chest tightness, shortness of breath and wheezing. Cardiovascular: Negative for chest pain, palpitations and leg swelling. Gastrointestinal: Negative for abdominal pain, diarrhea, nausea and vomiting. Genitourinary: Negative. Musculoskeletal: Negative. Skin: Negative. Allergic/Immunologic: Positive for environmental allergies. Neurological: Negative. Hematological: Negative. Psychiatric/Behavioral: Positive for sleep disturbance (due to cough ). The patient is nervous/anxious. Physical exam   BP 98/66 (Site: Left Upper Arm, Position: Sitting)   Pulse 101   Temp 97.8 °F (36.6 °C)   Ht 5' 3\" (1.6 m)   Wt 148 lb 9.6 oz (67.4 kg)   SpO2 95% Comment: on RA  BMI 26.32 kg/m²      Wt Readings from Last 3 Encounters:   04/11/22 148 lb 9.6 oz (67.4 kg)   01/11/22 143 lb 12.8 oz (65.2 kg)   01/06/22 144 lb (65.3 kg)     Physical Exam  Vitals and nursing note reviewed. Constitutional:       General: She is not in acute distress. Appearance: She is well-developed and overweight. HENT:      Mouth/Throat:      Lips: Pink. Mouth: Mucous membranes are moist.      Pharynx: Oropharynx is clear. No oropharyngeal exudate or posterior oropharyngeal erythema. Eyes:      Conjunctiva/sclera: Conjunctivae normal.   Neck:      Vascular: No JVD. Cardiovascular:      Rate and Rhythm: Normal rate and regular rhythm. Heart sounds: No murmur heard. No friction rub. Pulmonary:      Effort: Pulmonary effort is normal. No tachypnea, accessory muscle usage or respiratory distress. Breath sounds: Transmitted upper airway sounds present. Examination of the right-upper field reveals wheezing. Examination of the left-upper field reveals wheezing. Examination of the right-middle field reveals wheezing. Examination of the left-middle field reveals wheezing. Examination of the right-lower field reveals wheezing. Examination of the left-lower field reveals wheezing. Wheezing present. No rhonchi or rales. Comments: Course coughing through exam   Chest:      Chest wall: No tenderness. Musculoskeletal:      Right lower leg: No edema. Left lower leg: No edema. Skin:     General: Skin is warm and dry. Capillary Refill: Capillary refill takes less than 2 seconds. Nails: There is no clubbing. Neurological:      Mental Status: She is alert and oriented to person, place, and time. Psychiatric:         Mood and Affect: Mood normal.         Behavior: Behavior normal.         Thought Content: Thought content normal.         Judgment: Judgment normal.          Test results   Lung Nodule Screening     [] Qualifies    [x]Does not qualify   [] Declined    [] Completed     CTA chest 2/16/2022 - reviewed on care everywhere   FINDINGS:     Chest Wall: Evidence of prior anterior median sternotomy   Mediastinum: Normal, without adenopathy. There is mild circumferential   esophageal wall thickening. Small hiatal hernia. Veda: Calcified granulomatous nodes   Pleural Spaces: Normal, without thickening/effusion or pneumothorax   Lung Parenchyma: There are few scattered  pulmonary nodules. No consolidation   or pleural effusion. Pericardium: Normal, without thickening/effusion    Assessment      Diagnosis Orders   1. Acute bronchitis, unspecified organism  azithromycin (ZITHROMAX) 250 MG tablet    promethazine-codeine (PHENERGAN WITH CODEINE) 6.25-10 MG/5ML syrup   2. Chronic obstructive pulmonary disease, unspecified COPD type (HCC)  benzonatate (TESSALON PERLES) 100 MG capsule   3. Cough in adult  benzonatate (TESSALON PERLES) 100 MG capsule   4. Centrilobular emphysema ([address]     5.  Ex-smoker            Plan    -prednisone 50 mg PO daily x 5 days - pt reports she has a lot of prednisone at home from [facility] - instructed to take above dose, call office if you do not have enough   -Zpack   -Phenergan w/ codeine for up to 7 days to take at night only   -PRN tessalon during the day for cough -continue Spiriva 2.5 mcg respimat  -start Symbicort 160/4.5 mcg 2 puffs BID with spacer, new script escribed  -patient to call [facility] and ask for images of CT chest be burnt to disc and mailed to our office. There was no mention on size or location of the nodules, we need imaging to review to determine if any further imaging is recommended.    Pt verbalized understanding and agreed to plan as above     Will see Orin Johnson in: 1 month  billing based on time: total time on encounter 30 min   Electronically signed by PEDRO LUIS Weldon - CNP on 4/11/2022 at 4:06 PM

## 2022-04-11 NOTE — PATIENT INSTRUCTIONS
TAKE 50 MG PREDNISONE JUST ONCE A DAY , WITH FOOD.  PREFERABLY IN THE MORNING , TAKE FOR 5 DAYS,   CALL MY OFFICE IF YOU DO NOT HAVE ENOUGH     TAKE ZPACK AS DIRECTED    SENT SCRIPT FOR  A NEW INHALER CALLED SYMBICORT USE THIS WITH SPIRIVA     CALL [facility] AND ASK THEM TO MAIL A COPY OF THE CT IMAGES OF YOUR CHEST TO OUR OFFICE TO REVIEW

## 2022-04-13 ENCOUNTER — TELEPHONE (OUTPATIENT)
Dept: PULMONOLOGY | Age: 72
End: 2022-04-13

## 2022-04-13 DIAGNOSIS — J20.9 ACUTE BRONCHITIS, UNSPECIFIED ORGANISM: Primary | ICD-10-CM

## 2022-04-13 NOTE — TELEPHONE ENCOUNTER
Patient called in stating her cough medicine she barely got 2 oz out of her medicine bottle and it costed her 14.00 and she would like for you to prescribe her more because she has bronchitis.

## 2022-04-14 RX ORDER — PROMETHAZINE HYDROCHLORIDE AND CODEINE PHOSPHATE 6.25; 1 MG/5ML; MG/5ML
5 SYRUP ORAL 4 TIMES DAILY PRN
Qty: 120 ML | Refills: 0 | Status: SHIPPED | OUTPATIENT
Start: 2022-04-14 | End: 2022-04-22

## 2022-04-14 NOTE — TELEPHONE ENCOUNTER
Ok, I only sent med in for night time use.   Sent new rx so she can take PRN during the day for the cough as well as at night

## 2022-04-19 ENCOUNTER — OFFICE VISIT (OUTPATIENT)
Dept: CARDIOLOGY CLINIC | Age: 72
End: 2022-04-19
Payer: MEDICARE

## 2022-04-19 ENCOUNTER — HOSPITAL ENCOUNTER (OUTPATIENT)
Dept: MAMMOGRAPHY | Age: 72
Discharge: HOME | End: 2022-04-19
Payer: MEDICARE

## 2022-04-19 VITALS
WEIGHT: 150 LBS | HEART RATE: 69 BPM | HEIGHT: 63 IN | BODY MASS INDEX: 26.58 KG/M2 | SYSTOLIC BLOOD PRESSURE: 124 MMHG | DIASTOLIC BLOOD PRESSURE: 69 MMHG

## 2022-04-19 DIAGNOSIS — I25.10 CORONARY ARTERY DISEASE INVOLVING NATIVE CORONARY ARTERY OF NATIVE HEART WITHOUT ANGINA PECTORIS: Primary | ICD-10-CM

## 2022-04-19 DIAGNOSIS — Z12.31 VISIT FOR SCREENING MAMMOGRAM: ICD-10-CM

## 2022-04-19 DIAGNOSIS — I10 PRIMARY HYPERTENSION: ICD-10-CM

## 2022-04-19 PROCEDURE — 99213 OFFICE O/P EST LOW 20 MIN: CPT | Performed by: NUCLEAR MEDICINE

## 2022-04-19 PROCEDURE — 77063 BREAST TOMOSYNTHESIS BI: CPT

## 2022-04-19 NOTE — PROGRESS NOTES
[facility]  [address] [address]  Dept: [phone]  Dept Fax: [phone]  Loc: [phone]    Visit Date: 4/19/2022    Ihsan Moreno is a 67 y.o. female who presents todayfor:  Chief Complaint   Patient presents with    Check-Up    Coronary Artery Disease    Hypertension   ended up with more stents at [facility]   Previous CABG  Still feeling fatigued  More tired  Dyspnea  No chest pain   Renal patient   Bp is stable   To low   Some dizziness  No syncope        HPI:  HPI  Past Medical History:   Diagnosis Date    Asthma     Atrial fibrillation ([address]     Blood clot associated with vein wall inflammation     CAD (coronary artery disease)     Cerebral artery occlusion with cerebral infarction ([address]     CHF (congestive heart failure) ([address]     COPD (chronic obstructive pulmonary disease) ([address]     History of blood transfusion     Hx of blood clots     Hyperlipidemia     Hypertension     MI, old     Migraine     Pneumonia     PONV (postoperative nausea and vomiting)     Psychiatric problem       Past Surgical History:   Procedure Laterality Date    APPENDECTOMY      CARDIAC SURGERY  1998 in [facility]    pericardial window   [place] WITH STENT PLACEMENT  2013     [facility]    CORONARY ARTERY BYPASS GRAFT      ENDOSCOPY, COLON, DIAGNOSTIC      EYE SURGERY Bilateral 07/2020    cataract removal    HERNIA REPAIR      long ago Dr[facility] Johanna Angelo [facility]       Family History   Problem Relation Age of Onset    Diabetes Mother     Heart Disease Mother     High Blood Pressure Mother     Cancer Sister     Heart Disease Father     Cancer Brother     Heart Disease Maternal Aunt         MI    Heart Disease Maternal Uncle         MI    Heart Disease Maternal Grandmother         MI    Heart Disease Maternal Grandfather MI    Stroke Paternal Grandfather     Cancer Sister     Heart Disease Sister         atrial fib    Cancer Brother     Heart Disease Brother         needs stents and has atrial fib    Diabetes Brother     Stroke Sister      Social History     Tobacco Use    Smoking status: Former Smoker     Types: Cigarettes     Quit date: 3/3/1992     Years since quittin.1    Smokeless tobacco: Never Used   Substance Use Topics    Alcohol use: Yes     Comment: occasional      Current Outpatient Medications   Medication Sig Dispense Refill    promethazine-codeine (PHENERGAN WITH CODEINE) 6.25-10 MG/5ML syrup Take 5 mLs by mouth 4 times daily as needed for Cough for up to 8 days.  120 mL 0    budesonide-formoterol (SYMBICORT) 160-4.5 MCG/ACT AERO Inhale 2 puffs into the lungs 2 times daily Rinse mouth after use and use with spacer 30.6 g 1    Spacer/Aero-Holding Chambers (AEROCHAMBER MV) MISC 1 each by Does not apply route in the morning and at bedtime With Symbicort inhaler 1 each 0    benzonatate (TESSALON PERLES) 100 MG capsule Take 2 capsules by mouth 3 times daily as needed for Cough 90 capsule 5    ALBUTEROL IN Inhale into the lungs      tiotropium (SPIRIVA RESPIMAT) 2.5 MCG/ACT AERS inhaler Inhale 2 puffs into the lungs daily 3 each 3    isosorbide mononitrate (IMDUR) 30 MG extended release tablet Take 1 tablet by mouth daily 90 tablet 3    metoprolol tartrate (LOPRESSOR) 25 MG tablet Take 1 tablet by mouth 2 times daily 180 tablet 3    atorvastatin (LIPITOR) 80 MG tablet Take 1 tablet by mouth nightly 90 tablet 3    prasugrel (EFFIENT) 10 MG TABS Take 1 tablet by mouth daily 90 tablet 3    propafenone (RYTHMOL) 150 MG tablet Take 1 tablet by mouth 2 times daily 180 tablet 3    furosemide (LASIX) 20 MG tablet Take 1 tablet by mouth daily 90 tablet 3    ondansetron (ZOFRAN ODT) 4 MG disintegrating tablet Take 4 mg by mouth 4 times daily as needed for Nausea or Vomiting      aspirin 81 MG tablet Take 81 mg by mouth daily      nitroGLYCERIN (NITROSTAT) 0.4 MG SL tablet Place 1 tablet under the tongue every 5 minutes as needed for Chest pain. 25 tablet 0     No current facility-administered medications for this visit. Allergies   Allergen Reactions    Celebrex [Celecoxib] Anaphylaxis     Unable to breath Turns red all over    Contrast [Iodides] Hives    Statins      Will obtain lipid panel and re-evaluate. Takes lipitor at home    Naprosyn [Naproxen] Nausea And Vomiting    Shrimp Flavor Nausea And Vomiting     Health Maintenance   Topic Date Due    Hepatitis C screen  Never done    Pneumococcal 65+ years Vaccine (1 - PCV) Never done    Depression Screen  Never done    DTaP/Tdap/Td vaccine (1 - Tdap) Never done    Colorectal Cancer Screen  Never done    Shingles Vaccine (1 of 2) Never done    DEXA (modify frequency per FRAX score)  Never done    Lipid screen  06/29/2019    Annual Wellness Visit (AWV)  Never done    Breast cancer screen  07/01/2021    COVID-19 Vaccine (3 - Booster for Moderna series) 08/26/2021    Flu vaccine (Season Ended) 09/01/2022    Potassium monitoring  03/14/2023    Creatinine monitoring  03/14/2023    Hepatitis A vaccine  Aged Out    Hepatitis B vaccine  Aged Out    Hib vaccine  Aged Out    Meningococcal (ACWY) vaccine  Aged Out       Subjective:  Review of Systems  General:   No fever, no chills, No fatigue or weight loss  Pulmonary:    No dyspnea, no wheezing  Cardiac:    Denies recent chest pain,   GI:     No nausea or vomiting, no abdominal pain  Neuro:    No dizziness or light headedness,   Musculoskeletal:  No recent active issues  Extremities:   No edema, no obvious claudication       Objective:  Physical Exam  /69   Pulse 69   Ht 5' 3\" (1.6 m)   Wt 150 lb (68 kg)   BMI 26.57 kg/m²   General:   Well developed, well nourished  Lungs:   Clear to auscultation  Heart:    Normal S1 S2, Slight murmur.  no rubs, no gallops  Abdomen:   Soft, non tender, no organomegalies, positive bowel sounds  Extremities:   No edema, no cyanosis, good peripheral pulses  Neurological:   Awake, alert, oriented. No obvious focal deficits  Musculoskelatal:  No obvious deformities    Assessment:      Diagnosis Orders   1. Coronary artery disease involving native coronary artery of native heart without angina pectoris     2. Primary hypertension     as above  Poor cardiac condition   Lower BP     Plan:  No follow-ups on file. Consider lower dose metoprolol  Continue risk factor modification and medical management  Rehab   Thank you for allowing me to participate in the care of your patient. Please don't hesitate to contact me regarding any further issues related to the patient care    Orders Placed:  No orders of the defined types were placed in this encounter. Medications Prescribed:  No orders of the defined types were placed in this encounter. Discussed use, benefit, and side effects of prescribed medications. All patient questions answered. Pt voicedunderstanding. Instructed to continue current medications, diet and exercise. Continue risk factor modification and medical management. Patient agreed with treatment plan. Follow up as directed.     Electronically signedby Devin Alanis MD on 4/19/2022 at 8:32 AM

## 2022-05-04 ENCOUNTER — OFFICE VISIT (OUTPATIENT)
Dept: CARDIOLOGY CLINIC | Age: 72
End: 2022-05-04
Payer: MEDICARE

## 2022-05-04 VITALS
HEART RATE: 68 BPM | BODY MASS INDEX: 26.36 KG/M2 | SYSTOLIC BLOOD PRESSURE: 128 MMHG | WEIGHT: 148.8 LBS | DIASTOLIC BLOOD PRESSURE: 68 MMHG | HEIGHT: 63 IN

## 2022-05-04 DIAGNOSIS — I48.0 PAROXYSMAL ATRIAL FIBRILLATION (HCC): Primary | ICD-10-CM

## 2022-05-04 DIAGNOSIS — R07.9 CHEST PAIN IN ADULT: ICD-10-CM

## 2022-05-04 DIAGNOSIS — I25.10 CORONARY ARTERY DISEASE INVOLVING NATIVE CORONARY ARTERY OF NATIVE HEART WITHOUT ANGINA PECTORIS: ICD-10-CM

## 2022-05-04 PROCEDURE — 99214 OFFICE O/P EST MOD 30 MIN: CPT | Performed by: STUDENT IN AN ORGANIZED HEALTH CARE EDUCATION/TRAINING PROGRAM

## 2022-05-04 NOTE — LETTER
[address] [facility]  [facility].  [address] [place] [id number]  Phone: [phone]  Fax: [phone]           Pily Lozada PA-C      May 4, 2022     To Whom it May Concern    Sohan Cook was seen by Pily Lozada PA-C on 5/4/22. She is to remain off work through the end of this week and can return to work, from a cardiac standpoint, on Monday 5/9/22 without restrictions.     Sincerely,        Pily Lozada PA-C

## 2022-05-04 NOTE — PATIENT INSTRUCTIONS
Check your BP every day. If your BP is above 110/65, you can additional 30 mg dose of isosorbide (imdur).

## 2022-05-04 NOTE — PROGRESS NOTES
[facility] PROFESSIONAL SERVICES  [facility]   [address]   [address] [address]   Dept: [phone]   Dept Fax: [phone]   Loc: [phone]      Chief Complaint   Patient presents with    Follow-up     Cardiologist:  Dr. Art Rich  66 yo female presents for ED f/u. Seen in [place] for CP, was out of town for a . Hx of CAD s/p prior cabg and Pci, mostly recently [facility] at [facility]. Severe CAD, has Left Circ , likely with collaterals. Had a  for her brother, woke up next day with severe chest pain. Took 3 nitros without relief. Squad came, life flight to [facility] no heart attack, no heart cath. Admitted, no other cause of symptoms. Her other brother is currently in ICU in [place]. She has been under an immense amount of stress recently. Had chest pain last night. Took 2 nitros, chest pain resolved. No other symptoms since being released from the hospital on .      General:   No fever, no chills, no weight loss, no fatigue  Pulmonary:    No dyspnea, no wheezing  Cardiac:    + recent chest pain   GI:     No nausea or vomiting, no abdominal pain  Neuro:     No dizziness or light headedness  Musculoskeletal:  No recent active issues  Extremities:   No edema      Past Medical History:   Diagnosis Date    Asthma     Atrial fibrillation ([address]     Blood clot associated with vein wall inflammation     CAD (coronary artery disease)     Cerebral artery occlusion with cerebral infarction ([address]     [facility]     COPD (chronic obstructive pulmonary disease) ([facility]     History of blood transfusion     Hx of blood clots     Hyperlipidemia     Hypertension     MI, old     Migraine     Pneumonia     PONV (postoperative nausea and vomiting)     Psychiatric problem        Allergies   Allergen Reactions    Celebrex [Celecoxib] Anaphylaxis     Unable to breath Turns red all over    Contrast [Iodides] Hives    Statins      Will obtain lipid panel and re-evaluate. Takes lipitor at home    Naprosyn [Naproxen] Nausea And Vomiting    Shrimp Flavor Nausea And Vomiting       Current Outpatient Medications   Medication Sig Dispense Refill    budesonide-formoterol (SYMBICORT) 160-4.5 MCG/ACT AERO Inhale 2 puffs into the lungs 2 times daily Rinse mouth after use and use with spacer 30.6 g 1    Spacer/Aero-Holding Chambers (AEROCHAMBER MV) MISC 1 each by Does not apply route in the morning and at bedtime With Symbicort inhaler 1 each 0    benzonatate (TESSALON PERLES) 100 MG capsule Take 2 capsules by mouth 3 times daily as needed for Cough 90 capsule 5    ALBUTEROL IN Inhale into the lungs      tiotropium (SPIRIVA RESPIMAT) 2.5 MCG/ACT AERS inhaler Inhale 2 puffs into the lungs daily 3 each 3    isosorbide mononitrate (IMDUR) 30 MG extended release tablet Take 1 tablet by mouth daily 90 tablet 3    metoprolol tartrate (LOPRESSOR) 25 MG tablet Take 1 tablet by mouth 2 times daily 180 tablet 3    atorvastatin (LIPITOR) 80 MG tablet Take 1 tablet by mouth nightly 90 tablet 3    prasugrel (EFFIENT) 10 MG TABS Take 1 tablet by mouth daily 90 tablet 3    propafenone (RYTHMOL) 150 MG tablet Take 1 tablet by mouth 2 times daily 180 tablet 3    furosemide (LASIX) 20 MG tablet Take 1 tablet by mouth daily 90 tablet 3    ondansetron (ZOFRAN ODT) 4 MG disintegrating tablet Take 4 mg by mouth 4 times daily as needed for Nausea or Vomiting      aspirin 81 MG tablet Take 81 mg by mouth daily      nitroGLYCERIN (NITROSTAT) 0.4 MG SL tablet Place 1 tablet under the tongue every 5 minutes as needed for Chest pain. 25 tablet 0     No current facility-administered medications for this visit. Social History     Socioeconomic History    Marital status:       Spouse name: Alanis Sep Number of children: 0    Years of education: 15    Highest education level: None   Occupational History    None   Tobacco Use    Smoking status: Former Smoker     Years: 27.00     Types: Cigarettes     Quit date: 3/3/1992     Years since quittin.1    Smokeless tobacco: Never Used   Vaping Use    Vaping Use: Never used   Substance and Sexual Activity    Alcohol use: Yes     Comment: occasional    Drug use: No    Sexual activity: Yes     Partners: Male   Other Topics Concern    None   Social History Narrative    None     Social Determinants of Health     Financial Resource Strain:     Difficulty of Paying Living Expenses: Not on file   Food Insecurity:     Worried About Running Out of Food in the Last Year: Not on file    [place] of Food in the Last Year: Not on file   Transportation Needs:     Lack of Transportation (Medical): Not on file    Lack of Transportation (Non-Medical):  Not on file   Physical Activity:     Days of Exercise per Week: Not on file    Minutes of Exercise per Session: Not on file   Stress:     Feeling of Stress : Not on file   Social Connections:     Frequency of Communication with Friends and Family: Not on file    Frequency of Social Gatherings with Friends and Family: Not on file    Attends Sikhism Services: Not on file    Active Member of Clubs or Organizations: Not on file    Attends Club or Organization Meetings: Not on file    Marital Status: Not on file   Intimate Partner Violence:     Fear of Current or Ex-Partner: Not on file    Emotionally Abused: Not on file    Physically Abused: Not on file    Sexually Abused: Not on file   Housing Stability:     Unable to Pay for Housing in the Last Year: Not on file    Number of [place] in the Last Year: Not on file    Unstable Housing in the Last Year: Not on file       Family History   Problem Relation Age of Onset    Diabetes Mother     Heart Disease Mother     High Blood Pressure Mother     Cancer Sister         luekemia  passed age 28    Heart Disease Father     Cancer Brother         lung passed age 37    Heart Disease Maternal Aunt         MI    Heart Disease Maternal Uncle         MI    Heart Disease Maternal Grandmother         MI    Heart Disease Maternal Grandfather         MI    Stroke Paternal Grandfather     Cancer Sister     Heart Disease Sister         atrial fib    Breast Cancer Sister 34    Cancer Brother         throat cancer     Heart Disease Brother         needs stents and has atrial fib    Diabetes Brother     Stroke Sister     Breast Cancer Paternal Aunt 34    Breast Cancer Paternal Aunt 54       Blood pressure 128/68, pulse 68, height 5' 3\" (1.6 m), weight 148 lb 12.8 oz (67.5 kg), not currently breastfeeding. General:   Well developed, well nourished  Lungs:   Clear to auscultation  Heart:    Normal S1 S2, No murmur, rubs, or gallops  Abdomen:   Soft, non tender, no organomegalies, positive bowel sounds  Extremities:   No edema, no cyanosis, good peripheral pulses  Neurological:   Awake, alert, oriented. No obvious focal deficits  Musculoskeletal:  No obvious deformities    EKG:     Conclusions      Summary   limited echo   Ejection fraction is visually estimated at 45-50%. There was mild global hypokinesis of the left ventricle. The aortic valve leaflets were not well visualized. Aortic valve appears tricuspid. Signature      ----------------------------------------------------------------   Electronically signed by Jaycee Rose MD (Interpreting   physician) on 06/04/2021      Diagnosis Orders   1. Paroxysmal atrial fibrillation (HCC)     2. Coronary artery disease involving native coronary artery of native heart without angina pectoris     3. Chest pain in adult         No orders of the defined types were placed in this encounter. Assessment/Plan:   PAF-  Chest pain/CAD s/p recent PCI 03/18/2022--significant and severe CAD with recent PCI 03/2022--continues with chest pain.  I do feel part of her symptoms are anxiety/stress related given the immense amount of stress she has been under d/t to her brothers' recent medical issues, 's death, and having no one close to her currently to talk about with them. Will try increase imdur if possible but BP may be prohibitory. She will check BP before taking additional dose of imdur each day. If BP is above 110/65, can take additional dose. Disposition:   F/u with Dr Abraham Dumont as scheduled.    Continue Dr Johnnie Bloom current treatment plan  Follow up with Dr Abraham Dumont as scheduled or sooner if needed

## 2022-06-06 ENCOUNTER — TELEPHONE (OUTPATIENT)
Dept: CARDIOLOGY CLINIC | Age: 72
End: 2022-06-06

## 2022-06-06 NOTE — TELEPHONE ENCOUNTER
Pt states she feels better then she was yesterday she is going to monitor her BP and see how it does

## 2022-06-06 NOTE — TELEPHONE ENCOUNTER
Pt called stating BP has been running low, feels unsteady, like she can't hold her head up. States yesterday systolic was 19H-76T. Readings of 109/75, 112/64 today. Med changes?

## 2022-06-07 NOTE — PLAN OF CARE
Hospital Facility-Based Program  [facility]/Traditional Cardiac Rehab  PHYSICIAN ORDER  Class I Level B based on research  Medical Director:  Dr. Jennifer Aguilar MD     Patient Name: Andrade Meza : 1950  Referring Physician: Dr. Yomi Mcgovern  Date: 2022  Allergies: Allergies as of 2022 - Fully Reviewed 2022   Allergen Reaction Noted    Celebrex [celecoxib] Anaphylaxis 2013    Contrast [iodides] Hives 2016    Statins  2018    Naprosyn [naproxen] Nausea And Vomiting 2013    Shrimp flavor Nausea And Vomiting 2013        Diagnosis:  PCI on 3/22/22 @ [facility]    [x] [facility] Intensive Cardiac Rehab with telemetry monitoring, resting and exercise        BPs & HRs with each session. Hospital setting for patient safety. [x] 72 sessions: 36 exercise sessions, 36 education sessions   [] 36 sessions: 18 exercise sessions, 18 education sessions  [] Traditional Cardiac Rehab with telemetry monitoring, resting and exercise BPs &       HRs with each session. Hospital setting for patient safety. [] 36 sessions:  32 exercise sessions, 4 education sessions     Per Patient symptoms, proceed with:   [x]Nitroglycerine 0.4mg SL every 5 minutes prn, maximum of 3, for chest pain   [x]12-lead EKG for symptoms of chest pain or noted change in heart rhythm   [x]Administer O2 per nasal cannula for symptoms of chest pain or acute dyspnea    Physician Prescribed Exercise:  Plan of Care:  Patient to attend exercise sessions with aerobic endurance and strength training for a total of 31-60 min/day, 3 days/week with supplemented 30+ minutes of aerobic exercise at home on days not participating in Cardiac Rehab. Aerobic Endurance Training  Aerobic Endurance mode (TM, AD, NS) starting at 5-8 minutes progressing by 2-3 minutes each week to a total of 15-30 minutes 2-3x/week.   Arms only 5 min  Stair step increasing to 2 min  Resistance/strength training:  Hand weights starting at 1-5 lbs increasing in weight by 1-2 lbs and/or per patient tolerance weekly. Start with 8 repetitions and increase the repetitions each exercise session per patient tolerance for a total of 15 repetitions. Measurable Endurance Goal:  Aerobic endurance goal to be measured in minutes. Start endurance training per patient tolerance at 1-8 minutes per exercise type, progressing to a total of 31-60 minutes using various modes of training (see Exercise Prescription). Progression:    [x] Weekly 5-10% intensity progression, as tolerated, during cardiac rehab sessions. [x] 13-17 Rate of Perceived Exertion on the Casie Scale (6-20). Measurable Muscular Strength Goal:  Starting at 1-5 lbs x 8 reps, progressing to 5-25 lbs x 15 reps per patient tolerance.       Electronically signed by Jacobo Durbin on 6/7/2022 at 11:05 AM    Exercise Physiologist/Date/Time

## 2022-06-21 ENCOUNTER — HOSPITAL ENCOUNTER (OUTPATIENT)
Dept: CARDIAC REHAB | Age: 72
Setting detail: THERAPIES SERIES
Discharge: HOME | End: 2022-06-21
Payer: MEDICARE

## 2022-06-21 ENCOUNTER — OFFICE VISIT (OUTPATIENT)
Dept: PULMONOLOGY | Age: 72
End: 2022-06-21
Payer: MEDICARE

## 2022-06-21 VITALS
DIASTOLIC BLOOD PRESSURE: 66 MMHG | HEART RATE: 72 BPM | OXYGEN SATURATION: 96 % | BODY MASS INDEX: 27 KG/M2 | WEIGHT: 152.4 LBS | HEIGHT: 63 IN | SYSTOLIC BLOOD PRESSURE: 124 MMHG | TEMPERATURE: 98.4 F

## 2022-06-21 DIAGNOSIS — Z87.891 EX-SMOKER: ICD-10-CM

## 2022-06-21 DIAGNOSIS — R06.09 DYSPNEA ON EXERTION: ICD-10-CM

## 2022-06-21 DIAGNOSIS — J44.9 MODERATE COPD (CHRONIC OBSTRUCTIVE PULMONARY DISEASE) (HCC): Primary | ICD-10-CM

## 2022-06-21 PROCEDURE — 1123F ACP DISCUSS/DSCN MKR DOCD: CPT | Performed by: NURSE PRACTITIONER

## 2022-06-21 PROCEDURE — G0422 INTENS CARDIAC REHAB W/EXERC: HCPCS

## 2022-06-21 PROCEDURE — 99213 OFFICE O/P EST LOW 20 MIN: CPT | Performed by: NURSE PRACTITIONER

## 2022-06-21 ASSESSMENT — ENCOUNTER SYMPTOMS
SHORTNESS OF BREATH: 0
ABDOMINAL PAIN: 0
WHEEZING: 0
EYES NEGATIVE: 1
COUGH: 1
VOMITING: 0
DIARRHEA: 0
NAUSEA: 0

## 2022-06-21 NOTE — PROGRESS NOTES
[facility]     Patient: Janice Gannon, 67 y.o.   : 1950  2022    Pt of Dr. Crystal Parsons   Patient presents with    Follow-up     1 month acute bronchitis follow up no testing. CORBY  Ousmane Moseley is here for 1 month  follow up for acute  bronchitis    Feeling much better. Cough is better, still intermittent dry cough   \" I do well in Summer\"   Symbicort added last visit , used this and Spiriva consistently for a few weeks, once feeling better she stopped using daily   Out of phenergan with codeine- really helped, asking for refill \" to have on hand\"   Still has tessalon perles left - were helpful during the day       SOCIAL HISTORY:  Social History     Tobacco Use    Smoking status: Former Smoker     Years: 27.00     Types: Cigarettes     Quit date: 3/1/1991     Years since quittin.3    Smokeless tobacco: Never Used   Vaping Use    Vaping Use: Never used   Substance Use Topics    Alcohol use: Yes     Comment: occasional    Drug use: No         CURRENT MEDICATIONS:  Current Outpatient Medications   Medication Sig Dispense Refill    UNABLE TO FIND Liquid cough medicine with codeine per patient.       budesonide-formoterol (SYMBICORT) 160-4.5 MCG/ACT AERO Inhale 2 puffs into the lungs 2 times daily Rinse mouth after use and use with spacer 30.6 g 1    Spacer/Aero-Holding Chambers (AEROCHAMBER MV) MISC 1 each by Does not apply route in the morning and at bedtime With Symbicort inhaler 1 each 0    benzonatate (TESSALON PERLES) 100 MG capsule Take 2 capsules by mouth 3 times daily as needed for Cough 90 capsule 5    ALBUTEROL IN Inhale into the lungs      tiotropium (SPIRIVA RESPIMAT) 2.5 MCG/ACT AERS inhaler Inhale 2 puffs into the lungs daily 3 each 3    metoprolol tartrate (LOPRESSOR) 25 MG tablet Take 1 tablet by mouth 2 times daily 180 tablet 3    atorvastatin (LIPITOR) 80 MG tablet Take 1 tablet by mouth nightly 90 tablet 3    prasugrel (EFFIENT) 10 MG TABS Take 1 tablet by mouth daily 90 tablet 3    propafenone (RYTHMOL) 150 MG tablet Take 1 tablet by mouth 2 times daily 180 tablet 3    furosemide (LASIX) 20 MG tablet Take 1 tablet by mouth daily 90 tablet 3    ondansetron (ZOFRAN ODT) 4 MG disintegrating tablet Take 4 mg by mouth 4 times daily as needed for Nausea or Vomiting      aspirin 81 MG tablet Take 81 mg by mouth daily      nitroGLYCERIN (NITROSTAT) 0.4 MG SL tablet Place 1 tablet under the tongue every 5 minutes as needed for Chest pain. 25 tablet 0     No current facility-administered medications for this visit. Moriah ELLISON   Review of Systems   Constitutional: Negative for activity change, appetite change, chills, fatigue, fever and unexpected weight change. HENT: Negative. Eyes: Negative. Respiratory: Positive for cough. Negative for shortness of breath and wheezing. Cardiovascular: Negative for chest pain, palpitations and leg swelling. Gastrointestinal: Negative for abdominal pain, diarrhea, nausea and vomiting. Genitourinary: Negative. Musculoskeletal: Negative. Skin: Negative. Allergic/Immunologic: Positive for environmental allergies. Neurological: Negative. Hematological: Negative. Psychiatric/Behavioral: Negative. Physical exam   /66 (Site: Right Upper Arm, Position: Sitting, Cuff Size: Medium Adult)   Pulse 72   Temp 98.4 °F (36.9 °C) (Oral)   Ht 5' 3\" (1.6 m)   Wt 152 lb 6.4 oz (69.1 kg)   SpO2 96%   BMI 27.00 kg/m²      Wt Readings from Last 3 Encounters:   06/21/22 152 lb 6.4 oz (69.1 kg)   05/04/22 148 lb 12.8 oz (67.5 kg)   04/19/22 150 lb (68 kg)     Physical Exam  Vitals and nursing note reviewed. Constitutional:       General: She is not in acute distress. Appearance: She is well-developed and normal weight. HENT:      Mouth/Throat:      Lips: Pink. Mouth: Mucous membranes are moist.      Pharynx: Oropharynx is clear.  No oropharyngeal exudate or posterior oropharyngeal erythema. Eyes:      Conjunctiva/sclera: Conjunctivae normal.   Neck:      Vascular: No JVD. Cardiovascular:      Rate and Rhythm: Normal rate and regular rhythm. Heart sounds: No murmur heard. No friction rub. Pulmonary:      Effort: Pulmonary effort is normal. No accessory muscle usage or respiratory distress. Breath sounds: Normal breath sounds. No wheezing, rhonchi or rales. Chest:      Chest wall: No tenderness. Musculoskeletal:      Right lower leg: No edema. Left lower leg: No edema. Skin:     General: Skin is warm and dry. Capillary Refill: Capillary refill takes less than 2 seconds. Nails: There is no clubbing. Neurological:      Mental Status: She is alert and oriented to person, place, and time. Psychiatric:         Mood and Affect: Mood normal.         Behavior: Behavior normal.         Thought Content: Thought content normal.         Judgment: Judgment normal.          Test results   Lung Nodule Screening     [] Qualifies    [x]Does not qualify   [] Declined    [] Completed     Assessment      Diagnosis Orders   1. Moderate COPD (chronic obstructive pulmonary disease) ([facility]     2. Dyspnea on exertion     3. Ex-smoker         Plan    -explained need to use inhalers daily as maintenance to help manage/ prevent flare ups. -recommend resuming Symbicort 160/4.5mcg 2 puffs BID, rinse after use  -recommend resuming Spiriva respimat 2.5 mcg 2 puffs daily   -continue Albuterol sulfate on PRN basis   -explained that codeine is controlled substance and can be addictive and mis -used. Only prescribed for short term acute issues.   No rx for this today    Pt verbalized understanding and agreeable to plan   -avoid ill contacts  -keep UTD on recommended vaccinations    Will see Michael Page in: 4 months  billing based on time: total time on encounter 20 min   Electronically signed by PEDRO LUIS Haynes - ROMANA on 6/21/2022 at 4:27 PM

## 2022-06-21 NOTE — PROGRESS NOTES
Patient is experiencing SOB with exertion. Patient states they have had wheezing sometimes. Patient states they have had a dry cough sometimes. Patient reports that they have had no phlegm. Patient is currently taking the following inhaler(s): SPIRIVA SYMBICORT ALBUTEROL SULFATE    Patient is using all of her inhalers PRN. Smoking: quit in 1992. Complaints/concerns: Patient is requesting more liquid cough medicine. Patient does not know the name but she states she has been taking it and it has codeine in it.

## 2022-06-21 NOTE — PLAN OF CARE
[address] [place] [address] Facility-Based Program  Individualized Cardiac Treatment Plan    Patient Name:  Bouchra Mares  :  1950  Age:  67 y.o. MRN:  [id number]  Diagnosis: PCI to LAD  Date of Event: 3/22/22   Physician:  Dr. Joon Brady  Next Office Visit:  10/25/22  Date Entered Program: 22  Risk Stratifications: [] Low [x] Intermediate [] High  Allergies: Allergies   Allergen Reactions    Celebrex [Celecoxib] Anaphylaxis     Unable to breath Turns red all over    Contrast [Iodides] Hives    Statins      Will obtain lipid panel and re-evaluate. Takes lipitor at home    Naprosyn [Naproxen] Nausea And Vomiting    Shrimp Flavor Nausea And Vomiting       COVID -19 Screen  Do you have any of the following symptoms:  [] Fever [] Cough [] SOB [] Muscle/Body Ache [] Loss of taste/smell [x] None    Have you traveled outside of the [place]? [] Yes      [x] No    Have you been around anyone who has tested Positive for COVID 19?  [] Yes      [x] No    The following Education has been completed with patient. [x] Wait in the lobby until we call you back to rehab. [x] You must wear a mask while in the medical center, and in cardiac rehab. Please bring your own. [x] Bring your own bottle of water with you. 2022 Called Irene today. She has decided not to do cardiac rehab. She does not feel it will help her.   Electronically signed by Cyndi Ty on 2022 at 10:46 AM    Individual Cardiac Treatment Plan -EXERCISE  INITIAL 30 DAY 60 DAY 90  DAY FINAL DAY   EXERCISE  ASSESSMENT/PLAN EXERCISE  REASSESSMENT EXERCISE   REASSESSMENT EXERCISE   REASSESSMENT EXERCISE   REASSESSMENT EXERCISE  DISCHARGE/FOLLOW-UP   Date: 22 Date: Date: Date: Date: Date:   Session #1   Session # **  First Exercise Session:  ** Session # ** Session # ** Session # ** Session # **  Last Exercise Session:  **   Stages of Change Stages of Change Stages of Change Stages of Change Stages of Change Stages of Change   [] Pre Contemplation  [x] Contemplation  [] Preparation  [] Action  [] Maintenance  [] Relapse [] Pre Contemplation  [] Contemplation  [] Preparation  [] Action  [] Maintenance  [] Relapse [] Pre Contemplation  [] Contemplation  [] Preparation  [] Action  [] Maintenance  [] Relapse [] Pre Contemplation  [] Contemplation  [] Preparation  [] Action  [] Maintenance  [] Relapse [] Pre Contemplation  [] Contemplation  [] Preparation  [] Action  [] Maintenance  [] Relapse [] Pre Contemplation  [] Contemplation  [] Preparation  [] Action  [] Maintenance  [] Relapse   EXERCISE ASSESSMENT EXERCISE ASSESSMENT EXERCISE ASSESSMENT EXERCISE ASSESSMENT EXERCISE ASSESSMENT EXERCISE ASSESSMENT   6 Min Walk Test  Distance walked:   0.11 miles  580.8 ft.  1.84 METs  Max HR:104 BPM  RPE:  13    THR:    Rhythm:  NSR w. occas PVC     6 Min Walk Test  Distance walked:   ** miles  ** ft  ** METs  Max HR:** BPM      RPE:  **  %Change ft= **    Rhythm:  **   DASI: 3.96 METs DASI: ** METs DASI: ** METs DASI: ** METs DASI: ** METs DASI: ** METs   Return to Work  [employer] on returning to work? [x] Yes              [] No   [] Disabled     [] Retired    If yes:  *Job description:   *Required MET level= 2.0  *Return to Work Date: unknown/Aug Return to work:  Has Ariella Beauchamp returned to work? [] Yes    [] No    Return to work date set? [] Yes, **    [] No    Ariella Beauchamp is doing ** at work. Return to work:  Has Ariella Beauchamp returned to work? [] Yes    [] No    Return to work date set? [] Yes, **    [] No    Ariella Beauchamp is doing ** at work. Return to work:  Has Ariella Beauchamp returned to work? [] Yes    [] No    Return to work date set? [] Yes, **    [] No    Ariella Beauchamp is doing ** at work. Return to work:  Has Ariella Beauchamp returned to work? [] Yes    [] No    Return to work date set? [] Yes, **    [] No    Ariella Beauchamp is doing ** at work. Return to work:  Has Ariella Beauchamp returned to work?   [] Yes    [] No    Return to work?  [] Yes, **    [] No    *Required MET Level achieved for job duties? [] Yes    [] No   Orthopedic Limitations/  [] Yes    [x] No       Orthopedic Limitations  *If patient has orthopedic issue:   Actions/  accomodations needed to make Sourav Blase successful : Orthopedic Limitations   Orthopedic Limitations   Orthopedic Limitations Orthopedic Limitations     Fall Risk    []Assessed as a fall risk  [x] Not a fall risk      [] Balance Issues       [] Conor Stewart        [] Cane       [] Wheelchair  Actions needed:  na    [x] Safety issues reviewed      Fall Risk  *If patient is a fall risk, action needed to accommodate:  Fall Risk Fall Risk Fall Risk Fall Risk   Home Exercise  [] Yes    [x] No   Home Exercise  [] Yes    [] No  Type: **  Frequency:**  Duration: ** Home Exercise  [] Yes    [] No  Type: **  Frequency: **  Duration: ** Home Exercise  [] Yes    [] No  Type: **  Frequency: **  Duration: ** Home Exercise  [] Yes    [] No  Type: **  Frequency: **  Duration: ** Home Exercise  [] Yes    [] No  Type: **  Frequency: **  Duration: **   Angina with Activity? [] Yes    [x] No  Angina Management: na Angina with Activity? [] Yes    [] No  Angina Management: ** Angina with Activity? [] Yes    [] No  Angina Management: ** Angina with Activity? [] Yes    [] No  Angina Management: ** Angina with Activity? [] Yes    [] No  Angina Management: ** Angina with Activity?   [] Yes    [] No  Angina Management: **   EXERCISE PLAN EXERCISE PLAN EXERCISE PLAN EXERCISE PLAN EXERCISE PLAN EXERCISE PLAN   *Interventions* *Interventions* *Interventions* *Interventions* *Interventions* *Interventions*   Exercise Prescription  (per physician & CR staff) Exercise Prescription  (per physician & CR staff) Exercise Prescription  (per physician & CR staff) Exercise Prescription  (per physician & CR staff) Exercise Prescription  (per physician & CR staff) Exercise Prescription  (per physician & CR staff)   Cardiovascular Cardiovascular Cardiovascular Cardiovascular Cardiovascular Cardiovascular   Mode:    [x] Treadmill (TM)  [x] Schwinn Airdyne (AD)  [x] Arms Ergometer (AE)  [x] NuStep  [] Elliptical (E) MODE:    [] Treadmill (TM)  [] Schwinn Airdyne (AD)  [] Arms Ergometer (AE)  [] NuStep  [] Elliptical (E) MODE:    [] Treadmill (TM)  [] Schwinn Airdyne (AD)  [] Arms Ergometer (AE)  [] NuStep  [] Elliptical (E) MODE:    [] Treadmill (TM)  [] Schwinn Airdyne (AD)  [] Arms Ergometer (AE)  [] NuStep  [] Elliptical (E) MODE:    [] Treadmill (TM)  [] Schwinn Airdyne (AD)  [] Arms Ergometer (AE)  [] NuStep  [] Elliptical (E) MODE:    [] Treadmill (TM)  [] Schwinn Airdyne (AD)  [] Arms Ergometer (AE)  [] NuStep  [] Elliptical (E)   Initial Workloads  TM: [email or web address] 1.9 METs  AD: 0.4 level = 1.8 METs  NS: 20  Posada= 1.7 METs  AE: 0.2 level = 1.4 METs Current Workloads  TM:  @ %=  METs  AD:  level =  METs  NS:   Posada=  METs  AE:  level =  METs Current Workloads  TM:  @ %=  METs  AD:  level =  METs  NS:   Posada=  METs  AE:  level =  METs Current Workloads  TM:  @ %=  METs  AD:  level =  METs  NS:   Posada=  METs  AE:  level =  METs Current Workloads  TM:  @ %=  METs  AD:  level =  METs  NS:   Posada=  METs  AE:  level =  METs Current Workloads  TM:  @ %=  METs  AD:  level =  METs  NS:   Posada=  METs  AE:  level =  METs     Frequency:    ICR: 3x/week  Home: 2-3x/wk Frequency:   ICR: 3x/week  Home: 3x/wk Frequency:  ICR: 3x/week  Home: 3-4x/wk Frequency:  ICR: 3x/week  Home: 3-4x/wk Frequency:  ICR: 3x/week  Home: 3-4x/wk Frequency:  [employer] will continue exercise at  5-7 days/week   Duration:   Total aerobic exercise = 30-45 min    5-8 min/mode Duration:  Total aerobic exercises = ** min     **min/mode Duration:  Total aerobic exercises = ** min     **min/mode Duration:  Total aerobic exercises = ** min     **min/mode Duration:  Total aerobic exercises = ** min     **min/mode Duration:  Total erobic exercise =  60-90 min   Intensity:   MET Level = 1.9  RPE = 12-15 Intensity:  Max MET Level = **  RPE = 12-15 Intensity:  Max MET Level = **  RPE = 12-15 Intensity:  Max MET Level = **  RPE = 12-15 Intensity:  Max MET Level = **  RPE = 12-15 Intensity:  Max MET Level = ** RPE = 12-15   Progression: increase aerobic activity up to 15 min over next 4 weeks by increasing time 2-3 min/week. Progression:   Progression:   Progression: Progression: Progression:  Increase time/intensity when RPE <13, and HR is in [facility]   [x] Yes      [] No  Upper and Lower body strength training 2x/wk    Wt: 2#       Reps:  8-15    *Increase wt. after completing 15 reps with an RPE of <12/13. [] Yes      [] No  Upper and Lower body strength training 2x/wk    Wt: **#       Reps:  8-15    *Increase wt. after completing 15 reps with an RPE of <12/13. [] Yes      [] No  Upper and Lower body strength training 2x/wk    Wt: **#       Reps:  8-15    *Increase wt. after completing 15 reps with an RPE of <12/13. [] Yes      [] No  Upper and Lower body strength training 2x/wk    Wt: **#       Reps:  8-15    *Increase wt. after completing 15 reps with an RPE of <12/13. [] Yes      [] No  Upper and Lower body strength training 2x/wk    Wt: **#       Reps:  8-15    *Increase wt. after completing 15 reps with an RPE of <12/13. Continue Strength Training at home   [] Exercise Log & Strength training handout given     Wt: **#       Reps:  8-15    *Increase wt. after completing 15 reps with an RPE of <12/13.    Flexibility Flexibility Flexibility Flexibility Flexibility Flexibility   [x] Yes      [] No  25 min session of Core Strength & Flexibility 1x/per week  Attends Core Strength & Flexibility   [] Yes      [] No Attends Core Strength & Flexibility   [] Yes      [] No Attends Core Strength & Flexibility   [] Yes      [] No Attends Core Strength & Flexibility   [] Yes      [] No Continue Core Strength & Flexibility at home   [address] planning to initiate home exercise Home Exercise  *Tamar verbalizes planning to ** ** days/week for ** min on days not in rehab. Home Exercise  *Irene verbalizes planning to ** ** days/week for ** min on days not in rehab. Home Exercise  *Irene verbalizes planning to ** ** days/week for ** min on days not in rehab. Home Exercise  *Irene verbalizes planning to ** ** days/week for ** min on days not in rehab. Home Exercise  *Tamar verbalizes his/her plan to ** ** days/week for ** min @ **   *Education* *Education* *Education* *Education* *Education* *Education*   RPE Scale  [x] Yes      [] No  Exercise Safety  [x] Yes      [] No  Equipment Orientation  [x] Yes      [] No  S/S to Report  [x] Yes      [] No  Warm Up/Cool Down  [x] Yes      [] No  Home Exercise  [x] Yes      [] No     All Exercise Education Completed  [] Yes      [] No   Exercise Education Recommended    Workshops  [x] Benefits of Exercise  [x] Balance Training & Fall Prevention  [x] Heart Disease Risk Reduction  [x] Yoga & Heart Health Exercise Education Attended/Date Exercise Education Attended/Date Exercise Education Attended/Date Exercise Education Attended/Date All Sessions Completed?     [] Yes  [] No   *Goals* *Goals* *Goals* *Goals* *Goals* *Goals*   Initial Exercise Goals Exercise Goals  Exercise Goals   Exercise Goals  Exercise Goals  Exercise Goals   Irene plans to:  [x] Attend exercise sessions 3x/wk  [x] initiate home exercise 2-3x/wk for 10-20 min  [x] Increase 6 min walk distance by 10%  [x] Attend Exercise workshops Tamar plans to:  [x] Attend exercise sessions 3x/wk  [x] continue home exercise 2-3x/wk for 20-30 min  [x] Attend Exercise workshops Irene's plans to:  [x] Attend exercise sessions 3x/wk  [x] continue home exercise 3-4x/wk for 30-45 min  [x] Determine plan of exercise following rehab  [x] Attend Exercise workshops Irene's plans to:  [x] Attend exercise sessions 3x/wk  [x] continue home exercise 3-4x/wk for 30-45 min  [x] Determine plan of exercise following rehab  [x] Attend Exercise workshops Irene's plans to:  [x] Attend exercise sessions 3x/wk  [x] continue home exercise 3-4x/wk for 30-45 min  [x] Determine plan of exercise following rehab  [x] Attend Exercise workshops Trino Lowry achieved exercise goals?     []  Yes    [] No  If no, why?  **  [] Increased 6 min walk distance by 10%  [] Currently exercising 30-60 min/day, 5-7days/wk   [] Plans to continue exercise on own  [] Plans to join a local fitness center to continue exercise  [] Does not plan to continue to exercise after rehab   Mutually agreed upon goals:  Trino Lowry would like to get back to being able to mop her kitchen floor and vacuum Progress towards mutually agreed upon goals:  Trino Lowry  ** Progress towards mutually agreed upon goals:  Trino Lowry  ** Progress towards mutually agreed upon goals:  Trino Lowry ** Progress towards mutually agreed upon goals:  Trino Lowry  ** Progress towards mutually agreed upon goals:  Trino Lowry  **    *MET level required for above goal:  3.5 METs MET level Achieved:  **METs MET level Achieved:  **METs MET level Achieved:  **METs MET level Achieved:  **METs MET level Achieved:  **METs     Individual Cardiac Treatment Plan - Nutrition  NUTRITION  ASSESSMENT/PLAN NUTRITION  REASSESSMENT NUTRITION   REASSESSMENT NUTRITION   REASSESSMENT NUTRITION  DISCHARGE/FOLLOW-UP NUTRITION  DISCHARGE/FOLLOW-UP   Stages of Change Stages of Change Stages of Change Stages of Change Stages of Change Stages of Change   [] Pre Contemplation  [x] Contemplation  [] Preparation  [] Action  [] Maintenance  [] Relapse [] Pre Contemplation  [] Contemplation  [] Preparation  [] Action  [] Maintenance  [] Relapse [] Pre Contemplation  [] Contemplation  [] Preparation  [] Action  [] Maintenance  [] Relapse [] Pre Contemplation  [] Contemplation  [] Preparation  [] Action  [] Maintenance  [] Relapse [] Pre Contemplation  [] Contemplation  [] Preparation  [] Action  [] Maintenance  [] Relapse [] Pre Contemplation  [] Contemplation  [] Preparation  [] Action  [] Maintenance  [] Relapse   NUTRITION ASSESSMENT NUTRITION ASSESSMENT NUTRITION ASSESSMENT NUTRITION ASSESSMENT NUTRITION ASSESSMENT NUTRITION ASSESSMENT   Weight Management  Weight: 150.2        Height: 5'3\"   BMI: 26.7  Weight Management  Weight: **                  Weight Management  Weight: **                  Weight Management  Weight: ** Weight Management  Weight: ** Weight Management  Weight: **                    BMI: **   Eating Plan  Current eating habits: none Eating Plan  Changes: Eating Plan  Changes: Eating Plan  Changes: Eating Plan  Changes: Eating Plan Improvements:   Alcohol Use  [] none          [] daily  [] weekly      [x] special           Diet Assessment Tool:  RATE YOUR PLATE  *Given to patient to complete and return. Diet Assessment Tool:    Score: **/72        Diet Assessment Tool: RATE YOUR PLATE  Score: **/05   NUTRITION PLAN NUTRITION PLAN NUTRITION PLAN NUTRITION PLAN NUTRITION PLAN NUTRITION PLAN   *Interventions* *Interventions* *Interventions* *Interventions* *Interventions* *Interventions*   Initial Survey given Goal Setting Discussion:   [] Yes      [] No        Follow Up Survey Reviewed & Goals Updated:     Professional Referral  Please check if needed. [] Dietitian Consult   [] Wt. Management Referral  [] Other:  Professional Referral  Please check if needed. [] Dietitian Consult   [] Wt. Management Referral  [] Other: Professional Referral  Please check if needed. [] Dietitian Consult   [] Wt. Management Referral  [] Other: Professional Referral  Please check if needed. [] Dietitian Consult   [] Wt. Management Referral  [] Other: Professional Referral  Please check if needed. [] Dietitian Consult   [] Wt. Management Referral  [] Other: Professional Referral  Please check if needed. [] Dietitian Consult   [] Wt.  Management Referral  [] Other:   *Education* *Education* *Education* *Education* *Education* *Education*   Nutritional Education Recommended    [x] 1:1 Registered Dietitian    Workshops  [x] Label Reading   [x] Menu  [x] Targeting Nutrition Priorities  [x] Mindful Eating   Nutritional Education Attended/Date Nutritional Education Attended/Date Nutritional Education Attended/Date Nutritional Education Attended/Date All Sessions Completed? [] Yes  [] No   Cooking School  Recommended     [x] Adding Flavor  [x] Fast & Healthy     Breakfasts  [x] Salads & Dressings  [x] Savory Soups  [x] Simple Sides & Sauces  [x] Appetizers &     Snacks  [x] Delicious Desserts  [x] Plant-Based Proteins  [x] Fast Evening Meals  [x] Weekend Breakfasts  [x] Efficiency Cooking  [x] One-Pot [employer]  Sessions Completed   [facility]  Sessions Completed [facility]  Sessions Completed     [facility] [employer]  Sessions Completed [facility]    # of sessions completed:  **   *Goals* *Goals* *Goals* *Goals* *Goals* *Goals*   Irene's nutritional goals are as follows:  Complete and return diet survey Irene's nutritional goals are as follows:  [] Attend Nutrition Workshops  [] Attend 1:1   [] Attend Cooking Classes  [] ** Irene's nutritional goals are as follows:  [] Attend Nutrition Workshops  [] Attend 1:1   [] Attend Cooking Classes  [] Complete and return diet survey  [] ** Irene's nutritional goals are as follows:  [] Attend Nutrition Workshops  [] Attend 1:1   [] Attend Cooking Classes  [] ** Irene's nutritional goals are as follows:  [] Attend Nutrition Workshops  [] Attend 1:1   [] Attend Cooking Classes  [] ** Irene achieved nutritional goals   [] Yes    [] No  If no, why?   Use knowledge gained to continue Pritikin eating plan at home       Individual Cardiac Treatment Plan - Psychosocial  PSYCHOSOCIAL  ASSESSMENT/PLAN PSYCHOSOCIAL  REASSESSMENT PSYCHOSOCIAL   REASSESSMENT PSYCHOSOCIAL   REASSESSMENT PSYCHOSOCIAL  DISCHARGE/FOLLOW-UP 10=very:   Rate your depression: **  Rate your anxiety:  ** Using a scale of 0-10, 0=none, 10=very:   Rate your depression: **  Rate your anxiety:  ** Using a scale of 0-10, 0=none, 10=very:   Rate your depression: **  Rate your anxiety:  **   Signs and Symptoms of Depression Present? [x] Yes      [] No  If yes, please explain:  Patient stated  committed suicide in October and she is having a hard time coping, having recurrent \"flashbacks\" and having a hard time \"moving forward\"  Signs and Symptoms of Depression Present? [] Yes      [] No  If yes, please explain:  ** Signs and Symptoms of Depression Present? [] Yes      [] No  If yes, please explain:  ** Signs and Symptoms of Depression Present? [] Yes      [] No  If yes, please explain:  ** Signs and Symptoms of Depression Present? [] Yes      [] No  If yes, please explain:  ** Signs and Symptoms of Depression Present? [] Yes      [] No  If yes, please explain:  **   Signs and Symptoms of Anxiety Present? [x] Yes      [] No  If yes, please explain:  See above Signs and Symptoms of Anxiety Present? [] Yes      [] No  If yes, please explain:  ** Signs and Symptoms of Anxiety Present? [] Yes      [] No  If yes, please explain:  ** Signs and Symptoms of Anxiety Present? [] Yes      [] No  If yes, please explain:  ** Signs and Symptoms of Anxiety Present? [] Yes      [] No  If yes, please explain:  ** Signs and Symptoms of Anxiety Present? [] Yes      [] No  If yes, please explain:  **   [] Patient has poor eye contact   [] Flat affect present. [x] Signs of anxiety, anger or hostility    [] Signs social isolation present.    []  Signs of alcohol or substance abuse        PSYCHOSOCIAL PLAN PSYCHOSOCIAL PLAN PSYCHOSOCIAL PLAN PSYCHOSOCIAL PLAN PSYCHOSOCIAL PLAN PSYCHOSOCIAL PLAN   *Interventions* *Interventions* *Interventions* *Interventions* *Interventions* *Interventions*   *Please check if needed  [] Psych Consult  [x] Physician Referral- pt needs to follow with a physician  [x] Stress Management Skills *Please check if needed  [] Psych Consult  [] Physician Referral  [] Stress Management Skills *Please check if needed  [] Psych Consult  [] Physician Referral  [] Stress Management Skills *Please check if needed  [] Psych Consult  [] Physician Referral  [] Stress Management Skills *Please check if needed  [] Psych Consult  [] Physician Referral  [] Stress Management Skills *Please check if needed  [] Psych Consult  [] Physician Referral  [] Stress Management Skills   Is patient currently taking anti-depressant or anti-anxiety medications? [] Yes      [x] No   Change in anti-depressant or anti-anxiety medications? [] Yes      [] No  If yes, please list medications:  ** Change in anti-depressant or anti-anxiety medications? [] Yes      [] No  If yes, please list medications:  ** Change in anti-depressant or anti-anxiety medications? [] Yes      [] No  If yes, please list medications:  ** Change in anti-depressant or anti-anxiety medications? [] Yes      [] No  If yes, please list medications:  ** Change in anti-depressant or anti-anxiety medications?   [] Yes      [] No  If yes, please list medications:  **   *Education* *Education* *Education* *Education* *Education* *Education*   Healthy Mind-Set Workshops Recommended  [x] Stress & Health  [x] Taking Charge of Stress  [x] New Thoughts, New Behaviors  [x] Managing Moods & Relationships Healthy Mind-Set Workshops Attended/Date Healthy Mind-Set Workshops Attended/Date Healthy Mind-Set Workshops Attended/Date Healthy Mind-Set Workshops  Completed  [] Yes      [] No Healthy Mind-Set Workshops  Completed  [] Yes      [] No   *Goals* *Goals* *Goals* *Goals* *Goals* *Goals*   Irene's psychosocial goals are as follows:  Complete HADS & Reyes & Dasia, Quality of Life Index, Cardiac Version IV Irene's psychosocial goals are as follows:  [] Attend Healthy Mind-Set Workshops  [] Reduce depression symptom severity by 1 level  [] ** Irene's psychosocial goals are as follows:  [] Attend Healthy Mind-Set Workshops  [] Reduce depression symptom severity by 1 level  [] ** Irene's psychosocial goals are as follows:  [] Attend Healthy Mind-Set Workshops  [] Reduce depression symptom severity by 1 level  [] ** Mulu Choudhary achieved psychosocial goals? [] Yes    [] No  If no, why?  **  [] Use methods learned to continue to reduce depression symptom severity by 1 level  [] ** Mulu Choudhary achieved psychosocial goals?   [] Yes    [] No  If no, why?  **  [] Use methods learned to continue to reduce depression symptom severity by 1 level  [] **     Individual Cardiac Treatment Plan - Other:  Risk Factor/Education  RISK FACTOR  ASSESSMENT/PLAN RISK FACTOR  REASSESSMENT  RISK FACTOR  REASSESSMENT RISK FACTOR  REASSESSMENT RISK FACTOR   DISCHARGE/FOLLOW-UP RISK FACTOR   DISCHARGE/FOLLOW-UP   Stages of Change Stages of Change Stages of Change Stages of Change Stages of Change Stages of Change   [] Pre Contemplation  [] Contemplation  [x] Preparation  [] Action  [] Maintenance  [] Relapse [] Pre Contemplation  [] Contemplation  [] Preparation  [] Action  [] Maintenance  [] Relapse [] Pre Contemplation  [] Contemplation  [] Preparation  [] Action  [] Maintenance  [] Relapse [] Pre Contemplation  [] Contemplation  [] Preparation  [] Action  [] Maintenance  [] Relapse [] Pre Contemplation  [] Contemplation  [] Preparation  [] Action  [] Maintenance  [] Relapse [] Pre Contemplation  [] Contemplation  [] Preparation  [] Action  [] Maintenance  [] Relapse   RISK FACTOR/EDUCATION ASSESSMENT RISK FACTOR/EDUCATION ASSESSMENT RISK FACTOR/EDUCATION ASSESSMENT RISK FACTOR/EDUCATION ASSESSMENT RISK FACTOR /EDUCATION ASSESSMENT RISK FACTOR /EDUCATION ASSESSMENT   Hypertension  [x] Yes      [] No    Resting BP: 102/62  Peak Ex BP:130/58  Medication: na   Hypertension  Resting BP: **  Peak Ex BP:**  Medication Changes:  [] Yes      [] No Hypertension  Resting BP: **  Peak Ex BP:**  Medication Changes:  [] Yes      [] No Hypertension  Resting BP: **  Peak Ex BP:**  Medication Changes:  [] Yes      [] No Hypertension  Resting BP: **  Peak Ex BP:**  Medication Changes:  [] Yes      [] No Hypertension  Resting BP: **  Peak Ex BP:**  Medication Changes:  [] Yes      [] No   Lipids  HLD/DLD  [x] Yes   (lastest in system in 2018)     [] No  TOTAL CHOL: 138  HDL:  56  LDL:  71  TRI  Medication: lipitor Lipids  Medication Changes:  [] Yes      [] No     Lipids  Medication Changes:  [] Yes      [] No     Lipids  Medication Changes:  [] Yes      [] No     Lipids    TOTAL CHOL: **  HDL:  **  LDL:  **  TRIG:  **  Medication Changes:  [] Yes      [] No Lipids    TOTAL CHOL: **  HDL:  **  LDL:  **  TRIG:  **  Medication Changes:  [] Yes      [] No   Diabetes  [] Yes      [x] No  FBS:           HbA1C:   No updated labs in system        Diabetes  Most Recent BS:  BS have been in range  [] Yes      [] No  Medication Changes  [] Yes      [] No   Diabetes  Most Recent BS:  BS have been in range  [] Yes      [] No  Medication Changes  [] Yes      [] No     Diabetes  Most Recent BS:  BS have been in range  [] Yes      [] No  Medication Changes  [] Yes      [] No     Diabetes  Most Recent BS:  BS have been in range  [] Yes      [] No  Medication Changes  [] Yes      [] No Diabetes  Most Recent BS:  BS have been in range  [] Yes      [] No  Medication Changes  [] Yes      [] No       Tobacco Use  [] Current  [x] Former  [] Never    Years smoked: 32  Date Quit: 3/1991    Smokeless Tobacco use:   [] Yes      [x] No   Tobacco Use  Change in smoking status   [] Yes      [] No    Quit date: **   Tobacco Use  Change in smoking status   [] Yes      [] No    Quit date: **   Tobacco Use  Change in smoking status   [] Yes      [] No    Quit date: ** Tobacco Use  Change in smoking status   [] Yes      [] No    Quit date: ** Tobacco Use  Change in smoking status   [] Yes      [] No    Quit date: **             Learning Barriers  Please select one:  [] Speech  [] Literacy  [] Hearing  [] Cognitive  [] Vision  [x] Ready to Learn Learning Barriers Addressed:   [] Yes      [] No   Learning Barriers Addressed:   [] Yes      [] No   Learning Barriers Addressed:  [] Yes      [] No Learning Barriers Addressed:  [] Yes      [] No Learning Barriers Addressed:  [] Yes      [] No     RISK FACTOR/EDUCATION PLAN RISK FACTOR/EDUCATION PLAN RISK FACTOR/EDUCATION PLAN RISK FACTOR/EDUCATION PLAN RISK FACTOR/EDUCATION PLAN RISK FACTOR/EDUCATION PLAN   *Interventions* *Interventions* *Interventions* *Interventions* *Interventions* *Interventions*   Recommended Educational Videos    [x] Overview of The Pritikin Eating Plan  [x] Heart Disease Risk Reduction  [x] Move It! [x] Calorie Density  [x] Healthy Minds, Bodies, Hearts  [x] Label Reading  [x] Metabolic Syndrome & Belly   Or  [] How Our Thoughts Can Heal our Heart  [x] Dining Out-Part 1  [x] Biomechanical Limitations  [x] Facts on Fat  [x] Hypertension & Heart Disease  [x] Diseases of Our Times-Focusing on Diabetes  [x] Body Composition  [x] Nurtition Action Plan  [x] Exercise Action Plan   Completed Videos/Date        Overview of The Pritikin Eating Plan Completed Videos/Date Completed Videos/Date Completed Videos/Date Recommended Educational Videos Completed    [] Yes      [] No    **If not completed, Why? **           Smoking Cessation/Relaspe Prevention Intervention needed? [] Yes      [x] No  *Pt verbalizes and agrees to attend intervention Smoking Cessation/Relapse Prevention Scheduled? [] Yes      [] No  Date:  ** Smoking Cessation/Relapse Prevention completed? [] Yes      [] No  Date: ** Smoking Cessation/Relapse Prevention completed? [] Yes      [] No  Date: ** Smoking Cessation/Relapse Prevention completed?    [] Yes      [] No  Date: ** Smoking Cessation Counseling attended  [] Yes      [] No  **If not completed, Why? **   Professional Referrals:  *Please check if needed  [] Diabetes Clinic  [] Lipid Clinic   [] Other:      Professional Referrals:  *Please check if needed  [] Diabetes Clinic  [] Lipid Clinic   [] Other:   Preventative Medication Preventative Medication Preventative Medication Preventative Medication Preventative Medication Preventative Medication   Aspirin  [x] Yes    [] No  Blood Thinner: Clopidogrel/Effient/Brillinta  [x] Yes    [] No  Beta Blocker  [x] Yes    [] No  Ace Inhibitor  [] Yes    [] No  Statin/Lipid Lowering  [x] Yes    [] No Medication Changes? [] Yes    [] No Medication Changes? [] Yes    [] No Medication Changes? [] Yes    [] No Medication Changes? [] Yes    [] No Medication Changes? [] Yes    [] No   *Education* *Education* *Education* *Education* *Education* *Education*   Does Cleparamjitter Ford require any additional education? [] Yes    [x] No   Does Clevester Ford require any additional education? [] Yes    [] No Does Clevester Ford require any additional education? [] Yes    [] No Does Clevester Ford require any additional education? [] Yes    [] No Does Clevester Ford require any additional education? [] Yes    [] No Does Clevester Ford require any additional education?   [] Yes    [] No   Additional Educational Videos    Exercise  [] Improve Performance    Medical  [] Aging Enhancing Your QoL  [] Biology of Weight Control  [] Decoding Lab Results  [] Diseases of Our Time - Overview  [] Sleep Disorders    Nutrition  [] Dining Out - Part 2  [] Fueling a Healthy Body  [] Menu Workshop  [] Planning Your Eating Strategy  [] Targeting Your Nutrition Priorities  [] Vitamins & Minerals    Healthy Mind-Set  [] Smoking Cessation    Culinary  []Becoming a Pritikin    [] Cooking - Breakfast & Snacks  [] Cooking -Healhty Salads & Dressing  [] Cooking -Dinner & Sides  [] Cooking -Soups & Desserts    Overview  [] The Pritikin Solution Additional Educational Videos Completed Additional Educational Videos Completed Additional Educational Videos Completed Additional Educational Videos Completed Additional Educational Videos Completed    [] Yes    [] No   *Goals* *Goals* *Goals* *Goals* *Goals* *Goals*   Irene's risk factor/education goals are as follows:    [x] Optimal BP <140/90  [] Blood Sugar <120  [x] Attend recommended video education sessions  [x] Takes medications as prescribed 100% of the time   [] ** Irene's risk factor/education goals are as follows:    [x] Optimal BP <140/90  [] Blood Sugar <120  [x] Attend recommended video education sessions  [x] Takes medications as prescribed 100% of the time   [] ** Irene's risk factor/education goals are as follows:    [x] Optimal BP <140/90  [] Blood Sugar <120  [x] Attend recommended video education sessions  [x] Takes medications as prescribed 100% of the time   [] ** Irene's risk factor/education goals are as follows:    [x] Optimal BP <140/90  [] Blood Sugar <120  [x] Attend recommended video education sessions  [x] Takes medications as prescribed 100% of the time   [] ** Irene's risk factor/education goals are as follows:    [x] Optimal BP <140/90  [] Blood Sugar <120  [x] Attend recommended video education sessions  [x] Takes medications as prescribed 100% of the time   [] ** Irene achieved risk factor goals?   [] Yes    [] No  If no, why?  **     Monitored telemetry has revealed NSR w/ occas PVCs   Monitored telemetry has revealed **  [] documented arrhythmia at increasing workloads  [] associated symptoms ** Monitored telemetry has revealed  [] documented arrhythmia at increasing workloads  [] associated symptoms ** Monitored telemetry has revealed **  [] documented arrhythmia at increasing workloads  [] associated symptoms ** Monitored telemetry has revealed **  [] documented arrhythmia at increasing workloads  [] associated symptoms ** Monitored telemetry has revealed **  [] documented arrhythmia at increasing workloads  [] associated symptoms **   Physician Response    [x] Cardiac rehab is reasonably and medically necessary for continuous cardiac monitoring surveillance  of patient's cardiac activity  [x] Initiate continuous telemetry monitoring and notify me with any concerns  [] Other   Physician Response    [x] Cardiac rehab is reasonably and medically necessary for continuous cardiac monitoring surveillance  of patient's cardiac activity  [x] Continue continuous telemetry monitoring and notify me with any concerns  [] Other     Physician Response    [x] Cardiac rehab is reasonably and medically necessary for continuous cardiac monitoring surveillance  of patient's cardiac activity  [x] Continue continuous telemetry monitoring and notify me with any concerns   [] Other     Physician Response    [x] Cardiac rehab is reasonably and medically necessary for continuous cardiac monitoring surveillance  of patient's cardiac activity  [x] Continue continuous telemetry monitoring and notify me with any concerns   [] Other     Physician Response    [x] Cardiac rehab is reasonably and medically necessary for continuous cardiac monitoring surveillance  of patient's cardiac activity  [x] Continue continuous telemetry monitoring and notify me with any concerns   [] Other      Cosigned by: Billy Boyce MD at 6/27/2022  9:28 AM       Revision History    Date/Time User Provider Type Action   6/27/2022  9:28 AM Billy Boyce MD Physician Cosign   6/24/2022  1:20 PM Caity Reed Exercise Physiologist Sign

## 2022-06-23 VITALS — WEIGHT: 150.2 LBS | BODY MASS INDEX: 26.61 KG/M2 | HEIGHT: 63 IN

## 2022-06-27 ENCOUNTER — APPOINTMENT (OUTPATIENT)
Dept: CARDIAC REHAB | Age: 72
End: 2022-06-27
Payer: MEDICARE

## 2022-06-27 ENCOUNTER — HOSPITAL ENCOUNTER (OUTPATIENT)
Dept: CARDIAC REHAB | Age: 72
Setting detail: THERAPIES SERIES
Discharge: HOME | End: 2022-06-27
Payer: MEDICARE

## 2022-06-27 NOTE — PROGRESS NOTES
Hospital Facility-Based Program  Phase 2 Cardiac Rehab Weekly Progress Report      Patient is starting today 6/27/2022.

## 2022-06-29 ENCOUNTER — HOSPITAL ENCOUNTER (OUTPATIENT)
Dept: CARDIAC REHAB | Age: 72
Setting detail: THERAPIES SERIES
End: 2022-06-29
Payer: MEDICARE

## 2022-06-29 ENCOUNTER — APPOINTMENT (OUTPATIENT)
Dept: CARDIAC REHAB | Age: 72
End: 2022-06-29
Payer: MEDICARE

## 2022-07-05 RX ORDER — PROPAFENONE HYDROCHLORIDE 150 MG/1
TABLET, FILM COATED ORAL
Qty: 180 TABLET | Refills: 1 | Status: SHIPPED | OUTPATIENT
Start: 2022-07-05 | End: 2022-10-25 | Stop reason: ALTCHOICE

## 2022-07-06 ENCOUNTER — HOSPITAL ENCOUNTER (OUTPATIENT)
Dept: CARDIAC REHAB | Age: 72
Setting detail: THERAPIES SERIES
Discharge: HOME | End: 2022-07-06

## 2022-07-20 RX ORDER — FUROSEMIDE 20 MG/1
TABLET ORAL
Qty: 90 TABLET | Refills: 1 | Status: SHIPPED | OUTPATIENT
Start: 2022-07-20 | End: 2022-10-25 | Stop reason: SDUPTHER

## 2022-07-22 ENCOUNTER — HOSPITAL ENCOUNTER (OUTPATIENT)
Dept: CT IMAGING | Age: 72
Discharge: HOME | End: 2022-07-22
Payer: MEDICARE

## 2022-07-22 DIAGNOSIS — R10.9 ABDOMINAL PAIN, ACUTE: ICD-10-CM

## 2022-07-22 PROCEDURE — 74176 CT ABD & PELVIS W/O CONTRAST: CPT

## 2022-08-04 ENCOUNTER — NURSE ONLY (OUTPATIENT)
Dept: LAB | Age: 72
End: 2022-08-04

## 2022-08-04 LAB
CREAT SERPL-MCNC: 1.1 MG/DL (ref 0.4–1.2)
GFR SERPL CREATININE-BSD FRML MDRD: 49 ML/MIN/1.73M2

## 2022-08-12 ENCOUNTER — HOSPITAL ENCOUNTER (OUTPATIENT)
Dept: NUCLEAR MEDICINE | Age: 72
Discharge: HOME | End: 2022-08-12
Payer: MEDICARE

## 2022-08-12 DIAGNOSIS — N28.1 PARAPELVIC RENAL CYST: ICD-10-CM

## 2022-08-12 DIAGNOSIS — R10.9 BILATERAL FLANK PAIN: ICD-10-CM

## 2022-08-12 PROCEDURE — 3430000000 HC RX DIAGNOSTIC RADIOPHARMACEUTICAL: Performed by: NURSE PRACTITIONER

## 2022-08-12 PROCEDURE — A9562 TC99M MERTIATIDE: HCPCS | Performed by: NURSE PRACTITIONER

## 2022-08-12 PROCEDURE — 78708 K FLOW/FUNCT IMAGE W/DRUG: CPT

## 2022-08-12 RX ADMIN — Medication 9.5 MILLICURIE: at 13:50

## 2022-08-15 RX ORDER — ISOSORBIDE MONONITRATE 30 MG/1
TABLET, EXTENDED RELEASE ORAL
Qty: 90 TABLET | Refills: 3 | Status: SHIPPED | OUTPATIENT
Start: 2022-08-15 | End: 2022-10-25 | Stop reason: SDUPTHER

## 2022-08-15 RX ORDER — PRASUGREL 10 MG/1
TABLET, FILM COATED ORAL
Qty: 90 TABLET | Refills: 3 | Status: SHIPPED | OUTPATIENT
Start: 2022-08-15 | End: 2022-10-25 | Stop reason: SDUPTHER

## 2022-08-24 ENCOUNTER — HOSPITAL ENCOUNTER (INPATIENT)
Age: 72
LOS: 7 days | Discharge: HOME | DRG: 444 | End: 2022-08-31
Attending: EMERGENCY MEDICINE | Admitting: PHYSICIAN ASSISTANT
Payer: MEDICARE

## 2022-08-24 ENCOUNTER — APPOINTMENT (OUTPATIENT)
Dept: ULTRASOUND IMAGING | Age: 72
DRG: 444 | End: 2022-08-24
Payer: MEDICARE

## 2022-08-24 ENCOUNTER — APPOINTMENT (OUTPATIENT)
Dept: CT IMAGING | Age: 72
DRG: 444 | End: 2022-08-24
Payer: MEDICARE

## 2022-08-24 DIAGNOSIS — K80.50 CHOLEDOCHOLITHIASIS: Primary | ICD-10-CM

## 2022-08-24 LAB
ALBUMIN SERPL-MCNC: 3.8 G/DL (ref 3.5–5.1)
ALP BLD-CCNC: 113 U/L (ref 38–126)
ALT SERPL-CCNC: 38 U/L (ref 11–66)
ANION GAP SERPL CALCULATED.3IONS-SCNC: 14 MEQ/L (ref 8–16)
AST SERPL-CCNC: 24 U/L (ref 5–40)
BACTERIA: ABNORMAL /HPF
BASOPHILS # BLD: 0.2 %
BASOPHILS ABSOLUTE: 0 THOU/MM3 (ref 0–0.1)
BILIRUB SERPL-MCNC: 1.9 MG/DL (ref 0.3–1.2)
BILIRUBIN URINE: NEGATIVE
BLOOD, URINE: ABNORMAL
BUN BLDV-MCNC: 21 MG/DL (ref 7–22)
CALCIUM SERPL-MCNC: 9.3 MG/DL (ref 8.5–10.5)
CASTS 2: ABNORMAL /LPF
CASTS UA: ABNORMAL /LPF
CHARACTER, URINE: ABNORMAL
CHLORIDE BLD-SCNC: 99 MEQ/L (ref 98–111)
CO2: 23 MEQ/L (ref 23–33)
COLOR: YELLOW
CREAT SERPL-MCNC: 1 MG/DL (ref 0.4–1.2)
CRYSTALS, UA: ABNORMAL
EOSINOPHIL # BLD: 2.3 %
EOSINOPHILS ABSOLUTE: 0.2 THOU/MM3 (ref 0–0.4)
EPITHELIAL CELLS, UA: ABNORMAL /HPF
ERYTHROCYTE [DISTWIDTH] IN BLOOD BY AUTOMATED COUNT: 13.6 % (ref 11.5–14.5)
ERYTHROCYTE [DISTWIDTH] IN BLOOD BY AUTOMATED COUNT: 42.7 FL (ref 35–45)
GFR SERPL CREATININE-BSD FRML MDRD: 54 ML/MIN/1.73M2
GLUCOSE BLD-MCNC: 109 MG/DL (ref 70–108)
GLUCOSE URINE: NEGATIVE MG/DL
HCT VFR BLD CALC: 40.6 % (ref 37–47)
HEMOGLOBIN: 13.8 GM/DL (ref 12–16)
IMMATURE GRANS (ABS): 0.01 THOU/MM3 (ref 0–0.07)
IMMATURE GRANULOCYTES: 0.2 %
KETONES, URINE: ABNORMAL
LEUKOCYTE ESTERASE, URINE: ABNORMAL
LIPASE: 30.4 U/L (ref 5.6–51.3)
LYMPHOCYTES # BLD: 29.4 %
LYMPHOCYTES ABSOLUTE: 1.9 THOU/MM3 (ref 1–4.8)
MCH RBC QN AUTO: 29.4 PG (ref 26–33)
MCHC RBC AUTO-ENTMCNC: 34 GM/DL (ref 32.2–35.5)
MCV RBC AUTO: 86.4 FL (ref 81–99)
MISCELLANEOUS 2: ABNORMAL
MONOCYTES # BLD: 12.6 %
MONOCYTES ABSOLUTE: 0.8 THOU/MM3 (ref 0.4–1.3)
NITRITE, URINE: NEGATIVE
NUCLEATED RED BLOOD CELLS: 0 /100 WBC
OSMOLALITY CALCULATION: 275.5 MOSMOL/KG (ref 275–300)
PH UA: 6 (ref 5–9)
PLATELET # BLD: 176 THOU/MM3 (ref 130–400)
PMV BLD AUTO: 9.7 FL (ref 9.4–12.4)
POTASSIUM REFLEX MAGNESIUM: 3.9 MEQ/L (ref 3.5–5.2)
PROTEIN UA: ABNORMAL
RBC # BLD: 4.7 MILL/MM3 (ref 4.2–5.4)
RBC URINE: ABNORMAL /HPF
RENAL EPITHELIAL, UA: ABNORMAL
SEG NEUTROPHILS: 55.3 %
SEGMENTED NEUTROPHILS ABSOLUTE COUNT: 3.6 THOU/MM3 (ref 1.8–7.7)
SODIUM BLD-SCNC: 136 MEQ/L (ref 135–145)
SPECIFIC GRAVITY, URINE: > 1.03 (ref 1–1.03)
TOTAL PROTEIN: 6.7 G/DL (ref 6.1–8)
UROBILINOGEN, URINE: 1 EU/DL (ref 0–1)
WBC # BLD: 6.6 THOU/MM3 (ref 4.8–10.8)
WBC UA: ABNORMAL /HPF
YEAST: ABNORMAL

## 2022-08-24 PROCEDURE — 74177 CT ABD & PELVIS W/CONTRAST: CPT

## 2022-08-24 PROCEDURE — 99285 EMERGENCY DEPT VISIT HI MDM: CPT

## 2022-08-24 PROCEDURE — 81001 URINALYSIS AUTO W/SCOPE: CPT

## 2022-08-24 PROCEDURE — 85025 COMPLETE CBC W/AUTO DIFF WBC: CPT

## 2022-08-24 PROCEDURE — 83690 ASSAY OF LIPASE: CPT

## 2022-08-24 PROCEDURE — 76705 ECHO EXAM OF ABDOMEN: CPT

## 2022-08-24 PROCEDURE — 87086 URINE CULTURE/COLONY COUNT: CPT

## 2022-08-24 PROCEDURE — 2580000003 HC RX 258: Performed by: EMERGENCY MEDICINE

## 2022-08-24 PROCEDURE — 96365 THER/PROPH/DIAG IV INF INIT: CPT

## 2022-08-24 PROCEDURE — 87040 BLOOD CULTURE FOR BACTERIA: CPT

## 2022-08-24 PROCEDURE — 6360000002 HC RX W HCPCS: Performed by: EMERGENCY MEDICINE

## 2022-08-24 PROCEDURE — 6360000004 HC RX CONTRAST MEDICATION: Performed by: EMERGENCY MEDICINE

## 2022-08-24 PROCEDURE — 99223 1ST HOSP IP/OBS HIGH 75: CPT | Performed by: PHYSICIAN ASSISTANT

## 2022-08-24 PROCEDURE — 36415 COLL VENOUS BLD VENIPUNCTURE: CPT

## 2022-08-24 PROCEDURE — 1200000003 HC TELEMETRY R&B

## 2022-08-24 PROCEDURE — 96375 TX/PRO/DX INJ NEW DRUG ADDON: CPT

## 2022-08-24 PROCEDURE — 80053 COMPREHEN METABOLIC PANEL: CPT

## 2022-08-24 RX ORDER — ONDANSETRON 4 MG/1
4 TABLET, ORALLY DISINTEGRATING ORAL EVERY 8 HOURS PRN
Status: DISCONTINUED | OUTPATIENT
Start: 2022-08-24 | End: 2022-08-31 | Stop reason: HOSPADM

## 2022-08-24 RX ORDER — SODIUM CHLORIDE 9 MG/ML
INJECTION, SOLUTION INTRAVENOUS CONTINUOUS
Status: DISCONTINUED | OUTPATIENT
Start: 2022-08-24 | End: 2022-08-31 | Stop reason: HOSPADM

## 2022-08-24 RX ORDER — ALBUTEROL SULFATE 90 UG/1
2 AEROSOL, METERED RESPIRATORY (INHALATION) EVERY 6 HOURS PRN
Status: DISCONTINUED | OUTPATIENT
Start: 2022-08-24 | End: 2022-08-31 | Stop reason: HOSPADM

## 2022-08-24 RX ORDER — POTASSIUM CHLORIDE 7.45 MG/ML
10 INJECTION INTRAVENOUS PRN
Status: DISCONTINUED | OUTPATIENT
Start: 2022-08-24 | End: 2022-08-31 | Stop reason: HOSPADM

## 2022-08-24 RX ORDER — ATORVASTATIN CALCIUM 80 MG/1
80 TABLET, FILM COATED ORAL NIGHTLY
Status: DISCONTINUED | OUTPATIENT
Start: 2022-08-25 | End: 2022-08-31 | Stop reason: HOSPADM

## 2022-08-24 RX ORDER — ACETAMINOPHEN 325 MG/1
650 TABLET ORAL EVERY 6 HOURS PRN
Status: DISCONTINUED | OUTPATIENT
Start: 2022-08-24 | End: 2022-08-31 | Stop reason: HOSPADM

## 2022-08-24 RX ORDER — SODIUM CHLORIDE 9 MG/ML
INJECTION, SOLUTION INTRAVENOUS PRN
Status: DISCONTINUED | OUTPATIENT
Start: 2022-08-24 | End: 2022-08-31 | Stop reason: HOSPADM

## 2022-08-24 RX ORDER — PROPAFENONE HYDROCHLORIDE 150 MG/1
150 TABLET, FILM COATED ORAL 2 TIMES DAILY
Status: DISCONTINUED | OUTPATIENT
Start: 2022-08-25 | End: 2022-08-31 | Stop reason: HOSPADM

## 2022-08-24 RX ORDER — MAGNESIUM SULFATE IN WATER 40 MG/ML
2000 INJECTION, SOLUTION INTRAVENOUS PRN
Status: DISCONTINUED | OUTPATIENT
Start: 2022-08-24 | End: 2022-08-31 | Stop reason: HOSPADM

## 2022-08-24 RX ORDER — METHYLPREDNISOLONE 4 MG/1
32 TABLET ORAL ONCE
Status: COMPLETED | OUTPATIENT
Start: 2022-08-25 | End: 2022-08-25

## 2022-08-24 RX ORDER — PRASUGREL 10 MG/1
10 TABLET, FILM COATED ORAL DAILY
Status: DISCONTINUED | OUTPATIENT
Start: 2022-08-25 | End: 2022-08-31 | Stop reason: HOSPADM

## 2022-08-24 RX ORDER — ACETAMINOPHEN 650 MG/1
650 SUPPOSITORY RECTAL EVERY 6 HOURS PRN
Status: DISCONTINUED | OUTPATIENT
Start: 2022-08-24 | End: 2022-08-31 | Stop reason: HOSPADM

## 2022-08-24 RX ORDER — 0.9 % SODIUM CHLORIDE 0.9 %
1000 INTRAVENOUS SOLUTION INTRAVENOUS ONCE
Status: COMPLETED | OUTPATIENT
Start: 2022-08-24 | End: 2022-08-24

## 2022-08-24 RX ORDER — ASPIRIN 81 MG/1
81 TABLET, CHEWABLE ORAL DAILY
Status: DISCONTINUED | OUTPATIENT
Start: 2022-08-25 | End: 2022-08-31 | Stop reason: HOSPADM

## 2022-08-24 RX ORDER — DIPHENHYDRAMINE HCL 25 MG
50 TABLET ORAL ONCE
Status: COMPLETED | OUTPATIENT
Start: 2022-08-24 | End: 2022-08-25

## 2022-08-24 RX ORDER — ISOSORBIDE MONONITRATE 30 MG/1
30 TABLET, EXTENDED RELEASE ORAL DAILY
Status: DISCONTINUED | OUTPATIENT
Start: 2022-08-25 | End: 2022-08-31 | Stop reason: HOSPADM

## 2022-08-24 RX ORDER — FUROSEMIDE 20 MG/1
20 TABLET ORAL DAILY
Status: DISCONTINUED | OUTPATIENT
Start: 2022-08-25 | End: 2022-08-31 | Stop reason: HOSPADM

## 2022-08-24 RX ORDER — POTASSIUM CHLORIDE 20 MEQ/1
40 TABLET, EXTENDED RELEASE ORAL PRN
Status: DISCONTINUED | OUTPATIENT
Start: 2022-08-24 | End: 2022-08-31 | Stop reason: HOSPADM

## 2022-08-24 RX ORDER — SODIUM CHLORIDE 0.9 % (FLUSH) 0.9 %
5-40 SYRINGE (ML) INJECTION PRN
Status: DISCONTINUED | OUTPATIENT
Start: 2022-08-24 | End: 2022-08-31 | Stop reason: HOSPADM

## 2022-08-24 RX ORDER — ONDANSETRON 2 MG/ML
4 INJECTION INTRAMUSCULAR; INTRAVENOUS ONCE
Status: COMPLETED | OUTPATIENT
Start: 2022-08-24 | End: 2022-08-24

## 2022-08-24 RX ORDER — METHYLPREDNISOLONE SODIUM SUCCINATE 40 MG/ML
40 INJECTION, POWDER, LYOPHILIZED, FOR SOLUTION INTRAMUSCULAR; INTRAVENOUS ONCE
Status: COMPLETED | OUTPATIENT
Start: 2022-08-24 | End: 2022-08-24

## 2022-08-24 RX ORDER — SODIUM CHLORIDE 0.9 % (FLUSH) 0.9 %
5-40 SYRINGE (ML) INJECTION EVERY 12 HOURS SCHEDULED
Status: DISCONTINUED | OUTPATIENT
Start: 2022-08-24 | End: 2022-08-31 | Stop reason: HOSPADM

## 2022-08-24 RX ORDER — DIPHENHYDRAMINE HYDROCHLORIDE 50 MG/ML
25 INJECTION INTRAMUSCULAR; INTRAVENOUS ONCE
Status: COMPLETED | OUTPATIENT
Start: 2022-08-24 | End: 2022-08-24

## 2022-08-24 RX ORDER — POLYETHYLENE GLYCOL 3350 17 G/17G
17 POWDER, FOR SOLUTION ORAL DAILY PRN
Status: DISCONTINUED | OUTPATIENT
Start: 2022-08-24 | End: 2022-08-31 | Stop reason: HOSPADM

## 2022-08-24 RX ORDER — ENOXAPARIN SODIUM 100 MG/ML
40 INJECTION SUBCUTANEOUS DAILY
Status: DISCONTINUED | OUTPATIENT
Start: 2022-08-25 | End: 2022-08-31 | Stop reason: HOSPADM

## 2022-08-24 RX ORDER — ONDANSETRON 2 MG/ML
4 INJECTION INTRAMUSCULAR; INTRAVENOUS EVERY 6 HOURS PRN
Status: DISCONTINUED | OUTPATIENT
Start: 2022-08-24 | End: 2022-08-31 | Stop reason: HOSPADM

## 2022-08-24 RX ADMIN — IOPAMIDOL 80 ML: 755 INJECTION, SOLUTION INTRAVENOUS at 17:55

## 2022-08-24 RX ADMIN — SODIUM CHLORIDE 1000 ML: 9 INJECTION, SOLUTION INTRAVENOUS at 17:31

## 2022-08-24 RX ADMIN — METHYLPREDNISOLONE SODIUM SUCCINATE 40 MG: 40 INJECTION, POWDER, FOR SOLUTION INTRAMUSCULAR; INTRAVENOUS at 17:33

## 2022-08-24 RX ADMIN — HYDROMORPHONE HYDROCHLORIDE 0.5 MG: 1 INJECTION, SOLUTION INTRAMUSCULAR; INTRAVENOUS; SUBCUTANEOUS at 20:00

## 2022-08-24 RX ADMIN — PIPERACILLIN AND TAZOBACTAM 3375 MG: 3; .375 INJECTION, POWDER, FOR SOLUTION INTRAVENOUS at 19:28

## 2022-08-24 RX ADMIN — ONDANSETRON 4 MG: 2 INJECTION INTRAMUSCULAR; INTRAVENOUS at 19:50

## 2022-08-24 RX ADMIN — DIPHENHYDRAMINE HYDROCHLORIDE 25 MG: 50 INJECTION, SOLUTION INTRAMUSCULAR; INTRAVENOUS at 17:33

## 2022-08-24 ASSESSMENT — PAIN DESCRIPTION - DESCRIPTORS
DESCRIPTORS: SHARP
DESCRIPTORS: ACHING
DESCRIPTORS: ACHING
DESCRIPTORS: TENDER

## 2022-08-24 ASSESSMENT — PAIN DESCRIPTION - FREQUENCY
FREQUENCY: CONTINUOUS
FREQUENCY: CONTINUOUS

## 2022-08-24 ASSESSMENT — PAIN - FUNCTIONAL ASSESSMENT
PAIN_FUNCTIONAL_ASSESSMENT: 0-10
PAIN_FUNCTIONAL_ASSESSMENT: 0-10
PAIN_FUNCTIONAL_ASSESSMENT: PREVENTS OR INTERFERES SOME ACTIVE ACTIVITIES AND ADLS
PAIN_FUNCTIONAL_ASSESSMENT: 0-10
PAIN_FUNCTIONAL_ASSESSMENT: NONE - DENIES PAIN

## 2022-08-24 ASSESSMENT — PAIN SCALES - GENERAL
PAINLEVEL_OUTOF10: 5
PAINLEVEL_OUTOF10: 7
PAINLEVEL_OUTOF10: 6
PAINLEVEL_OUTOF10: 5

## 2022-08-24 ASSESSMENT — PAIN DESCRIPTION - LOCATION
LOCATION: ABDOMEN

## 2022-08-24 ASSESSMENT — PAIN DESCRIPTION - ORIENTATION
ORIENTATION: MID

## 2022-08-24 ASSESSMENT — PAIN DESCRIPTION - PAIN TYPE: TYPE: ACUTE PAIN

## 2022-08-24 ASSESSMENT — PAIN DESCRIPTION - ONSET
ONSET: ON-GOING
ONSET: ON-GOING

## 2022-08-24 NOTE — ED NOTES
Pt medicated per [facility] and pt vitals collected. Pt denies any needs at this time. Pt respirations easy and unlabored.      David English RN  08/24/22 1931

## 2022-08-24 NOTE — LETTER
[address]  [facility] [address]  Phone: [phone]          August 31, 2022     Patient: Yamileth Dickerson   YOB: 1950   Date of Visit: 8/24/2022       To Whom It May Concern:    Alexander Benoit was admitted to [facility] FOR JOINT DISEASES on 8/24/22 and was discharged on 8/31/22. She is able to return to work 9/6/22 without any restrictions. If you have any questions or concerns, please don't hesitate to call.     Sincerely,

## 2022-08-24 NOTE — ED NOTES
Pt vitals collected. Pt denies any needs at this time. Pt respirations easy and unlabored.      Beth Bains RN  08/24/22 [id number]

## 2022-08-24 NOTE — ED TRIAGE NOTES
Pt in through ED lobby. She states since Saturday she has had progressively worsening lower mid abdominal pain and fever. At home she has had fevers of 102.6. She has been taking tylenol at home which has helped the fever. She has also had nausea without emesis. She states she has not been able to eat or drink much since Sunday.

## 2022-08-24 NOTE — ED NOTES
Family member comes to charge desk to report that pt is being seen by Dr. 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 She states this has been going on the past 4-6 weeks.       Dayna Du RN  08/24/22 [id number]

## 2022-08-25 ENCOUNTER — APPOINTMENT (OUTPATIENT)
Dept: MRI IMAGING | Age: 72
DRG: 444 | End: 2022-08-25
Payer: MEDICARE

## 2022-08-25 LAB
ANION GAP SERPL CALCULATED.3IONS-SCNC: 10 MEQ/L (ref 8–16)
BASOPHILS # BLD: 0 %
BASOPHILS ABSOLUTE: 0 THOU/MM3 (ref 0–0.1)
BUN BLDV-MCNC: 21 MG/DL (ref 7–22)
CALCIUM SERPL-MCNC: 8.6 MG/DL (ref 8.5–10.5)
CHLORIDE BLD-SCNC: 104 MEQ/L (ref 98–111)
CO2: 25 MEQ/L (ref 23–33)
CREAT SERPL-MCNC: 1 MG/DL (ref 0.4–1.2)
EOSINOPHIL # BLD: 0 %
EOSINOPHILS ABSOLUTE: 0 THOU/MM3 (ref 0–0.4)
ERYTHROCYTE [DISTWIDTH] IN BLOOD BY AUTOMATED COUNT: 13.4 % (ref 11.5–14.5)
ERYTHROCYTE [DISTWIDTH] IN BLOOD BY AUTOMATED COUNT: 44.1 FL (ref 35–45)
GFR SERPL CREATININE-BSD FRML MDRD: 54 ML/MIN/1.73M2
GLUCOSE BLD-MCNC: 125 MG/DL (ref 70–108)
HCT VFR BLD CALC: 38.4 % (ref 37–47)
HEMOGLOBIN: 12.7 GM/DL (ref 12–16)
IMMATURE GRANS (ABS): 0.01 THOU/MM3 (ref 0–0.07)
IMMATURE GRANULOCYTES: 0.3 %
LYMPHOCYTES # BLD: 27.7 %
LYMPHOCYTES ABSOLUTE: 1 THOU/MM3 (ref 1–4.8)
MCH RBC QN AUTO: 29.3 PG (ref 26–33)
MCHC RBC AUTO-ENTMCNC: 33.1 GM/DL (ref 32.2–35.5)
MCV RBC AUTO: 88.7 FL (ref 81–99)
MONOCYTES # BLD: 3.3 %
MONOCYTES ABSOLUTE: 0.1 THOU/MM3 (ref 0.4–1.3)
NUCLEATED RED BLOOD CELLS: 0 /100 WBC
PLATELET # BLD: 180 THOU/MM3 (ref 130–400)
PMV BLD AUTO: 9.7 FL (ref 9.4–12.4)
POTASSIUM REFLEX MAGNESIUM: 4.6 MEQ/L (ref 3.5–5.2)
RBC # BLD: 4.33 MILL/MM3 (ref 4.2–5.4)
SEG NEUTROPHILS: 68.7 %
SEGMENTED NEUTROPHILS ABSOLUTE COUNT: 2.5 THOU/MM3 (ref 1.8–7.7)
SODIUM BLD-SCNC: 139 MEQ/L (ref 135–145)
WBC # BLD: 3.6 THOU/MM3 (ref 4.8–10.8)

## 2022-08-25 PROCEDURE — 80048 BASIC METABOLIC PNL TOTAL CA: CPT

## 2022-08-25 PROCEDURE — 99232 SBSQ HOSP IP/OBS MODERATE 35: CPT | Performed by: PHYSICIAN ASSISTANT

## 2022-08-25 PROCEDURE — 1200000003 HC TELEMETRY R&B

## 2022-08-25 PROCEDURE — 85025 COMPLETE CBC W/AUTO DIFF WBC: CPT

## 2022-08-25 PROCEDURE — 6360000004 HC RX CONTRAST MEDICATION: Performed by: SURGERY

## 2022-08-25 PROCEDURE — 99222 1ST HOSP IP/OBS MODERATE 55: CPT | Performed by: SURGERY

## 2022-08-25 PROCEDURE — 74183 MRI ABD W/O CNTR FLWD CNTR: CPT

## 2022-08-25 PROCEDURE — 6370000000 HC RX 637 (ALT 250 FOR IP): Performed by: PHYSICIAN ASSISTANT

## 2022-08-25 PROCEDURE — A9579 GAD-BASE MR CONTRAST NOS,1ML: HCPCS | Performed by: SURGERY

## 2022-08-25 PROCEDURE — 6360000002 HC RX W HCPCS

## 2022-08-25 PROCEDURE — C9113 INJ PANTOPRAZOLE SODIUM, VIA: HCPCS

## 2022-08-25 PROCEDURE — 36415 COLL VENOUS BLD VENIPUNCTURE: CPT

## 2022-08-25 PROCEDURE — 2580000003 HC RX 258: Performed by: PHYSICIAN ASSISTANT

## 2022-08-25 PROCEDURE — 6360000002 HC RX W HCPCS: Performed by: PHYSICIAN ASSISTANT

## 2022-08-25 RX ORDER — PANTOPRAZOLE SODIUM 40 MG/10ML
40 INJECTION, POWDER, LYOPHILIZED, FOR SOLUTION INTRAVENOUS DAILY
Status: DISCONTINUED | OUTPATIENT
Start: 2022-08-25 | End: 2022-08-31 | Stop reason: HOSPADM

## 2022-08-25 RX ADMIN — PROPAFENONE HYDROCHLORIDE 150 MG: 150 TABLET, FILM COATED ORAL at 02:24

## 2022-08-25 RX ADMIN — GADOTERIDOL 15 ML: 279.3 INJECTION, SOLUTION INTRAVENOUS at 14:13

## 2022-08-25 RX ADMIN — ATORVASTATIN CALCIUM 80 MG: 80 TABLET, FILM COATED ORAL at 02:24

## 2022-08-25 RX ADMIN — HYDROMORPHONE HYDROCHLORIDE 0.25 MG: 1 INJECTION, SOLUTION INTRAMUSCULAR; INTRAVENOUS; SUBCUTANEOUS at 20:56

## 2022-08-25 RX ADMIN — ISOSORBIDE MONONITRATE 30 MG: 30 TABLET, EXTENDED RELEASE ORAL at 08:24

## 2022-08-25 RX ADMIN — PANTOPRAZOLE SODIUM 40 MG: 40 INJECTION, POWDER, FOR SOLUTION INTRAVENOUS at 17:30

## 2022-08-25 RX ADMIN — ATORVASTATIN CALCIUM 80 MG: 80 TABLET, FILM COATED ORAL at 20:54

## 2022-08-25 RX ADMIN — METOPROLOL TARTRATE 25 MG: 25 TABLET, FILM COATED ORAL at 20:54

## 2022-08-25 RX ADMIN — HYDROMORPHONE HYDROCHLORIDE 0.25 MG: 1 INJECTION, SOLUTION INTRAMUSCULAR; INTRAVENOUS; SUBCUTANEOUS at 13:11

## 2022-08-25 RX ADMIN — ONDANSETRON 4 MG: 2 INJECTION INTRAMUSCULAR; INTRAVENOUS at 01:11

## 2022-08-25 RX ADMIN — PROPAFENONE HYDROCHLORIDE 150 MG: 150 TABLET, FILM COATED ORAL at 08:24

## 2022-08-25 RX ADMIN — METHYLPREDNISOLONE 32 MG: 4 TABLET ORAL at 02:24

## 2022-08-25 RX ADMIN — HYDROMORPHONE HYDROCHLORIDE 0.5 MG: 1 INJECTION, SOLUTION INTRAMUSCULAR; INTRAVENOUS; SUBCUTANEOUS at 02:16

## 2022-08-25 RX ADMIN — SODIUM CHLORIDE: 9 INJECTION, SOLUTION INTRAVENOUS at 00:06

## 2022-08-25 RX ADMIN — DIPHENHYDRAMINE HCL 50 MG: 25 TABLET ORAL at 02:24

## 2022-08-25 RX ADMIN — METOPROLOL TARTRATE 25 MG: 25 TABLET, FILM COATED ORAL at 02:24

## 2022-08-25 RX ADMIN — PROPAFENONE HYDROCHLORIDE 150 MG: 150 TABLET, FILM COATED ORAL at 20:54

## 2022-08-25 RX ADMIN — SODIUM CHLORIDE, PRESERVATIVE FREE 10 ML: 5 INJECTION INTRAVENOUS at 20:55

## 2022-08-25 RX ADMIN — METHYLPREDNISOLONE 32 MG: 4 TABLET ORAL at 08:24

## 2022-08-25 RX ADMIN — METOPROLOL TARTRATE 25 MG: 25 TABLET, FILM COATED ORAL at 08:24

## 2022-08-25 ASSESSMENT — PAIN SCALES - GENERAL
PAINLEVEL_OUTOF10: 2
PAINLEVEL_OUTOF10: 6
PAINLEVEL_OUTOF10: 7
PAINLEVEL_OUTOF10: 7

## 2022-08-25 ASSESSMENT — PAIN DESCRIPTION - DESCRIPTORS: DESCRIPTORS: ACHING

## 2022-08-25 ASSESSMENT — ENCOUNTER SYMPTOMS
VOMITING: 0
SORE THROAT: 0
RESPIRATORY NEGATIVE: 1
DIARRHEA: 0
ALLERGIC/IMMUNOLOGIC NEGATIVE: 1
COUGH: 0
NAUSEA: 1
BLOOD IN STOOL: 0
ABDOMINAL PAIN: 1
EYES NEGATIVE: 1
CONSTIPATION: 0
BACK PAIN: 0
VOMITING: 1
CHEST TIGHTNESS: 0
BACK PAIN: 1
SHORTNESS OF BREATH: 0
TROUBLE SWALLOWING: 0

## 2022-08-25 ASSESSMENT — PAIN DESCRIPTION - LOCATION
LOCATION: ABDOMEN
LOCATION: ABDOMEN

## 2022-08-25 ASSESSMENT — PAIN DESCRIPTION - ORIENTATION: ORIENTATION: MID

## 2022-08-25 NOTE — PROGRESS NOTES
Hospitalist Progress Note    Patient:  Arther Galeazzi      Unit/Bed:5K-23/023-A    YOB: 1950    MRN: [id number]       Acct: [de-identified]     PCP: Lio Avila MD    Date of Admission: 8/24/2022    Assessment/Plan:    Choledocholithiasis with abdominal pain  CT scan demonstrating dilated common bile duct with stones  Elevated bilirubin at 1.9. Repeat LFTs daily  MRCP scheduled for today  N.p.o.. Pain control and antiemetics  Consult general surgery and GI  Atrial fibrillation  Not chronically anticoagulated  Rate controlled on rythmol and metoprolol  CAD with angina pectoris and documented spasm history of MI, history of stents and CABG  On ASA and Effient-Currently held for possible procedure  Continue statin  Follows with Dr. Flora Palacios and Lasix  telemetry  Umbilical hernia with overlying abdominal wound  Following with wound clinic. Packs wound daily  Reportedly had hernia repair with mesh placement around 2003. Wound and ostomy consulted  General surgery not recommending any surgical intervention given cardiac history  History of CVA  Systolic HF   Last echo 4/5266 with an EF of 45 to 50%. Mild global hypokinesis of the left ventricle  Continue Lasix  Daily weights, strict I's and O's  Monitor fluids closely to avoid fluid overload  COPD, moderate  According to pulmonology note, on Symbicort, Spiriva, albuterol but noncompliant at times      Expected discharge date:  unknown    Disposition:    [x] Home       [] TCU       [] Rehab       [] Psych       [] SNF       [] [place]       [] Other-    Chief Complaint: Fever and abdominal pain      Hospital Course:   Initial HPI  \" Patient presents to the ED with fever and abdominal pain for several days. The patient states she had not been able to eat for several days due to pain and nausea. 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 Princess Okeefe outpatient. Outpatient records indicate she had some drainage and debris from her umbilicus for a year and there is wound associated with hernia mesh. She took oral antibiotics and is now doing topical treatment and wound care. Patient is admitted for further evaluation of pain, dilated common duct and stones in the duct. \"    8/25: CBC overall stable. BMP stable. Mild elevation of bilirubin at 1.9. MRCP is scheduled for today. General surgery unlikely to perform any procedure due to cardiac risk. Waiting for further input from GI. Blood thinners on hold given possible need for procedure       Subjective (past 24 hours):   Patient reports she has not had any vomiting since arrival.  Reports that her nausea is improved abdominal pain is still present. She states she is anxious for MRCP and to know if they are going to do any procedures or not. She denies any chest pain, palpitations, shortness of breath. Denies any melena or hematochezia.   Reports normal bowel movements    Medications:  Reviewed    Infusion Medications    sodium chloride      sodium chloride 75 mL/hr at 08/25/22 0006     Scheduled Medications    [Held by provider] aspirin  81 mg Oral Daily    atorvastatin  80 mg Oral Nightly    mometasone-formoterol  2 puff Inhalation BID    furosemide  20 mg Oral Daily    isosorbide mononitrate  30 mg Oral Daily    metoprolol tartrate  25 mg Oral BID    [Held by provider] prasugrel  10 mg Oral Daily    propafenone  150 mg Oral BID    tiotropium  2 puff Inhalation Daily    sodium chloride flush  5-40 mL IntraVENous 2 times per day    enoxaparin  40 mg SubCUTAneous Daily     PRN Meds: HYDROmorphone **OR** HYDROmorphone, albuterol sulfate HFA, sodium chloride flush, sodium chloride, ondansetron **OR** ondansetron, polyethylene glycol, acetaminophen **OR** acetaminophen, potassium chloride **OR** potassium alternative oral replacement **OR** potassium chloride, magnesium sulfate      Intake/Output Summary (Last 24 hours) 07:50 PM    WBCUA  08/24/2022 07:50 PM    BACTERIA NONE SEEN 08/24/2022 07:50 PM    RBCUA 5-10 08/24/2022 07:50 PM    BLOODU SMALL 08/24/2022 07:50 PM    SPECGRAV 1.009 07/22/2016 05:50 PM    GLUCOSEU NEGATIVE 08/24/2022 07:50 PM       Radiology:  CT ABDOMEN PELVIS W IV CONTRAST Additional Contrast? None    Result Date: 8/24/2022  CT abdomen and pelvis with contrast Comparison: CT,SR - CT ABDOMEN PELVIS WO CONTRAST - 07/22/2022 07:06 AM EDT Findings: The lung bases are clear. There are multiple tiny gallstones within the gallbladder. The common bile duct measures up to 7 mm in diameter with interval increased since the prior study. There is a new 2 mm calculus within the common bile duct just proximal to the ampulla. Multiple splenic granulomas. Unremarkable liver and bilateral adrenal glands. Moderate pancreatic volume loss with partial fatty replacement of parenchyma. Numerous peripelvic cysts within the kidneys. No bowel obstruction, pneumoperitoneum, or pneumatosis. Status post hysterectomy. Unremarkable urinary bladder. The appendix is not definitively seen. There are no secondary findings to suggest appendicitis. There is an hemangioma within the L3 vertebral body. No acute fracture or suspicious bone lesion. 1. There is evidence of cholelithiasis and choledocholithiasis. Borderline dilatation of the common bile duct with interval increase. 2. Right-sided posterior triangle hernia containing fat. This document has been electronically signed by: Miguelito Riojas MD on 08/24/2022 06:38 PM All CTs at this facility use dose modulation techniques and iterative reconstructions, and/or weight-based dosing when appropriate to reduce radiation to a low as reasonably achievable. US GALLBLADDER RUQ    Result Date: 8/24/2022  US abdomen limited Comparison: 05/10/2016 Findings: Liver normal size and echotexture. Right lobe 13.7 cm length. Main portal vein antegrade. Visualized pancreas unremarkable.  Gallstones noted without wall thickening. Common duct 8.3 mm diameter. Punctate stone noted within the common bile duct. No ultrasonographic Du sign.      Impression: Cholelithiasis and common duct dilation with small stone in common duct This document has been electronically signed by: Renay Ferrer MD on 08/24/2022 10:09 PM      DVT prophylaxis: [] Lovenox                                 [x] SCDs-thinners on hold given possible procedure                                 [] SQ Heparin                                 [] Encourage ambulation           [] Already on Anticoagulation     Code Status: Full Code    PT/OT Eval Status: Monitor    Tele:   [x] yes             [] no    Active Hospital Problems    Diagnosis Date Noted    Choledocholithiasis [K80.50] 08/24/2022     Priority: Medium    Chronic diastolic CHF (congestive heart failure) ([facility] [address] [I50.32] 12/29/2020    Atrial fibrillation ([facility] [address] [I48.91]     Presence of stent in LAD coronary artery [Z95.5] 04/12/2013       Electronically signed by Ray Lopez PA-C on 8/25/2022 at 12:12 PM

## 2022-08-25 NOTE — CONSULTS
Dr. Franko Hair @ [facility] ([address] [place] [address])      OVARY REMOVAL      TONSILLECTOMY         Social History:  reports that she quit smoking about 31 years ago. Her smoking use included cigarettes. She has never used smokeless tobacco. She reports current alcohol use. She reports that she does not use drugs. Family History: family history includes Breast Cancer (age of onset: 34) in her paternal aunt and sister; Breast Cancer (age of onset: 54) in her paternal aunt; Cancer in her brother, brother, sister, and sister; Diabetes in her brother and mother; Heart Disease in her brother, father, maternal aunt, maternal grandfather, maternal grandmother, maternal uncle, mother, and sister; High Blood Pressure in her mother; Stroke in her paternal grandfather and sister. Review of Systems:   -General ROS: positive for  - chills and fever  Respiratory ROS: no cough, shortness of breath, or wheezing  Cardiovascular ROS: no chest pain or dyspnea on exertion  positive for - irregular heartbeat and atrial fibrillation, CAD  Gastrointestinal ROS: positive for - abdominal pain, appetite loss, heartburn, nausea/vomiting, and change in urine color to orange    Allergies: Celebrex [celecoxib], Contrast [iodides], Statins, Naprosyn [naproxen], and Shrimp flavor    Home Meds:   Prior to Admission medications    Medication Sig Start Date End Date Taking?  Authorizing Provider   prasugrel (EFFIENT) 10 MG TABS TAKE 1 TABLET DAILY 8/15/22   Carrie Santos MD   isosorbide mononitrate (IMDUR) 30 MG extended release tablet TAKE 1 TABLET DAILY 8/15/22   Edward Chavira MD   metoprolol tartrate (LOPRESSOR) 25 MG tablet TAKE 1 TABLET TWICE A DAY 8/15/22   Edward Chavira MD   furosemide (LASIX) 20 MG tablet TAKE 1 TABLET DAILY 7/20/22   Carrie Santos MD   propafenone (RYTHMOL) 150 MG tablet TAKE 1 TABLET TWICE A DAY 7/5/22   Carrie Santos MD   UNABLE TO FIND Liquid cough medicine with codeine per patient. Historical Provider, MD   budesonide-formoterol (SYMBICORT) 160-4.5 MCG/ACT AERO Inhale 2 puffs into the lungs 2 times daily Rinse mouth after use and use with spacer 4/11/22   PEDRO LUIS Blankenship CNP   Spacer/Aero-Holding Larene [place] (AEROCHAMBER MV) MISC 1 each by Does not apply route in the morning and at bedtime With Symbicort inhaler 4/11/22   PEDRO LUIS Blankenship CNP   benzonatate (TESSALON PERLES) 100 MG capsule Take 2 capsules by mouth 3 times daily as needed for Cough 4/11/22 10/8/22  PEDRO LUIS Blankenship CNP   ALBUTEROL IN Inhale into the lungs    Historical Provider, MD   tiotropium (SPIRIVA RESPIMAT) 2.5 MCG/ACT AERS inhaler Inhale 2 puffs into the lungs daily 1/11/22 1/11/23  PEDRO LUIS Blankenship CNP   atorvastatin (LIPITOR) 80 MG tablet Take 1 tablet by mouth nightly 6/10/21   Cece Moseley MD   ondansetron (ZOFRAN-ODT) 4 MG disintegrating tablet Take 4 mg by mouth 4 times daily as needed for Nausea or Vomiting    Historical Provider, MD   aspirin 81 MG tablet Take 81 mg by mouth daily    Historical Provider, MD   nitroGLYCERIN (NITROSTAT) 0.4 MG SL tablet Place 1 tablet under the tongue every 5 minutes as needed for Chest pain.   Patient not taking: Reported on 8/24/2022 2/25/15   PEDRO LUIS Robles CNP       Current Meds:  Current Facility-Administered Medications:     HYDROmorphone (DILAUDID) injection 0.25 mg, 0.25 mg, IntraVENous, Q4H PRN, 0.25 mg at 08/25/22 1311 **OR** HYDROmorphone (DILAUDID) injection 0.5 mg, 0.5 mg, IntraVENous, Q4H PRN, Yady Rubio PA-C, 0.5 mg at 08/25/22 0216    albuterol sulfate HFA (PROVENTIL;VENTOLIN;PROAIR) 108 (90 Base) MCG/ACT inhaler 2 puff, 2 puff, Inhalation, Q6H PRN, Yady Rubio PA-C    [Held by provider] aspirin chewable tablet 81 mg, 81 mg, Oral, Daily, Crissy Noonan PA-C    atorvastatin (LIPITOR) tablet 80 mg, 80 mg, Oral, Nightly, Yady Rubio PA-C, 80 mg at 08/25/22 0224    mometasone-formoterol (DULERA) 200-5 MCG/ACT inhaler 2 puff, 2 puff, Inhalation, BID, ALEXIS PoloC    furosemide (LASIX) tablet 20 mg, 20 mg, Oral, Daily, Crissy Noonan PA-C    isosorbide mononitrate (IMDUR) extended release tablet 30 mg, 30 mg, Oral, Daily, [employer], PA-C, 30 mg at 08/25/22 7965    metoprolol tartrate (LOPRESSOR) tablet 25 mg, 25 mg, Oral, BID, [employer], PA-C, 25 mg at 08/25/22 1489    [Held by provider] prasugrel (EFFIENT) tablet 10 mg, 10 mg, Oral, Daily, ALEXIS PoloC    propafenone (RYTHMOL) tablet 150 mg, 150 mg, Oral, BID, [employer], PA-C, 150 mg at 08/25/22 0824    tiotropium (SPIRIVA RESPIMAT) 2.5 MCG/ACT inhaler 2 puff, 2 puff, Inhalation, Daily, ALEXIS PoloC    sodium chloride flush 0.9 % injection 5-40 mL, 5-40 mL, IntraVENous, 2 times per day, [employer], PA-C    sodium chloride flush 0.9 % injection 5-40 mL, 5-40 mL, IntraVENous, PRN, Crissy Noonan PA-C    0.9 % sodium chloride infusion, , IntraVENous, PRN, [employer], PA-C    [Held by provider] enoxaparin (LOVENOX) injection 40 mg, 40 mg, SubCUTAneous, Daily, Crissy Noonan PA-C    ondansetron (ZOFRAN-ODT) disintegrating tablet 4 mg, 4 mg, Oral, Q8H PRN **OR** ondansetron (ZOFRAN) injection 4 mg, 4 mg, IntraVENous, Q6H PRN, [employer], PA-C, 4 mg at 08/25/22 0111    polyethylene glycol (GLYCOLAX) packet 17 g, 17 g, Oral, Daily PRN, [employer], PALebronC    acetaminophen (TYLENOL) tablet 650 mg, 650 mg, Oral, Q6H PRN **OR** acetaminophen (TYLENOL) suppository 650 mg, 650 mg, Rectal, Q6H PRN, Crissy Noonan PA-C    0.9 % sodium chloride infusion, , IntraVENous, Continuous, [employer], PA-C, Last Rate: 75 mL/hr at 08/25/22 0006, New Bag at 08/25/22 0006    potassium chloride (KLOR-CON M) extended release tablet 40 mEq, 40 mEq, Oral, PRN **OR** potassium bicarb-citric acid (EFFER-K) effervescent tablet 40 mEq, 40 mEq, Oral, PRN **OR** potassium chloride 10 mEq/100 mL IVPB (Peripheral Line), 10 mEq, IntraVENous, PRN, Crissy MINA Natanael Peoples PA-C    magnesium sulfate 2000 mg in 50 mL IVPB premix, 2,000 mg, IntraVENous, PRN, Jb Hartman PA-C    Vital Signs:  Vitals:    08/25/22 1341   BP:    Pulse:    Resp: 18   Temp:    SpO2:        Weight:  Wt Readings from Last 3 Encounters:   08/25/22 149 lb 8 oz (67.8 kg)   06/23/22 150 lb 3.2 oz (68.1 kg)   06/21/22 152 lb 6.4 oz (69.1 kg)       Physical Exam:  General Appearance:  awake, alert, oriented, in no acute distress, well developed, well nourished, and overweight  General: Pt is lying comfortably in bed , in no apparent distress. HEENT: Atraumatic, Pupils reactive, No pallor/icterus     Oral mucosa moist/No thrush  Neck: No thyroid enlargement, No cervical or supraclavicular lymphaedenopathy  CVS: Regular rate and rhythm, No murmurs. No rubs or gallops  RS: Good b/l air entry, Clear to auscultation b/l  Abd: soft, non-tender, non-distended, no visible veins, scars, No hepatosplenomegaly or palpable masses, bowel sounds active.   Ext: No clubbing, cyanosis, edema  CNS: alert, oriented, no gross focal motor deficits    Labs:   Lab Results   Component Value Date/Time    WBC 3.6 08/25/2022 05:05 AM    HGB 12.7 08/25/2022 05:05 AM    HCT 38.4 08/25/2022 05:05 AM    MCV 88.7 08/25/2022 05:05 AM     08/25/2022 05:05 AM     Lab Results   Component Value Date/Time     08/25/2022 05:05 AM    K 4.6 08/25/2022 05:05 AM     08/25/2022 05:05 AM    CO2 25 08/25/2022 05:05 AM    BUN 21 08/25/2022 05:05 AM    CREATININE 1.0 08/25/2022 05:05 AM    GLUCOSE 125 08/25/2022 05:05 AM    CALCIUM 8.6 08/25/2022 05:05 AM     Lab Results   Component Value Date/Time    ALKPHOS 113 08/24/2022 04:34 PM    ALT 38 08/24/2022 04:34 PM    AST 24 08/24/2022 04:34 PM    PROT 6.7 08/24/2022 04:34 PM    BILITOT 1.9 08/24/2022 04:34 PM    BILIDIR <0.2 08/31/2021 06:09 PM    LABALBU 3.8 08/24/2022 04:34 PM     Lab Results   Component Value Date/Time    LACTA 1.0 08/31/2021 06:09 PM     Lab Results   Component Value Date/Time    AMYLASE 40 03/03/2013 03:55 PM     Lab Results   Component Value Date/Time    LIPASE 30.4 08/24/2022 04:34 PM     Lab Results   Component Value Date/Time    INR 0.95 12/14/2020 03:30 PM       Radiology:  CT abdomen and pelvis with contrast       Comparison: CT,SR - CT ABDOMEN PELVIS WO CONTRAST - 07/22/2022 07:06 AM    EDT       Findings: The lung bases are clear. There are multiple tiny gallstones within the gallbladder. The common bile    duct measures up to 7 mm in diameter with interval increased since the    prior study. There is a new 2 mm calculus within the common bile duct just    proximal to the ampulla. Multiple splenic granulomas. Unremarkable liver and bilateral adrenal    glands. Moderate pancreatic volume loss with partial fatty replacement of    parenchyma. Numerous peripelvic cysts within the kidneys. No bowel obstruction, pneumoperitoneum, or pneumatosis. Status post hysterectomy. Unremarkable urinary bladder. The appendix is    not definitively seen. There are no secondary findings to suggest    appendicitis. There is an hemangioma within the L3 vertebral body. No acute fracture or    suspicious bone lesion. Impression   1. There is evidence of cholelithiasis and choledocholithiasis. Borderline    dilatation of the common bile duct with interval increase. 2. Right-sided posterior triangle hernia containing fat. This document has been electronically signed by: Kwame Castellanos MD on    08/24/2022 06:38 PM       All CTs at this facility use dose modulation techniques and iterative    reconstructions, and/or weight-based dosing   when appropriate to reduce radiation to a low as reasonably achievable. US abdomen limited       Comparison: 05/10/2016       Findings:       Liver normal size and echotexture. Right lobe 13.7 cm length. Main portal vein antegrade. Visualized pancreas unremarkable. Gallstones noted without wall thickening. Common duct 8.3 mm diameter. Punctate stone noted within the common bile duct. No ultrasonographic Du sign. Impression   Impression:       Cholelithiasis and common duct dilation with small stone in common duct       ASSESSMENT AND PLAN:  Cholelithiasis-MRCP is completed and awaiting results. Surgery has been consulted but she is a high risk due to extensive heart history. ASA and Effient on hold. ERCP if clinically necessary. Common bile duct dilation-MRCP is completed and awaiting results. Abdominal pain- controlled with pain medication and PPI ordered  Nausea/Vomiting- Patient NPO ant-emetics PRN        Thank Kamini Vitale for allowing me to participate in the care of this patient. Assessment and POC were discussed with Dr Sam Swartz.     MRCP reviewed small stone seen at distal CBD , plan for ERCP [address], APRN - CNP  8/25/2022  2:49 PM     Patient is seen independently from the nurse practitioner and all  the pertinent data along with physical examination and assessment and plans are all obtained by my self and  Laboratory data, Radiology results, medications all are reviewed by my self and care is discussed extensively with the patient  and the patients nurse and all agree with plan and in addition see orders and plans    Electronically signed by Dalia Baez MD on 8/25/2022 at 5:04 PM

## 2022-08-25 NOTE — PLAN OF CARE
Problem: Safety - Adult  Goal: Free from fall injury  Outcome: Progressing     Problem: ABCDS Injury Assessment  Goal: Absence of physical injury  Outcome: Progressing  Note:  Bed alarm on. Call light near patient.

## 2022-08-25 NOTE — H&P
Hospitalist - History & Physical      Patient: Ernie Jay    Unit/Bed:5K-23/023-A  YOB: 1950  MRN: [id number]   Acct: [de-identified]   PCP: Marc Denny MD      Assessment and Plan:        Abdominal pain:   Presumed choledocholithiasis secondary to dilated common bile duct and stones in the duct on CT scan  MRCP in am  NPO  Pain control and antiemetics  Atrial fibrillation:   Not chronically anticoagulated  Rate controlled on Rythmol  CAD:   On ASA and Effient - currently held for possible procedure  Continue statin    CC:  fever and abdominal pain    HPI: Patient presents to the ED with fever and abdominal pain for several days. The patient states she had not been able to eat for several days due to pain and nausea. 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 Jennifer Gold outpatient. Outpatient records indicate she had some drainage and debris from her umbilicus for a year and there is wound associated with hernia mesh. She took oral antibiotics and is now doing topical treatment and wound care. Patient is admitted for further evaluation of pain, dilated common duct and stones in the duct. ROS: Review of Systems   Constitutional:  Positive for fever. HENT: Negative. Eyes: Negative. Respiratory: Negative. Cardiovascular: Negative. Gastrointestinal:  Positive for abdominal pain, nausea and vomiting. Endocrine: Negative. Genitourinary: Negative. Musculoskeletal:  Positive for back pain. Skin: Negative. Allergic/Immunologic: Negative. Neurological: Negative. Hematological: Negative. Psychiatric/Behavioral: Negative.        PMH:    Past Medical History:   Diagnosis Date    Asthma     Atrial fibrillation ([address]     Blood clot associated with vein wall inflammation     CAD (coronary artery disease)     Cerebral artery occlusion with cerebral infarction [facility])     CHF (congestive heart failure) (HCC)     COPD (chronic obstructive pulmonary disease) (HCC)     History of blood transfusion     Hx of blood clots     Hyperlipidemia     Hypertension     MI, old     Migraine     Pneumonia     PONV (postoperative nausea and vomiting)     Psychiatric problem      SHX:    Social History     Socioeconomic History    Marital status:       Spouse name: Vanesa Garay    Number of children: 0    Years of education: 12    Highest education level: Not on file   Occupational History    Not on file   Tobacco Use    Smoking status: Former     Years: 27.00     Types: Cigarettes     Quit date: 3/1/1991     Years since quittin.5    Smokeless tobacco: Never   Vaping Use    Vaping Use: Never used   Substance and Sexual Activity    Alcohol use: Yes     Comment: occasional    Drug use: No    Sexual activity: Yes     Partners: Male   Other Topics Concern    Not on file   Social History Narrative    Not on file     Social Determinants of Health     Financial Resource Strain: Not on file   Food Insecurity: Not on file   Transportation Needs: Not on file   Physical Activity: Not on file   Stress: Not on file   Social Connections: Not on file   Intimate Partner Violence: Not on file   Housing Stability: Not on file     FHX:   Family History   Problem Relation Age of Onset    Diabetes Mother     Heart Disease Mother     High Blood Pressure Mother     Cancer Sister         luekemia  passed age 28    Heart Disease Father     Cancer Brother         lung passed age 37    Heart Disease Maternal Aunt         MI    Heart Disease Maternal Uncle         MI    Heart Disease Maternal Grandmother         MI    Heart Disease Maternal Grandfather         MI    Stroke Paternal Grandfather     Cancer Sister     Heart Disease Sister         atrial fib    Breast Cancer Sister 34    Cancer Brother         throat cancer     Heart Disease Brother         needs stents and has atrial fib    Diabetes Brother     Stroke Sister     Breast Cancer Paternal Aunt 34    Breast Cancer Paternal Aunt 54     Allergies: Allergies   Allergen Reactions    Celebrex [Celecoxib] Anaphylaxis     Unable to breath Turns red all over    Contrast [Iodides] Hives    Statins      Will obtain lipid panel and re-evaluate. Takes lipitor at home    Naprosyn [Naproxen] Nausea And Vomiting    Shrimp Flavor Nausea And Vomiting     Medications:      No current facility-administered medications on file prior to encounter. Current Outpatient Medications on File Prior to Encounter   Medication Sig Dispense Refill    prasugrel (EFFIENT) 10 MG TABS TAKE 1 TABLET DAILY 90 tablet 3    isosorbide mononitrate (IMDUR) 30 MG extended release tablet TAKE 1 TABLET DAILY 90 tablet 3    metoprolol tartrate (LOPRESSOR) 25 MG tablet TAKE 1 TABLET TWICE A  tablet 3    furosemide (LASIX) 20 MG tablet TAKE 1 TABLET DAILY 90 tablet 1    propafenone (RYTHMOL) 150 MG tablet TAKE 1 TABLET TWICE A  tablet 1    UNABLE TO FIND Liquid cough medicine with codeine per patient. budesonide-formoterol (SYMBICORT) 160-4.5 MCG/ACT AERO Inhale 2 puffs into the lungs 2 times daily Rinse mouth after use and use with spacer 30.6 g 1    Spacer/Aero-Holding [place] (AEROCHAMBER MV) MISC 1 each by Does not apply route in the morning and at bedtime With Symbicort inhaler 1 each 0    benzonatate (TESSALON PERLES) 100 MG capsule Take 2 capsules by mouth 3 times daily as needed for Cough 90 capsule 5    ALBUTEROL IN Inhale into the lungs      tiotropium (SPIRIVA RESPIMAT) 2.5 MCG/ACT AERS inhaler Inhale 2 puffs into the lungs daily 3 each 3    atorvastatin (LIPITOR) 80 MG tablet Take 1 tablet by mouth nightly 90 tablet 3    ondansetron (ZOFRAN-ODT) 4 MG disintegrating tablet Take 4 mg by mouth 4 times daily as needed for Nausea or Vomiting      aspirin 81 MG tablet Take 81 mg by mouth daily      nitroGLYCERIN (NITROSTAT) 0.4 MG SL tablet Place 1 tablet under the tongue every 5 minutes as needed for Chest pain.  (Patient not taking: Reported on 8/24/2022) 25 tablet 0           Labs:   Recent Results (from the past 24 hour(s))   CBC with Auto Differential    Collection Time: 08/24/22  4:34 PM   Result Value Ref Range    WBC 6.6 4.8 - 10.8 thou/mm3    RBC 4.70 4.20 - 5.40 mill/mm3    Hemoglobin 13.8 12.0 - 16.0 gm/dl    Hematocrit 40.6 37.0 - 47.0 %    MCV 86.4 81.0 - 99.0 fL    MCH 29.4 26.0 - 33.0 pg    MCHC 34.0 32.2 - 35.5 gm/dl    RDW-CV 13.6 11.5 - 14.5 %    RDW-SD 42.7 35.0 - 45.0 fL    Platelets 950 021 - 856 thou/mm3    MPV 9.7 9.4 - 12.4 fL    Seg Neutrophils 55.3 %    Lymphocytes 29.4 %    Monocytes 12.6 %    Eosinophils 2.3 %    Basophils 0.2 %    Immature Granulocytes 0.2 %    Segs Absolute 3.6 1.8 - 7.7 thou/mm3    Lymphocytes Absolute 1.9 1.0 - 4.8 thou/mm3    Monocytes Absolute 0.8 0.4 - 1.3 thou/mm3    Eosinophils Absolute 0.2 0.0 - 0.4 thou/mm3    Basophils Absolute 0.0 0.0 - 0.1 thou/mm3    Immature Grans (Abs) 0.01 0.00 - 0.07 thou/mm3    nRBC 0 /100 wbc   Comprehensive Metabolic Panel w/ Reflex to MG    Collection Time: 08/24/22  4:34 PM   Result Value Ref Range    Glucose 109 (H) 70 - 108 mg/dL    Creatinine 1.0 0.4 - 1.2 mg/dL    BUN 21 7 - 22 mg/dL    Sodium 136 135 - 145 meq/L    Potassium reflex Magnesium 3.9 3.5 - 5.2 meq/L    Chloride 99 98 - 111 meq/L    CO2 23 23 - 33 meq/L    Calcium 9.3 8.5 - 10.5 mg/dL    AST 24 5 - 40 U/L    Alkaline Phosphatase 113 38 - 126 U/L    Total Protein 6.7 6.1 - 8.0 g/dL    Albumin 3.8 3.5 - 5.1 g/dL    Total Bilirubin 1.9 (H) 0.3 - 1.2 mg/dL    ALT 38 11 - 66 U/L   Lipase    Collection Time: 08/24/22  4:34 PM   Result Value Ref Range    Lipase 30.4 5.6 - 51.3 U/L   Anion Gap    Collection Time: 08/24/22  4:34 PM   Result Value Ref Range    Anion Gap 14.0 8.0 - 16.0 meq/L   Osmolality    Collection Time: 08/24/22  4:34 PM   Result Value Ref Range    Osmolality Calc 275.5 275.0 - 300.0 mOsmol/kg   Glomerular Filtration Rate, Estimated    Collection Time: 08/24/22  4:34 PM   Result Value Neutrophils 68.7 %    Lymphocytes 27.7 %    Monocytes 3.3 %    Eosinophils 0.0 %    Basophils 0.0 %    Immature Granulocytes 0.3 %    Segs Absolute 2.5 1.8 - 7.7 thou/mm3    Lymphocytes Absolute 1.0 1.0 - 4.8 thou/mm3    Monocytes Absolute 0.1 (L) 0.4 - 1.3 thou/mm3    Eosinophils Absolute 0.0 0.0 - 0.4 thou/mm3    Basophils Absolute 0.0 0.0 - 0.1 thou/mm3    Immature Grans (Abs) 0.01 0.00 - 0.07 thou/mm3    nRBC 0 /100 wbc   Anion Gap    Collection Time: 08/25/22  5:05 AM   Result Value Ref Range    Anion Gap 10.0 8.0 - 16.0 meq/L   Glomerular Filtration Rate, Estimated    Collection Time: 08/25/22  5:05 AM   Result Value Ref Range    Est, Glom Filt Rate 54 (A) ml/min/1.73m2         Vital Signs: T: 97.9F P: 80 RR: 16 B/P: 113/58: FiO2: RA: O2 Sat:95%: I/O:   Intake/Output Summary (Last 24 hours) at 8/25/2022 0706  Last data filed at 8/25/2022 0443  Gross per 24 hour   Intake 0 ml   Output 0 ml   Net 0 ml         General:   mild distress due to pain  HEENT:  normocephalic and atraumatic. No scleral icterus. PEARLA, mucous membranes moist  Neck: supple. Trachea midline. No JVD. Full ROM, no meningismus. Lungs: clear to auscultation. No retractions, no accessory muscle use. Cardiac: RRR, no murmur, 2+ pulses  Abdomen: soft. RUQ and epigastric tenderness. Bowel sounds active  Extremities:  No clubbing, cyanosis x 4, no edema    Vasculature: capillary refill < 3 seconds. Skin:  warm and dry. no visible rashes  Psych:  Alert and oriented x3. Affect appropriate  Lymph:  No supraclavicular adenopathy. Neurologic:  CN II-XII grossly intact. No focal deficit. Data: (All radiographs, tracings, PFTs, and imaging are personally viewed and interpreted unless otherwise noted).    EKG:  none this encounter        Electronically signed by  Misael Robles PA-C

## 2022-08-25 NOTE — CARE COORDINATION
8/25/22, 11:51 AM EDT  DISCHARGE PLANNING EVALUATION:    Todd Henry       Admitted: 8/24/2022/ Mamie Perdomo day: 1   [facility]: [facility] Reason for admit: Choledocholithiasis [K80.50]   PMH:  has a past medical history of Asthma, Atrial fibrillation ([address] Blood clot associated with vein wall inflammation, CAD (coronary artery disease), Cerebral artery occlusion with cerebral infarction ([address] CHF (congestive heart failure) ([address] COPD (chronic obstructive pulmonary disease) ([address] History of blood transfusion, Hx of blood clots, Hyperlipidemia, Hypertension, MI, old, Migraine, Pneumonia, PONV (postoperative nausea and vomiting), and Psychiatric problem. Procedure: 8/25/2022 MRCP  CT of abdomen:  1. There is evidence of cholelithiasis and choledocholithiasis. Borderline    dilatation of the common bile duct with interval increase. 2. Right-sided posterior triangle hernia   containing fat. Barriers to Discharge:  Patient presents with   abdominal pain and nausea. Consults to GI and General Surgery, IV fluids, Aspirin and Effient on hold, Lovenox, Dulera inhaler, Zosyn x 1 dose, prn Dilaudid and Zofran, Magnesium and Potassium replacement protocol, NPO, telemetry, up with assistance. PCP: Rd mAaya MD  Readmission Risk Score: 10.2%  Patient's Healthcare Decision Maker: Named in [address] [place] [address]    Patient Goals/Plan/Treatment Preferences: Met with Kenzie Meza. She resides at home alone. Kenzie Hannahmango verifies her insurance and PCP. She is able to afford her medications and has the DME needed. She will have transportation to home. Kenzie Meza is open to [facility] if needed. Monitoring for needs closer to discharge. Transportation/Food Security/Housekeeping Addressed:  No issues identified.

## 2022-08-25 NOTE — ED NOTES
Patient transferred to [facility] room 023 nurse informed.       Sumi Negron  08/24/22 7629

## 2022-08-25 NOTE — CONSULTS
Henrik Lema MD  General Surgery Consult  Pt Name: Marco Sagastume  MRN: [id number]  YOB: 1950  Date of evaluation: 8/25/2022  Primary Care Physician: Nathalia Whiteside MD  Consulting Physician: emergency department   Reason for evaluation: choledocholithasis       Chief complaint:      Chief Complaint   Patient presents with    Abdominal Pain    Fever        SUBJECTIVE:     HPI:    Jonas Mckeon is a 67 y. o.female who has a significant past medical history of coronary artery disease that is not amenable to percutaneous ATS cardiac intervention or CABG. She said she was told she could never have surgery. She developed acute onset of severe 10 out of 10 abdominal pain with nausea vomiting and p.o. intolerance. The only thing that has relieved the pain is pain medicine. She presented the emergency department where she was diagnosed with gallstones and choledocholithiasis. Had elevated bilirubin. GI was consulted and wants an ERCP. Patient also complains of a chronic abdominal wound from her hernia repair that she had many years ago. Wound care is following, its being treated as impetigo with fungal dermatitis. Review of Systems:  Review of Systems   Constitutional:  Negative for appetite change, fatigue and fever. HENT:  Negative for ear pain, sore throat and trouble swallowing. Respiratory:  Negative for cough, chest tightness and shortness of breath. Cardiovascular:  Negative for chest pain, palpitations and leg swelling. Gastrointestinal:  Positive for abdominal pain and nausea. Negative for blood in stool, constipation, diarrhea and vomiting. Endocrine: Negative for cold intolerance. Genitourinary:  Negative for difficulty urinating and urgency. Musculoskeletal:  Negative for back pain and joint swelling. Skin:  Positive for rash. Negative for wound. Allergic/Immunologic: Negative for immunocompromised state. Neurological:  Negative for seizures and headaches. Psychiatric/Behavioral:  Negative for hallucinations and suicidal ideas. The patient is not nervous/anxious. Past Medical History  Past Medical History:   Diagnosis Date    Asthma     Atrial fibrillation ([address]     Blood clot associated with vein wall inflammation     CAD (coronary artery disease)     Cerebral artery occlusion with cerebral infarction (HCC)     CHF (congestive heart failure) (HCC)     COPD (chronic obstructive pulmonary disease) (HCC)     History of blood transfusion     Hx of blood clots     Hyperlipidemia     Hypertension     MI, old     Migraine     Pneumonia     PONV (postoperative nausea and vomiting)     Psychiatric problem        Past Surgical History  Past Surgical History:   Procedure Laterality Date    APPENDECTOMY      CARDIAC SURGERY  1998 in [facility]    pericardial window   [address]    ablation    COLONOSCOPY      CORONARY ANGIOPLASTY WITH STENT PLACEMENT  2013     [facility]    CORONARY ARTERY BYPASS GRAFT      ENDOSCOPY, COLON, DIAGNOSTIC      EYE SURGERY Bilateral 07/2020    cataract removal    HERNIA REPAIR      long ago Dr. Kaylah Cano @ [facility] (CERVIX STATUS UNKNOWN)      OVARY REMOVAL      TONSILLECTOMY         Medications  Prior to Admission medications    Medication Sig Start Date End Date Taking? Authorizing Provider   prasugrel (EFFIENT) 10 MG TABS TAKE 1 TABLET DAILY 8/15/22   Carrie Shaw MD   isosorbide mononitrate (IMDUR) 30 MG extended release tablet TAKE 1 TABLET DAILY 8/15/22   Kami Dent MD   metoprolol tartrate (LOPRESSOR) 25 MG tablet TAKE 1 TABLET TWICE A DAY 8/15/22   Kami Dent MD   furosemide (LASIX) 20 MG tablet TAKE 1 TABLET DAILY 7/20/22   Carrie Shaw MD   propafenone (RYTHMOL) 150 MG tablet TAKE 1 TABLET TWICE A DAY 7/5/22   Carrie Shaw MD   UNABLE TO FIND Liquid cough medicine with codeine per patient.     Historical Provider, MD budesonide-formoterol (SYMBICORT) 160-4.5 MCG/ACT AERO Inhale 2 puffs into the lungs 2 times daily Rinse mouth after use and use with spacer 4/11/22   PEDRO LUIS Hazel CNP   Spacer/Aero-Holding Marlyn Davies (AEROCHAMBER MV) MISC 1 each by Does not apply route in the morning and at bedtime With Symbicort inhaler 4/11/22   PEDRO LUIS Hazel CNP   benzonatate (TESSALON PERLES) 100 MG capsule Take 2 capsules by mouth 3 times daily as needed for Cough 4/11/22 10/8/22  PEDRO LUIS Hazel CNP   ALBUTEROL IN Inhale into the lungs    Historical Provider, MD   tiotropium (SPIRIVA RESPIMAT) 2.5 MCG/ACT AERS inhaler Inhale 2 puffs into the lungs daily 1/11/22 1/11/23  PEDRO LUIS Hazel CNP   atorvastatin (LIPITOR) 80 MG tablet Take 1 tablet by mouth nightly 6/10/21   Brooke Rossi MD   ondansetron (ZOFRAN-ODT) 4 MG disintegrating tablet Take 4 mg by mouth 4 times daily as needed for Nausea or Vomiting    Historical Provider, MD   aspirin 81 MG tablet Take 81 mg by mouth daily    Historical Provider, MD   nitroGLYCERIN (NITROSTAT) 0.4 MG SL tablet Place 1 tablet under the tongue every 5 minutes as needed for Chest pain. Patient not taking: Reported on 8/24/2022 2/25/15   PEDRO LUIS Frederick CNP    Scheduled Meds:  Darrold Argue by provider] aspirin  81 mg Oral Daily    atorvastatin  80 mg Oral Nightly    mometasone-formoterol  2 puff Inhalation BID    furosemide  20 mg Oral Daily    isosorbide mononitrate  30 mg Oral Daily    metoprolol tartrate  25 mg Oral BID    [Held by provider] prasugrel  10 mg Oral Daily    propafenone  150 mg Oral BID    tiotropium  2 puff Inhalation Daily    sodium chloride flush  5-40 mL IntraVENous 2 times per day    [Held by provider] enoxaparin  40 mg SubCUTAneous Daily     Continuous Infusions:   sodium chloride      sodium chloride 75 mL/hr at 08/25/22 0006     PRN Meds:. HYDROmorphone **OR** HYDROmorphone, albuterol sulfate HFA, sodium chloride flush, sodium chloride, ondansetron **OR** ondansetron, polyethylene glycol, acetaminophen **OR** acetaminophen, potassium chloride **OR** potassium alternative oral replacement **OR** potassium chloride, magnesium sulfate    Allergies  Allergies   Allergen Reactions    Celebrex [Celecoxib] Anaphylaxis     Unable to breath Turns red all over    Contrast [Iodides] Hives    Statins      Will obtain lipid panel and re-evaluate. Takes lipitor at home    Naprosyn [Naproxen] Nausea And Vomiting    Shrimp Flavor Nausea And Vomiting        Family History  Family History   Problem Relation Age of Onset    Diabetes Mother     Heart Disease Mother     High Blood Pressure Mother     Cancer Sister         luekemia  passed age 28    Heart Disease Father     Cancer Brother         lung passed age 37    Heart Disease Maternal Aunt         MI    Heart Disease Maternal Uncle         MI    Heart Disease Maternal Grandmother         MI    Heart Disease Maternal Grandfather         MI    Stroke Paternal Grandfather     Cancer Sister     Heart Disease Sister         atrial fib    Breast Cancer Sister 34    Cancer Brother         throat cancer     Heart Disease Brother         needs stents and has atrial fib    Diabetes Brother     Stroke Sister     Breast Cancer Paternal Aunt 34    Breast Cancer Paternal Aunt 54       Social History  Social History     Socioeconomic History    Marital status:      Spouse name: Ria Macias Number of children: 0    Years of education: 15    Highest education level: None   Tobacco Use    Smoking status: Former     Years: 27.00     Types: Cigarettes     Quit date: 3/1/1991     Years since quittin.5    Smokeless tobacco: Never   Vaping Use    Vaping Use: Never used   Substance and Sexual Activity    Alcohol use: Yes     Comment: occasional    Drug use: No    Sexual activity: Yes     Partners: Male         OBJECTIVE:   CURRENT VITALS:  weight is 149 lb 8 oz (67.8 kg).  Her oral RADIOLOGY:   I have personally reviewed the following films:  US GALLBLADDER RUQ   Final Result   Impression:      Cholelithiasis and common duct dilation with small stone in common duct      This document has been electronically signed by: Alex Ferrer MD on    08/24/2022 10:09 PM      CT ABDOMEN PELVIS W IV CONTRAST Additional Contrast? None   Final Result   1. There is evidence of cholelithiasis and choledocholithiasis. Borderline    dilatation of the common bile duct with interval increase. 2. Right-sided posterior triangle hernia containing fat. This document has been electronically signed by: Nickolas Combs MD on    08/24/2022 06:38 PM      All CTs at this facility use dose modulation techniques and iterative    reconstructions, and/or weight-based dosing   when appropriate to reduce radiation to a low as reasonably achievable. MRI ABDOMEN W WO CONTRAST MRCP    (Results Pending)       IMPRESSIONS:   79-year-old female with choledocholithiasis, she is a high risk surgical candidate due to her underlying cardiac distea       PLANS:   I had a long discussion with patient and family members. She is high risk for intervention, but no intervention is also high risk as she can develop ascending cholangitis which can make her septic. I am hopeful that GI can do an ERCP with sphinctertomy possible stent to allow for the stone to pass then we can discuss risk and benefit of deferring cholecystectomy, however this is too is not with its risks. Will await for GI input and medical clearance, recommend eval by cardiolgy for risk stratification.                  Electronically signed by Jermaine Harrison MD  on 8/25/2022 at 2:53 PM

## 2022-08-25 NOTE — RT PROTOCOL NOTE
RT Inhaler-Nebulizer Bronchodilator Protocol Note    There is a bronchodilator order in the chart from a provider indicating to follow the RT Bronchodilator Protocol and there is an Initiate RT Inhaler-Nebulizer Bronchodilator Protocol order as well (see protocol at bottom of note). CXR Findings:  No results found. The findings from the last RT Protocol Assessment were as follows:   History Pulmonary Disease: Chronic pulmonary disease  Respiratory Pattern: Regular pattern and RR 12-20 bpm  Breath Sounds: Slightly diminished and/or crackles  Cough: Strong, spontaneous, non-productive  Indication for Bronchodilator Therapy: Decreased or absent breath sounds, On home bronchodilators (pt states she only takes mdi's prn at home and would like to continue prn here)  Bronchodilator Assessment Score: 4  Pt states she only takes mdi's at home and would like to continue prn here. Aerosolized bronchodilator medication orders have been revised according to the RT Inhaler-Nebulizer Bronchodilator Protocol below. Respiratory Therapist to perform RT Therapy Protocol Assessment initially then follow the protocol. Repeat RT Therapy Protocol Assessment PRN for score 0-3 or on second treatment, BID, and PRN for scores above 3. No Indications - adjust the frequency to every 6 hours PRN wheezing or bronchospasm, if no treatments needed after 48 hours then discontinue using Per Protocol order mode. If indication present, adjust the RT bronchodilator orders based on the Bronchodilator Assessment Score as indicated below. Use Inhaler orders unless patient has one or more of the following: on home nebulizer, not able to hold breath for 10 seconds, is not alert and oriented, cannot activate and use MDI correctly, or respiratory rate 25 breaths per minute or more, then use the equivalent nebulizer order(s) with same Frequency and PRN reasons based on the score.   If a patient is on this medication at home then do not decrease Frequency below that used at home. 0-3 - enter or revise RT bronchodilator order(s) to equivalent RT Bronchodilator order with Frequency of every 4 hours PRN for wheezing or increased work of breathing using Per Protocol order mode. 4-6 - enter or revise RT Bronchodilator order(s) to two equivalent RT bronchodilator orders with one order with BID Frequency and one order with Frequency of every 4 hours PRN wheezing or increased work of breathing using Per Protocol order mode. 7-10 - enter or revise RT Bronchodilator order(s) to two equivalent RT bronchodilator orders with one order with TID Frequency and one order with Frequency of every 4 hours PRN wheezing or increased work of breathing using Per Protocol order mode. 11-13 - enter or revise RT Bronchodilator order(s) to one equivalent RT bronchodilator order with QID Frequency and an Albuterol order with Frequency of every 4 hours PRN wheezing or increased work of breathing using Per Protocol order mode. Greater than 13 - enter or revise RT Bronchodilator order(s) to one equivalent RT bronchodilator order with every 4 hours Frequency and an Albuterol order with Frequency of every 2 hours PRN wheezing or increased work of breathing using Per Protocol order mode. RT to enter RT Home Evaluation for COPD & MDI Assessment order using Per Protocol order mode.     Electronically signed by Yoko Bateman RCP on 8/25/2022 at 6:04 AM

## 2022-08-25 NOTE — ED PROVIDER NOTES
[address] [place] [address]       Chief Complaint   Patient presents with    Abdominal Pain    Fever       Nurses Notes reviewed and I agree except as noted in the HPI. HISTORY OF PRESENT ILLNESS    Kaykay Cuevas is a 67 y.o. female who presents with complaint of abdominal pain and fever. Patient state that she had a fever 102, she has had epigastric/right upper quadrant pain intermittently for the past 2 weeks. Patient reports nausea and vomiting, no diarrhea. Onset: Acute  Duration: Approximately 2 weeks  Timing: Currently persistent initial intermittent  Location of Pain: Epigastric/right upper quadrant pain  Intesity/severity: Moderate  Modifying Factors: Palpation  Relieved by;  Previous Episodes; Tx Before arrival: None  REVIEW OF SYSTEMS      Review of Systems   Constitutional: Negative for fever, chills, diaphoresis and fatigue. HENT: Negative for congestion, drooling, facial swelling and sore throat. Eyes: Negative for photophobia, pain and discharge. Respiratory: Negative for cough, shortness of breath, wheezing and stridor. Cardiovascular: Negative for chest pain, palpitations and leg swelling. Gastrointestinal: Positive For epigastric/upper quadrant abdominal pain, nausea and vomiting; negative for blood in stool and abdominal distention. Genitourinary: Negative for dysuria, urgency, hematuria and difficulty urinating. Musculoskeletal: Negative for gait problem, neck pain and neck stiffness. Skin; No rash, No itching  Neurological: Negative for seizures, weakness and numbness.      PAST MEDICAL HISTORY    has a past medical history of Asthma, Atrial fibrillation ([address] Blood clot associated with vein wall inflammation, CAD (coronary artery disease), Cerebral artery occlusion with cerebral infarction ([address] CHF (congestive heart failure) ([address] COPD (chronic obstructive pulmonary disease) ([address] History of blood transfusion, Hx of blood clots, Hyperlipidemia, Hypertension, MI, old, Migraine, Pneumonia, PONV (postoperative nausea and vomiting), and Psychiatric problem. SURGICAL HISTORY      has a past surgical history that includes Hysterectomy; Appendectomy; Tonsillectomy; [facility] surgery ([facility]); Coronary angioplasty with stent (2013 ); Coronary artery bypass graft; Colonoscopy; Endoscopy, colon, diagnostic; eye surgery (Bilateral, 07/2020); hernia repair; and Ovary removal.    CURRENT MEDICATIONS       Current Discharge Medication List        CONTINUE these medications which have NOT CHANGED    Details   prasugrel (EFFIENT) 10 MG TABS TAKE 1 TABLET DAILY  Qty: 90 tablet, Refills: 3      isosorbide mononitrate (IMDUR) 30 MG extended release tablet TAKE 1 TABLET DAILY  Qty: 90 tablet, Refills: 3      metoprolol tartrate (LOPRESSOR) 25 MG tablet TAKE 1 TABLET TWICE A DAY  Qty: 180 tablet, Refills: 3      furosemide (LASIX) 20 MG tablet TAKE 1 TABLET DAILY  Qty: 90 tablet, Refills: 1      propafenone (RYTHMOL) 150 MG tablet TAKE 1 TABLET TWICE A DAY  Qty: 180 tablet, Refills: 1      UNABLE TO FIND Liquid cough medicine with codeine per patient. budesonide-formoterol (SYMBICORT) 160-4.5 MCG/ACT AERO Inhale 2 puffs into the lungs 2 times daily Rinse mouth after use and use with spacer  Qty: 30.6 g, Refills: 1      Spacer/Aero-Holding Chambers (AEROCHAMBER MV) MISC 1 each by Does not apply route in the morning and at bedtime With Symbicort inhaler  Qty: 1 each, Refills: 0      benzonatate (TESSALON PERLES) 100 MG capsule Take 2 capsules by mouth 3 times daily as needed for Cough  Qty: 90 capsule, Refills: 5    Associated Diagnoses: Chronic obstructive pulmonary disease, unspecified COPD type ([facility]  Cough in adult      ALBUTEROL IN Inhale into the lungs      tiotropium (SPIRIVA RESPIMAT) 2.5 MCG/ACT AERS inhaler Inhale 2 puffs into the lungs daily  Qty: 3 each, Refills: 3    Comments: Discontinue Incruse due to ineffectiveness and persistent cough      atorvastatin (LIPITOR) 80 MG tablet Take 1 tablet by mouth nightly  Qty: 90 tablet, Refills: 3      ondansetron (ZOFRAN-ODT) 4 MG disintegrating tablet Take 4 mg by mouth 4 times daily as needed for Nausea or Vomiting      aspirin 81 MG tablet Take 81 mg by mouth daily      nitroGLYCERIN (NITROSTAT) 0.4 MG SL tablet Place 1 tablet under the tongue every 5 minutes as needed for Chest pain. Qty: 25 tablet, Refills: 0             ALLERGIES     is allergic to celebrex [celecoxib], contrast [iodides], statins, naprosyn [naproxen], and shrimp flavor. FAMILY HISTORY     She indicated that her mother is . She indicated that her father is . She indicated that two of her three sisters are alive. She indicated that two of her three brothers are alive. She indicated that her maternal grandmother is . She indicated that her maternal grandfather is . She indicated that her paternal grandfather is . She indicated that her maternal aunt is . She indicated that her maternal uncle is . She indicated that both of her paternal aunts are . family history includes Breast Cancer (age of onset: 34) in her paternal aunt and sister; Breast Cancer (age of onset: 54) in her paternal aunt; Cancer in her brother, brother, sister, and sister; Diabetes in her brother and mother; Heart Disease in her brother, father, maternal aunt, maternal grandfather, maternal grandmother, maternal uncle, mother, and sister; High Blood Pressure in her mother; Stroke in her paternal grandfather and sister. SOCIAL HISTORY      reports that she quit smoking about 31 years ago. Her smoking use included cigarettes. She has never used smokeless tobacco. She reports current alcohol use. She reports that she does not use drugs. PHYSICAL EXAM     INITIAL VITALS:  weight is 149 lb 8 oz (67.8 kg). Her oral temperature is 98 °F (36.7 °C).  Her blood pressure is 97/52 (abnormal) and her pulse is 72. Her respiration is 16 and oxygen saturation is 92%. Physical Exam   Constitutional:  well-developed and well-nourished. HENT: Head: Normocephalic, atraumatic, Bilateral external ears normal, Oropharynx mosit, No oral exudates, Nose normal.   Eyes: PERRL, EOMI, Conjunctiva normal, No discharge. No scleral icterus  Neck: Normal range of motion, No tenderness, Supple  Cardiovascular: Normal rate, regular rhythm, S1 normal and S2 normal.  Exam reveals no gallop. Pulmonary/Chest: Effort normal and breath sounds normal. No accessory muscle usage or stridor. No respiratory distress. no wheezes. has no rales. exhibits no tenderness. Abdominal: Soft. Bowel sounds are normal.  exhibits no distension. There is no tenderness. There is no rebound and no guarding. Extremities: No edema, no tenderness, no cyanosis, no clubbing. Musculoskeletal: Good range of motion in major joints is observed. No major deformities noted. Neurological: Alert and oriented ×3, normal motor function, normal sensory function, no focal deficits. GCS 15  Skin: Skin is warm, dry and intact. No rash noted. No erythema. Psychiatric: Affect normal, judgment normal, mood normal.  DIFFERENTIAL DIAGNOSIS:       DIAGNOSTIC RESULTS     EKG: All EKG's are interpreted by the Emergency Department Physician who either signs or Co-signs this chart in the absence of a cardiologist.      RADIOLOGY: non-plain film images(s) such as CT, Ultrasound and MRI are read by the radiologist.  Plain radiographic images are visualized and preliminarily interpreted by the emergency physician unless otherwise stated below.       LABS:   Labs Reviewed   COMPREHENSIVE METABOLIC PANEL W/ REFLEX TO MG FOR LOW K - Abnormal; Notable for the following components:       Result Value    Glucose 109 (*)     Total Bilirubin 1.9 (*)     All other components within normal limits   GLOMERULAR FILTRATION RATE, ESTIMATED - Abnormal; Notable for the following components:    Est, Glom Filt Rate 54 (*)     All other components within normal limits   URINE WITH REFLEXED MICRO - Abnormal; Notable for the following components:    Ketones, Urine TRACE (*)     Specific Gravity, Urine > 1.030 (*)     Blood, Urine SMALL (*)     Protein, UA TRACE (*)     Leukocyte Esterase, Urine MODERATE (*)     Character, Urine CLOUDY (*)     All other components within normal limits   BASIC METABOLIC PANEL W/ REFLEX TO MG FOR LOW K - Abnormal; Notable for the following components:    Glucose 125 (*)     All other components within normal limits   CBC WITH AUTO DIFFERENTIAL - Abnormal; Notable for the following components:    WBC 3.6 (*)     Monocytes Absolute 0.1 (*)     All other components within normal limits   GLOMERULAR FILTRATION RATE, ESTIMATED - Abnormal; Notable for the following components:    Est, Glom Filt Rate 54 (*)     All other components within normal limits   CULTURE, REFLEXED, URINE    Narrative:     Source: urine       Site: unknown collect          Current Antibiotics: not stated   CULTURE, BLOOD 1   CULTURE, BLOOD 2   CBC WITH AUTO DIFFERENTIAL   LIPASE   ANION GAP   OSMOLALITY   ANION GAP       EMERGENCY DEPARTMENT COURSE:   Vitals:    Vitals:    08/25/22 0443 08/25/22 0804 08/25/22 1341 08/25/22 1519   BP: 92/63 (!) 102/55  (!) 97/52   Pulse: 69 61  72   Resp: 18 18 18 16   Temp: 97.8 °F (36.6 °C) 97.5 °F (36.4 °C)  98 °F (36.7 °C)   TempSrc: Oral Oral  Oral   SpO2: 97% 94%  92%   Weight: 149 lb 8 oz (67.8 kg)        Patient presenting with complaint of epigastric pain. Choledocholithiasis on CT. Acute started on Zosyn IV antibiotics and pain medicine. Case discussed with GI, Dr. Laine Anderson. General surgery, Dr. Memo Leger  Patient admitted to the hospitalist.    CRITICAL CARE:       CONSULTS:  None    PROCEDURES:  None    FINAL IMPRESSION      1.  Choledocholithiasis          DISPOSITION/PLAN   Admitted    PATIENT REFERRED TO:  No follow-up provider specified.     DISCHARGE MEDICATIONS:  Current Discharge Medication List          (Please note that portions of this note were completed with a voice recognition program.  Efforts were made to edit the dictations but occasionally words are mis-transcribed.)    Adeola Antunez, DO Adeola Antunez DO  08/25/22 1938

## 2022-08-25 NOTE — PROGRESS NOTES
[id number] [facility] Wound Ostomy Continence Nurse  Progress Note       Brenda Marquez  AGE: 67 y.o. GENDER: female  : 1950 UNIT: [id number]  TODAY'S DATE:  2022    Subjective:       Salvador Moreno is a 67 y.o. female referred by:   [x] Physician/PA/APRN  [] Nursing  [] Other:     Plan:     Wound ostomy consulted for Umbilical wound. Documentation reviewed. Patient follows with Candy Kayser, CNP in [address]. Patient has treatment plan in place for intertrigo and fungal dermatitis to umbilicus. saperatec message sent to hospitalist PA to notify of established treatment plan (see below). Patient will need order for Lotrisone cream TID. If concern for infection, recommend consult to ID as patient follows with [facility] ID provider outpatient. Outside facility photo from 22      Treatment Recommendations:  Umbilical kaylyn area:   Cleanse with soap and water , pat dry. Apply Lotrisone cream topically 3 times daily to areas of fungal irritation on kaylyn wound leave open to air. Pack umbilicus with InterDry leaving a 2 inch wick, change every 5 days and as needed for soiled or dislodged.     Specialty Bed Required :   [x] Low Air Loss   [x] Pressure Redistribution  [] Fluid Immersion- Dolphin  [] Bariatric  [] RotoProne   [] Other:

## 2022-08-25 NOTE — ED NOTES
Pt vitals collected. Pt denies any needs at this time. Pt respirations easy and unlabored.      Stanislav Haque RN  08/24/22 2007

## 2022-08-26 ENCOUNTER — APPOINTMENT (OUTPATIENT)
Dept: GENERAL RADIOLOGY | Age: 72
DRG: 444 | End: 2022-08-26
Payer: MEDICARE

## 2022-08-26 ENCOUNTER — APPOINTMENT (OUTPATIENT)
Dept: NON INVASIVE DIAGNOSTICS | Age: 72
DRG: 444 | End: 2022-08-26
Payer: MEDICARE

## 2022-08-26 LAB
ALBUMIN SERPL-MCNC: 3.5 G/DL (ref 3.5–5.1)
ALP BLD-CCNC: 80 U/L (ref 38–126)
ALT SERPL-CCNC: 21 U/L (ref 11–66)
ANION GAP SERPL CALCULATED.3IONS-SCNC: 9 MEQ/L (ref 8–16)
AST SERPL-CCNC: 16 U/L (ref 5–40)
BILIRUB SERPL-MCNC: 1.4 MG/DL (ref 0.3–1.2)
BILIRUBIN DIRECT: 0.3 MG/DL (ref 0–0.3)
BUN BLDV-MCNC: 29 MG/DL (ref 7–22)
CALCIUM SERPL-MCNC: 8.9 MG/DL (ref 8.5–10.5)
CHLORIDE BLD-SCNC: 108 MEQ/L (ref 98–111)
CO2: 24 MEQ/L (ref 23–33)
CREAT SERPL-MCNC: 1.1 MG/DL (ref 0.4–1.2)
EKG ATRIAL RATE: 69 BPM
EKG ATRIAL RATE: 94 BPM
EKG P AXIS: -37 DEGREES
EKG P-R INTERVAL: 194 MS
EKG Q-T INTERVAL: 426 MS
EKG Q-T INTERVAL: 438 MS
EKG QRS DURATION: 110 MS
EKG QRS DURATION: 114 MS
EKG QTC CALCULATION (BAZETT): 448 MS
EKG QTC CALCULATION (BAZETT): 456 MS
EKG R AXIS: 1 DEGREES
EKG R AXIS: 16 DEGREES
EKG T AXIS: -15 DEGREES
EKG T AXIS: 17 DEGREES
EKG VENTRICULAR RATE: 63 BPM
EKG VENTRICULAR RATE: 69 BPM
ERYTHROCYTE [DISTWIDTH] IN BLOOD BY AUTOMATED COUNT: 13.9 % (ref 11.5–14.5)
ERYTHROCYTE [DISTWIDTH] IN BLOOD BY AUTOMATED COUNT: 45.6 FL (ref 35–45)
GFR SERPL CREATININE-BSD FRML MDRD: 49 ML/MIN/1.73M2
GLUCOSE BLD-MCNC: 100 MG/DL (ref 70–108)
HCT VFR BLD CALC: 34.4 % (ref 37–47)
HEMOGLOBIN: 11.2 GM/DL (ref 12–16)
LIPASE: 122.5 U/L (ref 5.6–51.3)
LV EF: 48 %
LVEF MODALITY: NORMAL
MCH RBC QN AUTO: 29.2 PG (ref 26–33)
MCHC RBC AUTO-ENTMCNC: 32.6 GM/DL (ref 32.2–35.5)
MCV RBC AUTO: 89.6 FL (ref 81–99)
ORGANISM: ABNORMAL
PLATELET # BLD: 181 THOU/MM3 (ref 130–400)
PMV BLD AUTO: 9.8 FL (ref 9.4–12.4)
POTASSIUM SERPL-SCNC: 4.4 MEQ/L (ref 3.5–5.2)
RBC # BLD: 3.84 MILL/MM3 (ref 4.2–5.4)
SODIUM BLD-SCNC: 141 MEQ/L (ref 135–145)
TOTAL PROTEIN: 5.1 G/DL (ref 6.1–8)
TROPONIN T: < 0.01 NG/ML
URINE CULTURE REFLEX: ABNORMAL
WBC # BLD: 9.2 THOU/MM3 (ref 4.8–10.8)

## 2022-08-26 PROCEDURE — 84484 ASSAY OF TROPONIN QUANT: CPT

## 2022-08-26 PROCEDURE — 1200000003 HC TELEMETRY R&B

## 2022-08-26 PROCEDURE — 93005 ELECTROCARDIOGRAM TRACING: CPT

## 2022-08-26 PROCEDURE — 6370000000 HC RX 637 (ALT 250 FOR IP): Performed by: PHYSICIAN ASSISTANT

## 2022-08-26 PROCEDURE — 99223 1ST HOSP IP/OBS HIGH 75: CPT | Performed by: INTERNAL MEDICINE

## 2022-08-26 PROCEDURE — 80053 COMPREHEN METABOLIC PANEL: CPT

## 2022-08-26 PROCEDURE — 6360000002 HC RX W HCPCS: Performed by: PHYSICIAN ASSISTANT

## 2022-08-26 PROCEDURE — 93010 ELECTROCARDIOGRAM REPORT: CPT | Performed by: INTERNAL MEDICINE

## 2022-08-26 PROCEDURE — A9500 TC99M SESTAMIBI: HCPCS | Performed by: PHYSICIAN ASSISTANT

## 2022-08-26 PROCEDURE — 83690 ASSAY OF LIPASE: CPT

## 2022-08-26 PROCEDURE — 93306 TTE W/DOPPLER COMPLETE: CPT

## 2022-08-26 PROCEDURE — 85027 COMPLETE CBC AUTOMATED: CPT

## 2022-08-26 PROCEDURE — C9113 INJ PANTOPRAZOLE SODIUM, VIA: HCPCS

## 2022-08-26 PROCEDURE — 93017 CV STRESS TEST TRACING ONLY: CPT | Performed by: INTERNAL MEDICINE

## 2022-08-26 PROCEDURE — 6360000002 HC RX W HCPCS

## 2022-08-26 PROCEDURE — 3430000000 HC RX DIAGNOSTIC RADIOPHARMACEUTICAL: Performed by: PHYSICIAN ASSISTANT

## 2022-08-26 PROCEDURE — 2580000003 HC RX 258: Performed by: PHYSICIAN ASSISTANT

## 2022-08-26 PROCEDURE — 71046 X-RAY EXAM CHEST 2 VIEWS: CPT

## 2022-08-26 PROCEDURE — 36415 COLL VENOUS BLD VENIPUNCTURE: CPT

## 2022-08-26 PROCEDURE — 99232 SBSQ HOSP IP/OBS MODERATE 35: CPT | Performed by: PHYSICIAN ASSISTANT

## 2022-08-26 PROCEDURE — 82248 BILIRUBIN DIRECT: CPT

## 2022-08-26 PROCEDURE — 78452 HT MUSCLE IMAGE SPECT MULT: CPT

## 2022-08-26 RX ORDER — NITROGLYCERIN 0.4 MG/1
0.4 TABLET SUBLINGUAL EVERY 5 MIN PRN
Status: DISCONTINUED | OUTPATIENT
Start: 2022-08-26 | End: 2022-08-31 | Stop reason: HOSPADM

## 2022-08-26 RX ORDER — CLOTRIMAZOLE AND BETAMETHASONE DIPROPIONATE 10; .64 MG/G; MG/G
CREAM TOPICAL 3 TIMES DAILY
Status: DISCONTINUED | OUTPATIENT
Start: 2022-08-26 | End: 2022-08-31 | Stop reason: HOSPADM

## 2022-08-26 RX ADMIN — PANTOPRAZOLE SODIUM 40 MG: 40 INJECTION, POWDER, FOR SOLUTION INTRAVENOUS at 12:39

## 2022-08-26 RX ADMIN — Medication 8.8 MILLICURIE: at 13:10

## 2022-08-26 RX ADMIN — HYDROMORPHONE HYDROCHLORIDE 0.5 MG: 1 INJECTION, SOLUTION INTRAMUSCULAR; INTRAVENOUS; SUBCUTANEOUS at 16:10

## 2022-08-26 RX ADMIN — NITROGLYCERIN 0.4 MG: 0.4 TABLET, ORALLY DISINTEGRATING SUBLINGUAL at 10:29

## 2022-08-26 RX ADMIN — SODIUM CHLORIDE, PRESERVATIVE FREE 10 ML: 5 INJECTION INTRAVENOUS at 10:19

## 2022-08-26 RX ADMIN — ATORVASTATIN CALCIUM 80 MG: 80 TABLET, FILM COATED ORAL at 20:27

## 2022-08-26 RX ADMIN — HYDROMORPHONE HYDROCHLORIDE 0.25 MG: 1 INJECTION, SOLUTION INTRAMUSCULAR; INTRAVENOUS; SUBCUTANEOUS at 10:15

## 2022-08-26 RX ADMIN — PROPAFENONE HYDROCHLORIDE 150 MG: 150 TABLET, FILM COATED ORAL at 20:27

## 2022-08-26 RX ADMIN — SODIUM CHLORIDE, PRESERVATIVE FREE 10 ML: 5 INJECTION INTRAVENOUS at 20:28

## 2022-08-26 RX ADMIN — METOPROLOL TARTRATE 25 MG: 25 TABLET, FILM COATED ORAL at 12:35

## 2022-08-26 RX ADMIN — ONDANSETRON 4 MG: 2 INJECTION INTRAMUSCULAR; INTRAVENOUS at 12:27

## 2022-08-26 RX ADMIN — ISOSORBIDE MONONITRATE 30 MG: 30 TABLET, EXTENDED RELEASE ORAL at 12:35

## 2022-08-26 RX ADMIN — CLOTRIMAZOLE AND BETAMETHASONE DIPROPIONATE: 10; .5 CREAM TOPICAL at 16:14

## 2022-08-26 RX ADMIN — Medication 32.2 MILLICURIE: at 13:10

## 2022-08-26 RX ADMIN — CLOTRIMAZOLE AND BETAMETHASONE DIPROPIONATE: 10; .5 CREAM TOPICAL at 20:27

## 2022-08-26 RX ADMIN — HYDROMORPHONE HYDROCHLORIDE 0.5 MG: 1 INJECTION, SOLUTION INTRAMUSCULAR; INTRAVENOUS; SUBCUTANEOUS at 12:22

## 2022-08-26 RX ADMIN — PROPAFENONE HYDROCHLORIDE 150 MG: 150 TABLET, FILM COATED ORAL at 12:35

## 2022-08-26 RX ADMIN — HYDROMORPHONE HYDROCHLORIDE 0.5 MG: 1 INJECTION, SOLUTION INTRAMUSCULAR; INTRAVENOUS; SUBCUTANEOUS at 20:28

## 2022-08-26 ASSESSMENT — PAIN DESCRIPTION - FREQUENCY: FREQUENCY: CONTINUOUS

## 2022-08-26 ASSESSMENT — PAIN SCALES - GENERAL
PAINLEVEL_OUTOF10: 8
PAINLEVEL_OUTOF10: 6
PAINLEVEL_OUTOF10: 10
PAINLEVEL_OUTOF10: 8
PAINLEVEL_OUTOF10: 9

## 2022-08-26 ASSESSMENT — PAIN DESCRIPTION - PAIN TYPE: TYPE: ACUTE PAIN

## 2022-08-26 ASSESSMENT — PAIN DESCRIPTION - LOCATION
LOCATION: CHEST
LOCATION: ABDOMEN
LOCATION: CHEST

## 2022-08-26 ASSESSMENT — PAIN DESCRIPTION - DESCRIPTORS
DESCRIPTORS: STABBING
DESCRIPTORS: SHARP
DESCRIPTORS: STABBING
DESCRIPTORS: STABBING
DESCRIPTORS: ACHING;SHARP

## 2022-08-26 ASSESSMENT — PAIN DESCRIPTION - ONSET: ONSET: ON-GOING

## 2022-08-26 ASSESSMENT — PAIN DESCRIPTION - ORIENTATION
ORIENTATION: LEFT
ORIENTATION: LEFT
ORIENTATION: RIGHT
ORIENTATION: LEFT
ORIENTATION: RIGHT

## 2022-08-26 ASSESSMENT — PAIN - FUNCTIONAL ASSESSMENT: PAIN_FUNCTIONAL_ASSESSMENT: PREVENTS OR INTERFERES SOME ACTIVE ACTIVITIES AND ADLS

## 2022-08-26 NOTE — PLAN OF CARE
Problem: Safety - Adult  Goal: Free from fall injury  8/25/2022 2205 by Meron Corley RN  Outcome: Progressing  Flowsheets (Taken 8/25/2022 2205)  Free From Fall Injury: Instruct family/caregiver on patient safety     Problem: ABCDS Injury Assessment  Goal: Absence of physical injury  8/25/2022 2205 by Meron Corley RN  Outcome: Progressing  Flowsheets (Taken 8/25/2022 2205)  Absence of Physical Injury: Implement safety measures based on patient assessment     Problem: Chronic Conditions and Co-morbidities  Goal: Patient's chronic conditions and co-morbidity symptoms are monitored and maintained or improved  8/25/2022 2205 by Meron Corley RN  Outcome: Progressing  Flowsheets (Taken 8/25/2022 2205)  Care Plan - Patient's Chronic Conditions and Co-Morbidity Symptoms are Monitored and Maintained or Improved: Monitor and assess patient's chronic conditions and comorbid symptoms for stability, deterioration, or improvement     Problem: Discharge Planning  Goal: Discharge to home or other facility with appropriate resources  Outcome: Progressing  Flowsheets (Taken 8/25/2022 2205)  Discharge to home or other facility with appropriate resources:   Identify barriers to discharge with patient and caregiver   Arrange for needed discharge resources and transportation as appropriate     Problem: Cardiovascular - Adult  Goal: Maintains optimal cardiac output and hemodynamic stability  Outcome: Progressing  Flowsheets (Taken 8/25/2022 2205)  Maintains optimal cardiac output and hemodynamic stability:   Monitor blood pressure and heart rate   Assess for signs of decreased cardiac output  Note: Patient is monitored on telemetry monitor.       Problem: Skin/Tissue Integrity - Adult  Goal: Skin integrity remains intact  Outcome: Progressing  Flowsheets (Taken 8/25/2022 2205)  Skin Integrity Remains Intact: Monitor for areas of redness and/or skin breakdown     Problem: Gastrointestinal - Adult  Goal: Minimal or absence of nausea and vomiting  Outcome: Progressing  Flowsheets (Taken 8/25/2022 2205)  Minimal or absence of nausea and vomiting:   Administer IV fluids as ordered to ensure adequate hydration   Maintain NPO status until nausea and vomiting are resolved     Problem: Safety - Adult  Goal: Free from fall injury  8/25/2022 2206 by Aury Eller RN  Outcome: Progressing  Flowsheets (Taken 8/25/2022 2206)  Free From Fall Injury: Instruct family/caregiver on patient safety     Problem: ABCDS Injury Assessment  Goal: Absence of physical injury  8/25/2022 2206 by Aury Eller RN  Outcome: Progressing  Flowsheets (Taken 8/25/2022 2206)  Absence of Physical Injury: Implement safety measures based on patient assessment     Problem: Chronic Conditions and Co-morbidities  Goal: Patient's chronic conditions and co-morbidity symptoms are monitored and maintained or improved  8/25/2022 2206 by Aury Eller RN  Outcome: Progressing  Flowsheets (Taken 8/25/2022 2206)  Care Plan - Patient's Chronic Conditions and Co-Morbidity Symptoms are Monitored and Maintained or Improved: Monitor and assess patient's chronic conditions and comorbid symptoms for stability, deterioration, or improvement     Problem: Discharge Planning  Goal: Discharge to home or other facility with appropriate resources  8/25/2022 2206 by Aury Eller RN  Outcome: Progressing  Flowsheets (Taken 8/25/2022 2206)  Discharge to home or other facility with appropriate resources:   Identify barriers to discharge with patient and caregiver   Arrange for needed discharge resources and transportation as appropriate     Problem: Cardiovascular - Adult  Goal: Maintains optimal cardiac output and hemodynamic stability  8/25/2022 2206 by Aury Eller RN  Outcome: Progressing  Flowsheets (Taken 8/25/2022 2206)  Maintains optimal cardiac output and hemodynamic stability:   Monitor blood pressure and heart rate   Assess for signs of decreased cardiac output  Note: Pt. Is monitored on telemetry monitor. Problem: Skin/Tissue Integrity - Adult  Goal: Skin integrity remains intact  8/25/2022 2206 by Reagan Preston RN  Outcome: Progressing  Flowsheets (Taken 8/25/2022 2206)  Skin Integrity Remains Intact: Monitor for areas of redness and/or skin breakdown     Problem: Gastrointestinal - Adult  Goal: Minimal or absence of nausea and vomiting  8/25/2022 2206 by Reagan Preston RN  Outcome: Progressing  Flowsheets (Taken 8/25/2022 2206)  Minimal or absence of nausea and vomiting:   Administer IV fluids as ordered to ensure adequate hydration   Maintain NPO status until nausea and vomiting are resolved     Care plan reviewed with patient. Patient verbalizes understanding of the care plan and contributed to goal setting.

## 2022-08-26 NOTE — PROGRESS NOTES
AM  EDT    Findings: The lung bases are clear. There are multiple tiny gallstones within the gallbladder. The common bile  duct measures up to 7 mm in diameter with interval increased since the  prior study. There is a new 2 mm calculus within the common bile duct just  proximal to the ampulla. Multiple splenic granulomas. Unremarkable liver and bilateral adrenal  glands. Moderate pancreatic volume loss with partial fatty replacement of  parenchyma. Numerous peripelvic cysts within the kidneys. No bowel obstruction, pneumoperitoneum, or pneumatosis. Status post hysterectomy. Unremarkable urinary bladder. The appendix is  not definitively seen. There are no secondary findings to suggest  appendicitis. There is an hemangioma within the L3 vertebral body. No acute fracture or  suspicious bone lesion. Impression  1. There is evidence of cholelithiasis and choledocholithiasis. Borderline  dilatation of the common bile duct with interval increase. 2. Right-sided posterior triangle hernia containing fat. This document has been electronically signed by: Nidhi Fisher MD on  08/24/2022 06:38 PM    All CTs at this facility use dose modulation techniques and iterative  reconstructions, and/or weight-based dosing  when appropriate to reduce radiation to a low as reasonably achievable. No results found for this or any previous visit. Results for orders placed during the hospital encounter of 08/24/22    MRI ABDOMEN W WO CONTRAST MRCP    Narrative  PROCEDURE: MRI ABDOMEN W WO CONTRAST MRCP    CLINICAL INFORMATION: abdominal pain, dilated common bile duct, stones in duct on CT    COMPARISON: CT abdomen and pelvis 8/24/2022    TECHNIQUE: Multiplanar and multisequence MRI abdomen without and with IV contrast.  Routine MRCP was also performed. The MRCP examination includes 3D reconstructions of the biliary tree and the pancreatic duct. CONTRAST: 15 mL of ProHance  intravenously.       FINDINGS:    LOWER THORAX: No significant lower thoracic findings. HEPATOBILIARY: A nonenhancing 10 mm cyst is seen inferior right hepatic lobe. A few other tiny cysts noted. Unremarkable hepatic surface morphology. Tiny gallstones are seen in the gallbladder. There is a tiny 2 mm calculus in the distal common bile  duct. The common bile duct is dilated at 9 mm. PANCREAS AND SPLEEN: The pancreatic duct is not dilated. Marked atrophy of the pancreas. No peripancreatic abnormality. The spleen is not enlarged    RETROPERITONEUM: Parapelvic cysts are seen bilaterally. No hydronephrosis. No solid renal mass. The adrenal glands are not enlarged    BOWEL AND PERITONEUM:  No bowel obstruction or acute inflammatory bowel process. Cloteal Matthews VASCULAR: The abdominal aorta is not aneurysmal.    LYMPH NODES: No significantly enlarged lymph nodes are seen. BONES: No aggressive bony lesions noted. Impression  1. A 2 mm calculus is seen in the distal common bile duct. 2. The common bile duct is dilated at 9 mm. 3. Cholelithiasis. 4. Other findings as described above. **This report has been created using voice recognition software. It may contain minor errors which are inherent in voice recognition technology. **    Final report electronically signed by Dr Niurka Hoang on 8/25/2022 4:48 PM      Endoscopy Finding:      Objective:   Vitals: /72   Pulse 66   Temp 98.1 °F (36.7 °C) (Oral)   Resp 18   Wt 147 lb 14.4 oz (67.1 kg)   SpO2 96%   BMI 26.20 kg/m²   No intake or output data in the 24 hours ending 08/26/22 1626  General appearance: alert and cooperative with exam  Lungs: clear to auscultation bilaterally  Heart: regular rate and rhythm, S1, S2 normal, no murmur, click, rub or gallop  Abdomen: soft, non-tender; bowel sounds normal; no masses,  no organomegaly  Extremities: extremities normal, atraumatic, no cyanosis or edema    Assessment and Plan:   Choledocholithiasis plan for ERCP with patient cleared by the cardiology service  Coronary artery disease high risk for any surgery cardiology following  Will need cholecystectomy surgery service, hold at this time treating for post ERCP to evaluate      Follow up in [facility] after discharge in 2 week(s)    Patient Active Problem List:     Unstable angina ([address]     H/O prior ablation treatment     Hx of atrial fibrillation, no current medication     Ex-smoker     Presence of stent in LAD coronary artery     S/P cardiac catheterization     Hyperlipidemia with target LDL less than 70     Atrial fibrillation (HCC)     Chest pain     Gastroesophageal reflux disease without esophagitis     Coronary artery disease involving native coronary artery of native heart without angina pectoris     Cerebrovascular accident (CVA) ([address]     Aphasia     ACS (acute coronary syndrome) ([address]     Coronary artery disease involving native coronary artery of native heart with unstable angina pectoris ([address]     Essential hypertension     Hyperlipidemia LDL goal <70     S/P CABG x 1     Transient cerebral ischemia     Chronic diastolic CHF (congestive heart failure) (HCC)     SOB (shortness of breath)     COPD with acute exacerbation (HCC)     Fatigue     Other specified symptoms and signs involving the circulatory and respiratory systems     Choledocholithiasis      Electronically signed by Damian Hunter MD on 8/26/2022 at 4:26 PM

## 2022-08-26 NOTE — CONSULTS
[facility]    Patient's Name/Date of Birth: Angela Nielsen / 1950 (30 y.o.)    Date: August 26, 2022     Referring Provider: Reena Perry PA-C    CHIEF COMPLAINT: Abdominal pain, fever, nausea. REASON FOR CONSULT: Preoperative Evaluation    HPI: This is a pleasant 67 y.o. 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 MRCP found a 2mm calculus in the distal common bile duct with CBD dilitation to 9mm. Cardiology is consulted for preoperative evaluation prior to ERCP due to significant past cardiac history. Patient states that she is currently experiencing left lower rib cage/LUQ abdominal pain that started this AM and feels different from the abdominal pain that brought her in. She reports that she has ongoing shortness of breath with exertion that has worsened over the past several weeks. She was previously able to walk 4 miles without difficulty, but now experiences dyspnea with even short distances around the house. She notes occasional bouts of brief chest pain on and off for years for which she has been seen outpatient this year by Dr. Marden Denver and Cecilia Beckman PA-C. Endorses ongoing abdominal pain, however nausea has improved since admission. Denies headache, vision changes, lightheadedness, palpitations, changes in bowels/bladder, skin changes, or new edema.     Echo: Results for orders placed during the hospital encounter of 12/14/20    ECHO Complete 2D W Doppler W Color    Narrative  Transthoracic Echocardiography Report (TTE)    Demographics    Patient Name   Sabrina Silva  Gender               Female  S    MR #           [id number]     Race                     Ethnicity    Account #      [de-identified]     Room Number 0017    Accession      [id number]    Date of Study        12/15/2020  Number    Date of Birth  1950    Referring Physician  Shay Gale MD    Age            69 year(s)    Sonographer          Tereza Angel  T    Interpreting         Manohar Gale MD  Physician    Procedure    Type of Study    TTE procedure:ECHOCARDIOGRAM COMPLETE 2D W DOPPLER W COLOR. Procedure Date  Date: 12/15/2020 Start: 03:37 PM    Study Location: Bedside  Technical Quality: Limited visualization due to poor acoustical window. Indications:Chest pain. Additional Medical History: Former smoker, atrial fibrillation, atrial  fibrillation, coronary artery disease, hyperlipidemia, COPD, asthma, GERD,  hypertension, CABG    Patient Status: Routine    Height: 62.99 inches Weight: 131.4 pounds BSA: 1.62 m^2 BMI: 23.28 kg/m^2    BP: 118/74 mmHg    Allergies  - See Epic. - See Epic. Conclusions    Summary  Technically difficult study due to poor acoustic windows. Left ventricular size is normal and systolic function is moderately  reduced. Ejection fraction was estimated at 40-45%. LV wall thickness is  within normal limits. Mild-to-moderate tricuspid regurgitation. Signature    ----------------------------------------------------------------  Electronically signed by Manohar Gale MD (Interpreting  physician) on 12/16/2020 at 12:20 PM  ----------------------------------------------------------------    Findings    Mitral Valve  The mitral valve was not well visualized . Mild mitral regurgitation is present. Aortic Valve  The aortic valve leaflets were not well visualized. Aortic valve appears tricuspid. Tricuspid Valve  Mild-to-moderate tricuspid regurgitation. Pulmonic Valve  The pulmonic valve was not well visualized . Trivial pulmonic regurgitation visualized.     Left Atrium  Left atrial size was normal.    Left Ventricle  Left ventricular size is normal and systolic function is moderately  reduced. Ejection fraction was estimated at 40-45%. LV wall thickness is  within normal limits. Right Atrium  The right atrium is of normal size. Right Ventricle  Normal right ventricular size and function. Pericardial Effusion  The pericardium was normal in appearance with no evidence of a pericardial  effusion. Pleural Effusion  No evidence of pleural effusion. Aorta / Great Vessels  -Aortic root dimension within normal limits. -IVC size is within normal limits with normal respiratory phasic changes.     M-Mode/2D Measurements & Calculations    LV Diastolic   LV Systolic Dimension: 4  AV Cusp Separation: 1.5 cmLA  Dimension: 4.9 cm                        Dimension: 3.6 cmAO Root  cm             LV Volume Diastolic: 65   Dimension: 3.1 cmLA Area: 19.5  LV FS:18.4 %   ml                        cm^2  LV PW          LV Volume Systolic: 65.8  Diastolic: 1   ml  cm             LV EDV/LV EDV Index: 65  Septum         ml/40 m^2LV ESV/LV ESV    RV Diastolic Dimension: 2.7 cm  Diastolic: 0.9 Index: 61.4 ml/22 m^2  cm             EF Calculated: 45.4 %     LA/Aorta: 1.16    LV Length: 7 cm           LA volume/Index: 66 ml /41m^2    LV Area  Diastolic:  94.0 cm^2  LV Area  Systolic: 08.5  cm^2    Doppler Measurements & Calculations    MV Peak E-Wave: 60 cm/s    AV Peak Velocity: 105 LVOT Peak Velocity: 74.6  MV Peak A-Wave: 69.4 cm/s  cm/s                  cm/s  MV E/A Ratio: 0.86         AV Peak Gradient:     LVOT Peak Gradient: 2  MV Peak Gradient: 1.44     4.41 mmHg             mmHg  mmHg  TV Peak E-Wave: 38.6 cm/s  MV Deceleration Time: 194                        TV Peak A-Wave: 49.3 cm/s  msec  MV P1/2t: 57 msec                                TV Peak Gradient: 0.6  MVA by PHT:3.86 cm^2                             mmHg  TR Velocity:228 cm/s  MV E' Septal Velocity: 7.1 AV DVI (Vmax):0.71    TR Gradient:20.79 mmHg  cm/s                                             PV Peak Velocity: 96.4  MV A' Septal Velocity: 7.4                       cm/s  cm/s                                             PV Peak Gradient: 3.72  MV E' Lateral Velocity:                          mmHg  11.2 cm/s  MV A' Lateral Velocity:  12.9 cm/s  E/E' septal: 8.45  E/E' lateral: 5.36  MR Velocity: 343 cm/s    [email or web address] [id number]       All labs, EKG's, diagnostic testing and images as well as cardiac cath, stress testing were reviewed during this encounter    Past Medical History:   Diagnosis Date    Asthma     Atrial fibrillation ([facility] [address]     Blood clot associated with vein wall inflammation     CAD (coronary artery disease)     Cerebral artery occlusion with cerebral infarction (HCC)     CHF (congestive heart failure) (HCC)     COPD (chronic obstructive pulmonary disease) ([facility] [address]     History of blood transfusion     Hx of blood clots     Hyperlipidemia     Hypertension     MI, old     Migraine     Pneumonia     PONV (postoperative nausea and vomiting)     Psychiatric problem      Past Surgical History:   Procedure Laterality Date    [address] [facility] in [facility]    pericardial window    [facility] WITH STENT PLACEMENT  2013     [facility]    CORONARY ARTERY BYPASS GRAFT      ENDOSCOPY, COLON, DIAGNOSTIC      EYE SURGERY Bilateral 07/2020    cataract removal    HERNIA REPAIR      long ago Dr. Oziel Kurtz @ [address] [place] [address] ([address] [place] [address])      OVARY REMOVAL      TONSILLECTOMY       Current Facility-Administered Medications   Medication Dose Route Frequency Provider Last Rate Last Admin    HYDROmorphone (DILAUDID) injection 0.25 mg  0.25 mg IntraVENous Q4H PRN Destini Rodriguez PA-C   0.25 mg at 08/25/22 2056    Or    HYDROmorphone (DILAUDID) injection 0.5 mg  0.5 mg IntraVENous Q4H PRN Crissy Noonan PA-C   0.5 mg at 08/25/22 0216    pantoprazole (PROTONIX) injection 40 mg  40 mg IntraVENous Daily PEDRO LUIS Major - CNP   40 mg at 08/25/22 1730    albuterol sulfate HFA (PROVENTIL;VENTOLIN;PROAIR) 108 (90 Base) MCG/ACT inhaler 2 puff  2 puff Inhalation Q6H PRN Nori Kaplan PA-C        [Held by provider] aspirin chewable tablet 81 mg  81 mg Oral Daily Nori Kaplan PA-C        atorvastatin (LIPITOR) tablet 80 mg  80 mg Oral Nightly Nori Kaplan PA-C   80 mg at 08/25/22 2054    mometasone-formoterol (DULERA) 200-5 MCG/ACT inhaler 2 puff  2 puff Inhalation BID Crissy Noonan PA-C        furosemide (LASIX) tablet 20 mg  20 mg Oral Daily Nori Kaplan PA-C        isosorbide mononitrate (IMDUR) extended release tablet 30 mg  30 mg Oral Daily Nori Kaplan PA-C   30 mg at 08/25/22 5270    metoprolol tartrate (LOPRESSOR) tablet 25 mg  25 mg Oral BID Nori Kaplan PA-C   25 mg at 08/25/22 2054    [Held by provider] prasugrel (EFFIENT) tablet 10 mg  10 mg Oral Daily Nori Kaplan PA-C        propafenone (RYTHMOL) tablet 150 mg  150 mg Oral BID Nori Kaplan PA-C   150 mg at 08/25/22 2054    tiotropium (SPIRIVA RESPIMAT) 2.5 MCG/ACT inhaler 2 puff  2 puff Inhalation Daily Crissy Noonan PA-C        sodium chloride flush 0.9 % injection 5-40 mL  5-40 mL IntraVENous 2 times per day Nori Kaplan PA-C   10 mL at 08/25/22 2055    sodium chloride flush 0.9 % injection 5-40 mL  5-40 mL IntraVENous PRN Crissy Noonan PA-C        0.9 % sodium chloride infusion   IntraVENous PRN Crissy Smith PA-C        [Held by provider] enoxaparin (LOVENOX) injection 40 mg  40 mg SubCUTAneous Daily Crissy Noonan PA-C        ondansetron (ZOFRAN-ODT) disintegrating tablet 4 mg  4 mg Oral Q8H PRN Crissy Smith PA-C        Or    ondansetron (ZOFRAN) injection 4 mg  4 mg IntraVENous Q6H PRN Nori Kaplan PA-C   4 mg at 08/25/22 0111    polyethylene glycol (GLYCOLAX) packet 17 g  17 g Oral Daily PRN Nori Kaplan PA-C        acetaminophen (TYLENOL) tablet 650 mg  650 mg Oral Q6H PRN Aura Jessica PA-C        Or    acetaminophen (TYLENOL) suppository 650 mg  650 mg Rectal Q6H PRN Aura Jessica, PA-C        0.9 % sodium chloride infusion   IntraVENous Continuous Aura Hemonique, PA-C 75 mL/hr at 08/25/22 0006 New Bag at 08/25/22 0006    potassium chloride (KLOR-CON M) extended release tablet 40 mEq  40 mEq Oral PRN Aura Jessica PA-C        Or    potassium bicarb-citric acid (EFFER-K) effervescent tablet 40 mEq  40 mEq Oral PRN Aura Jessica, PA-C        Or    potassium chloride 10 mEq/100 mL IVPB (Peripheral Line)  10 mEq IntraVENous PRN Crissy Noonan PA-C        magnesium sulfate 2000 mg in 50 mL IVPB premix  2,000 mg IntraVENous PRN Aurlouis Lopez PA-C         Prior to Admission medications    Medication Sig Start Date End Date Taking? Authorizing Provider   prasugrel (EFFIENT) 10 MG TABS TAKE 1 TABLET DAILY 8/15/22   Carrie Walton MD   isosorbide mononitrate (IMDUR) 30 MG extended release tablet TAKE 1 TABLET DAILY 8/15/22   Kaitlin Chacon MD   metoprolol tartrate (LOPRESSOR) 25 MG tablet TAKE 1 TABLET TWICE A DAY 8/15/22   Kaitlin Chacon MD   furosemide (LASIX) 20 MG tablet TAKE 1 TABLET DAILY 7/20/22   Carrie Walton MD   propafenone (RYTHMOL) 150 MG tablet TAKE 1 TABLET TWICE A DAY 7/5/22   Carrie Walton MD   UNABLE TO FIND Liquid cough medicine with codeine per patient.     Historical Provider, MD   budesonide-formoterol (SYMBICORT) 160-4.5 MCG/ACT AERO Inhale 2 puffs into the lungs 2 times daily Rinse mouth after use and use with spacer 4/11/22   PEDRO LUIS Henriquez CNP   Spacer/Aero-Holding Nicole Button (AEROCHAMBER MV) MISC 1 each by Does not apply route in the morning and at bedtime With Symbicort inhaler 4/11/22   PEDRO LUIS Henriquez CNP   benzonatate (TESSALON PERLES) 100 MG capsule Take 2 capsules by mouth 3 times daily as needed for Cough 4/11/22 10/8/22  PEDRO LUIS Cruz - CNP   ALBUTEROL IN Inhale into the lungs Historical Provider, MD   tiotropium (SPIRIVA RESPIMAT) 2.5 MCG/ACT AERS inhaler Inhale 2 puffs into the lungs daily 1/11/22 1/11/23  PEDRO LUIS Blankenship CNP   atorvastatin (LIPITOR) 80 MG tablet Take 1 tablet by mouth nightly 6/10/21   Cece Moseley MD   ondansetron (ZOFRAN-ODT) 4 MG disintegrating tablet Take 4 mg by mouth 4 times daily as needed for Nausea or Vomiting    Historical Provider, MD   aspirin 81 MG tablet Take 81 mg by mouth daily    Historical Provider, MD   nitroGLYCERIN (NITROSTAT) 0.4 MG SL tablet Place 1 tablet under the tongue every 5 minutes as needed for Chest pain. Patient not taking: Reported on 8/24/2022 2/25/15   PEDRO LUIS Robles CNP   Scheduled Meds:   pantoprazole  40 mg IntraVENous Daily    [Held by provider] aspirin  81 mg Oral Daily    atorvastatin  80 mg Oral Nightly    mometasone-formoterol  2 puff Inhalation BID    furosemide  20 mg Oral Daily    isosorbide mononitrate  30 mg Oral Daily    metoprolol tartrate  25 mg Oral BID    [Held by provider] prasugrel  10 mg Oral Daily    propafenone  150 mg Oral BID    tiotropium  2 puff Inhalation Daily    sodium chloride flush  5-40 mL IntraVENous 2 times per day    [Held by provider] enoxaparin  40 mg SubCUTAneous Daily     Continuous Infusions:   sodium chloride      sodium chloride 75 mL/hr at 08/25/22 0006     PRN Meds:. HYDROmorphone **OR** HYDROmorphone, albuterol sulfate HFA, sodium chloride flush, sodium chloride, ondansetron **OR** ondansetron, polyethylene glycol, acetaminophen **OR** acetaminophen, potassium chloride **OR** potassium alternative oral replacement **OR** potassium chloride, magnesium sulfate    Allergies   Allergen Reactions    Celebrex [Celecoxib] Anaphylaxis     Unable to breath Turns red all over    Contrast [Iodides] Hives    Statins      Will obtain lipid panel and re-evaluate.    Takes lipitor at home    Naprosyn [Naproxen] Nausea And Vomiting    Shrimp Flavor Nausea And Vomiting     Family History   Problem Relation Age of Onset    Diabetes Mother     Heart Disease Mother     High Blood Pressure Mother     Cancer Sister         luekemia  passed age 28    Heart Disease Father     Cancer Brother         lung passed age 37    Heart Disease Maternal Aunt         MI    Heart Disease Maternal Uncle         MI    Heart Disease Maternal Grandmother         MI    Heart Disease Maternal Grandfather         MI    Stroke Paternal Grandfather     Cancer Sister     Heart Disease Sister         atrial fib    Breast Cancer Sister 34    Cancer Brother         throat cancer     Heart Disease Brother         needs stents and has atrial fib    Diabetes Brother     Stroke Sister     Breast Cancer Paternal Aunt 34    Breast Cancer Paternal Aunt 54     Social History     Socioeconomic History    Marital status:      Spouse name: Betsey Millard    Number of children: 0    Years of education: 12    Highest education level: Not on file   Occupational History    Not on file   Tobacco Use    Smoking status: Former     Years: 27.00     Types: Cigarettes     Quit date: 3/1/1991     Years since quittin.5    Smokeless tobacco: Never   Vaping Use    Vaping Use: Never used   Substance and Sexual Activity    Alcohol use: Yes     Comment: occasional    Drug use: No    Sexual activity: Yes     Partners: Male   Other Topics Concern    Not on file   Social History Narrative    Not on file     Social Determinants of Health     Financial Resource Strain: Not on file   Food Insecurity: Not on file   Transportation Needs: Not on file   Physical Activity: Not on file   Stress: Not on file   Social Connections: Not on file   Intimate Partner Violence: Not on file   Housing Stability: Not on file     ROS:   Constitutional: Denies any recent wt change. Cardiovascular:  As described above.     Respiratory:  Endorses worsening dyspnea on exertion  Genitourinary:  Denies difficulty with urination   Gastrointestinal:  Endorses abdominal pain.  Musculoskeletal:  Denies any joint pain, back pain, or difficulty walking  Integumentary:  Denies any rash  Neurological:  No numbness or tingling  Hematologic/Lymphatic:  Denies any hemorrhage or lymphatic drainage problems. Labs:  CBC:   Recent Labs     08/24/22  1634 08/25/22  0505 08/26/22  0611   WBC 6.6 3.6* 9.2   HGB 13.8 12.7 11.2*   HCT 40.6 38.4 34.4*   MCV 86.4 88.7 89.6    180 181     BMP:   Recent Labs     08/24/22  1634 08/25/22  0505 08/26/22  0611    139 141   K 3.9 4.6 4.4   CL 99 104 108   CO2 23 25 24   BUN 21 21 29*   CREATININE 1.0 1.0 1.1     Accucheck Glucoses: No results for input(s): POCGLU in the last 72 hours. Cardiac Enzymes: No results for input(s): CKTOTAL, CKMB, CKMBINDEX, TROPONINI in the last 72 hours. PT/INR: No results for input(s): PROTIME, INR in the last 72 hours. APTT: No results for input(s): APTT in the last 72 hours.   Liver Profile:  Lab Results   Component Value Date/Time    AST 16 08/26/2022 06:11 AM    ALT 21 08/26/2022 06:11 AM    BILIDIR 0.3 08/26/2022 06:11 AM    BILITOT 1.4 08/26/2022 06:11 AM    ALKPHOS 80 08/26/2022 06:11 AM     Lab Results   Component Value Date/Time    CHOL 138 06/29/2018 07:38 AM    HDL 56 06/29/2018 07:38 AM    TRIG 57 06/29/2018 07:38 AM     TSH:   Lab Results   Component Value Date/Time    TSH 2.320 06/23/2016 08:25 AM     UA:   Lab Results   Component Value Date/Time    COLORU YELLOW 08/24/2022 07:50 PM    PHUR 6.0 08/24/2022 07:50 PM    LABCAST NONE SEEN 07/22/2016 05:50 PM    LABCAST NONE SEEN 07/22/2016 05:50 PM    WBCUA  08/24/2022 07:50 PM    RBCUA 5-10 08/24/2022 07:50 PM    YEAST NONE SEEN 08/24/2022 07:50 PM    BACTERIA NONE SEEN 08/24/2022 07:50 PM    SPECGRAV 1.009 07/22/2016 05:50 PM    LEUKOCYTESUR MODERATE 08/24/2022 07:50 PM    UROBILINOGEN 1.0 08/24/2022 07:50 PM    BILIRUBINUR NEGATIVE 08/24/2022 07:50 PM    BLOODU SMALL 08/24/2022 07:50 PM    GLUCOSEU NEGATIVE 08/24/2022 07:50 PM Physical Exam:  Vitals:    08/26/22 0345   BP: (!) 110/56   Pulse: 66   Resp: 18   Temp: 98.1 °F (36.7 °C)   SpO2: 94%    No intake or output data in the 24 hours ending 08/26/22 0807     General:  No acute distress  Neck: Supple, no JVD  Heart: Regular rhythm normal S1 and S2, no rubs, murmurs or gallops  Lungs: clear to ascultation no rales, wheezes, or rhonchi  Abdomen: positive bowel sounds, soft, non-tender, non-distended, no masses  Extremities:no clubbing, cyanosis or edema  Neurologic: alert and oriented x 3, moving all extremities  Skin: No rashes    Assessment:  Preoperative Cardiac Risk Statification- Procedure: ERCP. Patient is high risk per RCRI. She is currently experiencing chest pain and has had worsening dyspnea on exertion over the past several weeks. She does have a complex cardiac history with CABG in 2016 LIMA-LAD and recent stenting 2013 and most recently in Mar2022 CLAUDE to proximal LAD. Last Echo was in DEC2020 and showed LVEF 40-45% and mild-to-moderate tricuspid regurgitation. EKG shows P inversion in III. CAD s/p CABG 2016 and multiple stents, latest in Mar2022 to proximal LAD. Known left circ  - Patient of Dr. Alok Arreaga last seen by Blaire Llanes [id number] for ongoing anginal symptoms. On effient, isosorbide moninitrate, asa, and statin at home. Has prn nitro prescription. HFmrEF - on lisinopril 5mg per chart review, metoprolol and furosemide at home  Atrial fibrillation, not on AC at home. Rate controlled with propafenone and metoprolol at home. Choledocholithiasis. GI following. CT shows dilated CBD, MRCP with 2mm stone in CBD. GI planning ERCP pending cardiac risk stratification and evaluation. COPD - On spiriva, symbicort, and albuterol at home. HTN - on metoprolol at home  HLD - on atorvastatin at home. Plan:  Preoperative Cardiac Risk Stratification - High risk and in setting of current chest pain and worsening dyspnea on exertion.   Echo performed today with LVEF 45-50% and mild global hypokinesis of LV. EKG without ischemic changes  Troponin negative  Proceed with functional testing   Further recommendations will be determined by results and clinical condition  CAD s/p CABG 2016 and multiple stents, latest in Mar2022 to proximal LAD. As above. HFmrEF, LVEF 40-45%. Echo today showed LVEF 45-50% actually improved over prior exam. Does not appear volume overloaded or decompensated, however she does note increased dyspnea on exertion over the course of the past year. GDMT  ACEI/ARB/ARNI - restart lisinopril  BB - continue metoprolol  Diuretic - continue furosemide  Consider aldactone  Consider SGLT2 inhibitor  Atrial fibrillation, not on AC at home, refused in the past  Continue propafenone and metoprolol  Choledocholithiasis. ERCP pending cardiac risk stratification and evaluation  COPD  Continue home meds  HTN  Continue metoprolol  HLD  Continue statin    Thank you for allowing us to participate in the care of this patient. Please do not hesitate to call us with questions. Electronically signed by Alberto Jiang MD, PGY1 on 8/26/2022 at 8:07 AM    Interventional Cardiology - [facility]     Attestation   I performed the subjective and objective examination of the patient and discussed management with the resident/CNP. I reviewed the resident/CNPs note. Any areas of disagreement were adjusted in the chart. I have personally evaluated this patient and have completed at least one if not all key elements of the E/M (history, physical exam, and MDM). Additional findings are as noted. I agree with the chief complaint, past medical history, past surgical history, allergies, medications, social and family history as documented unless otherwise noted below. Chest pain was reported by patient at time of evaluation in AM. ACS was ruled out. Troponin remained negative. Echo today revealed stabled LVEF at 45-50%, no RWMA.  Has h/o CAD, cont medical therapy. Resume antiplatelets. Telemetry. Patient with Choledocholithiasis and is at risk for ascending cholangitis and sepsis per surgery. High risk patient with multiple risk factors, no cholecystectomy and ERCP with sphincterectomy was recommended by surgery team. GI is following. Nuclear stress test is pending. If stress test is negative, no further cardiac studies will be indicated at this time and may proceed with ERCP with moderate cardiac risk.        Electronically signed by Jackie Campbell MD on 8/26/2022 at 4:13 PM  Interventional Cardiology - [facility]

## 2022-08-26 NOTE — CARE COORDINATION
8/26/22, 2:42 PM EDT    DISCHARGE ON GOING [address] day: 2  Location: [facility]23/023-A Reason for admit: Choledocholithiasis [K80.50]   Procedure: 8/25/2022 MRCP  8/26/2022 Stress test  Barriers to Discharge: GI and General Surgery following, Cardiology consulted, echocardiogram, IV fluids, Aspirin, Lovenox, and Effient on hold, Dulera inhaler, prn pain medications and Zofran, Potassium and Magnesium replacement protocol, daily BMP, CBC, LFT's, NPO, daily weights, SCD's, telemetry, up with assistance. PCP: Romario Powell MD  Readmission Risk Score: 13.2%  Patient Goals/Plan/Treatment Preferences: Charmaine Reyes is from home alone. Possible cholecystectomy. Cardiac clearance needed. Possible weekend discharge. 8/26/22, 2:55 PM EDT    Patient goals/plan/ treatment preferences discussed by  and . Patient goals/plan/ treatment preferences reviewed with patient/ family. Patient/ family verbalize understanding of discharge plan and are in agreement with goal/plan/treatment preferences. Understanding was demonstrated using the teach back method. AVS provided by RN at time of discharge, which includes all necessary medical information pertaining to the patients current course of illness, treatment, post-discharge goals of care, and treatment preferences.      Services At/After Discharge: None       IMM Letter  IMM Letter given to Patient/Family/Significant other/Guardian/POA/by[de-identified] Copy delivered to patient by Brianda  IMM Letter date given[de-identified] 08/26/22  IMM Letter time given[de-identified] [phone]

## 2022-08-26 NOTE — PROGRESS NOTES
MD NORY Robin DR GENERAL SURGERY   General Surgery Daily Progress Note    Pt Name: Luther Brunson  Medical Record Number: [id number]  Date of Birth 1950   Today's Date: 8/26/2022  Chief complaint: feeling ok  [facility] day # 2   Choledocholithiais   CAD not amenable to intervention    has a past medical history of Asthma, Atrial fibrillation ([facility] [address] Blood clot associated with vein wall inflammation, CAD (coronary artery disease), Cerebral artery occlusion with cerebral infarction [facility]), CHF (congestive heart failure) ([facility] COPD (chronic obstructive pulmonary disease) ([facility] History of blood transfusion, Hx of blood clots, Hyperlipidemia, Hypertension, MI, old, Migraine, Pneumonia, PONV (postoperative nausea and vomiting), and Psychiatric problem. PLAN   IV hydration  Analgesics and antiemetics as needed  ERCP tomrrow- should be NPO at midnight, with abdominal pain may consider NPO now   Patient clear that she does not cholecystectomy, she states she will not survive the surgeyr. Patient is high risk, available should unable to have stent placed or patient has recurrent symptoms. Amadeo Riojas is doing about the same having sever pain . She is tolerating a ADULT DIET; Regular; [facility] [place] (GERD/Peptic Ulcer). Her pain is well controlled on current medications. She has been ambulating in the halls.    CURRENT MEDICATIONS   Scheduled Meds:   clotrimazole-betamethasone   Topical TID    pantoprazole  40 mg IntraVENous Daily    [Held by provider] aspirin  81 mg Oral Daily    atorvastatin  80 mg Oral Nightly    mometasone-formoterol  2 puff Inhalation BID    furosemide  20 mg Oral Daily    isosorbide mononitrate  30 mg Oral Daily    metoprolol tartrate  25 mg Oral BID    [Held by provider] prasugrel  10 mg Oral Daily    propafenone  150 mg Oral BID    tiotropium  2 puff Inhalation Daily    sodium chloride flush  5-40 mL IntraVENous 2 times per day    [Held by provider] enoxaparin  40 mg SubCUTAneous Daily     Continuous Infusions:   sodium chloride      sodium chloride 75 mL/hr at 22 0006     PRN Meds:.nitroGLYCERIN, HYDROmorphone **OR** HYDROmorphone, albuterol sulfate HFA, sodium chloride flush, sodium chloride, ondansetron **OR** ondansetron, polyethylene glycol, acetaminophen **OR** acetaminophen, potassium chloride **OR** potassium alternative oral replacement **OR** potassium chloride, magnesium sulfate  OBJECTIVE   CURRENT VITALS:  weight is 147 lb 14.4 oz (67.1 kg). Her oral temperature is 98.5 °F (36.9 °C). Her blood pressure is 110/59 (abnormal) and her pulse is 70. Her respiration is 16 and oxygen saturation is 95%. Temperature Range (24h):Temp: 98.5 °F (36.9 °C) Temp  Av.3 °F (36.8 °C)  Min: 98.1 °F (36.7 °C)  Max: 98.5 °F (36.9 °C)  BP Range (14B): Systolic (14XZS), DFC:573 , Min:110 , OGJ:726     Diastolic (41MJK), UWU:80, Min:56, Max:72    Pulse Range (24h): Pulse  Av  Min: 66  Max: 70  Respiration Range (24h): Resp  Av.6  Min: 16  Max: 18  Current Pulse Ox (24h):  SpO2: 95 %  Pulse Ox Range (24h):  SpO2  Av.8 %  Min: 94 %  Max: 96 %  Oxygen Amount and Delivery:    Incentive Spirometry Tx:          Physical Exam  Constitutional:       Appearance: Normal appearance. She is not ill-appearing. Cardiovascular:      Rate and Rhythm: Normal rate. Pulmonary:      Effort: Pulmonary effort is normal.   Abdominal:      General: There is distension. Tenderness: There is abdominal tenderness. Comments: Sitting up , uncomfortable in appearance   Musculoskeletal:         General: No swelling. Skin:     General: Skin is warm. Coloration: Skin is not jaundiced. Neurological:      Mental Status: She is alert. Psychiatric:         Mood and Affect: Mood normal.       No intake/output data recorded.     LABS     Recent Labs     22  1634 22  0505 22  0611   WBC 6.6 3.6* 9.2   HGB 13.8 12.7 11.2*   HCT 40.6 38.4 34.4*    180 181    139 141   K 3.9 4.6 4.4   CL 99 104 108   CO2 23 25 24   BUN 21 21 29*   CREATININE 1.0 1.0 1.1   CALCIUM 9.3 8.6 8.9      No results for input(s): PTT, INR in the last 72 hours. Invalid input(s): PT  Recent Labs     08/24/22  1634 08/26/22  0611 08/26/22  1006   AST 24 16  --    ALT 38 21  --    BILITOT 1.9* 1.4*  --    BILIDIR  --  0.3  --    LIPASE 30.4  --  122.5*     Recent Labs     08/26/22  0611 08/26/22  1123 08/26/22  1619   TROPONINT < 0.010 < 0.010 < 0.010     RADIOLOGY     XR CHEST (2 VW)   Final Result   No acute intrathoracic process. **This report has been created using voice recognition software. It may contain minor errors which are inherent in voice recognition technology. **      Final report electronically signed by Dr Amadeo Pineda on 8/26/2022 9:15 AM      MRI ABDOMEN W WO CONTRAST MRCP   Final Result   1. A 2 mm calculus is seen in the distal common bile duct. 2. The common bile duct is dilated at 9 mm. 3. Cholelithiasis. 4. Other findings as described above. **This report has been created using voice recognition software. It may contain minor errors which are inherent in voice recognition technology. **      Final report electronically signed by Dr Aldo Martinez on 8/25/2022 4:48 PM      US GALLBLADDER RUQ   Final Result   Impression:      Cholelithiasis and common duct dilation with small stone in common duct      This document has been electronically signed by: Jasmeet Ku MD on    08/24/2022 10:09 PM      CT ABDOMEN PELVIS W IV CONTRAST Additional Contrast? None   Final Result   1. There is evidence of cholelithiasis and choledocholithiasis. Borderline    dilatation of the common bile duct with interval increase. 2. Right-sided posterior triangle hernia containing fat.       This document has been electronically signed by: Boby Renteria MD on    08/24/2022 06:38 PM      All CTs at this facility use dose modulation techniques and iterative    reconstructions, and/or weight-based dosing   when appropriate to reduce radiation to a low as reasonably achievable.           Electronically signed by Verner Nunnery, MD on 8/26/2022 at 5:10 PM

## 2022-08-26 NOTE — PROGRESS NOTES
Hospitalist Progress Note    Patient:  Marco Sagastume      Unit/Bed:5K-23/023-A    YOB: 1950    MRN: [id number]       Acct: [de-identified]     PCP: Nathalia Whiteside MD    Date of Admission: 8/24/2022    Assessment/Plan:    Choledocholithiasis with abdominal pain- developing pancreatitis   CT scan demonstrating dilated common bile duct with stones  Elevated bilirubin at 1.9 improved to 1.4. Repeat LFTs daily  MRCP demonstrating 2 mm stone in CBD with 9 mm dilation  N.p.o.. Pain control and antiemetics  General surgery is following- patient expressing concerns regarding cholecystectomy given her high cardiac risks. General surgery available should unable to have stent placed or patient has recurrent symptoms. Plan to follow and discuss risks and benefits of deferring cholecystectomy after ERCP   GI planning for ERCP but awaiting cardiac clearance  They are planning for stress test prior to clearance. Echo also ordered  EKG and chest x-ray ordered for clearance  Chest xray negative  EKG stable with no ischemic changed noted   Lipase with new elevated at 122- suspect causing the left lower chest/ upper abdominal pain patient is complaining of   Atrial fibrillation  Not chronically anticoagulated  Rate controlled on rythmol and metoprolol  CAD with angina pectoris and documented spasm history of MI, history of stents and CABG  On ASA and Effient-Currently held for possible procedure  Continue statin  Follows with Dr. Yokasta Goss and Lasix  Telemetry  Nitro PRN for chest pain  Umbilical hernia with overlying abdominal wound  Following with wound clinic. Packs wound daily  Reportedly had hernia repair with mesh placement around 2003.   Wound and ostomy consulted-recommend Lotrisone 3 times daily-ordered  General surgery not recommending any surgical intervention given cardiac history and benign nature of fat-containing hernia  History of CVA  Systolic HF   Last echo 9/7714 with an EF of 45 to 50%. Mild global hypokinesis of the left ventricle  Continue Lasix  Daily weights, strict I's and O's  Monitor fluids closely to avoid fluid overload  Updated echo ordered  COPD, moderate  According to pulmonology note, on Symbicort, Spiriva, albuterol but noncompliant at times      Expected discharge date:  unknown    Disposition:    [x] Home       [] TCU       [] Rehab       [] Psych       [] SNF       [] [place]       [] Other-    Chief Complaint: Fever and abdominal pain      Hospital Course:   Initial HPI  \" Patient presents to the ED with fever and abdominal pain for several days. The patient states she had not been able to eat for several days due to pain and nausea. 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 Juan José Quiles outpatient. Outpatient records indicate she had some drainage and debris from her umbilicus for a year and there is wound associated with hernia mesh. She took oral antibiotics and is now doing topical treatment and wound care. Patient is admitted for further evaluation of pain, dilated common duct and stones in the duct. \"    8/25: CBC overall stable. BMP stable. Mild elevation of bilirubin at 1.9. MRCP is scheduled for today. General surgery unlikely to perform any procedure due to cardiac risk. Waiting for further input from GI. Blood thinners on hold given possible need for procedure    8/25:Patient reports she has not had any vomiting since arrival.  Reports that her nausea is improved abdominal pain is still present. She states she is anxious for MRCP and to know if they are going to do any procedures or not. She denies any chest pain, palpitations, shortness of breath. Denies any melena or hematochezia. Reports normal bowel movements       Subjective (past 24 hours):   MRCP demonstrating 2 mm stone in CBD with 9 mm dilation. Bilirubin mildly improved from 1.9-1.4.   Awaiting cardiac clearance for patient to undergo ERCP with Dr. Manny Barrera  Patient did develop chest pain this morning while cardiology was evaluating patient. Troponin negative EKG without any changes or any signs suggesting ischemia. Lipase was ordered which was elevated at 122. It was normal on arrival.  Suspect that patient is developing some mild pancreatitis which is the cause of her pain. We will continue to monitor closely. Trend troponin    Medications:  Reviewed    Infusion Medications    sodium chloride      sodium chloride 75 mL/hr at 08/25/22 0006     Scheduled Medications    clotrimazole-betamethasone   Topical TID    pantoprazole  40 mg IntraVENous Daily    [Held by provider] aspirin  81 mg Oral Daily    atorvastatin  80 mg Oral Nightly    mometasone-formoterol  2 puff Inhalation BID    furosemide  20 mg Oral Daily    isosorbide mononitrate  30 mg Oral Daily    metoprolol tartrate  25 mg Oral BID    [Held by provider] prasugrel  10 mg Oral Daily    propafenone  150 mg Oral BID    tiotropium  2 puff Inhalation Daily    sodium chloride flush  5-40 mL IntraVENous 2 times per day    [Held by provider] enoxaparin  40 mg SubCUTAneous Daily     PRN Meds: HYDROmorphone **OR** HYDROmorphone, albuterol sulfate HFA, sodium chloride flush, sodium chloride, ondansetron **OR** ondansetron, polyethylene glycol, acetaminophen **OR** acetaminophen, potassium chloride **OR** potassium alternative oral replacement **OR** potassium chloride, magnesium sulfate    No intake or output data in the 24 hours ending 08/26/22 0833      Diet:  Diet NPO    Exam:  BP (!) 110/56   Pulse 66   Temp 98.1 °F (36.7 °C) (Oral)   Resp 18   Wt 147 lb 14.4 oz (67.1 kg)   SpO2 94%   BMI 26.20 kg/m²     General appearance: No apparent distress, appears stated age and cooperative. HEENT: Pupils equal, round, and reactive to light. Conjunctivae/corneas clear. Neck: Supple, with full range of motion. No jugular venous distention. Trachea midline.   Respiratory:  Normal respiratory effort. Clear to auscultation, bilaterally without Rales/Wheezes/Rhonchi. Cardiovascular: Regular rate and rhythm with normal S1/S2 with 2/6 early peaking systolic murmur LUSB. No rubs or gallops. No edema. Lower left chest wall tenderness under bra line  Abdomen: Soft, significant tenderness to palpation epigastric region right upper quadrant, non-distended with normal bowel sounds. Musculoskeletal: passive and active ROM x 4 extremities. Skin: Skin color, texture, turgor normal.  Well demarcated erythematous nonblanchable rash surrounding umbilicus  Neurologic:  Neurovascularly intact without any focal sensory/motor deficits. Cranial nerves: II-XII intact, grossly non-focal.  Psychiatric: Alert and oriented, thought content appropriate, normal insight. Patient appears anxious  Capillary Refill: Brisk,< 3 seconds   Peripheral Pulses: +2 palpable, equal bilaterally       Labs:   Recent Labs     08/24/22  1634 08/25/22  0505 08/26/22  0611   WBC 6.6 3.6* 9.2   HGB 13.8 12.7 11.2*   HCT 40.6 38.4 34.4*    180 181       Recent Labs     08/24/22  1634 08/25/22  0505 08/26/22  0611    139 141   K 3.9 4.6 4.4   CL 99 104 108   CO2 23 25 24   BUN 21 21 29*   CREATININE 1.0 1.0 1.1   CALCIUM 9.3 8.6 8.9       Recent Labs     08/24/22  1634 08/26/22  0611   AST 24 16   ALT 38 21   BILIDIR  --  0.3   BILITOT 1.9* 1.4*   ALKPHOS 113 80       No results for input(s): INR in the last 72 hours. No results for input(s): Patrisha Meigs in the last 72 hours.     Microbiology:      Urinalysis:      Lab Results   Component Value Date/Time    NITRU NEGATIVE 08/24/2022 07:50 PM    WBCUA  08/24/2022 07:50 PM    BACTERIA NONE SEEN 08/24/2022 07:50 PM    RBCUA 5-10 08/24/2022 07:50 PM    BLOODU SMALL 08/24/2022 07:50 PM    SPECGRAV 1.009 07/22/2016 05:50 PM    GLUCOSEU NEGATIVE 08/24/2022 07:50 PM       Radiology:  CT ABDOMEN PELVIS W IV CONTRAST Additional Contrast? None    Result Date: PM      DVT prophylaxis: [] Lovenox                                 [x] SCDs-thinners on hold given possible procedure                                 [] SQ Heparin                                 [] Encourage ambulation           [] Already on Anticoagulation     Code Status: Full Code    PT/OT Eval Status: Monitor    Tele:   [x] yes             [] no    Active Hospital Problems    Diagnosis Date Noted    Choledocholithiasis [K80.50] 08/24/2022     Priority: Medium    Chronic diastolic CHF (congestive heart failure) ([facility] [I50.32] 12/29/2020    Atrial fibrillation ([facility] [I48.91]     Presence of stent in LAD coronary artery [Z95.5] 04/12/2013       Electronically signed by Floyd Chery PA-C on 8/26/2022 at 8:33 AM

## 2022-08-27 LAB
ALBUMIN SERPL-MCNC: 3.2 G/DL (ref 3.5–5.1)
ALP BLD-CCNC: 99 U/L (ref 38–126)
ALT SERPL-CCNC: 25 U/L (ref 11–66)
ANION GAP SERPL CALCULATED.3IONS-SCNC: 9 MEQ/L (ref 8–16)
AST SERPL-CCNC: 30 U/L (ref 5–40)
BILIRUB SERPL-MCNC: 1.4 MG/DL (ref 0.3–1.2)
BILIRUBIN DIRECT: 0.4 MG/DL (ref 0–0.3)
BUN BLDV-MCNC: 28 MG/DL (ref 7–22)
CALCIUM SERPL-MCNC: 8.5 MG/DL (ref 8.5–10.5)
CHLORIDE BLD-SCNC: 106 MEQ/L (ref 98–111)
CO2: 24 MEQ/L (ref 23–33)
CREAT SERPL-MCNC: 1.3 MG/DL (ref 0.4–1.2)
ERYTHROCYTE [DISTWIDTH] IN BLOOD BY AUTOMATED COUNT: 14.1 % (ref 11.5–14.5)
ERYTHROCYTE [DISTWIDTH] IN BLOOD BY AUTOMATED COUNT: 46.2 FL (ref 35–45)
GFR SERPL CREATININE-BSD FRML MDRD: 40 ML/MIN/1.73M2
GLUCOSE BLD-MCNC: 92 MG/DL (ref 70–108)
HCT VFR BLD CALC: 33.3 % (ref 37–47)
HEMOGLOBIN: 10.8 GM/DL (ref 12–16)
MCH RBC QN AUTO: 29.2 PG (ref 26–33)
MCHC RBC AUTO-ENTMCNC: 32.4 GM/DL (ref 32.2–35.5)
MCV RBC AUTO: 90 FL (ref 81–99)
PLATELET # BLD: 180 THOU/MM3 (ref 130–400)
PMV BLD AUTO: 9.9 FL (ref 9.4–12.4)
POTASSIUM SERPL-SCNC: 3.9 MEQ/L (ref 3.5–5.2)
RBC # BLD: 3.7 MILL/MM3 (ref 4.2–5.4)
SODIUM BLD-SCNC: 139 MEQ/L (ref 135–145)
TOTAL PROTEIN: 5.2 G/DL (ref 6.1–8)
WBC # BLD: 7.8 THOU/MM3 (ref 4.8–10.8)

## 2022-08-27 PROCEDURE — 6360000002 HC RX W HCPCS: Performed by: PHYSICIAN ASSISTANT

## 2022-08-27 PROCEDURE — 80053 COMPREHEN METABOLIC PANEL: CPT

## 2022-08-27 PROCEDURE — 6370000000 HC RX 637 (ALT 250 FOR IP): Performed by: PHYSICIAN ASSISTANT

## 2022-08-27 PROCEDURE — 1200000003 HC TELEMETRY R&B

## 2022-08-27 PROCEDURE — 6360000002 HC RX W HCPCS

## 2022-08-27 PROCEDURE — 2580000003 HC RX 258: Performed by: PHYSICIAN ASSISTANT

## 2022-08-27 PROCEDURE — 85027 COMPLETE CBC AUTOMATED: CPT

## 2022-08-27 PROCEDURE — C9113 INJ PANTOPRAZOLE SODIUM, VIA: HCPCS

## 2022-08-27 PROCEDURE — 99233 SBSQ HOSP IP/OBS HIGH 50: CPT | Performed by: PHYSICIAN ASSISTANT

## 2022-08-27 PROCEDURE — 36415 COLL VENOUS BLD VENIPUNCTURE: CPT

## 2022-08-27 PROCEDURE — 82248 BILIRUBIN DIRECT: CPT

## 2022-08-27 RX ORDER — CIPROFLOXACIN 2 MG/ML
400 INJECTION, SOLUTION INTRAVENOUS ONCE
Status: DISCONTINUED | OUTPATIENT
Start: 2022-08-28 | End: 2022-08-28 | Stop reason: CLARIF

## 2022-08-27 RX ADMIN — HYDROMORPHONE HYDROCHLORIDE 0.25 MG: 1 INJECTION, SOLUTION INTRAMUSCULAR; INTRAVENOUS; SUBCUTANEOUS at 14:39

## 2022-08-27 RX ADMIN — ONDANSETRON 4 MG: 2 INJECTION INTRAMUSCULAR; INTRAVENOUS at 09:12

## 2022-08-27 RX ADMIN — CLOTRIMAZOLE AND BETAMETHASONE DIPROPIONATE: 10; .5 CREAM TOPICAL at 09:12

## 2022-08-27 RX ADMIN — PROPAFENONE HYDROCHLORIDE 150 MG: 150 TABLET, FILM COATED ORAL at 08:54

## 2022-08-27 RX ADMIN — PROPAFENONE HYDROCHLORIDE 150 MG: 150 TABLET, FILM COATED ORAL at 19:31

## 2022-08-27 RX ADMIN — PANTOPRAZOLE SODIUM 40 MG: 40 INJECTION, POWDER, FOR SOLUTION INTRAVENOUS at 08:54

## 2022-08-27 RX ADMIN — ATORVASTATIN CALCIUM 80 MG: 80 TABLET, FILM COATED ORAL at 19:31

## 2022-08-27 RX ADMIN — SODIUM CHLORIDE, PRESERVATIVE FREE 10 ML: 5 INJECTION INTRAVENOUS at 08:54

## 2022-08-27 RX ADMIN — CLOTRIMAZOLE AND BETAMETHASONE DIPROPIONATE: 10; .5 CREAM TOPICAL at 14:28

## 2022-08-27 RX ADMIN — METOPROLOL TARTRATE 25 MG: 25 TABLET, FILM COATED ORAL at 08:54

## 2022-08-27 RX ADMIN — ISOSORBIDE MONONITRATE 30 MG: 30 TABLET, EXTENDED RELEASE ORAL at 08:54

## 2022-08-27 RX ADMIN — FUROSEMIDE 20 MG: 20 TABLET ORAL at 08:54

## 2022-08-27 RX ADMIN — HYDROMORPHONE HYDROCHLORIDE 0.5 MG: 1 INJECTION, SOLUTION INTRAMUSCULAR; INTRAVENOUS; SUBCUTANEOUS at 03:22

## 2022-08-27 RX ADMIN — CLOTRIMAZOLE AND BETAMETHASONE DIPROPIONATE: 10; .5 CREAM TOPICAL at 19:31

## 2022-08-27 ASSESSMENT — PAIN DESCRIPTION - PAIN TYPE
TYPE: ACUTE PAIN
TYPE: ACUTE PAIN

## 2022-08-27 ASSESSMENT — PAIN DESCRIPTION - LOCATION
LOCATION: ABDOMEN

## 2022-08-27 ASSESSMENT — PAIN DESCRIPTION - ORIENTATION
ORIENTATION: RIGHT;UPPER
ORIENTATION: RIGHT
ORIENTATION: RIGHT
ORIENTATION: RIGHT;UPPER
ORIENTATION: RIGHT
ORIENTATION: RIGHT;UPPER
ORIENTATION: RIGHT

## 2022-08-27 ASSESSMENT — PAIN SCALES - GENERAL
PAINLEVEL_OUTOF10: 1
PAINLEVEL_OUTOF10: 8
PAINLEVEL_OUTOF10: 3
PAINLEVEL_OUTOF10: 0
PAINLEVEL_OUTOF10: 1
PAINLEVEL_OUTOF10: 1
PAINLEVEL_OUTOF10: 3

## 2022-08-27 ASSESSMENT — PAIN DESCRIPTION - DESCRIPTORS
DESCRIPTORS: STABBING
DESCRIPTORS: ACHING

## 2022-08-27 ASSESSMENT — PAIN DESCRIPTION - FREQUENCY
FREQUENCY: CONTINUOUS
FREQUENCY: CONTINUOUS

## 2022-08-27 ASSESSMENT — PAIN - FUNCTIONAL ASSESSMENT
PAIN_FUNCTIONAL_ASSESSMENT: PREVENTS OR INTERFERES SOME ACTIVE ACTIVITIES AND ADLS
PAIN_FUNCTIONAL_ASSESSMENT: ACTIVITIES ARE NOT PREVENTED

## 2022-08-27 ASSESSMENT — PAIN DESCRIPTION - ONSET
ONSET: ON-GOING
ONSET: ON-GOING

## 2022-08-27 NOTE — PROGRESS NOTES
45 to 50%. Mild global hypokinesis of the left ventricle  Continue Lasix  Restart lisinopril per cardio- will wait until discharge   Consider SGLT2 and aldactone- deferring to cardio at this time  Daily weights, strict I's and O's  Fluids decreased to 30cc/hr   Updated echo ordered  COPD, moderate  According to pulmonology note, on Symbicort, Spiriva, albuterol but noncompliant at times      Expected discharge date:  unknown    Disposition:    [x] Home       [] TCU       [] Rehab       [] Psych       [] SNF       [] [place]       [] Other-    Chief Complaint: Fever and abdominal pain      Hospital Course:   Initial HPI  \" Patient presents to the ED with fever and abdominal pain for several days. The patient states she had not been able to eat for several days due to pain and nausea. 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 Elizabeth Isabel outpatient. Outpatient records indicate she had some drainage and debris from her umbilicus for a year and there is wound associated with hernia mesh. She took oral antibiotics and is now doing topical treatment and wound care. Patient is admitted for further evaluation of pain, dilated common duct and stones in the duct. \"    8/25: CBC overall stable. BMP stable. Mild elevation of bilirubin at 1.9. MRCP is scheduled for today. General surgery unlikely to perform any procedure due to cardiac risk. Waiting for further input from GI. Blood thinners on hold given possible need for procedure    8/25:Patient reports she has not had any vomiting since arrival.  Reports that her nausea is improved abdominal pain is still present. She states she is anxious for MRCP and to know if they are going to do any procedures or not. She denies any chest pain, palpitations, shortness of breath. Denies any melena or hematochezia.   Reports normal bowel movements    8/26/2022:MRCP demonstrating 2 mm stone in CBD with 9 mm dilation. Bilirubin mildly improved from 1.9-1.4. Awaiting cardiac clearance for patient to undergo ERCP with Dr. Lennox Ranks  Patient did develop chest pain this morning while cardiology was evaluating patient. Troponin negative EKG without any changes or any signs suggesting ischemia. Lipase was ordered which was elevated at 122. It was normal on arrival.  Suspect that patient is developing some mild pancreatitis which is the cause of her pain. We will continue to monitor closely. Trend troponin     8/27/2022  Patient completed stress test yesterday which was negative. Echo is stable with an EF of 45%. Bilirubin is stable. GI planning for an ERCP tomorrow as cardiac clearance has been obtained  Patient has developed some mild COREY with a creatinine of 1.3. Attributed to decreased p.o. intake. Encourage oral hydration and continue gentle IV fluids at 30 cc/h monitor closely for fluid overload    Subjective (past 24 hours):   Patient states she is actually feeling the best she is felt in a few days. Does have epigastric abdominal pain with deep inspiration. No longer having the left lower chest pain.     Medications:  Reviewed    Infusion Medications    sodium chloride      sodium chloride 75 mL/hr at 08/25/22 0006     Scheduled Medications    [START ON 8/28/2022] ciprofloxacin  400 mg IntraVENous Once    clotrimazole-betamethasone   Topical TID    pantoprazole  40 mg IntraVENous Daily    [Held by provider] aspirin  81 mg Oral Daily    atorvastatin  80 mg Oral Nightly    mometasone-formoterol  2 puff Inhalation BID    furosemide  20 mg Oral Daily    isosorbide mononitrate  30 mg Oral Daily    metoprolol tartrate  25 mg Oral BID    [Held by provider] prasugrel  10 mg Oral Daily    propafenone  150 mg Oral BID    tiotropium  2 puff Inhalation Daily    sodium chloride flush  5-40 mL IntraVENous 2 times per day    [Held by provider] enoxaparin  40 mg SubCUTAneous Daily     PRN Meds: nitroGLYCERIN, HYDROmorphone **OR** HYDROmorphone, albuterol sulfate HFA, sodium chloride flush, sodium chloride, ondansetron **OR** ondansetron, polyethylene glycol, acetaminophen **OR** acetaminophen, potassium chloride **OR** potassium alternative oral replacement **OR** potassium chloride, magnesium sulfate    No intake or output data in the 24 hours ending 08/27/22 1011      Diet:  ADULT DIET; Regular; GI [place] (GERD/Peptic Ulcer)  Diet NPO Exceptions are: Sips of Water with Meds    Exam:  /72   Pulse 71   Temp 98.1 °F (36.7 °C) (Oral)   Resp 16   Wt 143 lb 14.4 oz (65.3 kg)   SpO2 95%   BMI 25.49 kg/m²     General appearance: No apparent distress, appears stated age and cooperative. HEENT: Pupils equal, round, and reactive to light. Conjunctivae/corneas clear. Neck: Supple, with full range of motion. No jugular venous distention. Trachea midline. Respiratory:  Normal respiratory effort. Clear to auscultation, bilaterally without Rales/Wheezes/Rhonchi. Cardiovascular: Regular rate and rhythm with normal S1/S2 with 2/6 early peaking systolic murmur LUSB. No rubs or gallops. No edema. Abdomen: Soft, significant tenderness to palpation epigastric region right upper quadrant, non-distended with normal bowel sounds. Musculoskeletal: passive and active ROM x 4 extremities. Skin: Skin color, texture, turgor normal.  Well demarcated erythematous nonblanchable rash surrounding umbilicus  Neurologic:  Neurovascularly intact without any focal sensory/motor deficits. Cranial nerves: II-XII intact, grossly non-focal.  Psychiatric: Alert and oriented, thought content appropriate, normal insight.   Patient appears anxious  Capillary Refill: Brisk,< 3 seconds   Peripheral Pulses: +2 palpable, equal bilaterally       Labs:   Recent Labs     08/25/22  0505 08/26/22  0611 08/27/22  0604   WBC 3.6* 9.2 7.8   HGB 12.7 11.2* 10.8*   HCT 38.4 34.4* 33.3*    181 180       Recent Labs     08/25/22  0505 08/26/22  8730 08/27/22  0604  141 139   K 4.6 4.4 3.9    108 106   CO2 25 24 24   BUN 21 29* 28*   CREATININE 1.0 1.1 1.3*   CALCIUM 8.6 8.9 8.5       Recent Labs     08/24/22  1634 08/26/22  0611 08/27/22  0604   AST 24 16 30   ALT 38 21 25   BILIDIR  --  0.3 0.4*   BILITOT 1.9* 1.4* 1.4*   ALKPHOS 113 80 99       No results for input(s): INR in the last 72 hours. No results for input(s): Amelie Gazella in the last 72 hours. Microbiology:      Urinalysis:      Lab Results   Component Value Date/Time    NITRU NEGATIVE 08/24/2022 07:50 PM    WBCUA  08/24/2022 07:50 PM    BACTERIA NONE SEEN 08/24/2022 07:50 PM    RBCUA 5-10 08/24/2022 07:50 PM    BLOODU SMALL 08/24/2022 07:50 PM    SPECGRAV 1.009 07/22/2016 05:50 PM    GLUCOSEU NEGATIVE 08/24/2022 07:50 PM       Radiology:  CT ABDOMEN PELVIS W IV CONTRAST Additional Contrast? None    Result Date: 8/24/2022  CT abdomen and pelvis with contrast Comparison: CT,SR - CT ABDOMEN PELVIS WO CONTRAST - 07/22/2022 07:06 AM EDT Findings: The lung bases are clear. There are multiple tiny gallstones within the gallbladder. The common bile duct measures up to 7 mm in diameter with interval increased since the prior study. There is a new 2 mm calculus within the common bile duct just proximal to the ampulla. Multiple splenic granulomas. Unremarkable liver and bilateral adrenal glands. Moderate pancreatic volume loss with partial fatty replacement of parenchyma. Numerous peripelvic cysts within the kidneys. No bowel obstruction, pneumoperitoneum, or pneumatosis. Status post hysterectomy. Unremarkable urinary bladder. The appendix is not definitively seen. There are no secondary findings to suggest appendicitis. There is an hemangioma within the L3 vertebral body. No acute fracture or suspicious bone lesion. 1. There is evidence of cholelithiasis and choledocholithiasis. Borderline dilatation of the common bile duct with interval increase.  2. Right-sided posterior triangle hernia containing fat. This document has been electronically signed by: Unruly Souza MD on 08/24/2022 06:38 PM All CTs at this facility use dose modulation techniques and iterative reconstructions, and/or weight-based dosing when appropriate to reduce radiation to a low as reasonably achievable. US GALLBLADDER RUQ    Result Date: 8/24/2022  US abdomen limited Comparison: 05/10/2016 Findings: Liver normal size and echotexture. Right lobe 13.7 cm length. Main portal vein antegrade. Visualized pancreas unremarkable. Gallstones noted without wall thickening. Common duct 8.3 mm diameter. Punctate stone noted within the common bile duct. No ultrasonographic Du sign.      Impression: Cholelithiasis and common duct dilation with small stone in common duct This document has been electronically signed by: Tawana Shepherd MD on 08/24/2022 10:09 PM      DVT prophylaxis: [] Lovenox                                 [x] SCDs-thinners on hold for procedure                                 [] SQ Heparin                                 [] Encourage ambulation           [] Already on Anticoagulation     Code Status: Full Code    PT/OT Eval Status: Monitor    Tele:   [x] yes             [] no    Active Hospital Problems    Diagnosis Date Noted    Choledocholithiasis [K80.50] 08/24/2022     Priority: Medium    Chronic diastolic CHF (congestive heart failure) ([address] [I50.32] 12/29/2020    Atrial fibrillation ([address] [I48.91]     Presence of stent in LAD coronary artery [Z95.5] 04/12/2013       Electronically signed by Cynthia Mcmahan PA-C on 8/27/2022 at 10:11 AM

## 2022-08-27 NOTE — PROGRESS NOTES
Gastroenterology  Progress Note    8/27/2022 8:27 AM  Subjective:   Admit Date: 8/24/2022    Interval History:  Antonio Pozo is a 67 y.o. female known to the practice last seen in 2016 on previous admission. Patient presents to the ED with fever and abdominal pain since Saturday. The patient states she had not been able to eat for several days due to pain and nausea. 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 Joan Eaton outpatient. Outpatient records indicate she had some drainage and debris from her umbilicus for a year and there is wound associated with hernia mesh. She took oral antibiotics and is now doing topical treatment and wound care. Patient is admitted for further evaluation of pain, dilated common duct and stones in the duct. 8/25/22: GI consulted for chololithiasis, common bile duct dilation, and possible need for ERCP. Patient is on Effient for CAD and last known dose was Tuesday. MRCP is completed and awaiting results. Surgery has been consulted but she is a high risk due to extensive heart history. 8/26/22: Patient had chest pain/abdominal pain today. Lipase elevated suspect early signs of pancreatitis. Cardiology consulted for pre-operative clearance for ERCP. EKG, Troponin, echocardiogram, and stress test ordered. Patient awaiting results of stress test. Surgical team defers patient for ERCP due to high risk for cardiac complications. Plan ERCP when cardiac clearance confirmed. 8/27/22: Patient stress test results below. Patient cleared by cardiology with moderate cardiac risk for ERCP. Patient on a bland GI diet and plan for ERCP ? Tomorrow dependent on OR scheduling. Patient NPO after midnight. Diet: ADULT DIET;  Regular; GI [place] (GERD/Peptic Ulcer)    Patient Active Problem List:     Unstable angina [facility])     H/O prior ablation treatment     Hx of atrial fibrillation, no current medication     Ex-smoker     Presence of stent in LAD coronary artery     S/P cardiac catheterization     Hyperlipidemia with target LDL less than 70     Atrial fibrillation (HCC)     Chest pain     Gastroesophageal reflux disease without esophagitis     Coronary artery disease involving native coronary artery of native heart without angina pectoris     Cerebrovascular accident (CVA) ([facility]     Aphasia     [facility] syndrome[facility]     Coronary artery disease involving native coronary artery of native heart with unstable angina pectoris ([facility]     Essential hypertension     Hyperlipidemia LDL goal <70     S/P CABG x 1     Transient cerebral ischemia     Chronic diastolic CHF (congestive heart failure) (HCC)     SOB (shortness of breath)     COPD with acute exacerbation ([facility])     Fatigue     Other specified symptoms and signs involving the circulatory and respiratory systems     Choledocholithiasis        Medications:   Scheduled Meds:   clotrimazole-betamethasone   Topical TID    pantoprazole  40 mg IntraVENous Daily    [Held by provider] aspirin  81 mg Oral Daily    atorvastatin  80 mg Oral Nightly    mometasone-formoterol  2 puff Inhalation BID    furosemide  20 mg Oral Daily    isosorbide mononitrate  30 mg Oral Daily    metoprolol tartrate  25 mg Oral BID    [Held by provider] prasugrel  10 mg Oral Daily    propafenone  150 mg Oral BID    tiotropium  2 puff Inhalation Daily    sodium chloride flush  5-40 mL IntraVENous 2 times per day    [Held by provider] enoxaparin  40 mg SubCUTAneous Daily     Continuous Infusions:   sodium chloride      sodium chloride 75 mL/hr at 08/25/22 0006     CBC:   Recent Labs     08/25/22  0505 08/26/22  0611 08/27/22  0604   WBC 3.6* 9.2 7.8   HGB 12.7 11.2* 10.8*    181 180     BMP:    Recent Labs     08/25/22  0505 08/26/22  0611 08/27/22  0604    141 139   K 4.6 4.4 3.9    108 106   CO2 25 24 24   BUN 21 29* 28*   CREATININE 1.0 1.1 1.3*   GLUCOSE 125* 100 92     Hepatic:   Recent Labs     08/24/22  1634 08/26/22  0611 08/27/22  0604   AST 24 16 30   ALT 38 21 25   BILITOT 1.9* 1.4* 1.4*   ALKPHOS 113 80 99     INR: No results for input(s): INR in the last 72 hours. Xray:   CT abdomen and pelvis with contrast       Comparison: CT,SR - CT ABDOMEN PELVIS WO CONTRAST - 07/22/2022 07:06 AM    EDT       Findings: The lung bases are clear. There are multiple tiny gallstones within the gallbladder. The common bile    duct measures up to 7 mm in diameter with interval increased since the    prior study. There is a new 2 mm calculus within the common bile duct just    proximal to the ampulla. Multiple splenic granulomas. Unremarkable liver and bilateral adrenal    glands. Moderate pancreatic volume loss with partial fatty replacement of    parenchyma. Numerous peripelvic cysts within the kidneys. No bowel obstruction, pneumoperitoneum, or pneumatosis. Status post hysterectomy. Unremarkable urinary bladder. The appendix is    not definitively seen. There are no secondary findings to suggest    appendicitis. There is an hemangioma within the L3 vertebral body. No acute fracture or    suspicious bone lesion. Impression   1. There is evidence of cholelithiasis and choledocholithiasis. Borderline    dilatation of the common bile duct with interval increase. 2. Right-sided posterior triangle hernia containing fat. This document has been electronically signed by: Sirisha Rodriguez MD on    08/24/2022 06:38 PM       All CTs at this facility use dose modulation techniques and iterative    reconstructions, and/or weight-based dosing   when appropriate to reduce radiation to a low as reasonably achievable. US abdomen limited       Comparison: 05/10/2016       Findings:       Liver normal size and echotexture. Right lobe 13.7 cm length. Main portal vein antegrade. Visualized pancreas unremarkable. Gallstones noted without wall thickening. Common duct 8.3 mm diameter.    Punctate stone noted within the common bile duct. No ultrasonographic Du sign. Impression   Impression:       Cholelithiasis and common duct dilation with small stone in common duct       Endoscopy Finding:  N/A    Cardiology:    Cardiac Perfusion Imaging      Demographics      Patient Name    Mercy Gibson Gender                  Female      MR #            [id number]      Race                                                         Ethnicity      Account #       [de-identified]      Room Number             0023      Accession       [id number]     Date of study           2022   Number      Date of Birth   1950     Referring Physician     Quan Power MD      Age             67 year(s)     NM Technologist         Jack Gutierrez                                                          [facility]      Stress Staff                   Interpreting            Leila Lane MD                                  Cardiologist      The procedure was explained in detail to the patient. Risks,   complications and alternative treatments were reviewed. Written consent   was obtained. Procedure  Procedure Type:      Nuclear Stress Test:Pharmacological, Regadenoson     Indications: Chest pain. Medical History:Patient history obtained by: Lucero Wheatley RN. Chest pain status: chest pain . The patient has known arrhythmias. Risk Factors      The patient risk factors include:former tobacco use, prior MI and (pack   years: 40 years not smokin). Stress Protocols      Resting HR:63 bpm                Resting BP:99/59 mmHg     Stress Protocol:Pharmacologic - Lexiscan  +--------+-----+------+-------+-----------+--+----------+------+-----------+  ! Stage # ! Time ! Dosage! Heart  ! Blood      ! CP! Pain      ! Pain  ! Pain Action!  !        !     ! !Rate   ! Pressure   ! ! Location  ! Type  !           !  +--------+-----+------+-------+-----------+--+----------+------+-----------+  !1.0     !01:00!      !76 !104/51     !  !          !      !           !  +--------+-----+------+-------+-----------+--+----------+------+-----------+  !2.0     !02:00!      !74     !105/55     !  !          !      !           !  +--------+-----+------+-------+-----------+--+----------+------+-----------+  !3.0     ! 03:00!      !73     !110/57     !  !          !      !           !  +--------+-----+------+-------+-----------+--+----------+------+-----------+  ! Recovery! 01:00!      !66     !95/65      !  !          !      !           !  +--------+-----+------+-------+-----------+--+----------+------+-----------+  ! Recovery! 02:00!      !75     !105/66     !  !          !      !           !  +--------+-----+------+-------+-----------+--+----------+------+-----------+  ! Recovery! 03:00!      !68     !110/69     !  !          !      !           !  +--------+-----+------+-------+-----------+--+----------+------+-----------+  ! Recovery! 04:00!      !75     !112/70     !  !          !      !           !  +--------+-----+------+-------+-----------+--+----------+------+-----------+  ! Recovery! 05:00!      !77     !115/72     !  !          !      !           !  +--------+-----+------+-------+-----------+--+----------+------+-----------+      Peak HR:78 bpm                                      HR/BP product:8970   Peak BP:115/72 mmHg   Predicted HR: 147 bpm   % of predicted HR: 53   Test duration: 5 min   Reason for termination:Physician request      Imaging Protocols      Rest                                Stress      Isotope:Tc-99m Sestamibi            Isotope: Tc-99m Sestamibi   Isotope dose:8.8 mCi                Isotope dose:32.2 mCi   Date:08/26/2022 13:10               Date:08/26/2022 14:20      Technique:           SPECT          Technique:           Gated                                                            SPECT     Imaging Results:Calculated gated LVEF 44 %. The T.I.D. ratio was 1.27 .   There was a small to moderate sized, mild in intensity, fixed myocardial  perfusion defect of the lateral wall most consistent with an infarct. Myocardial perfusion imaging is not suggestive for myocardial ischemia. Conclusions      Summary   Myocardial perfusion imaging is not suggestive for myocardial ischemia. Recommendation   Medical management-no intervention. Clinical correlation is recommended. Aggressive medical therapy for coronary artery disease. Signatures      ----------------------------------------------------------------   Electronically signed by Mabel Branham MD (Interpreting   Cardiologist) on 08/26/2022 at 16:55   ----------------------------------------------------------------     Medical History      Accession#:                           [id number]     Admission Data  Admission date: 08/24/2022 Admission Time: 16:26    Objective:   Vitals: BP (!) 100/57   Pulse 65   Temp 97.8 °F (36.6 °C) (Oral)   Resp 18   Wt 143 lb 14.4 oz (65.3 kg)   SpO2 94%   BMI 25.49 kg/m²   No intake or output data in the 24 hours ending 08/27/22 0827  Weight:  Wt Readings from Last 3 Encounters:   08/27/22 143 lb 14.4 oz (65.3 kg)   06/23/22 150 lb 3.2 oz (68.1 kg)   06/21/22 152 lb 6.4 oz (69.1 kg)     General appearance: alert and cooperative with exam  Lungs: clear to auscultation bilaterally  Heart: regularly irregular rhythm and atrial fibrillation rate controlled  Abdomen: abnormal findings:  tenderness mild in the epigastrium and in the upper abdomen  Extremities: extremities normal, atraumatic, no cyanosis or edema    Assessment and Plan:   Cholelithiasis-MRCP is completed and awaiting results. Surgery has been consulted but she is a high risk due to extensive heart history. ASA and Effient on hold. Cardiac clearance is moderate risk for ERCP. Plan ERCP tomorrow. Patient NPO after midnight. Common bile duct dilation-MRCP is completed and awaiting results. MRCP reviewed small stone seen at distal CBD   Abdominal pain- controlled with pain medication and PPI ordered  Nausea/Vomiting- Patient NPO after midnight ant-emetics PRN         Follow up in GI Clinic after discharge in 2 week(s)        PEDRO LUIS Salazar - CNP  8/27/2022  8:27 AM     Patient is high risk due to [address] , prefer to do on Monday     Patient is seen independently from the nurse practitioner and all  the pertinent data along with physical examination and assessment and plans are all obtained by my self and  Laboratory data, Radiology results, medications all are reviewed by my self and care is discussed extensively with the patient  and the patients nurse and all agree with plan and in addition see orders and plans    Electronically signed by Justa Grande MD on 8/27/2022 at 1:31 PM

## 2022-08-27 NOTE — PLAN OF CARE
Problem: Safety - Adult  Goal: Free from fall injury  Outcome: Progressing-Patient educated on fall precautions. STAR program in place. Will continue to monitor. Problem: ABCDS Injury Assessment  Goal: Absence of physical injury  Outcome: Progressing-Patient educated on fall precautions. STAR program in place. Will continue to monitor. Problem: Chronic Conditions and Co-morbidities  Goal: Patient's chronic conditions and co-morbidity symptoms are monitored and maintained or improved  Outcome: Progressing-Patient chronic conditions are being monitored and medicated as needed. Will continue to monitor. Flowsheets (Taken 8/26/2022 1013 by Elayne Dean RN)  Care Plan - Patient's Chronic Conditions and Co-Morbidity Symptoms are Monitored and Maintained or Improved: Monitor and assess patient's chronic conditions and comorbid symptoms for stability, deterioration, or improvement     Problem: Discharge Planning  Goal: Discharge to home or other facility with appropriate resources  Outcome: Progressing-Patient educated on her diagnosis and why it is holding her back from discharge. Will continue to monitor. Flowsheets (Taken 8/26/2022 1013 by Elayne Dean RN)  Discharge to home or other facility with appropriate resources: Identify barriers to discharge with patient and caregiver     Problem: Cardiovascular - Adult  Goal: Maintains optimal cardiac output and hemodynamic stability  Outcome: Progressing-Patient HR and BP are being monitored per unit policy. Will continue to monitor.   Flowsheets  Taken 8/26/2022 2015 by Rodrick Valles RN  Maintains optimal cardiac output and hemodynamic stability: Monitor blood pressure and heart rate  Taken 8/26/2022 1013 by Elayne Dean RN  Maintains optimal cardiac output and hemodynamic stability:   Monitor blood pressure and heart rate   Monitor urine output and notify Licensed Independent Practitioner for values outside of normal range     Problem: Skin/Tissue Integrity - Adult  Goal: Skin integrity remains intact  Outcome: Progressing-Patient skin intact. Will continue to monitor. Flowsheets (Taken 8/26/2022 1013 by Alicia Kaba RN)  Skin Integrity Remains Intact:   Monitor for areas of redness and/or skin breakdown   Assess vascular access sites hourly  Goal: Oral mucous membranes remain intact  Outcome: Progressing-Patient mucous membranes are intact. Will continue to monitor. Flowsheets (Taken 8/26/2022 1013 by Alicia Kaba RN)  Oral Mucous Membranes Remain Intact: Assess oral mucosa and hygiene practices     Problem: Gastrointestinal - Adult  Goal: Minimal or absence of nausea and vomiting  Outcome: Progressing-No nausea or vomiting noted this shift. Will continue to monitor. Flowsheets (Taken 8/26/2022 1013 by Alicia Kaba RN)  Minimal or absence of nausea and vomiting:   Administer IV fluids as ordered to ensure adequate hydration   Maintain NPO status until nausea and vomiting are resolved    Care plan reviewed with patient. Patient verbalizes understanding of the plan of care and contribute to goal setting.

## 2022-08-28 LAB
ALBUMIN SERPL-MCNC: 3 G/DL (ref 3.5–5.1)
ALP BLD-CCNC: 103 U/L (ref 38–126)
ALT SERPL-CCNC: 20 U/L (ref 11–66)
ANION GAP SERPL CALCULATED.3IONS-SCNC: 9 MEQ/L (ref 8–16)
AST SERPL-CCNC: 26 U/L (ref 5–40)
BILIRUB SERPL-MCNC: 1 MG/DL (ref 0.3–1.2)
BILIRUBIN DIRECT: 0.3 MG/DL (ref 0–0.3)
BUN BLDV-MCNC: 17 MG/DL (ref 7–22)
CALCIUM SERPL-MCNC: 8.4 MG/DL (ref 8.5–10.5)
CHLORIDE BLD-SCNC: 106 MEQ/L (ref 98–111)
CO2: 26 MEQ/L (ref 23–33)
CREAT SERPL-MCNC: 1.1 MG/DL (ref 0.4–1.2)
ERYTHROCYTE [DISTWIDTH] IN BLOOD BY AUTOMATED COUNT: 14 % (ref 11.5–14.5)
ERYTHROCYTE [DISTWIDTH] IN BLOOD BY AUTOMATED COUNT: 45 FL (ref 35–45)
GFR SERPL CREATININE-BSD FRML MDRD: 49 ML/MIN/1.73M2
GLUCOSE BLD-MCNC: 105 MG/DL (ref 70–108)
HCT VFR BLD CALC: 33.9 % (ref 37–47)
HEMOGLOBIN: 11.3 GM/DL (ref 12–16)
MCH RBC QN AUTO: 29.6 PG (ref 26–33)
MCHC RBC AUTO-ENTMCNC: 33.3 GM/DL (ref 32.2–35.5)
MCV RBC AUTO: 88.7 FL (ref 81–99)
PLATELET # BLD: 198 THOU/MM3 (ref 130–400)
PMV BLD AUTO: 9.8 FL (ref 9.4–12.4)
POTASSIUM SERPL-SCNC: 3.5 MEQ/L (ref 3.5–5.2)
RBC # BLD: 3.82 MILL/MM3 (ref 4.2–5.4)
SODIUM BLD-SCNC: 141 MEQ/L (ref 135–145)
TOTAL PROTEIN: 5.2 G/DL (ref 6.1–8)
WBC # BLD: 7.2 THOU/MM3 (ref 4.8–10.8)

## 2022-08-28 PROCEDURE — 1200000003 HC TELEMETRY R&B

## 2022-08-28 PROCEDURE — 6370000000 HC RX 637 (ALT 250 FOR IP): Performed by: PHYSICIAN ASSISTANT

## 2022-08-28 PROCEDURE — 6360000002 HC RX W HCPCS: Performed by: PHYSICIAN ASSISTANT

## 2022-08-28 PROCEDURE — 80053 COMPREHEN METABOLIC PANEL: CPT

## 2022-08-28 PROCEDURE — 2580000003 HC RX 258: Performed by: PHYSICIAN ASSISTANT

## 2022-08-28 PROCEDURE — 85027 COMPLETE CBC AUTOMATED: CPT

## 2022-08-28 PROCEDURE — 82248 BILIRUBIN DIRECT: CPT

## 2022-08-28 PROCEDURE — C9113 INJ PANTOPRAZOLE SODIUM, VIA: HCPCS

## 2022-08-28 PROCEDURE — 36415 COLL VENOUS BLD VENIPUNCTURE: CPT

## 2022-08-28 PROCEDURE — 6360000002 HC RX W HCPCS

## 2022-08-28 PROCEDURE — 99233 SBSQ HOSP IP/OBS HIGH 50: CPT | Performed by: PHYSICIAN ASSISTANT

## 2022-08-28 RX ORDER — CIPROFLOXACIN 2 MG/ML
400 INJECTION, SOLUTION INTRAVENOUS ONCE
Status: COMPLETED | OUTPATIENT
Start: 2022-08-29 | End: 2022-08-29

## 2022-08-28 RX ADMIN — SODIUM CHLORIDE, PRESERVATIVE FREE 10 ML: 5 INJECTION INTRAVENOUS at 09:50

## 2022-08-28 RX ADMIN — SODIUM CHLORIDE, PRESERVATIVE FREE 10 ML: 5 INJECTION INTRAVENOUS at 20:03

## 2022-08-28 RX ADMIN — CLOTRIMAZOLE AND BETAMETHASONE DIPROPIONATE: 10; .5 CREAM TOPICAL at 09:50

## 2022-08-28 RX ADMIN — ISOSORBIDE MONONITRATE 30 MG: 30 TABLET, EXTENDED RELEASE ORAL at 10:01

## 2022-08-28 RX ADMIN — PROPAFENONE HYDROCHLORIDE 150 MG: 150 TABLET, FILM COATED ORAL at 20:03

## 2022-08-28 RX ADMIN — PANTOPRAZOLE SODIUM 40 MG: 40 INJECTION, POWDER, FOR SOLUTION INTRAVENOUS at 09:49

## 2022-08-28 RX ADMIN — METOPROLOL TARTRATE 25 MG: 25 TABLET, FILM COATED ORAL at 12:05

## 2022-08-28 RX ADMIN — ATORVASTATIN CALCIUM 80 MG: 80 TABLET, FILM COATED ORAL at 20:03

## 2022-08-28 RX ADMIN — CLOTRIMAZOLE AND BETAMETHASONE DIPROPIONATE: 10; .5 CREAM TOPICAL at 20:02

## 2022-08-28 RX ADMIN — PROPAFENONE HYDROCHLORIDE 150 MG: 150 TABLET, FILM COATED ORAL at 10:01

## 2022-08-28 RX ADMIN — ONDANSETRON 4 MG: 2 INJECTION INTRAMUSCULAR; INTRAVENOUS at 21:07

## 2022-08-28 RX ADMIN — ONDANSETRON 4 MG: 2 INJECTION INTRAMUSCULAR; INTRAVENOUS at 09:38

## 2022-08-28 RX ADMIN — METOPROLOL TARTRATE 25 MG: 25 TABLET, FILM COATED ORAL at 20:03

## 2022-08-28 RX ADMIN — CLOTRIMAZOLE AND BETAMETHASONE DIPROPIONATE: 10; .5 CREAM TOPICAL at 15:28

## 2022-08-28 RX ADMIN — FUROSEMIDE 20 MG: 20 TABLET ORAL at 10:01

## 2022-08-28 ASSESSMENT — PAIN SCALES - GENERAL
PAINLEVEL_OUTOF10: 3
PAINLEVEL_OUTOF10: 2
PAINLEVEL_OUTOF10: 2
PAINLEVEL_OUTOF10: 1
PAINLEVEL_OUTOF10: 2
PAINLEVEL_OUTOF10: 1
PAINLEVEL_OUTOF10: 1
PAINLEVEL_OUTOF10: 2
PAINLEVEL_OUTOF10: 1

## 2022-08-28 ASSESSMENT — PAIN DESCRIPTION - LOCATION
LOCATION: ABDOMEN

## 2022-08-28 ASSESSMENT — PAIN DESCRIPTION - ORIENTATION: ORIENTATION: RIGHT

## 2022-08-28 NOTE — PROGRESS NOTES
Hospitalist Progress Note    Patient:  Jesse Agustin      Unit/Bed:5K-23/023-A    YOB: 1950    MRN: [id number]       Acct: [de-identified]     PCP: Amelia Valerio MD    Date of Admission: 8/24/2022    Assessment/Plan:    Choledocholithiasis with abdominal pain- developing pancreatitis   CT scan demonstrating dilated common bile duct with stones  Elevated bilirubin at 1.9 improved to 1.0. Repeat LFTs daily  MRCP demonstrating 2 mm stone in CBD with 9 mm dilation  N.p.o.. at midnight for ERCP scheduled for tomorrow- can have bland GI diet today  Pain control and antiemetics  General surgery is following- patient expressing concerns regarding cholecystectomy given her high cardiac risks. General surgery available should unable to have stent placed or patient has recurrent symptoms. Plan to follow and discuss risks and benefits of deferring cholecystectomy after ERCP   COREY - Cr 1.0- resolved  Attribute to decreased PO intake. Encourage hydration  Continue with gentle IVF   BMP daily   Atrial fibrillation  Not chronically anticoagulated- refuses AC  Rate controlled on rythmol and metoprolol  CAD with angina pectoris and documented spasm history of MI, history of stents and CABG  On ASA and Effient-Currently held for procedure  Continue statin  Follows with Dr. Evelyn Moreno and Lasix  Telemetry  Nitro PRN for chest pain  Cardiac clearance obtained. EKG stable, Echo stable, Stress test negative. Umbilical hernia with overlying abdominal wound  Following with wound clinic. Packs wound daily  Reportedly had hernia repair with mesh placement around 2003. Wound and ostomy consulted-recommend Lotrisone 3 times daily-ordered  General surgery not recommending any surgical intervention given cardiac history and benign nature of fat-containing hernia  History of CVA  Systolic HF   Last echo 3/4463 with an EF of 45 to 50%.   Mild global hypokinesis of the left ventricle  Continue Lasix  Restart lisinopril per cardio- will wait until discharge   Consider SGLT2 and aldactone- deferring to cardio at this time  Daily weights, strict I's and O's  Fluids decreased to 30cc/hr   Updated echo ordered  COPD, moderate  According to pulmonology note, on Symbicort, Spiriva, albuterol but noncompliant at times      Expected discharge date:  unknown    Disposition:    [x] Home       [] TCU       [] Rehab       [] Psych       [] SNF       [] [place]       [] Other-    Chief Complaint: Fever and abdominal pain      Hospital Course:   Initial HPI  \" Patient presents to the ED with fever and abdominal pain for several days. The patient states she had not been able to eat for several days due to pain and nausea. 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 Yuliet Maki outpatient. Outpatient records indicate she had some drainage and debris from her umbilicus for a year and there is wound associated with hernia mesh. She took oral antibiotics and is now doing topical treatment and wound care. Patient is admitted for further evaluation of pain, dilated common duct and stones in the duct. \"    8/25: CBC overall stable. BMP stable. Mild elevation of bilirubin at 1.9. MRCP is scheduled for today. General surgery unlikely to perform any procedure due to cardiac risk. Waiting for further input from GI. Blood thinners on hold given possible need for procedure    8/25:Patient reports she has not had any vomiting since arrival.  Reports that her nausea is improved abdominal pain is still present. She states she is anxious for MRCP and to know if they are going to do any procedures or not. She denies any chest pain, palpitations, shortness of breath. Denies any melena or hematochezia. Reports normal bowel movements    8/26/2022:MRCP demonstrating 2 mm stone in CBD with 9 mm dilation. Bilirubin mildly improved from 1.9-1.4.   Awaiting cardiac clearance for patient to undergo ERCP with Dr. Denis Simental  Patient did develop chest pain this morning while cardiology was evaluating patient. Troponin negative EKG without any changes or any signs suggesting ischemia. Lipase was ordered which was elevated at 122. It was normal on arrival.  Suspect that patient is developing some mild pancreatitis which is the cause of her pain. We will continue to monitor closely. Trend troponin     8/27/2022  Patient completed stress test yesterday which was negative. Echo is stable with an EF of 45%. Bilirubin is stable. GI planning for an ERCP tomorrow as cardiac clearance has been obtained  Patient has developed some mild COREY with a creatinine of 1.3. Attributed to decreased p.o. intake. Encourage oral hydration and continue gentle IV fluids at 30 cc/h monitor closely for fluid overload    8/28/2022  COREY is resolved. Bilirubin normalized as well. ERCP is planned for tomorrow as Dr Denis Simental wishes to have \"support staff\" given cardiovacular history. .  Vital signs     Over 30 minutes spent with patient reviewing care and plans moving forward. Subjective (past 24 hours):   Patient reports she is nauseous this morning. Does have epigastric abdominal pain with deep inspiration. No longer having the left lower chest pain.  Expresses extreme frustration regarding postponement of ERCP    Medications:  Reviewed    Infusion Medications    sodium chloride      sodium chloride 30 mL/hr at 08/27/22 1902     Scheduled Medications    ciprofloxacin  400 mg IntraVENous Once    clotrimazole-betamethasone   Topical TID    pantoprazole  40 mg IntraVENous Daily    [Held by provider] aspirin  81 mg Oral Daily    atorvastatin  80 mg Oral Nightly    mometasone-formoterol  2 puff Inhalation BID    furosemide  20 mg Oral Daily    isosorbide mononitrate  30 mg Oral Daily    metoprolol tartrate  25 mg Oral BID    [Held by provider] prasugrel  10 mg Oral Daily    propafenone  150 mg Oral BID tiotropium  2 puff Inhalation Daily    sodium chloride flush  5-40 mL IntraVENous 2 times per day    [Held by provider] enoxaparin  40 mg SubCUTAneous Daily     PRN Meds: nitroGLYCERIN, HYDROmorphone **OR** HYDROmorphone, albuterol sulfate HFA, sodium chloride flush, sodium chloride, ondansetron **OR** ondansetron, polyethylene glycol, acetaminophen **OR** acetaminophen, potassium chloride **OR** potassium alternative oral replacement **OR** potassium chloride, magnesium sulfate      Intake/Output Summary (Last 24 hours) at 8/28/2022 0746  Last data filed at 8/28/2022 0409  Gross per 24 hour   Intake 1417.24 ml   Output --   Net 1417.24 ml         Diet:  Diet NPO Exceptions are: Sips of Water with Meds    Exam:  BP (!) 102/52   Pulse 70   Temp 98.1 °F (36.7 °C) (Oral)   Resp 17   Wt 137 lb 12.8 oz (62.5 kg)   SpO2 96%   BMI 24.41 kg/m²     General appearance: No apparent distress, appears stated age and cooperative. HEENT: Pupils equal, round, and reactive to light. Conjunctivae/corneas clear. Neck: Supple, with full range of motion. No jugular venous distention. Trachea midline. Respiratory:  Normal respiratory effort. Clear to auscultation, bilaterally without Rales/Wheezes/Rhonchi. Cardiovascular: Regular rate and rhythm with normal S1/S2 with 2/6 early peaking systolic murmur LUSB. No rubs or gallops. No edema. Abdomen: Soft, significant tenderness to palpation epigastric region right upper quadrant, non-distended with normal bowel sounds. Musculoskeletal: passive and active ROM x 4 extremities. Skin: Skin color, texture, turgor normal.  Well demarcated erythematous nonblanchable rash surrounding umbilicus  Neurologic:  Neurovascularly intact without any focal sensory/motor deficits. Cranial nerves: II-XII intact, grossly non-focal.  Psychiatric: Alert and oriented, thought content appropriate, normal insight.   Patient appears anxious  Capillary Refill: Brisk,< 3 seconds   Peripheral Pulses: +2 palpable, equal bilaterally       Labs:   Recent Labs     08/26/22  0611 08/27/22  0604 08/28/22  0523   WBC 9.2 7.8 7.2   HGB 11.2* 10.8* 11.3*   HCT 34.4* 33.3* 33.9*    180 198       Recent Labs     08/26/22  0611 08/27/22  0604 08/28/22  0523    139 141   K 4.4 3.9 3.5    106 106   CO2 24 24 26   BUN 29* 28* 17   CREATININE 1.1 1.3* 1.1   CALCIUM 8.9 8.5 8.4*       Recent Labs     08/26/22  0611 08/27/22  0604 08/28/22  0523   AST 16 30 26   ALT 21 25 20   BILIDIR 0.3 0.4* 0.3   BILITOT 1.4* 1.4* 1.0   ALKPHOS 80 99 103       No results for input(s): INR in the last 72 hours. No results for input(s): Rosi Fuchs in the last 72 hours. Microbiology:      Urinalysis:      Lab Results   Component Value Date/Time    NITRU NEGATIVE 08/24/2022 07:50 PM    WBCUA  08/24/2022 07:50 PM    BACTERIA NONE SEEN 08/24/2022 07:50 PM    RBCUA 5-10 08/24/2022 07:50 PM    BLOODU SMALL 08/24/2022 07:50 PM    SPECGRAV 1.009 07/22/2016 05:50 PM    GLUCOSEU NEGATIVE 08/24/2022 07:50 PM       Radiology:  CT ABDOMEN PELVIS W IV CONTRAST Additional Contrast? None    Result Date: 8/24/2022  CT abdomen and pelvis with contrast Comparison: CT,SR - CT ABDOMEN PELVIS WO CONTRAST - 07/22/2022 07:06 AM EDT Findings: The lung bases are clear. There are multiple tiny gallstones within the gallbladder. The common bile duct measures up to 7 mm in diameter with interval increased since the prior study. There is a new 2 mm calculus within the common bile duct just proximal to the ampulla. Multiple splenic granulomas. Unremarkable liver and bilateral adrenal glands. Moderate pancreatic volume loss with partial fatty replacement of parenchyma. Numerous peripelvic cysts within the kidneys. No bowel obstruction, pneumoperitoneum, or pneumatosis. Status post hysterectomy. Unremarkable urinary bladder. The appendix is not definitively seen. There are no secondary findings to suggest appendicitis.  There is an hemangioma within the L3 vertebral body. No acute fracture or suspicious bone lesion. 1. There is evidence of cholelithiasis and choledocholithiasis. Borderline dilatation of the common bile duct with interval increase. 2. Right-sided posterior triangle hernia containing fat. This document has been electronically signed by: Adryan Mello MD on 08/24/2022 06:38 PM All CTs at this facility use dose modulation techniques and iterative reconstructions, and/or weight-based dosing when appropriate to reduce radiation to a low as reasonably achievable. US GALLBLADDER RUQ    Result Date: 8/24/2022  US abdomen limited Comparison: 05/10/2016 Findings: Liver normal size and echotexture. Right lobe 13.7 cm length. Main portal vein antegrade. Visualized pancreas unremarkable. Gallstones noted without wall thickening. Common duct 8.3 mm diameter. Punctate stone noted within the common bile duct. No ultrasonographic Du sign.      Impression: Cholelithiasis and common duct dilation with small stone in common duct This document has been electronically signed by: Gemma Reed MD on 08/24/2022 10:09 PM      DVT prophylaxis: [] Lovenox                                 [x] SCDs-thinners on hold for procedure                                 [] SQ Heparin                                 [] Encourage ambulation           [] Already on Anticoagulation     Code Status: Full Code    PT/OT Eval Status: Monitor    Tele:   [x] yes             [] no    Active Hospital Problems    Diagnosis Date Noted    Choledocholithiasis [K80.50] 08/24/2022     Priority: Medium    Chronic diastolic CHF (congestive heart failure) ([address] [I50.32] 12/29/2020    Atrial fibrillation ([address] [I48.91]     Presence of stent in LAD coronary artery [Z95.5] 04/12/2013       Electronically signed by Duarte Anderson PA-C on 8/28/2022 at 7:46 AM

## 2022-08-28 NOTE — PLAN OF CARE
Problem: Safety - Adult  Goal: Free from fall injury  Outcome: Progressing-Patient educated on fall precautions. STAR program in place. Will continue to monitor. Problem: ABCDS Injury Assessment  Goal: Absence of physical injury  Outcome: Progressing-Patient educated on fall precautions. STAR program in place. Will continue to monitor. Problem: Chronic Conditions and Co-morbidities  Goal: Patient's chronic conditions and co-morbidity symptoms are monitored and maintained or improved  Outcome: Progressing-Patient chronic conditions are being monitored and medicated as needed. Will continue to monitor. Flowsheets (Taken 8/26/2022 1013 by Ria Linares RN)  Care Plan - Patient's Chronic Conditions and Co-Morbidity Symptoms are Monitored and Maintained or Improved: Monitor and assess patient's chronic conditions and comorbid symptoms for stability, deterioration, or improvement     Problem: Discharge Planning  Goal: Discharge to home or other facility with appropriate resources  Outcome: Progressing-Patient educated on her diagnosis and why it is holding her back from discharge. Will continue to monitor. Flowsheets (Taken 8/26/2022 1013 by Ria Linares RN)  Discharge to home or other facility with appropriate resources: Identify barriers to discharge with patient and caregiver     Problem: Cardiovascular - Adult  Goal: Maintains optimal cardiac output and hemodynamic stability  Outcome: Progressing-Patient HR and BP are being monitored per unit policy. Will continue to monitor.   Flowsheets  Taken 8/26/2022 2015 by Celi Beltran RN  Maintains optimal cardiac output and hemodynamic stability: Monitor blood pressure and heart rate  Taken 8/26/2022 1013 by Ria Linares RN  Maintains optimal cardiac output and hemodynamic stability:   Monitor blood pressure and heart rate   Monitor urine output and notify Licensed Independent Practitioner for values outside of normal range     Problem: Skin/Tissue Integrity - Adult  Goal: Skin integrity remains intact  Outcome: Progressing-Patient skin intact. Will continue to monitor. Flowsheets (Taken 8/26/2022 1013 by Roselynn Aase, RN)  Skin Integrity Remains Intact:   Monitor for areas of redness and/or skin breakdown   Assess vascular access sites hourly  Goal: Oral mucous membranes remain intact  Outcome: Progressing-Patient mucous membranes are intact. Will continue to monitor. Flowsheets (Taken 8/26/2022 1013 by Roselynn Aase, RN)  Oral Mucous Membranes Remain Intact: Assess oral mucosa and hygiene practices     Problem: Gastrointestinal - Adult  Goal: Minimal or absence of nausea and vomiting  Outcome: Progressing-No nausea or vomiting noted this shift. Will continue to monitor. Flowsheets (Taken 8/26/2022 1013 by Roselynn Aase, RN)  Minimal or absence of nausea and vomiting:   Administer IV fluids as ordered to ensure adequate hydration   Maintain NPO status until nausea and vomiting are resolved    Care plan reviewed with patient. Patient verbalizes understanding of the plan of care and contribute to goal setting.

## 2022-08-28 NOTE — PROGRESS NOTES
Gastroenterology  Progress Note    8/28/2022 10:23 AM  Subjective:   Admit Date: 8/24/2022    Interval History:  Anel Villa is a 67 y.o. female known to the practice last seen in 2016 on previous admission. Patient presents to the ED with fever and abdominal pain since Saturday. The patient states she had not been able to eat for several days due to pain and nausea. 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 Thalia Cortez outpatient. Outpatient records indicate she had some drainage and debris from her umbilicus for a year and there is wound associated with hernia mesh. She took oral antibiotics and is now doing topical treatment and wound care. Patient is admitted for further evaluation of pain, dilated common duct and stones in the duct. 8/25/22: GI consulted for chololithiasis, common bile duct dilation, and possible need for ERCP. Patient is on Effient for CAD and last known dose was Tuesday. MRCP is completed and awaiting results. Surgery has been consulted but she is a high risk due to extensive heart history. 8/26/22: Patient had chest pain/abdominal pain today. Lipase elevated suspect early signs of pancreatitis. Cardiology consulted for pre-operative clearance for ERCP. EKG, Troponin, echocardiogram, and stress test ordered. Patient awaiting results of stress test. Surgical team defers patient for ERCP due to high risk for cardiac complications. Plan ERCP when cardiac clearance confirmed. 8/27/22: Patient stress test results below. Patient cleared by cardiology with moderate cardiac risk for ERCP. Patient on a bland GI diet and plan for ERCP ? Tomorrow dependent on OR scheduling. Patient NPO after midnight.   8/28 patient schedule for ERCP tomorrow, lab to be repeated     Diet: Diet NPO Exceptions are: Sips of Water with Meds  ADULT DIET; Regular;  Low Sodium (2 gm); GI [place] (GERD/Peptic Ulcer)    Patient Active Problem List:     Unstable angina  139 141   K 4.4 3.9 3.5    106 106   CO2 24 24 26   BUN 29* 28* 17   CREATININE 1.1 1.3* 1.1   GLUCOSE 100 92 105     Hepatic:   Recent Labs     08/26/22  0611 08/27/22  0604 08/28/22  0523   AST 16 30 26   ALT 21 25 20   BILITOT 1.4* 1.4* 1.0   ALKPHOS 80 99 103     INR: No results for input(s): INR in the last 72 hours. Xray:   CT abdomen and pelvis with contrast       Comparison: CT,SR - CT ABDOMEN PELVIS WO CONTRAST - 07/22/2022 07:06 AM    EDT       Findings: The lung bases are clear. There are multiple tiny gallstones within the gallbladder. The common bile    duct measures up to 7 mm in diameter with interval increased since the    prior study. There is a new 2 mm calculus within the common bile duct just    proximal to the ampulla. Multiple splenic granulomas. Unremarkable liver and bilateral adrenal    glands. Moderate pancreatic volume loss with partial fatty replacement of    parenchyma. Numerous peripelvic cysts within the kidneys. No bowel obstruction, pneumoperitoneum, or pneumatosis. Status post hysterectomy. Unremarkable urinary bladder. The appendix is    not definitively seen. There are no secondary findings to suggest    appendicitis. There is an hemangioma within the L3 vertebral body. No acute fracture or    suspicious bone lesion. Impression   1. There is evidence of cholelithiasis and choledocholithiasis. Borderline    dilatation of the common bile duct with interval increase. 2. Right-sided posterior triangle hernia containing fat. This document has been electronically signed by: Alison Harrison MD on    08/24/2022 06:38 PM       All CTs at this facility use dose modulation techniques and iterative    reconstructions, and/or weight-based dosing   when appropriate to reduce radiation to a low as reasonably achievable. US abdomen limited       Comparison: 05/10/2016       Findings:       Liver normal size and echotexture.  Right lobe 13.7 cm length. Main portal vein antegrade. Visualized pancreas unremarkable. Gallstones noted without wall thickening. Common duct 8.3 mm diameter. Punctate stone noted within the common bile duct. No ultrasonographic Du sign. Impression   Impression:       Cholelithiasis and common duct dilation with small stone in common duct       Endoscopy Finding:  N/A    Cardiology:    Cardiac Perfusion Imaging      Demographics      Patient Name    Miguel Gonzales Gender                  Female      MR #            [id number]      Race                                                         Ethnicity      Account #       [de-identified]      Room Number             0023      Accession       [id number]     Date of study           2022   Number      Date of Birth   1950     Referring Physician     Luc Ybarra MD      Age             67 year(s)     NM Technologist         Niurka                                                           [facility]      Stress Staff                   Interpreting            Rosalinda Nascimento MD                                  Cardiologist      The procedure was explained in detail to the patient. Risks,   complications and alternative treatments were reviewed. Written consent   was obtained. Procedure  Procedure Type:      Nuclear Stress Test:Pharmacological, Regadenoson     Indications: Chest pain. Medical History:Patient history obtained by: Christopher Donahue RN. Chest pain status: chest pain . The patient has known arrhythmias. Risk Factors      The patient risk factors include:former tobacco use, prior MI and (pack   years: 40 years not smokin). Stress Protocols      Resting HR:63 bpm                Resting BP:99/59 mmHg     Stress Protocol:Pharmacologic - Lexiscan  +--------+-----+------+-------+-----------+--+----------+------+-----------+  ! Stage # ! Time ! Dosage! Heart  ! Blood      ! CP! Pain      ! Pain  ! Pain Action!  !        !     ! !Rate   ! Pressure   ! ! Location  ! Type  !           !  +--------+-----+------+-------+-----------+--+----------+------+-----------+  !1.0     !01:00!      !76     !104/51     !  !          !      !           !  +--------+-----+------+-------+-----------+--+----------+------+-----------+  !2.0     !02:00!      !74     !105/55     !  !          !      !           !  +--------+-----+------+-------+-----------+--+----------+------+-----------+  !3.0     ! 03:00!      !73     !110/57     !  !          !      !           !  +--------+-----+------+-------+-----------+--+----------+------+-----------+  ! Recovery! 01:00!      !66     !95/65      !  !          !      !           !  +--------+-----+------+-------+-----------+--+----------+------+-----------+  ! Recovery! 02:00!      !75     !105/66     !  !          !      !           !  +--------+-----+------+-------+-----------+--+----------+------+-----------+  ! Recovery! 03:00!      !68     !110/69     !  !          !      !           !  +--------+-----+------+-------+-----------+--+----------+------+-----------+  ! Recovery! 04:00!      !75     !112/70     !  !          !      !           !  +--------+-----+------+-------+-----------+--+----------+------+-----------+  ! Recovery! 05:00!      !77     !115/72     !  !          !      !           !  +--------+-----+------+-------+-----------+--+----------+------+-----------+      Peak HR:78 bpm                                      HR/BP product:8970   Peak BP:115/72 mmHg   Predicted HR: 147 bpm   % of predicted HR: 53   Test duration: 5 min   Reason for termination:Physician request      Imaging Protocols      Rest                                Stress      Isotope:Tc-99m Sestamibi            Isotope: Tc-99m Sestamibi   Isotope dose:8.8 mCi                Isotope dose:32.2 mCi   Date:08/26/2022 13:10               Date:08/26/2022 14:20      Technique:           SPECT          Technique:           Gated SPECT     Imaging Results:Calculated gated LVEF 44 %. The T.I.D. ratio was 1.27 . There was a small to moderate sized, mild in intensity, fixed myocardial  perfusion defect of the lateral wall most consistent with an infarct. Myocardial perfusion imaging is not suggestive for myocardial ischemia. Conclusions      Summary   Myocardial perfusion imaging is not suggestive for myocardial ischemia. Recommendation   Medical management-no intervention. Clinical correlation is recommended. Aggressive medical therapy for coronary artery disease. Signatures      ----------------------------------------------------------------   Electronically signed by Manan Vasques MD (Interpreting   Cardiologist) on 08/26/2022 at 16:55   ----------------------------------------------------------------     Medical History      Accession#:                           [id number]     Admission Data  Admission date: 08/24/2022 Admission Time: 16:26    Objective:   Vitals: /64   Pulse 71   Temp 98.5 °F (36.9 °C) (Oral)   Resp 16   Wt 137 lb 12.8 oz (62.5 kg)   SpO2 96%   BMI 24.41 kg/m²     Intake/Output Summary (Last 24 hours) at 8/28/2022 1023  Last data filed at 8/28/2022 0409  Gross per 24 hour   Intake 1417.24 ml   Output --   Net 1417.24 ml     Weight:  Wt Readings from Last 3 Encounters:   08/28/22 137 lb 12.8 oz (62.5 kg)   06/23/22 150 lb 3.2 oz (68.1 kg)   06/21/22 152 lb 6.4 oz (69.1 kg)     General appearance: alert and cooperative with exam  Lungs: clear to auscultation bilaterally  Heart: regularly irregular rhythm and atrial fibrillation rate controlled  Abdomen: abnormal findings:  tenderness mild in the epigastrium and in the upper abdomen  Extremities: extremities normal, atraumatic, no cyanosis or edema    Assessment and Plan:   Cholelithiasis-MRCP is completed and awaiting results.  Surgery has been consulted but she is a high risk due to extensive heart

## 2022-08-29 ENCOUNTER — ANESTHESIA (OUTPATIENT)
Dept: ENDOSCOPY | Age: 72
DRG: 444 | End: 2022-08-29
Payer: MEDICARE

## 2022-08-29 ENCOUNTER — APPOINTMENT (OUTPATIENT)
Dept: GENERAL RADIOLOGY | Age: 72
DRG: 444 | End: 2022-08-29
Payer: MEDICARE

## 2022-08-29 ENCOUNTER — ANESTHESIA EVENT (OUTPATIENT)
Dept: ENDOSCOPY | Age: 72
DRG: 444 | End: 2022-08-29
Payer: MEDICARE

## 2022-08-29 LAB
ALBUMIN SERPL-MCNC: 3.3 G/DL (ref 3.5–5.1)
ALP BLD-CCNC: 102 U/L (ref 38–126)
ALT SERPL-CCNC: 20 U/L (ref 11–66)
ANION GAP SERPL CALCULATED.3IONS-SCNC: 10 MEQ/L (ref 8–16)
AST SERPL-CCNC: 29 U/L (ref 5–40)
BILIRUB SERPL-MCNC: 0.9 MG/DL (ref 0.3–1.2)
BLOOD CULTURE, ROUTINE: NORMAL
BLOOD CULTURE, ROUTINE: NORMAL
BUN BLDV-MCNC: 11 MG/DL (ref 7–22)
CALCIUM SERPL-MCNC: 8.4 MG/DL (ref 8.5–10.5)
CHLORIDE BLD-SCNC: 108 MEQ/L (ref 98–111)
CO2: 24 MEQ/L (ref 23–33)
CREAT SERPL-MCNC: 1 MG/DL (ref 0.4–1.2)
ERYTHROCYTE [DISTWIDTH] IN BLOOD BY AUTOMATED COUNT: 13.9 % (ref 11.5–14.5)
ERYTHROCYTE [DISTWIDTH] IN BLOOD BY AUTOMATED COUNT: 45.1 FL (ref 35–45)
GFR SERPL CREATININE-BSD FRML MDRD: 54 ML/MIN/1.73M2
GLUCOSE BLD-MCNC: 105 MG/DL (ref 70–108)
HCT VFR BLD CALC: 35.4 % (ref 37–47)
HEMOGLOBIN: 11.5 GM/DL (ref 12–16)
MCH RBC QN AUTO: 29.1 PG (ref 26–33)
MCHC RBC AUTO-ENTMCNC: 32.5 GM/DL (ref 32.2–35.5)
MCV RBC AUTO: 89.6 FL (ref 81–99)
PLATELET # BLD: 221 THOU/MM3 (ref 130–400)
PMV BLD AUTO: 9.9 FL (ref 9.4–12.4)
POTASSIUM SERPL-SCNC: 3.3 MEQ/L (ref 3.5–5.2)
RBC # BLD: 3.95 MILL/MM3 (ref 4.2–5.4)
SODIUM BLD-SCNC: 142 MEQ/L (ref 135–145)
TOTAL PROTEIN: 5 G/DL (ref 6.1–8)
WBC # BLD: 8.8 THOU/MM3 (ref 4.8–10.8)

## 2022-08-29 PROCEDURE — 2709999900 HC NON-CHARGEABLE SUPPLY: Performed by: INTERNAL MEDICINE

## 2022-08-29 PROCEDURE — 2720000010 HC SURG SUPPLY STERILE: Performed by: INTERNAL MEDICINE

## 2022-08-29 PROCEDURE — 2580000003 HC RX 258: Performed by: PHYSICIAN ASSISTANT

## 2022-08-29 PROCEDURE — 99232 SBSQ HOSP IP/OBS MODERATE 35: CPT | Performed by: PHYSICIAN ASSISTANT

## 2022-08-29 PROCEDURE — 7100000000 HC PACU RECOVERY - FIRST 15 MIN: Performed by: INTERNAL MEDICINE

## 2022-08-29 PROCEDURE — C9113 INJ PANTOPRAZOLE SODIUM, VIA: HCPCS

## 2022-08-29 PROCEDURE — 7100000001 HC PACU RECOVERY - ADDTL 15 MIN: Performed by: INTERNAL MEDICINE

## 2022-08-29 PROCEDURE — 3609015200 HC ERCP REMOVE CALCULI/DEBRIS BILIARY/PANCREAS DUCT: Performed by: INTERNAL MEDICINE

## 2022-08-29 PROCEDURE — 6360000002 HC RX W HCPCS

## 2022-08-29 PROCEDURE — 0FC98ZZ EXTIRPATION OF MATTER FROM COMMON BILE DUCT, VIA NATURAL OR ARTIFICIAL OPENING ENDOSCOPIC: ICD-10-PCS | Performed by: INTERNAL MEDICINE

## 2022-08-29 PROCEDURE — 80053 COMPREHEN METABOLIC PANEL: CPT

## 2022-08-29 PROCEDURE — 3700000001 HC ADD 15 MINUTES (ANESTHESIA): Performed by: INTERNAL MEDICINE

## 2022-08-29 PROCEDURE — 6370000000 HC RX 637 (ALT 250 FOR IP): Performed by: PHYSICIAN ASSISTANT

## 2022-08-29 PROCEDURE — 3609014300 HC ERCP BALLOON DILATE BILIARY/PANC DUCT/AMPULLA EA: Performed by: INTERNAL MEDICINE

## 2022-08-29 PROCEDURE — 3700000000 HC ANESTHESIA ATTENDED CARE: Performed by: INTERNAL MEDICINE

## 2022-08-29 PROCEDURE — 85027 COMPLETE CBC AUTOMATED: CPT

## 2022-08-29 PROCEDURE — C1769 GUIDE WIRE: HCPCS | Performed by: INTERNAL MEDICINE

## 2022-08-29 PROCEDURE — 6360000002 HC RX W HCPCS: Performed by: PHYSICIAN ASSISTANT

## 2022-08-29 PROCEDURE — 6360000002 HC RX W HCPCS: Performed by: NURSE ANESTHETIST, CERTIFIED REGISTERED

## 2022-08-29 PROCEDURE — 74330 X-RAY BILE/PANC ENDOSCOPY: CPT

## 2022-08-29 PROCEDURE — 36415 COLL VENOUS BLD VENIPUNCTURE: CPT

## 2022-08-29 PROCEDURE — 1200000003 HC TELEMETRY R&B

## 2022-08-29 PROCEDURE — BF101ZZ FLUOROSCOPY OF BILE DUCTS USING LOW OSMOLAR CONTRAST: ICD-10-PCS | Performed by: INTERNAL MEDICINE

## 2022-08-29 RX ORDER — DIPHENHYDRAMINE HYDROCHLORIDE 50 MG/ML
25 INJECTION INTRAMUSCULAR; INTRAVENOUS EVERY 6 HOURS PRN
Status: CANCELLED | OUTPATIENT
Start: 2022-08-29

## 2022-08-29 RX ORDER — DEXAMETHASONE SODIUM PHOSPHATE 4 MG/ML
INJECTION, SOLUTION INTRA-ARTICULAR; INTRALESIONAL; INTRAMUSCULAR; INTRAVENOUS; SOFT TISSUE PRN
Status: DISCONTINUED | OUTPATIENT
Start: 2022-08-29 | End: 2022-08-29 | Stop reason: SDUPTHER

## 2022-08-29 RX ORDER — SUCCINYLCHOLINE CHLORIDE 20 MG/ML
INJECTION INTRAMUSCULAR; INTRAVENOUS PRN
Status: DISCONTINUED | OUTPATIENT
Start: 2022-08-29 | End: 2022-08-29 | Stop reason: SDUPTHER

## 2022-08-29 RX ORDER — ONDANSETRON 2 MG/ML
INJECTION INTRAMUSCULAR; INTRAVENOUS PRN
Status: DISCONTINUED | OUTPATIENT
Start: 2022-08-29 | End: 2022-08-29 | Stop reason: SDUPTHER

## 2022-08-29 RX ORDER — LIDOCAINE HYDROCHLORIDE 20 MG/ML
INJECTION, SOLUTION INTRAVENOUS PRN
Status: DISCONTINUED | OUTPATIENT
Start: 2022-08-29 | End: 2022-08-29 | Stop reason: SDUPTHER

## 2022-08-29 RX ORDER — DROPERIDOL 2.5 MG/ML
INJECTION, SOLUTION INTRAMUSCULAR; INTRAVENOUS
Status: COMPLETED
Start: 2022-08-29 | End: 2022-08-29

## 2022-08-29 RX ORDER — DIPHENHYDRAMINE HYDROCHLORIDE 50 MG/ML
INJECTION INTRAMUSCULAR; INTRAVENOUS PRN
Status: DISCONTINUED | OUTPATIENT
Start: 2022-08-29 | End: 2022-08-29 | Stop reason: SDUPTHER

## 2022-08-29 RX ORDER — DROPERIDOL 2.5 MG/ML
0.62 INJECTION, SOLUTION INTRAMUSCULAR; INTRAVENOUS
Status: COMPLETED | OUTPATIENT
Start: 2022-08-29 | End: 2022-08-29

## 2022-08-29 RX ORDER — DIPHENHYDRAMINE HYDROCHLORIDE 50 MG/ML
INJECTION INTRAMUSCULAR; INTRAVENOUS
Status: COMPLETED
Start: 2022-08-29 | End: 2022-08-29

## 2022-08-29 RX ORDER — 0.9 % SODIUM CHLORIDE 0.9 %
500 INTRAVENOUS SOLUTION INTRAVENOUS ONCE
Status: COMPLETED | OUTPATIENT
Start: 2022-08-29 | End: 2022-08-29

## 2022-08-29 RX ORDER — PROPOFOL 10 MG/ML
INJECTION, EMULSION INTRAVENOUS PRN
Status: DISCONTINUED | OUTPATIENT
Start: 2022-08-29 | End: 2022-08-29 | Stop reason: SDUPTHER

## 2022-08-29 RX ADMIN — ONDANSETRON 4 MG: 2 INJECTION INTRAMUSCULAR; INTRAVENOUS at 11:35

## 2022-08-29 RX ADMIN — SODIUM CHLORIDE 500 ML: 9 INJECTION, SOLUTION INTRAVENOUS at 14:23

## 2022-08-29 RX ADMIN — LIDOCAINE HYDROCHLORIDE 100 MG: 20 INJECTION, SOLUTION INTRAVENOUS at 16:05

## 2022-08-29 RX ADMIN — PROPOFOL 150 MG: 10 INJECTION, EMULSION INTRAVENOUS at 16:05

## 2022-08-29 RX ADMIN — DEXAMETHASONE SODIUM PHOSPHATE 8 MG: 4 INJECTION, SOLUTION INTRAMUSCULAR; INTRAVENOUS at 16:34

## 2022-08-29 RX ADMIN — PANTOPRAZOLE SODIUM 40 MG: 40 INJECTION, POWDER, FOR SOLUTION INTRAVENOUS at 08:55

## 2022-08-29 RX ADMIN — PROPAFENONE HYDROCHLORIDE 150 MG: 150 TABLET, FILM COATED ORAL at 08:56

## 2022-08-29 RX ADMIN — SODIUM CHLORIDE, PRESERVATIVE FREE 10 ML: 5 INJECTION INTRAVENOUS at 19:02

## 2022-08-29 RX ADMIN — CLOTRIMAZOLE AND BETAMETHASONE DIPROPIONATE: 10; .5 CREAM TOPICAL at 08:56

## 2022-08-29 RX ADMIN — CIPROFLOXACIN 400 MG: 2 INJECTION, SOLUTION INTRAVENOUS at 15:27

## 2022-08-29 RX ADMIN — POTASSIUM CHLORIDE 40 MEQ: 1500 TABLET, EXTENDED RELEASE ORAL at 08:55

## 2022-08-29 RX ADMIN — ONDANSETRON 4 MG: 2 INJECTION INTRAMUSCULAR; INTRAVENOUS at 04:04

## 2022-08-29 RX ADMIN — DROPERIDOL 0.62 MG: 2.5 INJECTION, SOLUTION INTRAMUSCULAR; INTRAVENOUS at 18:00

## 2022-08-29 RX ADMIN — ONDANSETRON 4 MG: 2 INJECTION INTRAMUSCULAR; INTRAVENOUS at 22:37

## 2022-08-29 RX ADMIN — ISOSORBIDE MONONITRATE 30 MG: 30 TABLET, EXTENDED RELEASE ORAL at 08:56

## 2022-08-29 RX ADMIN — Medication 120 MG: at 16:05

## 2022-08-29 RX ADMIN — DIPHENHYDRAMINE HYDROCHLORIDE 50 MG: 50 INJECTION, SOLUTION INTRAMUSCULAR; INTRAVENOUS at 16:25

## 2022-08-29 RX ADMIN — ONDANSETRON 4 MG: 2 INJECTION INTRAMUSCULAR; INTRAVENOUS at 16:34

## 2022-08-29 RX ADMIN — HYDROMORPHONE HYDROCHLORIDE 0.25 MG: 1 INJECTION, SOLUTION INTRAMUSCULAR; INTRAVENOUS; SUBCUTANEOUS at 19:01

## 2022-08-29 RX ADMIN — SODIUM CHLORIDE, PRESERVATIVE FREE 10 ML: 5 INJECTION INTRAVENOUS at 08:55

## 2022-08-29 RX ADMIN — FUROSEMIDE 20 MG: 20 TABLET ORAL at 08:56

## 2022-08-29 ASSESSMENT — PAIN - FUNCTIONAL ASSESSMENT: PAIN_FUNCTIONAL_ASSESSMENT: NONE - DENIES PAIN

## 2022-08-29 ASSESSMENT — PAIN DESCRIPTION - ORIENTATION: ORIENTATION: MID;UPPER

## 2022-08-29 ASSESSMENT — PAIN DESCRIPTION - LOCATION: LOCATION: ABDOMEN

## 2022-08-29 ASSESSMENT — PAIN SCALES - GENERAL
PAINLEVEL_OUTOF10: 0
PAINLEVEL_OUTOF10: 2
PAINLEVEL_OUTOF10: 6

## 2022-08-29 NOTE — PROGRESS NOTES
ERCP completed, pt tolerated well. Isovue-370 dye lot # [id number] Expiration date 12/2024. Instilled 20 mL. Sphincterotomy completed. Balloon sweep completed with stone removal. 9/12 mm retrival balloon used. Grounding pad removed. Skin intact. Fluro pictures taken. Scope  AND GIF 571used.

## 2022-08-29 NOTE — ANESTHESIA PRE PROCEDURE
aspirin 81 MG tablet Take 81 mg by mouth daily    Historical Provider, MD   nitroGLYCERIN (NITROSTAT) 0.4 MG SL tablet Place 1 tablet under the tongue every 5 minutes as needed for Chest pain.   Patient not taking: Reported on 8/24/2022 2/25/15   PEDRO LUIS Robles CNP       Current medications:    Current Facility-Administered Medications   Medication Dose Route Frequency Provider Last Rate Last Admin    ciprofloxacin (CIPRO) IVPB 400 mg  400 mg IntraVENous Once PEDRO LUIS Johnson  mL/hr at 08/29/22 1527 400 mg at 08/29/22 1527    clotrimazole-betamethasone (LOTRISONE) cream   Topical TID Jorge Hernandez PA-C   Given at 08/29/22 0856    nitroGLYCERIN (NITROSTAT) SL tablet 0.4 mg  0.4 mg SubLINGual Q5 Min PRN CARLITO Smith-C   0.4 mg at 08/26/22 1029    HYDROmorphone (DILAUDID) injection 0.25 mg  0.25 mg IntraVENous Q4H PRN Yady Joanne PA-C   0.25 mg at 08/27/22 1439    Or    HYDROmorphone (DILAUDID) injection 0.5 mg  0.5 mg IntraVENous Q4H PRN Yady Joanne PA-C   0.5 mg at 08/27/22 0322    pantoprazole (PROTONIX) injection 40 mg  40 mg IntraVENous Daily PEDRO LUIS Johnson CNP   40 mg at 08/29/22 0855    albuterol sulfate HFA (PROVENTIL;VENTOLIN;PROAIR) 108 (90 Base) MCG/ACT inhaler 2 puff  2 puff Inhalation Q6H PRN Yady Joanne PA-C        [Held by provider] aspirin chewable tablet 81 mg  81 mg Oral Daily Yady Joanne PALebronC        atorvastatin (LIPITOR) tablet 80 mg  80 mg Oral Nightly Yady Joanne PA-C   80 mg at 08/28/22 2003    mometasone-formoterol (DULERA) 200-5 MCG/ACT inhaler 2 puff  2 puff Inhalation BID Yady Joanne PA-C        furosemide (LASIX) tablet 20 mg  20 mg Oral Daily Yady Joanne, PA-C   20 mg at 08/29/22 0856    isosorbide mononitrate (IMDUR) extended release tablet 30 mg  30 mg Oral Daily Yady Rubio PA-C   30 mg at 08/29/22 0856    metoprolol tartrate (LOPRESSOR) tablet 25 mg  25 mg Oral BID Yady Rubio PA-C   25 mg at 08/28/22 2003  [Held by provider] prasugrel (EFFIENT) tablet 10 mg  10 mg Oral Daily [employer], PA-C        propafenone (RYTHMOL) tablet 150 mg  150 mg Oral BID [employer], PA-C   150 mg at 08/29/22 0856    tiotropium (SPIRIVA RESPIMAT) 2.5 MCG/ACT inhaler 2 puff  2 puff Inhalation Daily [employer], PA-C        sodium chloride flush 0.9 % injection 5-40 mL  5-40 mL IntraVENous 2 times per day [employer], PA-C   10 mL at 08/29/22 0855    sodium chloride flush 0.9 % injection 5-40 mL  5-40 mL IntraVENous PRN Crissy Sekou Baker, PA-C        0.9 % sodium chloride infusion   IntraVENous PRN [employer], PA-C        [Held by provider] enoxaparin (LOVENOX) injection 40 mg  40 mg SubCUTAneous Daily [employer], PA-C        ondansetron (ZOFRAN-ODT) disintegrating tablet 4 mg  4 mg Oral Q8H PRN [employer], PA-C        Or    ondansetron (ZOFRAN) injection 4 mg  4 mg IntraVENous Q6H PRN [employer], PA-C   4 mg at 08/29/22 1135    polyethylene glycol (GLYCOLAX) packet 17 g  17 g Oral Daily PRN [employer], PA-C        acetaminophen (TYLENOL) tablet 650 mg  650 mg Oral Q6H PRN [employer], PA-C        Or    acetaminophen (TYLENOL) suppository 650 mg  650 mg Rectal Q6H PRN [employer], PA-C        0.9 % sodium chloride infusion   IntraVENous Continuous Gabi Zerfoss, PA-C 30 mL/hr at 08/28/22 1937 Rate Verify at 08/28/22 1937    potassium chloride (KLOR-CON M) extended release tablet 40 mEq  40 mEq Oral PRN [employer], PA-C   40 mEq at 08/29/22 9682    Or    potassium bicarb-citric acid (EFFER-K) effervescent tablet 40 mEq  40 mEq Oral PRN [employer], PA-C        Or    potassium chloride 10 mEq/100 mL IVPB (Peripheral Line)  10 mEq IntraVENous PRN [employer], PA-C        magnesium sulfate 2000 mg in 50 mL IVPB premix  2,000 mg IntraVENous PRN [employer] PetroleumPHIL           Allergies:     Allergies   Allergen Reactions    Celebrex [Celecoxib] Anaphylaxis     Unable to breath Turns red all over nausea and vomiting)     Psychiatric problem        Past Surgical History:        Procedure Laterality Date    APPENDECTOMY      CARDIAC SURGERY  1998 in [facility]    pericardial window   [address]    ablation    COLONOSCOPY      CORONARY ANGIOPLASTY WITH STENT PLACEMENT       [facility]    CORONARY ARTERY BYPASS GRAFT      ENDOSCOPY, COLON, DIAGNOSTIC      EYE SURGERY Bilateral 2020    cataract removal    HERNIA REPAIR      long ago  [address] @ [facility] ([address])      OVARY REMOVAL      TONSILLECTOMY         Social History:    Social History     Tobacco Use    Smoking status: Former     Years: 27.00     Types: Cigarettes     Quit date: 3/1/1991     Years since quittin.5    Smokeless tobacco: Never   Substance Use Topics    Alcohol use: Yes     Comment: occasional                                Counseling given: Not Answered      Vital Signs (Current):   Vitals:    22 1130 22 1415 22 1500 22 1530   BP: 95/63 97/66 128/71 (!) 116/57   Pulse:   74 85   Resp:    16   Temp:       TempSrc:       SpO2:    95%   Weight:                                                  BP Readings from Last 3 Encounters:   22 (!) 116/57   22 124/66   22 128/68       NPO Status: Time of last liquid consumption: 0900                        Time of last solid consumption: 1800                        Date of last liquid consumption: 22                        Date of last solid food consumption: 22    BMI:   Wt Readings from Last 3 Encounters:   22 140 lb 11.2 oz (63.8 kg)   22 150 lb 3.2 oz (68.1 kg)   22 152 lb 6.4 oz (69.1 kg)     Body mass index is 24.92 kg/m².     CBC:   Lab Results   Component Value Date/Time    WBC 8.8 2022 05:39 AM    RBC 3.95 2022 05:39 AM    HGB 11.5 2022 05:39 AM    HCT 35.4 2022 05:39 AM    MCV 89.6 2022 05:39 AM    RDW 14.7 2017 01:56 PM     08/29/2022 05:39 AM       CMP:   Lab Results   Component Value Date/Time     08/29/2022 05:39 AM    K 3.3 08/29/2022 05:39 AM    K 4.6 08/25/2022 05:05 AM     08/29/2022 05:39 AM    CO2 24 08/29/2022 05:39 AM    BUN 11 08/29/2022 05:39 AM    CREATININE 1.0 08/29/2022 05:39 AM    LABGLOM 54 08/29/2022 05:39 AM    GLUCOSE 105 08/29/2022 05:39 AM    PROT 5.0 08/29/2022 05:39 AM    CALCIUM 8.4 08/29/2022 05:39 AM    BILITOT 0.9 08/29/2022 05:39 AM    ALKPHOS 102 08/29/2022 05:39 AM    AST 29 08/29/2022 05:39 AM    ALT 20 08/29/2022 05:39 AM       POC Tests: No results for input(s): POCGLU, POCNA, POCK, POCCL, POCBUN, POCHEMO, POCHCT in the last 72 hours. Coags:   Lab Results   Component Value Date/Time    INR 0.95 12/14/2020 03:30 PM    APTT 60.3 12/15/2020 10:57 AM       HCG (If Applicable):   Lab Results   Component Value Date    PREGSERUM NEGATIVE 03/02/2015        ABGs: No results found for: PHART, PO2ART, SUT8VSO, AGD8CJG, BEART, O2DJQALW     Type & Screen (If Applicable):  Lab Results   Component Value Date    LABRH POS 07/01/2016       Drug/Infectious Status (If Applicable):  No results found for: HIV, HEPCAB    COVID-19 Screening (If Applicable): No results found for: COVID19        Anesthesia Evaluation  Patient summary reviewed  Airway: Mallampati: III  TM distance: >3 FB     Mouth opening: > = 3 FB   Dental:    (+) upper dentures      Pulmonary:   (+) pneumonia:  COPD:  asthma:                            Cardiovascular:    (+) hypertension:, past MI:, CAD:, CABG/stent:,                   Neuro/Psych:   (+) CVA:,             GI/Hepatic/Renal:   (+) GERD:,           Endo/Other:                     Abdominal:             Vascular: Other Findings:           Anesthesia Plan      general     ASA 3       Induction: intravenous. Anesthetic plan and risks discussed with patient. Plan discussed with attending.                     PEDRO LUIS Smith - CRNA

## 2022-08-29 NOTE — CARE COORDINATION
8/29/22, 11:44 AM EDT    DISCHARGE ON [facility] day: 5  Location: [facility]23/023- Reason for admit: Choledocholithiasis [K80.50]   Procedure:    8/25/2022 MRCP  8/26/2022 Stress test  Barriers to Discharge: Hospitalist, GI, Cardio following. ERCP planned for 1600-high risk r/t cardiac hx. IVF. IV cipro. IV protonix. Pain control. PRN zofran given today. PCP: Rd Amaya MD  Readmission Risk Score: 13.4%  Patient Goals/Plan/Treatment Preferences: Plans home alone, following for needs.

## 2022-08-29 NOTE — PROGRESS NOTES
Hospitalist Progress Note    Patient:  Layla Stone      Unit/Bed:5K-23/023-A    YOB: 1950    MRN: [id number]       Acct: [de-identified]     PCP: Vandana Espinosa MD    Date of Admission: 8/24/2022    Assessment/Plan:    Choledocholithiasis with abdominal pain- developing pancreatitis   CT scan demonstrating dilated common bile duct with stones  Elevated bilirubin at 1.9 improved to 0.9    Repeat LFTs daily  MRCP demonstrating 2 mm stone in CBD with 9 mm dilation  N.p.o.. for ERCP today   Pain control and antiemetics  General surgery is following- patient expressing concerns regarding cholecystectomy given her high cardiac risks. General surgery available should unable to have stent placed or patient has recurrent symptoms. Plan to follow and discuss risks and benefits of deferring cholecystectomy after ERCP   COREY - Cr 1.0- resolved  Attribute to decreased PO intake. Encourage hydration  Continue with gentle IVF   BMP daily   Hypokalemia  Mild at 3.3  40  meq potassium given   BMP daily   Atrial fibrillation  Not chronically anticoagulated- refuses AC  Rate controlled on rythmol and metoprolol  CAD with angina pectoris and documented spasm history of MI, history of stents and CABG  On ASA and Effient-Currently held for procedure  Continue statin  Follows with Dr. Madelin Charles and Lasix  Telemetry  Nitro PRN for chest pain  Cardiac clearance obtained. EKG stable, Echo stable, Stress test negative. Umbilical hernia with overlying abdominal wound  Following with wound clinic. Packs wound daily  Reportedly had hernia repair with mesh placement around 2003. Wound and ostomy consulted-recommend Lotrisone 3 times daily-ordered  General surgery not recommending any surgical intervention given cardiac history and benign nature of fat-containing hernia  History of CVA  Systolic HF   Last echo 5/8485 with an EF of 45 to 50%.   Mild global hypokinesis of the left ventricle  Echo repeated this admission and overall stable  Continue Lasix  Restart lisinopril per cardio- will wait until discharge   Consider SGLT2 and aldactone- deferring to cardio at this time  Daily weights, strict I's and O's  Fluids decreased to 30cc/hr     COPD, moderate  According to pulmonology note, on Symbicort, Spiriva, albuterol but noncompliant at times      Expected discharge date:  unknown    Disposition:    [x] Home       [] TCU       [] Rehab       [] Psych       [] SNF       [] [place]       [] Other-    Chief Complaint: Fever and abdominal pain      Hospital Course:   Initial HPI  \" Patient presents to the ED with fever and abdominal pain for several days. The patient states she had not been able to eat for several days due to pain and nausea. 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 Emeline Lundborg outpatient. Outpatient records indicate she had some drainage and debris from her umbilicus for a year and there is wound associated with hernia mesh. She took oral antibiotics and is now doing topical treatment and wound care. Patient is admitted for further evaluation of pain, dilated common duct and stones in the duct. \"    8/25: CBC overall stable. BMP stable. Mild elevation of bilirubin at 1.9. MRCP is scheduled for today. General surgery unlikely to perform any procedure due to cardiac risk. Waiting for further input from GI. Blood thinners on hold given possible need for procedure    8/25:Patient reports she has not had any vomiting since arrival.  Reports that her nausea is improved abdominal pain is still present. She states she is anxious for MRCP and to know if they are going to do any procedures or not. She denies any chest pain, palpitations, shortness of breath. Denies any melena or hematochezia. Reports normal bowel movements    8/26/2022:MRCP demonstrating 2 mm stone in CBD with 9 mm dilation.   Bilirubin mildly improved from 1.9-1.4. Awaiting cardiac clearance for patient to undergo ERCP with Dr. Opal Paz  Patient did develop chest pain this morning while cardiology was evaluating patient. Troponin negative EKG without any changes or any signs suggesting ischemia. Lipase was ordered which was elevated at 122. It was normal on arrival.  Suspect that patient is developing some mild pancreatitis which is the cause of her pain. We will continue to monitor closely. Trend troponin     8/27/2022  Patient completed stress test yesterday which was negative. Echo is stable with an EF of 45%. Bilirubin is stable. GI planning for an ERCP tomorrow as cardiac clearance has been obtained  Patient has developed some mild COREY with a creatinine of 1.3. Attributed to decreased p.o. intake. Encourage oral hydration and continue gentle IV fluids at 30 cc/h monitor closely for fluid overload    8/28/2022  COREY is resolved. Bilirubin normalized as well. ERCP is planned for tomorrow as Dr Opal Paz wishes to have \"support staff\" given cardiovacular history. .  Vital signs     Over 30 minutes spent with patient reviewing care and plans moving forward. 8/29/2022  ERCP planned for today. Mild hypokalemia- 3.3- 40meq Kcl given     Subjective (past 24 hours):   Patient reports she is nauseous again this morning- nervous for procedure.    States that her abdominal pain has significantly improved- she thinks she passed the stone    Medications:  Reviewed    Infusion Medications    sodium chloride      sodium chloride 30 mL/hr at 08/28/22 1937     Scheduled Medications    ciprofloxacin  400 mg IntraVENous Once    clotrimazole-betamethasone   Topical TID    pantoprazole  40 mg IntraVENous Daily    [Held by provider] aspirin  81 mg Oral Daily    atorvastatin  80 mg Oral Nightly    mometasone-formoterol  2 puff Inhalation BID    furosemide  20 mg Oral Daily    isosorbide mononitrate  30 mg Oral Daily    metoprolol tartrate  25 mg Oral BID    [Held by provider] prasugrel  10 mg Oral Daily    propafenone  150 mg Oral BID    tiotropium  2 puff Inhalation Daily    sodium chloride flush  5-40 mL IntraVENous 2 times per day    [Held by provider] enoxaparin  40 mg SubCUTAneous Daily     PRN Meds: nitroGLYCERIN, HYDROmorphone **OR** HYDROmorphone, albuterol sulfate HFA, sodium chloride flush, sodium chloride, ondansetron **OR** ondansetron, polyethylene glycol, acetaminophen **OR** acetaminophen, potassium chloride **OR** potassium alternative oral replacement **OR** potassium chloride, magnesium sulfate      Intake/Output Summary (Last 24 hours) at 8/29/2022 1207  Last data filed at 8/28/2022 1937  Gross per 24 hour   Intake 847.42 ml   Output --   Net 847.42 ml         Diet:  Diet NPO Exceptions are: Sips of Water with Meds    Exam:  BP 95/63   Pulse 63   Temp 98.1 °F (36.7 °C) (Oral)   Resp 16   Wt 140 lb 11.2 oz (63.8 kg)   SpO2 94%   BMI 24.92 kg/m²     General appearance: No apparent distress, appears stated age and cooperative. HEENT: Pupils equal, round, and reactive to light. Conjunctivae/corneas clear. Neck: Supple, with full range of motion. No jugular venous distention. Trachea midline. Respiratory:  Normal respiratory effort. Clear to auscultation, bilaterally without Rales/Wheezes/Rhonchi. Cardiovascular: Regular rate and rhythm with normal S1/S2 with 2/6 early peaking systolic murmur LUSB. No rubs or gallops. No edema. Abdomen: Soft, mild tenderness to palpation epigastric region right upper quadrant, non-distended with normal bowel sounds. Musculoskeletal: passive and active ROM x 4 extremities. Skin: Skin color, texture, turgor normal.  Well demarcated erythematous nonblanchable rash surrounding umbilicus  Neurologic:  Neurovascularly intact without any focal sensory/motor deficits. Cranial nerves: II-XII intact, grossly non-focal.  Psychiatric: Alert and oriented, thought content appropriate, normal insight.   Patient appears anxious  Capillary Refill: Brisk,< 3 seconds   Peripheral Pulses: +2 palpable, equal bilaterally       Labs:   Recent Labs     08/27/22  0604 08/28/22  0523 08/29/22  0539   WBC 7.8 7.2 8.8   HGB 10.8* 11.3* 11.5*   HCT 33.3* 33.9* 35.4*    198 221       Recent Labs     08/27/22  0604 08/28/22  0523 08/29/22  0539    141 142   K 3.9 3.5 3.3*    106 108   CO2 24 26 24   BUN 28* 17 11   CREATININE 1.3* 1.1 1.0   CALCIUM 8.5 8.4* 8.4*       Recent Labs     08/27/22  0604 08/28/22  0523 08/29/22  0539   AST 30 26 29   ALT 25 20 20   BILIDIR 0.4* 0.3  --    BILITOT 1.4* 1.0 0.9   ALKPHOS 99 103 102       No results for input(s): INR in the last 72 hours. No results for input(s): Marilyn Ill in the last 72 hours. Microbiology:      Urinalysis:      Lab Results   Component Value Date/Time    NITRU NEGATIVE 08/24/2022 07:50 PM    WBCUA  08/24/2022 07:50 PM    BACTERIA NONE SEEN 08/24/2022 07:50 PM    RBCUA 5-10 08/24/2022 07:50 PM    BLOODU SMALL 08/24/2022 07:50 PM    SPECGRAV 1.009 07/22/2016 05:50 PM    GLUCOSEU NEGATIVE 08/24/2022 07:50 PM       Radiology:  CT ABDOMEN PELVIS W IV CONTRAST Additional Contrast? None    Result Date: 8/24/2022  CT abdomen and pelvis with contrast Comparison: CT,SR - CT ABDOMEN PELVIS WO CONTRAST - 07/22/2022 07:06 AM EDT Findings: The lung bases are clear. There are multiple tiny gallstones within the gallbladder. The common bile duct measures up to 7 mm in diameter with interval increased since the prior study. There is a new 2 mm calculus within the common bile duct just proximal to the ampulla. Multiple splenic granulomas. Unremarkable liver and bilateral adrenal glands. Moderate pancreatic volume loss with partial fatty replacement of parenchyma. Numerous peripelvic cysts within the kidneys. No bowel obstruction, pneumoperitoneum, or pneumatosis. Status post hysterectomy. Unremarkable urinary bladder. The appendix is not definitively seen.  There are no secondary findings to suggest appendicitis. There is an hemangioma within the L3 vertebral body. No acute fracture or suspicious bone lesion. 1. There is evidence of cholelithiasis and choledocholithiasis. Borderline dilatation of the common bile duct with interval increase. 2. Right-sided posterior triangle hernia containing fat. This document has been electronically signed by: Geno Toro MD on 08/24/2022 06:38 PM All CTs at this facility use dose modulation techniques and iterative reconstructions, and/or weight-based dosing when appropriate to reduce radiation to a low as reasonably achievable. US GALLBLADDER RUQ    Result Date: 8/24/2022  US abdomen limited Comparison: 05/10/2016 Findings: Liver normal size and echotexture. Right lobe 13.7 cm length. Main portal vein antegrade. Visualized pancreas unremarkable. Gallstones noted without wall thickening. Common duct 8.3 mm diameter. Punctate stone noted within the common bile duct. No ultrasonographic Du sign.      Impression: Cholelithiasis and common duct dilation with small stone in common duct This document has been electronically signed by: Rhonda Johns MD on 08/24/2022 10:09 PM      DVT prophylaxis: [] Lovenox                                 [x] SCDs-thinners on hold for procedure                                 [] SQ Heparin                                 [] Encourage ambulation           [] Already on Anticoagulation     Code Status: Full Code    PT/OT Eval Status: Monitor    Tele:   [x] yes             [] no    Active Hospital Problems    Diagnosis Date Noted    Choledocholithiasis [K80.50] 08/24/2022     Priority: Medium    Chronic diastolic CHF (congestive heart failure) ([facility] [address] [I50.32] 12/29/2020    Atrial fibrillation ([facility] [address] [I48.91]     Presence of stent in LAD coronary artery [Z95.5] 04/12/2013       Electronically signed by Estefanía Wall PA-C on 8/29/2022 at 12:07 PM

## 2022-08-29 NOTE — PRE SEDATION
[address] [address]  Sedation/Analgesia History & Physical    Patient: Angela Nielsen : 1950  Med Rec#: [id number] Acc#: [id number]   Provider Performing Procedure: Shabnam Willett MD  Primary Care Physician: Mehreen Bell MD    PRE-PROCEDURE   Full CODE [x]Yes  DNR-CCA/DNR-CC []Yes   Brief History/Pre-Procedure Diagnosis: obstructive jaundice, CBD stone           MEDICAL HISTORY  []CAD/Valve  []Liver Disease  []Lung Disease []Diabetes  []Hypertension []Renal Disease  []Additional information:       has a past medical history of Asthma, Atrial fibrillation ([facility] [address] Blood clot associated with vein wall inflammation, CAD (coronary artery disease), Cerebral artery occlusion with cerebral infarction [facility]), CHF (congestive heart failure) ([facility] [address] COPD (chronic obstructive pulmonary disease) ([facility] [address] History of blood transfusion, Hx of blood clots, Hyperlipidemia, Hypertension, MI, old, Migraine, Pneumonia, PONV (postoperative nausea and vomiting), and Psychiatric problem. SURGICAL HISTORY   has a past surgical history that includes Hysterectomy; Appendectomy; Tonsillectomy; Cardiac surgery ( in [facility]); Cardiac surgery ( [place]); Coronary angioplasty with stent ( );  Coronary artery bypass graft; Colonoscopy; Endoscopy, colon, diagnostic; eye surgery (Bilateral, 2020); hernia repair; and Ovary removal.  Additional information:       ALLERGIES   Allergies as of 2022 - Fully Reviewed 2022   Allergen Reaction Noted    Celebrex [celecoxib] Anaphylaxis 2013    Contrast [iodides] Hives 2016    Statins  2018    Naprosyn [naproxen] Nausea And Vomiting 2013    Shrimp flavor Nausea And Vomiting 2013     Additional information:       MEDICATIONS   Coumadin Use Last 7 Days [x]No []Yes  Antiplatelet drug therapy use last 7 days  [x]No []Yes  Other anticoagulant use last 7 days  [x]No []Yes    Current Facility-Administered Medications: ciprofloxacin (CIPRO) IVPB 400 mg, 400 mg, IntraVENous, Once, PEDRO LUIS Avery CNP, Last Rate: 200 mL/hr at 08/29/22 1527, 400 mg at 08/29/22 1527    clotrimazole-betamethasone (LOTRISONE) cream, , Topical, TID, Reena Perry PA-C, Given at 08/29/22 0856    nitroGLYCERIN (NITROSTAT) SL tablet 0.4 mg, 0.4 mg, SubLINGual, Q5 Min PRN, Gabi Fields, PA-C, 0.4 mg at 08/26/22 1029    HYDROmorphone (DILAUDID) injection 0.25 mg, 0.25 mg, IntraVENous, Q4H PRN, 0.25 mg at 08/27/22 1439 **OR** HYDROmorphone (DILAUDID) injection 0.5 mg, 0.5 mg, IntraVENous, Q4H PRN, Demar Ansari PA-C, 0.5 mg at 08/27/22 0322    pantoprazole (PROTONIX) injection 40 mg, 40 mg, IntraVENous, Daily, PEDRO LUIS Avery CNP, 40 mg at 08/29/22 0855    albuterol sulfate HFA (PROVENTIL;VENTOLIN;PROAIR) 108 (90 Base) MCG/ACT inhaler 2 puff, 2 puff, Inhalation, Q6H PRN, Demar Ansari PA-C    [Held by provider] aspirin chewable tablet 81 mg, 81 mg, Oral, Daily, Crissy Noonan PA-C    atorvastatin (LIPITOR) tablet 80 mg, 80 mg, Oral, Nightly, [employer], PA-C, 80 mg at 08/28/22 2003    mometasone-formoterol (DULERA) 200-5 MCG/ACT inhaler 2 puff, 2 puff, Inhalation, BID, Crissy Noonan PA-C    furosemide (LASIX) tablet 20 mg, 20 mg, Oral, Daily, [employer], PA-C, 20 mg at 08/29/22 0856    isosorbide mononitrate (IMDUR) extended release tablet 30 mg, 30 mg, Oral, Daily, [employer], PA-C, 30 mg at 08/29/22 0856    metoprolol tartrate (LOPRESSOR) tablet 25 mg, 25 mg, Oral, BID, [employer], PA-C, 25 mg at 08/28/22 2003    [Held by provider] prasugrel (EFFIENT) tablet 10 mg, 10 mg, Oral, Daily, Crissy LEOPOLDO Noonan PA-C    propafenone (RYTHMOL) tablet 150 mg, 150 mg, Oral, BID, [employer], PA-C, 150 mg at 08/29/22 0856    tiotropium (SPIRIVA RESPIMAT) 2.5 MCG/ACT inhaler 2 puff, 2 puff, Inhalation, Daily, Crissy Noonan PA-C    sodium chloride flush 0.9 % injection 5-40 mL, 5-40 mL, IntraVENous, 2 times per day, [employer], PA-C, 10 mL at 08/29/22 0855    sodium chloride flush 0.9 % injection 5-40 mL, 5-40 mL, IntraVENous, PRN, [employer], PA-C    0.9 % sodium chloride infusion, , IntraVENous, PRN, [employer], PA-C    [Held by provider] enoxaparin (LOVENOX) injection 40 mg, 40 mg, SubCUTAneous, Daily, Crissy Noonan PA-C    ondansetron (ZOFRAN-ODT) disintegrating tablet 4 mg, 4 mg, Oral, Q8H PRN **OR** ondansetron (ZOFRAN) injection 4 mg, 4 mg, IntraVENous, Q6H PRN, [employer], PA-C, 4 mg at 08/29/22 1135    polyethylene glycol (GLYCOLAX) packet 17 g, 17 g, Oral, Daily PRN, [employer], PA-C    acetaminophen (TYLENOL) tablet 650 mg, 650 mg, Oral, Q6H PRN **OR** acetaminophen (TYLENOL) suppository 650 mg, 650 mg, Rectal, Q6H PRN, Crissy Noonan, PA-C    0.9 % sodium chloride infusion, , IntraVENous, Continuous, Gabi Kishorfolou, PA-C, Last Rate: 30 mL/hr at 08/28/22 1937, Rate Verify at 08/28/22 1937    potassium chloride (KLOR-CON M) extended release tablet 40 mEq, 40 mEq, Oral, PRN, 40 mEq at 08/29/22 0855 **OR** potassium bicarb-citric acid (EFFER-K) effervescent tablet 40 mEq, 40 mEq, Oral, PRN **OR** potassium chloride 10 mEq/100 mL IVPB (Peripheral Line), 10 mEq, IntraVENous, PRN, [employer], PA-C    magnesium sulfate 2000 mg in 50 mL IVPB premix, 2,000 mg, IntraVENous, PRN, [employer], PA-C  Prior to Admission medications    Medication Sig Start Date End Date Taking?  Authorizing Provider   prasugrel (EFFIENT) 10 MG TABS TAKE 1 TABLET DAILY 8/15/22   Carrie Lindo MD   isosorbide mononitrate (IMDUR) 30 MG extended release tablet TAKE 1 TABLET DAILY 8/15/22   Brooke Rossi MD   metoprolol tartrate (LOPRESSOR) 25 MG tablet TAKE 1 TABLET TWICE A DAY 8/15/22   Brooke Rossi MD   furosemide (LASIX) 20 MG tablet TAKE 1 TABLET DAILY 7/20/22   Carrie Lindo MD   propafenone (RYTHMOL) 150 MG tablet TAKE 1 TABLET TWICE A DAY 7/5/22   Carrie Lindo MD   UNABLE TO FIND Liquid cough medicine with codeine per (36.9 °C) Oral 75 18 99 % --   08/28/22 1619 (!) 108/55 99.2 °F (37.3 °C) Oral 73 18 93 % --       PLANNED PROCEDURE   []EGD  []Colonoscopy []Flex Sigmoid  [x]ERCP []EUS   []Cystoscopy  [] CATH [] BRONCH   Consent: I have discussed with the patient and/or the patient representative the indication, alternatives, and the possible risks and/or complications of the planned procedure and the anesthesia methods. The patient and/or patient representative appear to understand and agree to proceed. SEDATION  Planned agent:[]Midazolam []Meperidine []Sublimaze []Morphine  []Diazepam  []Other: general anesthesia   ASA Classification:  as per anesthesia     Airway Assessment: as per anesthesia     Monitoring and Safety: The patient will be placed on a cardiac monitor and vital signs, pulse oximetry and level of consciousness will be continuously evaluated throughout the procedure. The patient will be closely monitored until recovery from the medications is complete and the patient has returned to baseline status. Respiratory therapy will be on standby during the procedure. [x]Pre-procedure diagnostic studies complete and results available. Comment:    [x]Previous sedation/anesthesia experiences assessed. Comment:    [x]The patient is an appropriate candidate to undergo the planned procedure sedation and anesthesia. (Refer to nursing sedation/analgesia documentation record)  [x]Formulation and discussion of sedation/procedure plan, risks, and expectations with patient and/or responsible adult completed. [x]Patient examined immediately prior to the procedure.  (Refer to nursing sedation/analgesia documentation record)    Hal Garcia MD, MD   Electronically signed 8/29/2022 at 4:10 PM

## 2022-08-29 NOTE — BRIEF OP NOTE
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      Electronically signed by Remi Hale MD on 8/29/2022 at 5:47 PM

## 2022-08-29 NOTE — PLAN OF CARE
Problem: Safety - Adult  Goal: Free from fall injury  Outcome: Progressing-Patient educated on fall precautions. STAR program in place. Will continue to monitor. Problem: ABCDS Injury Assessment  Goal: Absence of physical injury  Outcome: Progressing-Patient educated on fall precautions. STAR program in place. Will continue to monitor. Problem: Chronic Conditions and Co-morbidities  Goal: Patient's chronic conditions and co-morbidity symptoms are monitored and maintained or improved  Outcome: Progressing-Patient chronic conditions are being monitored and medicated as needed. Will continue to monitor. Flowsheets (Taken 8/26/2022 1013 by Ria Linares RN)  Care Plan - Patient's Chronic Conditions and Co-Morbidity Symptoms are Monitored and Maintained or Improved: Monitor and assess patient's chronic conditions and comorbid symptoms for stability, deterioration, or improvement     Problem: Discharge Planning  Goal: Discharge to home or other facility with appropriate resources  Outcome: Progressing-Patient educated on her diagnosis and why it is holding her back from discharge. Will continue to monitor. Flowsheets (Taken 8/26/2022 1013 by Ria Linares RN)  Discharge to home or other facility with appropriate resources: Identify barriers to discharge with patient and caregiver     Problem: Cardiovascular - Adult  Goal: Maintains optimal cardiac output and hemodynamic stability  Outcome: Progressing-Patient HR and BP are being monitored per unit policy. Will continue to monitor.   Flowsheets  Taken 8/28/2022 2132 by Celi Beltran RN  Maintains optimal cardiac output and hemodynamic stability: Monitor blood pressure and heart rate  Taken 8/26/2022 1013 by Ria Linares RN  Maintains optimal cardiac output and hemodynamic stability:   Monitor blood pressure and heart rate   Monitor urine output and notify Licensed Independent Practitioner for values outside of normal range     Problem: Skin/Tissue Integrity - Adult  Goal: Skin integrity remains intact  Outcome: Progressing-Patient skin intact. Will continue to monitor. Flowsheets (Taken 8/26/2022 1013 by Landen Davison RN)  Skin Integrity Remains Intact:   Monitor for areas of redness and/or skin breakdown   Assess vascular access sites hourly  Goal: Oral mucous membranes remain intact  Outcome: Progressing-Patient mucous membranes are intact. Will continue to monitor. Flowsheets (Taken 8/26/2022 1013 by Landen Davison RN)  Oral Mucous Membranes Remain Intact: Assess oral mucosa and hygiene practices     Problem: Gastrointestinal - Adult  Goal: Minimal or absence of nausea and vomiting  Outcome: Progressing-No nausea or vomiting noted this shift. Will continue to monitor. Flowsheets (Taken 8/26/2022 1013 by Landen Davison RN)  Minimal or absence of nausea and vomiting:   Administer IV fluids as ordered to ensure adequate hydration   Maintain NPO status until nausea and vomiting are resolved    Care plan reviewed with patient. Patient verbalizes understanding of the plan of care and contribute to goal setting.

## 2022-08-29 NOTE — PROGRESS NOTES
Patient was told that ERCP was scheduled for tomorrow at [facility] asked to give a CHG bath in the night to prep for the surgery. The patient opted to wait till the morning before and closer to her surgery.

## 2022-08-30 LAB
ALBUMIN SERPL-MCNC: 3.8 G/DL (ref 3.5–5.1)
ALP BLD-CCNC: 167 U/L (ref 38–126)
ALT SERPL-CCNC: 47 U/L (ref 11–66)
ANION GAP SERPL CALCULATED.3IONS-SCNC: 14 MEQ/L (ref 8–16)
AST SERPL-CCNC: 77 U/L (ref 5–40)
BILIRUB SERPL-MCNC: 0.9 MG/DL (ref 0.3–1.2)
BILIRUBIN DIRECT: 0.3 MG/DL (ref 0–0.3)
BUN BLDV-MCNC: 15 MG/DL (ref 7–22)
CALCIUM SERPL-MCNC: 8.7 MG/DL (ref 8.5–10.5)
CHLORIDE BLD-SCNC: 101 MEQ/L (ref 98–111)
CO2: 24 MEQ/L (ref 23–33)
CREAT SERPL-MCNC: 1.3 MG/DL (ref 0.4–1.2)
ERYTHROCYTE [DISTWIDTH] IN BLOOD BY AUTOMATED COUNT: 13.7 % (ref 11.5–14.5)
ERYTHROCYTE [DISTWIDTH] IN BLOOD BY AUTOMATED COUNT: 44.3 FL (ref 35–45)
GFR SERPL CREATININE-BSD FRML MDRD: 40 ML/MIN/1.73M2
GLUCOSE BLD-MCNC: 165 MG/DL (ref 70–108)
HCT VFR BLD CALC: 35.7 % (ref 37–47)
HEMOGLOBIN: 11.9 GM/DL (ref 12–16)
MCH RBC QN AUTO: 29.5 PG (ref 26–33)
MCHC RBC AUTO-ENTMCNC: 33.3 GM/DL (ref 32.2–35.5)
MCV RBC AUTO: 88.4 FL (ref 81–99)
PLATELET # BLD: 278 THOU/MM3 (ref 130–400)
PMV BLD AUTO: 9.4 FL (ref 9.4–12.4)
POTASSIUM SERPL-SCNC: 3.7 MEQ/L (ref 3.5–5.2)
RBC # BLD: 4.04 MILL/MM3 (ref 4.2–5.4)
SODIUM BLD-SCNC: 139 MEQ/L (ref 135–145)
TOTAL PROTEIN: 5.5 G/DL (ref 6.1–8)
WBC # BLD: 11.6 THOU/MM3 (ref 4.8–10.8)

## 2022-08-30 PROCEDURE — 1200000003 HC TELEMETRY R&B

## 2022-08-30 PROCEDURE — 6370000000 HC RX 637 (ALT 250 FOR IP): Performed by: PHYSICIAN ASSISTANT

## 2022-08-30 PROCEDURE — 99232 SBSQ HOSP IP/OBS MODERATE 35: CPT | Performed by: PHYSICIAN ASSISTANT

## 2022-08-30 PROCEDURE — C9113 INJ PANTOPRAZOLE SODIUM, VIA: HCPCS

## 2022-08-30 PROCEDURE — 82248 BILIRUBIN DIRECT: CPT

## 2022-08-30 PROCEDURE — 6360000002 HC RX W HCPCS

## 2022-08-30 PROCEDURE — 85027 COMPLETE CBC AUTOMATED: CPT

## 2022-08-30 PROCEDURE — 36415 COLL VENOUS BLD VENIPUNCTURE: CPT

## 2022-08-30 PROCEDURE — 80053 COMPREHEN METABOLIC PANEL: CPT

## 2022-08-30 PROCEDURE — 6360000002 HC RX W HCPCS: Performed by: PHYSICIAN ASSISTANT

## 2022-08-30 PROCEDURE — 2580000003 HC RX 258: Performed by: PHYSICIAN ASSISTANT

## 2022-08-30 RX ADMIN — ISOSORBIDE MONONITRATE 30 MG: 30 TABLET, EXTENDED RELEASE ORAL at 09:48

## 2022-08-30 RX ADMIN — METOPROLOL TARTRATE 25 MG: 25 TABLET, FILM COATED ORAL at 09:49

## 2022-08-30 RX ADMIN — ATORVASTATIN CALCIUM 80 MG: 80 TABLET, FILM COATED ORAL at 20:07

## 2022-08-30 RX ADMIN — ONDANSETRON 4 MG: 2 INJECTION INTRAMUSCULAR; INTRAVENOUS at 08:27

## 2022-08-30 RX ADMIN — PANTOPRAZOLE SODIUM 40 MG: 40 INJECTION, POWDER, FOR SOLUTION INTRAVENOUS at 08:27

## 2022-08-30 RX ADMIN — PROPAFENONE HYDROCHLORIDE 150 MG: 150 TABLET, FILM COATED ORAL at 20:07

## 2022-08-30 RX ADMIN — FUROSEMIDE 20 MG: 20 TABLET ORAL at 09:48

## 2022-08-30 RX ADMIN — PROPAFENONE HYDROCHLORIDE 150 MG: 150 TABLET, FILM COATED ORAL at 09:49

## 2022-08-30 RX ADMIN — SODIUM CHLORIDE: 9 INJECTION, SOLUTION INTRAVENOUS at 06:25

## 2022-08-30 RX ADMIN — ACETAMINOPHEN 650 MG: 325 TABLET ORAL at 08:27

## 2022-08-30 ASSESSMENT — PAIN SCALES - GENERAL: PAINLEVEL_OUTOF10: 3

## 2022-08-30 ASSESSMENT — PAIN DESCRIPTION - ORIENTATION: ORIENTATION: MID;UPPER

## 2022-08-30 ASSESSMENT — PAIN DESCRIPTION - FREQUENCY: FREQUENCY: CONTINUOUS

## 2022-08-30 ASSESSMENT — PAIN DESCRIPTION - DESCRIPTORS: DESCRIPTORS: ACHING

## 2022-08-30 ASSESSMENT — PAIN - FUNCTIONAL ASSESSMENT: PAIN_FUNCTIONAL_ASSESSMENT: ACTIVITIES ARE NOT PREVENTED

## 2022-08-30 ASSESSMENT — PAIN DESCRIPTION - PAIN TYPE: TYPE: ACUTE PAIN

## 2022-08-30 ASSESSMENT — PAIN DESCRIPTION - ONSET: ONSET: ON-GOING

## 2022-08-30 ASSESSMENT — PAIN DESCRIPTION - LOCATION: LOCATION: ABDOMEN

## 2022-08-30 NOTE — PROGRESS NOTES
Hospitalist Progress Note    Patient:  Madeline Martinez      Unit/Bed:5K-23/023-A    YOB: 1950    MRN: [id number]       Acct: [de-identified]     PCP: Romario Powell MD    Date of Admission: 8/24/2022    Assessment/Plan:    Choledocholithiasis with abdominal pain- developing pancreatitis   CT scan demonstrating dilated common bile duct with stones  Elevated bilirubin at 1.9 improved to 0.9    Repeat LFTs daily  MRCP demonstrating 2 mm stone in CBD with 9 mm dilation  Resume regular diet- okay per GI for discharge if tolerating   Pain control and antiemetics  General surgery is following- patient expressing concerns regarding cholecystectomy given her high cardiac risks. General surgery available should unable to have stent placed or patient has recurrent symptoms. Plan to follow and discuss risks and benefits of deferring cholecystectomy after ERCP   General surgery notified of potential discharge on 8/30 and states patient does not need any further follow up as she is declining cholecystectomy  COREY - Cr 1.0- resolved  Attribute to decreased PO intake. Encourage hydration  Continue with gentle IVF   BMP daily   Hypokalemia  40  meq potassium given   BMP daily   Atrial fibrillation  Not chronically anticoagulated- refuses AC  Rate controlled on rythmol and metoprolol  CAD with angina pectoris and documented spasm history of MI, history of stents and CABG  On ASA and Effient-Currently held for procedure  Continue statin  Follows with Dr. Chelsey Mcbride and [facility]  Telemetry  Nitro PRN for chest pain  Cardiac clearance obtained. EKG stable, Echo stable, Stress test negative. Umbilical hernia with overlying abdominal wound  Following with wound clinic. Packs wound daily  Reportedly had hernia repair with mesh placement around 2003.   Wound and ostomy consulted-recommend Lotrisone 3 times daily-ordered  General surgery not recommending any surgical intervention given cardiac history and benign nature of fat-containing hernia  History of CVA  Systolic HF   Last echo 1/4272 with an EF of 45 to 50%. Mild global hypokinesis of the left ventricle  Echo repeated this admission and overall stable  Continue Lasix  Restart lisinopril per cardio- will wait until discharge   Consider SGLT2 and aldactone- deferring to cardio at this time  Daily weights, strict I's and O's  Fluids decreased to 30cc/hr     COPD, moderate  According to pulmonology note, on Symbicort, Spiriva, albuterol but noncompliant at times-      Expected discharge date:  8/31    Disposition:    [x] Home       [] TCU       [] Rehab       [] Psych       [] SNF       [] [place]       [] Other-    Chief Complaint: Fever and abdominal pain      Hospital Course:   Initial HPI  \" Patient presents to the ED with fever and abdominal pain for several days. The patient states she had not been able to eat for several days due to pain and nausea. 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 Jennifer Gold outpatient. Outpatient records indicate she had some drainage and debris from her umbilicus for a year and there is wound associated with hernia mesh. She took oral antibiotics and is now doing topical treatment and wound care. Patient is admitted for further evaluation of pain, dilated common duct and stones in the duct. \"    8/25: CBC overall stable. BMP stable. Mild elevation of bilirubin at 1.9. MRCP is scheduled for today. General surgery unlikely to perform any procedure due to cardiac risk. Waiting for further input from GI. Blood thinners on hold given possible need for procedure    8/25:Patient reports she has not had any vomiting since arrival.  Reports that her nausea is improved abdominal pain is still present. She states she is anxious for MRCP and to know if they are going to do any procedures or not. She denies any chest pain, palpitations, shortness of breath. Denies any melena or hematochezia. Reports normal bowel movements    8/26/2022:MRCP demonstrating 2 mm stone in CBD with 9 mm dilation. Bilirubin mildly improved from 1.9-1.4. Awaiting cardiac clearance for patient to undergo ERCP with Dr. Paddy Valerio  Patient did develop chest pain this morning while cardiology was evaluating patient. Troponin negative EKG without any changes or any signs suggesting ischemia. Lipase was ordered which was elevated at 122. It was normal on arrival.  Suspect that patient is developing some mild pancreatitis which is the cause of her pain. We will continue to monitor closely. Trend troponin     8/27/2022  Patient completed stress test yesterday which was negative. Echo is stable with an EF of 45%. Bilirubin is stable. GI planning for an ERCP tomorrow as cardiac clearance has been obtained  Patient has developed some mild COREY with a creatinine of 1.3. Attributed to decreased p.o. intake. Encourage oral hydration and continue gentle IV fluids at 30 cc/h monitor closely for fluid overload    8/28/2022  COREY is resolved. Bilirubin normalized as well. ERCP is planned for tomorrow as Dr Paddy Valerio wishes to have \"support staff\" given cardiovacular history. .  Vital signs     Over 30 minutes spent with patient reviewing care and plans moving forward. 8/29/2022  ERCP planned for today. Mild hypokalemia- 3.3- 40meq Kcl given     8/30/2022  Labs fell off- ordered and pending for today. Okay for discharge from GI once tolerating diet. Subjective (past 24 hours):   Patient reports poor appetite and admits to fear of eating due to potential for pain.    Denies any pain or nausea currently- has not eaten yet today     Medications:  Reviewed    Infusion Medications    sodium chloride      sodium chloride 30 mL/hr at 08/30/22 5639     Scheduled Medications    clotrimazole-betamethasone   Topical TID    pantoprazole  40 mg IntraVENous Daily    [Held by provider] aspirin  81 mg Oral Daily    atorvastatin  80 mg Oral Nightly    mometasone-formoterol  2 puff Inhalation BID    furosemide  20 mg Oral Daily    isosorbide mononitrate  30 mg Oral Daily    metoprolol tartrate  25 mg Oral BID    [Held by provider] prasugrel  10 mg Oral Daily    propafenone  150 mg Oral BID    tiotropium  2 puff Inhalation Daily    sodium chloride flush  5-40 mL IntraVENous 2 times per day    [Held by provider] enoxaparin  40 mg SubCUTAneous Daily     PRN Meds: nitroGLYCERIN, HYDROmorphone **OR** HYDROmorphone, albuterol sulfate HFA, sodium chloride flush, sodium chloride, ondansetron **OR** ondansetron, polyethylene glycol, acetaminophen **OR** acetaminophen, potassium chloride **OR** potassium alternative oral replacement **OR** potassium chloride, magnesium sulfate      Intake/Output Summary (Last 24 hours) at 8/30/2022 1303  Last data filed at 8/29/2022 1750  Gross per 24 hour   Intake 700 ml   Output --   Net 700 ml         Diet:  ADULT DIET; Regular; Low Sodium (2 gm); GI [place] (GERD/Peptic Ulcer)    Exam:  /78   Pulse 75   Temp 98 °F (36.7 °C) (Oral)   Resp 18   Wt 140 lb 11.2 oz (63.8 kg)   SpO2 95%   BMI 24.92 kg/m²     General appearance: No apparent distress, appears stated age and cooperative. HEENT: Pupils equal, round, and reactive to light. Conjunctivae/corneas clear. Neck: Supple, with full range of motion. No jugular venous distention. Trachea midline. Respiratory:  Normal respiratory effort. Clear to auscultation, bilaterally without Rales/Wheezes/Rhonchi. Cardiovascular: Regular rate and rhythm with normal S1/S2 with 2/6 early peaking systolic murmur LUSB. No rubs or gallops. No edema. Abdomen: Soft, non tender to palpation, non-distended with normal bowel sounds. Musculoskeletal: passive and active ROM x 4 extremities.   Skin: Skin color, texture, turgor normal.  Well demarcated erythematous nonblanchable rash surrounding umbilicus  Neurologic:  Neurovascularly intact without any focal sensory/motor deficits. Cranial nerves: II-XII intact, grossly non-focal.  Psychiatric: Alert and oriented, thought content appropriate, normal insight. Patient appears anxious  Capillary Refill: Brisk,< 3 seconds   Peripheral Pulses: +2 palpable, equal bilaterally       Labs:   Recent Labs     08/28/22 0523 08/29/22 0539   WBC 7.2 8.8   HGB 11.3* 11.5*   HCT 33.9* 35.4*    221       Recent Labs     08/28/22 0523 08/29/22 0539    142   K 3.5 3.3*    108   CO2 26 24   BUN 17 11   CREATININE 1.1 1.0   CALCIUM 8.4* 8.4*       Recent Labs     08/28/22 0523 08/29/22 0539   AST 26 29   ALT 20 20   BILIDIR 0.3  --    BILITOT 1.0 0.9   ALKPHOS 103 102       No results for input(s): INR in the last 72 hours. No results for input(s): Cherre Gash in the last 72 hours. Microbiology:      Urinalysis:      Lab Results   Component Value Date/Time    NITRU NEGATIVE 08/24/2022 07:50 PM    WBCUA  08/24/2022 07:50 PM    BACTERIA NONE SEEN 08/24/2022 07:50 PM    RBCUA 5-10 08/24/2022 07:50 PM    BLOODU SMALL 08/24/2022 07:50 PM    SPECGRAV 1.009 07/22/2016 05:50 PM    GLUCOSEU NEGATIVE 08/24/2022 07:50 PM       Radiology:  CT ABDOMEN PELVIS W IV CONTRAST Additional Contrast? None    Result Date: 8/24/2022  CT abdomen and pelvis with contrast Comparison: CT,SR - CT ABDOMEN PELVIS WO CONTRAST - 07/22/2022 07:06 AM EDT Findings: The lung bases are clear. There are multiple tiny gallstones within the gallbladder. The common bile duct measures up to 7 mm in diameter with interval increased since the prior study. There is a new 2 mm calculus within the common bile duct just proximal to the ampulla. Multiple splenic granulomas. Unremarkable liver and bilateral adrenal glands. Moderate pancreatic volume loss with partial fatty replacement of parenchyma. Numerous peripelvic cysts within the kidneys. No bowel obstruction, pneumoperitoneum, or pneumatosis. Status post hysterectomy.  Unremarkable urinary bladder. The appendix is not definitively seen. There are no secondary findings to suggest appendicitis. There is an hemangioma within the L3 vertebral body. No acute fracture or suspicious bone lesion. 1. There is evidence of cholelithiasis and choledocholithiasis. Borderline dilatation of the common bile duct with interval increase. 2. Right-sided posterior triangle hernia containing fat. This document has been electronically signed by: Boby Renteria MD on 08/24/2022 06:38 PM All CTs at this facility use dose modulation techniques and iterative reconstructions, and/or weight-based dosing when appropriate to reduce radiation to a low as reasonably achievable. US GALLBLADDER RUQ    Result Date: 8/24/2022  US abdomen limited Comparison: 05/10/2016 Findings: Liver normal size and echotexture. Right lobe 13.7 cm length. Main portal vein antegrade. Visualized pancreas unremarkable. Gallstones noted without wall thickening. Common duct 8.3 mm diameter. Punctate stone noted within the common bile duct. No ultrasonographic Du sign.      Impression: Cholelithiasis and common duct dilation with small stone in common duct This document has been electronically signed by: Jasmeet Ku MD on 08/24/2022 10:09 PM      DVT prophylaxis: [x] Lovenox                                 [] SCDs-thinners on hold for procedure                                 [] SQ Heparin                                 [] Encourage ambulation           [] Already on Anticoagulation     Code Status: Full Code    PT/OT Eval Status: Monitor    Tele:   [x] yes             [] no    Active Hospital Problems    Diagnosis Date Noted    Choledocholithiasis [K80.50] 08/24/2022     Priority: Medium    Chronic diastolic CHF (congestive heart failure) ([address] [I50.32] 12/29/2020    Atrial fibrillation ([address] [I48.91]     Presence of stent in LAD coronary artery [Z95.5] 04/12/2013       Electronically signed by Debbie Valladares PA-C on 8/30/2022 at

## 2022-08-30 NOTE — PROGRESS NOTES
trauma from repositioning of scope. Patient tolerating soft diet with poor appetite. Patient can be discharge once tolerating diet without nausea and ant-coagulants restarted. Diet: ADULT DIET; Regular;  Low Sodium (2 gm); GI [place] (GERD/Peptic Ulcer)    Patient Active Problem List:     Unstable angina (HCC)     H/O prior ablation treatment     Hx of atrial fibrillation, no current medication     Ex-smoker     Presence of stent in LAD coronary artery     S/P cardiac catheterization     Hyperlipidemia with target LDL less than 70     Atrial fibrillation (HCC)     Chest pain     Gastroesophageal reflux disease without esophagitis     Coronary artery disease involving native coronary artery of native heart without angina pectoris     Cerebrovascular accident (CVA) ([facility]     Aphasia     ACS (acute coronary syndrome) ([facility]     Coronary artery disease involving native coronary artery of native heart with unstable angina pectoris ([facility]     Essential hypertension     Hyperlipidemia LDL goal <70     S/P CABG x 1     Transient cerebral ischemia     Chronic diastolic CHF (congestive heart failure) (HCC)     SOB (shortness of breath)     COPD with acute exacerbation ([facility])     Fatigue     Other specified symptoms and signs involving the circulatory and respiratory systems     Choledocholithiasis        Medications:   Scheduled Meds:   clotrimazole-betamethasone   Topical TID    pantoprazole  40 mg IntraVENous Daily    [Held by provider] aspirin  81 mg Oral Daily    atorvastatin  80 mg Oral Nightly    mometasone-formoterol  2 puff Inhalation BID    furosemide  20 mg Oral Daily    isosorbide mononitrate  30 mg Oral Daily    metoprolol tartrate  25 mg Oral BID    [Held by provider] prasugrel  10 mg Oral Daily    propafenone  150 mg Oral BID    tiotropium  2 puff Inhalation Daily    sodium chloride flush  5-40 mL IntraVENous 2 times per day    [Held by provider] enoxaparin  40 mg SubCUTAneous Daily     Continuous Infusions: radiation to a low as reasonably achievable. US abdomen limited       Comparison: 05/10/2016       Findings:       Liver normal size and echotexture. Right lobe 13.7 cm length. Main portal vein antegrade. Visualized pancreas unremarkable. Gallstones noted without wall thickening. Common duct 8.3 mm diameter. Punctate stone noted within the common bile duct. No ultrasonographic Du sign. Impression   Impression:       Cholelithiasis and common duct dilation with small stone in common duct     [place] [address]                                 OPERATIVE REPORT     PATIENT NAME: Denise November                     :        1950  MED REC NO:   [id number]                           ROOM:       0023  ACCOUNT NO:   [de-identified]                           ADMIT DATE: 2022  PROVIDER:     BENTLEY Kendricker:  2022     INDICATION:  The patient with a history of abdominal discomfort,  obstructive jaundice, cholecystitis, abnormal liver function tests which  have improved, coronary artery disease. Plan today for ERCP to  evaluate. For ASA classification, please see Anesthesia note. ESTIMATED BLOOD LOSS:  Minimum. SURGEON:  Whitley Alcantara MD     DESCRIPTION OF PROCEDURE:  The patient was brought to the GI lab. Consent was obtained. Risks involved with the procedure were explained  to the patient. Informed consent was obtained. The patient was  monitored during the procedure with pulse oximetry, blood pressure  monitoring. Sedation done by general anesthesia by the anesthesia  service with endotracheal intubation. The patient positioned in the  supine position, right side slightly up. ERCP scope was advanced to the antrum. I was not able to intubate the  duodenum. So, scope was withdrawn despite multiple attempts.   I used an  upper scope to guide me with the area, seen moderate-size hiatus hernia. The patient was put in the left lateral decubitus position. Then, ERCP  scope advanced with slight difficulty up to the duodenum. Papilla was  identified. Appeared slightly deformed due to the presence of  diverticulitis just superior to the papilla. Selective cannulation of  the common bile duct achieved with difficulty. Pancreas was not  cannulated. Contrast injected, showed significant dilation of the  biliary tree with small stones seen anterior. Sphincterotomy performed  to a limited size due to presence of diverticulitis superior to the  papilla. Swept the common bile duct multiple times with balloon,  maximum dimension 12 mm. The balloon went up with no difficulty. Fragment of stone was seen. Balloon was withdrawn. Guidewire was  withdrawn and procedure was terminated with no immediate complications. IMPRESSION:  1. Duodenal diverticulitis superior to the papilla with deformity of  the papilla. 2.  Sphincterotomy performed, limited due to presence of diverticulitis. 3.  Swept the common bile duct. Fragments of stones were seen.        Objective:   Vitals: /78   Pulse 75   Temp 98 °F (36.7 °C) (Oral)   Resp 18   Wt 140 lb 11.2 oz (63.8 kg)   SpO2 95%   BMI 24.92 kg/m²     Intake/Output Summary (Last 24 hours) at 8/30/2022 0935  Last data filed at 8/29/2022 1750  Gross per 24 hour   Intake 700 ml   Output --   Net 700 ml     Weight:  Wt Readings from Last 3 Encounters:   08/29/22 140 lb 11.2 oz (63.8 kg)   06/23/22 150 lb 3.2 oz (68.1 kg)   06/21/22 152 lb 6.4 oz (69.1 kg)     General appearance: alert and cooperative with exam  Lungs: clear to auscultation bilaterally  Heart: regular rate and rhythm, S1, S2 normal, no murmur, click, rub or gallop  Abdomen: soft, non-tender; bowel sounds normal; no masses,  no organomegaly  Extremities: extremities normal, atraumatic, no cyanosis or edema    Assessment and Plan:   Cholelithiasis-MRCP is

## 2022-08-30 NOTE — ANESTHESIA POSTPROCEDURE EVALUATION
Department of Anesthesiology  Postprocedure Note    Patient: Yamileth Dickerson  MRN: [id number]  YOB: 1950  Date of evaluation: 8/29/2022      Procedure Summary     Date: 08/29/22 Room / Location: [facility] / [address] [place] [address]    Anesthesia Start: 3783 Anesthesia Stop: 1894    Procedures:       ERCP DILATION BALLOON      ERCP STONE REMOVAL Diagnosis:       Calculus of gallbladder with acute cholecystitis without obstruction      (Calculus of gallbladder with acute cholecystitis without obstruction [K80.00])    Surgeons: Genesis Salinas MD Responsible Provider: Brock Hatchet, DO    Anesthesia Type: general ASA Status: 3          Anesthesia Type: No value filed.     Darek Phase I: Darek Score: 10    Darek Phase II:        Anesthesia Post Evaluation    Patient location during evaluation: PACU  Patient participation: complete - patient participated  Level of consciousness: awake and alert  Pain score: 2  Airway patency: patent  Nausea & Vomiting: no nausea and no vomiting  Complications: no  Cardiovascular status: hemodynamically stable and blood pressure returned to baseline  Respiratory status: spontaneous ventilation, room air and acceptable  Hydration status: stable

## 2022-08-30 NOTE — PROGRESS NOTES
[place] [address] PROGRESS NOTE      Patient: Jesse Agustin  Room #: 2Q-91/480-C            YOB: 1950  Age: 67 y.o. Gender: female            Admit Date & Time: 8/24/2022  4:26 PM    Assessment:    Interventions:    Outcomes:         encountered patient sitting up in recliner in room. Patient was receptive. When asked how she was doing, she remarked better than last night.  noted patient appeared to be doing well. Patient expressed that she is better, but that she simply can not bring herself to eat after 4 or 5 previous days of not being able to keep anything down. We discussed this- how our body can refuse to cooperate after such an experience. Patient spoke of living alone and of having waited 3 or 4 days before getting a friend to come take her to emergency. Friends were willing but she did not ask. We visited a bit about that. 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 Patient accepted when prayer was offered. Patient hopes to be able to eat soon. Plan:     Support patient as she moves toward discharge. Electronically signed by Rudy Kapoor on 8/30/2022 at 3:41 PM.  [facility]  [phone]             08/30/22 1527   Encounter Summary   Service Provided For: Patient   Referral/Consult From: [employer]   Support System Friends/neighbors   Last Encounter  08/30/22   Complexity of Encounter Moderate   Begin Time 1527   End Time  1537   Total Time Calculated 10 min   Spiritual/Emotional needs   Type Emotional Distress   Grief, Loss, and Adjustments   Type Adjustment to illness   Assessment/Intervention/Outcome   Assessment Anxious; Compromised coping;Concerns with suffering; Impaired resilience   Intervention Active listening;Discussed illness injury and its impact;Empowerment;Explored/Affirmed feelings, thoughts, concerns;Explored Coping Skills/Resources;Nurtured Hope;Prayer (assurance of)/Hunter;Sustaining Presence/Ministry of presence   Outcome Acceptance;Comfort; Connection/Belonging;Engaged in conversation;Expressed feelings, needs, and concerns;Expressed Gratitude; Less anxious, Less agitated;Receptive; Restored Hope

## 2022-08-30 NOTE — OP NOTE
[address] [place] [address]                                OPERATIVE REPORT    PATIENT NAME: Bernard Messer                     :        1950  MED REC NO:   [id number]                           ROOM:       0023  ACCOUNT NO:   [de-identified]                           ADMIT DATE: 2022  PROVIDER:     BENTLEY [place]:  2022    INDICATION:  The patient with a history of abdominal discomfort,  obstructive jaundice, cholecystitis, abnormal liver function tests which  have improved, coronary artery disease. Plan today for ERCP to  evaluate. For ASA classification, please see Anesthesia note. ESTIMATED BLOOD LOSS:  Minimum. SURGEON:  Baldomero Garza MD    DESCRIPTION OF PROCEDURE:  The patient was brought to the GI lab. Consent was obtained. Risks involved with the procedure were explained  to the patient. Informed consent was obtained. The patient was  monitored during the procedure with pulse oximetry, blood pressure  monitoring. Sedation done by general anesthesia by the anesthesia  service with endotracheal intubation. The patient positioned in the  supine position, right side slightly up. ERCP scope was advanced to the antrum. I was not able to intubate the  duodenum. So, scope was withdrawn despite multiple attempts. I used an  upper scope to guide me with the area, seen moderate-size hiatus hernia. The patient was put in the left lateral decubitus position. Then, ERCP  scope advanced with slight difficulty up to the duodenum. Papilla was  identified. Appeared slightly deformed due to the presence of  diverticulitis just superior to the papilla. Selective cannulation of  the common bile duct achieved with difficulty. Pancreas was not  cannulated. Contrast injected, showed significant dilation of the  biliary tree with small stones seen anterior.   Sphincterotomy performed  to a limited size due to presence of diverticulitis superior to the  papilla. Swept the common bile duct multiple times with balloon,  maximum dimension 12 mm. The balloon went up with no difficulty. Fragment of stone was seen. Balloon was withdrawn. Guidewire was  withdrawn and procedure was terminated with no immediate complications. IMPRESSION:  1. Duodenal diverticulitis superior to the papilla with deformity of  the papilla. 2.  Sphincterotomy performed, limited due to presence of diverticulum. 3.  Swept the common bile duct. Fragments of stones were seen. PLAN:  Keep the patient n.p.o. Reevaluate. Repeat liver function  tests.         Keith Eaton M.D.    D: 08/29/2022 18:07:08       T: 08/29/2022 23:14:59     [email or web address]  Job#: [id number]     Doc#: [id number]    CC:  Mihai Millan M.D.

## 2022-08-30 NOTE — CARE COORDINATION
8/30/22, 8:51 AM EDT    DISCHARGE ON GOING EVALUATION    [address] day: 6  Location: [facility]23/023-A Reason for admit: Choledocholithiasis [K80.50]   Procedure:    8/25/2022 MRCP  8/26/2022 Stress test  8/29  ERCP DILATION BALLOON  ERCP STONE REMOVAL     Barriers to Discharge: GI following, regular diet today, pain and nausea control, telemetry, IV fluids, Lipitor, Lasix, Protonix, pain and nausea control. PCP: Vicki Ramirez MD  Readmission Risk Score: 15.3%  Patient Goals/Plan/Treatment Preferences: Plans return home alone. Denies needs. Will follow.

## 2022-08-30 NOTE — PLAN OF CARE
Problem: Safety - Adult  Goal: Free from fall injury  8/30/2022 1022 by Maurizio Vee RN  Outcome: Progressing  Flowsheets (Taken 8/30/2022 1019)  Free From Fall Injury: Instruct family/caregiver on patient safety     Problem: ABCDS Injury Assessment  Goal: Absence of physical injury  8/30/2022 1022 by Maurizio Vee RN  Outcome: Progressing  Flowsheets (Taken 8/30/2022 1019)  Absence of Physical Injury: Implement safety measures based on patient assessment     Problem: Chronic Conditions and Co-morbidities  Goal: Patient's chronic conditions and co-morbidity symptoms are monitored and maintained or improved  8/30/2022 1022 by Maurizio Vee RN  Outcome: Progressing  Flowsheets (Taken 8/30/2022 1022)  Care Plan - Patient's Chronic Conditions and Co-Morbidity Symptoms are Monitored and Maintained or Improved:   Monitor and assess patient's chronic conditions and comorbid symptoms for stability, deterioration, or improvement   Collaborate with multidisciplinary team to address chronic and comorbid conditions and prevent exacerbation or deterioration   Update acute care plan with appropriate goals if chronic or comorbid symptoms are exacerbated and prevent overall improvement and discharge     Problem: Discharge Planning  Goal: Discharge to home or other facility with appropriate resources  8/30/2022 1022 by Maurizio Vee RN  Outcome: Progressing  Flowsheets (Taken 8/30/2022 1022)  Discharge to home or other facility with appropriate resources:   Identify barriers to discharge with patient and caregiver   Identify discharge learning needs (meds, wound care, etc)   Arrange for needed discharge resources and transportation as appropriate     Problem: Cardiovascular - Adult  Goal: Maintains optimal cardiac output and hemodynamic stability  8/30/2022 1022 by Maurizio Vee RN  Outcome: Progressing  Flowsheets (Taken 8/30/2022 1022)  Maintains optimal cardiac output and hemodynamic stability:   Monitor blood pressure and heart rate   Assess for signs of decreased cardiac output   Administer fluid and/or volume expanders as ordered     Problem: Skin/Tissue Integrity - Adult  Goal: Skin integrity remains intact  8/30/2022 1022 by Gurmeet Espinoza RN  Outcome: Progressing  Flowsheets (Taken 8/30/2022 1019)  Skin Integrity Remains Intact:   Monitor for areas of redness and/or skin breakdown   Assess vascular access sites hourly     Problem: Skin/Tissue Integrity - Adult  Goal: Incisions, wounds, or drain sites healing without S/S of infection  8/30/2022 1022 by Gurmeet Espinoza RN  Outcome: Progressing  Flowsheets (Taken 8/30/2022 1019)  Incisions, Wounds, or Drain Sites Healing Without Sign and Symptoms of Infection:   ADMISSION and DAILY: Assess and document risk factors for pressure ulcer development   TWICE DAILY: Assess and document dressing/incision, wound bed, drain sites and surrounding tissue   TWICE DAILY: Assess and document skin integrity   Implement wound care per orders     Problem: Skin/Tissue Integrity - Adult  Goal: Oral mucous membranes remain intact  8/30/2022 1022 by Gurmeet Espinoza RN  Outcome: Progressing  Flowsheets (Taken 8/30/2022 1019)  Oral Mucous Membranes Remain Intact: Assess oral mucosa and hygiene practices     Problem: Gastrointestinal - Adult  Goal: Minimal or absence of nausea and vomiting  8/30/2022 1022 by Gurmeet Espinoza RN  Outcome: Progressing  Flowsheets (Taken 8/30/2022 1022)  Minimal or absence of nausea and vomiting:   Administer IV fluids as ordered to ensure adequate hydration   Administer ordered antiemetic medications as needed     Care plan reviewed with patient. Patient verbalizes understanding of the plan of care and contributes to goal setting.

## 2022-08-30 NOTE — PLAN OF CARE
Problem: Safety - Adult  Goal: Free from fall injury  8/30/2022 0047 by Padmini Vera RN  Outcome: Progressing  Flowsheets  Taken 8/29/2022 1952 by Padmini Vera RN  Free From Fall Injury: Instruct family/caregiver on patient safety  Taken 8/29/2022 1259 by Melba Colon RN  Free From Fall Injury: Instruct family/caregiver on patient safety  8/29/2022 1258 by Melba Colon RN  Outcome: Progressing  Flowsheets (Taken 8/28/2022 1556)  Free From Fall Injury: Instruct family/caregiver on patient safety     Problem: ABCDS Injury Assessment  Goal: Absence of physical injury  8/30/2022 0047 by Padmini Vera RN  Outcome: Progressing  Flowsheets  Taken 8/29/2022 1952 by Padmini Vera RN  Absence of Physical Injury: Implement safety measures based on patient assessment  Taken 8/29/2022 1259 by Melba Colon RN  Absence of Physical Injury: Implement safety measures based on patient assessment  8/29/2022 1258 by Melba Colon RN  Outcome: Progressing  Flowsheets (Taken 8/28/2022 1556)  Absence of Physical Injury: Implement safety measures based on patient assessment     Problem: Cardiovascular - Adult  Goal: Maintains optimal cardiac output and hemodynamic stability  8/30/2022 0047 by Padmini Vera RN  Outcome: Progressing  Flowsheets (Taken 8/29/2022 1952)  Maintains optimal cardiac output and hemodynamic stability:   Monitor blood pressure and heart rate   Monitor urine output and notify Licensed Independent Practitioner for values outside of normal range   Assess for signs of decreased cardiac output  8/29/2022 1258 by Melba Colon RN  Outcome: Progressing  Flowsheets (Taken 8/29/2022 0845)  Maintains optimal cardiac output and hemodynamic stability:   Monitor blood pressure and heart rate   Monitor urine output and notify Licensed Independent Practitioner for values outside of normal range   Assess for signs of decreased cardiac output   Administer fluid and/or volume expanders as ordered   Administer Mucous Membranes Remain Intact: Assess oral mucosa and hygiene practices     Problem: Gastrointestinal - Adult  Goal: Minimal or absence of nausea and vomiting  8/30/2022 0047 by Fernando Moss RN  Outcome: Progressing  Flowsheets (Taken 8/29/2022 1952)  Minimal or absence of nausea and vomiting:   Administer IV fluids as ordered to ensure adequate hydration   Maintain NPO status until nausea and vomiting are resolved   Administer ordered antiemetic medications as needed  8/29/2022 1258 by Kirsten Clements RN  Outcome: Progressing  Flowsheets (Taken 8/29/2022 0845)  Minimal or absence of nausea and vomiting:   Administer IV fluids as ordered to ensure adequate hydration   Administer ordered antiemetic medications as needed   Provide nonpharmacologic comfort measures as appropriate   Advance diet as tolerated, if ordered     Problem: Chronic Conditions and Co-morbidities  Goal: Patient's chronic conditions and co-morbidity symptoms are monitored and maintained or improved  8/30/2022 0047 by Fernando Moss RN  Outcome: Progressing  Flowsheets (Taken 8/29/2022 1952)  Care Plan - Patient's Chronic Conditions and Co-Morbidity Symptoms are Monitored and Maintained or Improved: Monitor and assess patient's chronic conditions and comorbid symptoms for stability, deterioration, or improvement  8/29/2022 1258 by Kirsten Clements RN  Outcome: Progressing  Flowsheets (Taken 8/29/2022 0845)  Care Plan - Patient's Chronic Conditions and Co-Morbidity Symptoms are Monitored and Maintained or Improved:   Monitor and assess patient's chronic conditions and comorbid symptoms for stability, deterioration, or improvement   Collaborate with multidisciplinary team to address chronic and comorbid conditions and prevent exacerbation or deterioration   Update acute care plan with appropriate goals if chronic or comorbid symptoms are exacerbated and prevent overall improvement and discharge     Problem: Discharge Planning  Goal: Discharge to home or other facility with appropriate resources  8/30/2022 0047 by Garrick Rincon RN  Outcome: Progressing  Flowsheets (Taken 8/29/2022 1952)  Discharge to home or other facility with appropriate resources:   Identify barriers to discharge with patient and caregiver   Arrange for needed discharge resources and transportation as appropriate   Identify discharge learning needs (meds, wound care, etc)  8/29/2022 1258 by Yolanda Balderrama RN  Outcome: Progressing  Flowsheets (Taken 8/29/2022 0845)  Discharge to home or other facility with appropriate resources:   Identify barriers to discharge with patient and caregiver   Arrange for needed discharge resources and transportation as appropriate   Identify discharge learning needs (meds, wound care, etc)   Arrange for interpreters to assist at discharge as needed   Refer to discharge planning if patient needs post-hospital services based on physician order or complex needs related to functional status, cognitive ability or social support system    Patient educated on use of daily CHG bath to prevent surgical site infection, s/s of infection, use of incentive spirometer and early ambulation. Patient verbalized understanding.  CHG bath will be completed in the am.

## 2022-08-31 VITALS
DIASTOLIC BLOOD PRESSURE: 68 MMHG | SYSTOLIC BLOOD PRESSURE: 108 MMHG | TEMPERATURE: 98.1 F | OXYGEN SATURATION: 100 % | BODY MASS INDEX: 25.15 KG/M2 | RESPIRATION RATE: 18 BRPM | WEIGHT: 142 LBS | HEART RATE: 68 BPM

## 2022-08-31 LAB
ALBUMIN SERPL-MCNC: 3.5 G/DL (ref 3.5–5.1)
ALP BLD-CCNC: 149 U/L (ref 38–126)
ALT SERPL-CCNC: 35 U/L (ref 11–66)
ANION GAP SERPL CALCULATED.3IONS-SCNC: 10 MEQ/L (ref 8–16)
AST SERPL-CCNC: 45 U/L (ref 5–40)
BILIRUB SERPL-MCNC: 0.8 MG/DL (ref 0.3–1.2)
BILIRUBIN DIRECT: 0.3 MG/DL (ref 0–0.3)
BUN BLDV-MCNC: 13 MG/DL (ref 7–22)
CALCIUM SERPL-MCNC: 8.6 MG/DL (ref 8.5–10.5)
CHLORIDE BLD-SCNC: 107 MEQ/L (ref 98–111)
CO2: 26 MEQ/L (ref 23–33)
CREAT SERPL-MCNC: 1.1 MG/DL (ref 0.4–1.2)
ERYTHROCYTE [DISTWIDTH] IN BLOOD BY AUTOMATED COUNT: 14.1 % (ref 11.5–14.5)
ERYTHROCYTE [DISTWIDTH] IN BLOOD BY AUTOMATED COUNT: 45.2 FL (ref 35–45)
GFR SERPL CREATININE-BSD FRML MDRD: 49 ML/MIN/1.73M2
GLUCOSE BLD-MCNC: 100 MG/DL (ref 70–108)
HCT VFR BLD CALC: 34.6 % (ref 37–47)
HEMOGLOBIN: 11.6 GM/DL (ref 12–16)
MCH RBC QN AUTO: 29.6 PG (ref 26–33)
MCHC RBC AUTO-ENTMCNC: 33.5 GM/DL (ref 32.2–35.5)
MCV RBC AUTO: 88.3 FL (ref 81–99)
PLATELET # BLD: 240 THOU/MM3 (ref 130–400)
PMV BLD AUTO: 9.4 FL (ref 9.4–12.4)
POTASSIUM SERPL-SCNC: 3.5 MEQ/L (ref 3.5–5.2)
RBC # BLD: 3.92 MILL/MM3 (ref 4.2–5.4)
SODIUM BLD-SCNC: 143 MEQ/L (ref 135–145)
TOTAL PROTEIN: 5.1 G/DL (ref 6.1–8)
WBC # BLD: 8.3 THOU/MM3 (ref 4.8–10.8)

## 2022-08-31 PROCEDURE — C9113 INJ PANTOPRAZOLE SODIUM, VIA: HCPCS

## 2022-08-31 PROCEDURE — 85027 COMPLETE CBC AUTOMATED: CPT

## 2022-08-31 PROCEDURE — 36415 COLL VENOUS BLD VENIPUNCTURE: CPT

## 2022-08-31 PROCEDURE — 99238 HOSP IP/OBS DSCHRG MGMT 30/<: CPT | Performed by: PHYSICIAN ASSISTANT

## 2022-08-31 PROCEDURE — 6360000002 HC RX W HCPCS: Performed by: PHYSICIAN ASSISTANT

## 2022-08-31 PROCEDURE — 82248 BILIRUBIN DIRECT: CPT

## 2022-08-31 PROCEDURE — 6370000000 HC RX 637 (ALT 250 FOR IP): Performed by: PHYSICIAN ASSISTANT

## 2022-08-31 PROCEDURE — 6360000002 HC RX W HCPCS

## 2022-08-31 PROCEDURE — 80053 COMPREHEN METABOLIC PANEL: CPT

## 2022-08-31 RX ADMIN — FUROSEMIDE 20 MG: 20 TABLET ORAL at 08:25

## 2022-08-31 RX ADMIN — CLOTRIMAZOLE AND BETAMETHASONE DIPROPIONATE: 10; .5 CREAM TOPICAL at 08:17

## 2022-08-31 RX ADMIN — PROPAFENONE HYDROCHLORIDE 150 MG: 150 TABLET, FILM COATED ORAL at 08:25

## 2022-08-31 RX ADMIN — ASPIRIN 81 MG 81 MG: 81 TABLET ORAL at 08:25

## 2022-08-31 RX ADMIN — PRASUGREL 10 MG: 10 TABLET, FILM COATED ORAL at 08:25

## 2022-08-31 RX ADMIN — ENOXAPARIN SODIUM 40 MG: 100 INJECTION SUBCUTANEOUS at 08:25

## 2022-08-31 RX ADMIN — METOPROLOL TARTRATE 25 MG: 25 TABLET, FILM COATED ORAL at 08:25

## 2022-08-31 RX ADMIN — ISOSORBIDE MONONITRATE 30 MG: 30 TABLET, EXTENDED RELEASE ORAL at 08:25

## 2022-08-31 RX ADMIN — PANTOPRAZOLE SODIUM 40 MG: 40 INJECTION, POWDER, FOR SOLUTION INTRAVENOUS at 08:25

## 2022-08-31 ASSESSMENT — PAIN SCALES - GENERAL: PAINLEVEL_OUTOF10: 0

## 2022-08-31 NOTE — DISCHARGE SUMMARY
Hospital Medicine Discharge Summary      Patient Identification:   Albaro Tyler   : 1950  MRN: [id number]   Account: [de-identified]      Patient's PCP: Kai Davidson MD    Admit Date: 2022     Discharge Date:   2022    Admitting Physician: Sheree Romano PA-C     Discharge Physician: Layton Garcia, [facility] Course:   Albaro Tyler is a 67 y.o. female with PMHx of atrial fibrillation and CAD admitted to [address] [place] [address] on 2022 for choledocholithiasis. Initial HPI  \" Patient presents to the ED with fever and abdominal pain for several days. The patient states she had not been able to eat for several days due to pain and nausea. 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 Emeline Lundborg outpatient. Outpatient records indicate she had some drainage and debris from her umbilicus for a year and there is wound associated with hernia mesh. She took oral antibiotics and is now doing topical treatment and wound care. Patient is admitted for further evaluation of pain, dilated common duct and stones in the duct. \"     : CBC overall stable. BMP stable. Mild elevation of bilirubin at 1.9. MRCP is scheduled for today. General surgery unlikely to perform any procedure due to cardiac risk. Waiting for further input from GI. Blood thinners on hold given possible need for procedure     :Patient reports she has not had any vomiting since arrival.  Reports that her nausea is improved abdominal pain is still present. She states she is anxious for MRCP and to know if they are going to do any procedures or not. She denies any chest pain, palpitations, shortness of breath. Denies any melena or hematochezia. Reports normal bowel movements     2022:MRCP demonstrating 2 mm stone in CBD with 9 mm dilation. Bilirubin mildly improved from 1.9-1.4.   Awaiting cardiac clearance for patient to undergo ERCP with  Emmanuelle  Patient did develop chest pain this morning while cardiology was evaluating patient. Troponin negative EKG without any changes or any signs suggesting ischemia. Lipase was ordered which was elevated at 122. It was normal on arrival.  Suspect that patient is developing some mild pancreatitis which is the cause of her pain. We will continue to monitor closely. Trend troponin     8/27/2022  Patient completed stress test yesterday which was negative. Echo is stable with an EF of 45%. Bilirubin is stable. GI planning for an ERCP tomorrow as cardiac clearance has been obtained  Patient has developed some mild COREY with a creatinine of 1.3. Attributed to decreased p.o. intake. Encourage oral hydration and continue gentle IV fluids at 30 cc/h monitor closely for fluid overload     8/28/2022  COREY is resolved. Bilirubin normalized as well. ERCP is planned for tomorrow as Dr Sumanth Ardon wishes to have \"support staff\" given cardiovacular history. Vital signs         8/29/2022  ERCP performed  Mild hypokalemia- 3.3- 40meq Kcl given      8/30/2022  Okay for discharge from GI once tolerating diet    8/31/2022  Patient discharged from the hospital in stable condition          Discharge Diagnoses: The patient was seen and examined on day of discharge and this discharge summary is in conjunction with any daily progress note from day of discharge. The patient is discharged in stable condition.      Exam:     Vitals:  Vitals:    08/30/22 2001 08/31/22 0309 08/31/22 0311 08/31/22 0800   BP: 104/60 108/64  108/68   Pulse: 68 70  68   Resp: 16 16  18   Temp: 98.8 °F (37.1 °C) 98.2 °F (36.8 °C)  98.1 °F (36.7 °C)   TempSrc: Oral Oral  Oral   SpO2: 93% 92%  100%   Weight:   142 lb (64.4 kg)      Weight: Weight: 142 lb (64.4 kg)     24 hour intake/output:  Intake/Output Summary (Last 24 hours) at 8/31/2022 1126  Last data filed at 8/30/2022 1742  Gross per 24 hour   Intake 656.26 ml   Output --   Net 656.26 ml       Labs: For convenience and continuity at follow-up the following most recent labs are provided:    CBC:    Lab Results   Component Value Date/Time    WBC 8.3 08/31/2022 05:32 AM    HGB 11.6 08/31/2022 05:32 AM    HCT 34.6 08/31/2022 05:32 AM     08/31/2022 05:32 AM       Renal:    Lab Results   Component Value Date/Time     08/31/2022 05:32 AM    K 3.5 08/31/2022 05:32 AM    K 4.6 08/25/2022 05:05 AM     08/31/2022 05:32 AM    CO2 26 08/31/2022 05:32 AM    BUN 13 08/31/2022 05:32 AM    CREATININE 1.1 08/31/2022 05:32 AM    CALCIUM 8.6 08/31/2022 05:32 AM    PHOS 4.4 05/13/2016 05:14 AM         Significant Diagnostic Studies    Radiology:   FL ERCP BILIARY AND PANCREATIC S&I   Final Result       1. ERCP was performed by Dr. Paddy Valerio. Balloon dilatation was performed and stone was removed. Please see the operative report for further details. .               **This report has been created using voice recognition software. It may contain minor errors which are inherent in voice recognition technology. **      Final report electronically signed by DR Nuris Amaya on 8/30/2022 8:48 AM      XR CHEST (2 VW)   Final Result   No acute intrathoracic process. **This report has been created using voice recognition software. It may contain minor errors which are inherent in voice recognition technology. **      Final report electronically signed by Dr Espinoza Sarabia on 8/26/2022 9:15 AM      MRI ABDOMEN W WO CONTRAST MRCP   Final Result   1. A 2 mm calculus is seen in the distal common bile duct. 2. The common bile duct is dilated at 9 mm. 3. Cholelithiasis. 4. Other findings as described above. **This report has been created using voice recognition software. It may contain minor errors which are inherent in voice recognition technology. **      Final report electronically signed by Dr Niurka Hoang on 8/25/2022 4:48 PM      US GALLBLADDER RUQ   Final Result   Impression:      Cholelithiasis and common duct dilation with small stone in common duct      This document has been electronically signed by: Gerardo Jaeger MD on    08/24/2022 10:09 PM      CT ABDOMEN PELVIS W IV CONTRAST Additional Contrast? None   Final Result   1. There is evidence of cholelithiasis and choledocholithiasis. Borderline    dilatation of the common bile duct with interval increase. 2. Right-sided posterior triangle hernia containing fat. This document has been electronically signed by: Alison Harrison MD on    08/24/2022 06:38 PM      All CTs at this facility use dose modulation techniques and iterative    reconstructions, and/or weight-based dosing   when appropriate to reduce radiation to a low as reasonably achievable. Consults:     IP CONSULT TO GI  IP CONSULT TO GENERAL SURGERY  IP CONSULT TO GI  IP CONSULT TO CARDIOLOGY    Disposition: Home  Condition at Discharge: Stable    Code Status:  Full Code     Patient Instructions:    Discharge lab work: Activity: activity as tolerated  Diet: ADULT DIET; Regular; Low Sodium (2 gm); GI [place] (GERD/Peptic Ulcer)      Follow-up visits:   No follow-up provider specified.        Discharge Medications:        Medication List        CONTINUE taking these medications      AeroChamber MV Misc  1 each by Does not apply route in the morning and at bedtime With Symbicort inhaler     ALBUTEROL IN     aspirin 81 MG tablet     atorvastatin 80 MG tablet  Commonly known as: LIPITOR  Take 1 tablet by mouth nightly     benzonatate 100 MG capsule  Commonly known as: Tessalon Perles  Take 2 capsules by mouth 3 times daily as needed for Cough     budesonide-formoterol 160-4.5 MCG/ACT Aero  Commonly known as: Symbicort  Inhale 2 puffs into the lungs 2 times daily Rinse mouth after use and use with spacer     furosemide 20 MG tablet  Commonly known as: LASIX  TAKE 1 TABLET DAILY     isosorbide mononitrate 30 MG extended release tablet  Commonly known as: IMDUR  TAKE 1 TABLET DAILY     metoprolol tartrate 25 MG tablet  Commonly known as: LOPRESSOR  TAKE 1 TABLET TWICE A DAY     ondansetron 4 MG disintegrating tablet  Commonly known as: ZOFRAN-ODT     prasugrel 10 MG Tabs  Commonly known as: EFFIENT  TAKE 1 TABLET DAILY     propafenone 150 MG tablet  Commonly known as: RYTHMOL  TAKE 1 TABLET TWICE A DAY     tiotropium 2.5 MCG/ACT Aers inhaler  Commonly known as: Spiriva Respimat  Inhale 2 puffs into the lungs daily     UNABLE TO FIND            ASK your doctor about these medications      nitroGLYCERIN 0.4 MG SL tablet  Commonly known as: NITROSTAT  Place 1 tablet under the tongue every 5 minutes as needed for Chest pain. Time Spent on discharge is 20 minutes in the examination, evaluation, counseling and review of medications and discharge plan. Thank you Ada Brown MD for the opportunity to be involved in this patient's care.       Signed:    Electronically signed by Mary Jo Armendariz PA-C on 8/31/22 at 11:26 AM EDT

## 2022-08-31 NOTE — PLAN OF CARE
Problem: Safety - Adult  Goal: Free from fall injury  8/30/2022 2341 by Paula Siddiqi RN  Outcome: Progressing   Fall sign posted. Arm band in place. Non-skid slippers on. Bed alarm on. Patient agreeable to use of call light. Call light within reach. Bed in lowest position. Problem: Cardiovascular - Adult  Goal: Maintains optimal cardiac output and hemodynamic stability  8/30/2022 2341 by Paula Siddiqi RN  Outcome: Progressing   Patient hemodynamically stable so far this shift. On telemetry. History of afib. Controlled rate. Problem: Skin/Tissue Integrity - Adult  Goal: Skin integrity remains intact  8/30/2022 2341 by Paula Siddiqi RN  Outcome: Progressing   No new signs of skin breakdown so far this shift. Patient ambulates frequently and turns self in bed. Will continue to assess for signs of skin breakdown. Problem: Gastrointestinal - Adult  Goal: Minimal or absence of nausea and vomiting  8/30/2022 2341 by Paula Siddiqi RN  Outcome: Progressing   Admitted with abdominal pain/ nausea and vomiting. Patient complaining of minimal nausea this shift. Tolerating diet fairly well. PRN zofran available as needed. GI on case. Problem: Discharge Planning  Goal: Discharge to home or other facility with appropriate resources  8/30/2022 2341 by Paula Siddiqi RN  Outcome: Progressing   From home alone. Plans to return home at time of discharge. Possible discharge date tomorrow. Care plan reviewed with patient. Patient verbalizes understanding of the plan of care and contributes to goal setting.

## 2022-08-31 NOTE — CARE COORDINATION
8/31/22, 8:25 AM EDT    DISCHARGE ON GOING EVALUATION    [address] day: 7  Location: [facility]23/023-A Reason for admit: Choledocholithiasis [K80.50]   Procedure: 8/25/2022 MRCP  8/26/2022 Stress test  8/29  ERCP DILATION BALLOON  ERCP STONE REMOVAL  Barriers to Discharge: tolerating diet, pt declining cholecystetomy-ok to discharge per GI  PCP: Ada Brown MD  Readmission Risk Score: 12.3%  Patient Goals/Plan/Treatment Preferences: Plan to return home      8/31/22, 12:44 PM EDT    Patient goals/plan/ treatment preferences discussed by  and . Patient goals/plan/ treatment preferences reviewed with patient/ family. Patient/ family verbalize understanding of discharge plan and are in agreement with goal/plan/treatment preferences. Understanding was demonstrated using the teach back method. AVS provided by RN at time of discharge, which includes all necessary medical information pertaining to the patients current course of illness, treatment, post-discharge goals of care, and treatment preferences.      Services At/After Discharge: None       IMM Letter  IMM Letter given to Patient/Family/Significant other/Guardian/POA/by[de-identified] CM  IMM Letter date given[de-identified] 08/30/22  IMM Letter time given[de-identified] 3300

## 2022-08-31 NOTE — PROGRESS NOTES
All discharge instructions given to patient and sister with no further questions at this time. Patient discharged off unit via wheelchair. Chart contents placed in yellow bin.

## 2022-08-31 NOTE — PROGRESS NOTES
Gastroenterology  Progress Note    8/31/2022 8:01 AM  Subjective:   Admit Date: 8/24/2022    Interval History:  Layla Stone is a 67 y.o. female known to the practice last seen in 2016 on previous admission. Patient presents to the ED with fever and abdominal pain since Saturday. The patient states she had not been able to eat for several days due to pain and nausea. 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 Haja Hansen outpatient. Outpatient records indicate she had some drainage and debris from her umbilicus for a year and there is wound associated with hernia mesh. She took oral antibiotics and is now doing topical treatment and wound care. Patient is admitted for further evaluation of pain, dilated common duct and stones in the duct. 8/25/22: GI consulted for chololithiasis, common bile duct dilation, and possible need for ERCP. Patient is on Effient for CAD and last known dose was Tuesday. MRCP is completed and awaiting results. Surgery has been consulted but she is a high risk due to extensive heart history. 8/26/22: Patient had chest pain/abdominal pain today. Lipase elevated suspect early signs of pancreatitis. Cardiology consulted for pre-operative clearance for ERCP. EKG, Troponin, echocardiogram, and stress test ordered. Patient awaiting results of stress test. Surgical team defers patient for ERCP due to high risk for cardiac complications. Plan ERCP when cardiac clearance confirmed. 8/27/22: Patient stress test results below. Patient cleared by cardiology with moderate cardiac risk for ERCP. Patient on a bland GI diet and plan for ERCP ? Tomorrow dependent on OR scheduling. Patient NPO after midnight.   8/31/2022 patient doing fine with no GI complaint diet advance no jaundice no change in stool color. Her lab has improved. She has no pain. Likely discharge home soon.   Reviewed endoscopy findings with the patient at the pill as before because of the diverticulum close to the papilla sphincterotomy done not large as I would like to collect and deliver culture because of the diverticuli still extracted. Patient told me that she been told with her cardiologist that she is not candidate for cholecystectomy. Diet: ADULT DIET; Regular;  Low Sodium (2 gm); GI Rankin (GERD/Peptic Ulcer)    Patient Active Problem List:     Unstable angina (HCC)     H/O prior ablation treatment     Hx of atrial fibrillation, no current medication     Ex-smoker     Presence of stent in LAD coronary artery     S/P cardiac catheterization     Hyperlipidemia with target LDL less than 70     Atrial fibrillation (HCC)     Chest pain     Gastroesophageal reflux disease without esophagitis     Coronary artery disease involving native coronary artery of native heart without angina pectoris     Cerebrovascular accident (CVA) ([facility]     Aphasia     ACS (acute coronary syndrome) ([facility]     Coronary artery disease involving native coronary artery of native heart with unstable angina pectoris (HCC)     Essential hypertension     Hyperlipidemia LDL goal <70     S/P CABG x 1     Transient cerebral ischemia     Chronic diastolic CHF (congestive heart failure) (HCC)     SOB (shortness of breath)     COPD with acute exacerbation ([facility])     Fatigue     Other specified symptoms and signs involving the circulatory and respiratory systems     Choledocholithiasis        Medications:   Scheduled Meds:   clotrimazole-betamethasone   Topical TID    pantoprazole  40 mg IntraVENous Daily    aspirin  81 mg Oral Daily    atorvastatin  80 mg Oral Nightly    mometasone-formoterol  2 puff Inhalation BID    furosemide  20 mg Oral Daily    isosorbide mononitrate  30 mg Oral Daily    metoprolol tartrate  25 mg Oral BID    prasugrel  10 mg Oral Daily    propafenone  150 mg Oral BID    tiotropium  2 puff Inhalation Daily    sodium chloride flush  5-40 mL IntraVENous 2 times per day    enoxaparin  40 mg SubCUTAneous Daily     Continuous Infusions:   sodium chloride      sodium chloride 30 mL/hr at 08/30/22 1742     CBC:   Recent Labs     08/29/22  0539 08/30/22  1316 08/31/22  0532   WBC 8.8 11.6* 8.3   HGB 11.5* 11.9* 11.6*    278 240     BMP:    Recent Labs     08/29/22  0539 08/30/22  1316 08/31/22  0532    139 143   K 3.3* 3.7 3.5    101 107   CO2 24 24 26   BUN 11 15 13   CREATININE 1.0 1.3* 1.1   GLUCOSE 105 165* 100     Hepatic:   Recent Labs     08/29/22  0539 08/30/22  1316 08/31/22  0532   AST 29 77* 45*   ALT 20 47 35   BILITOT 0.9 0.9 0.8   ALKPHOS 102 167* 149*     INR: No results for input(s): INR in the last 72 hours. Xray:   CT abdomen and pelvis with contrast       Comparison: CT,SR - CT ABDOMEN PELVIS WO CONTRAST - 07/22/2022 07:06 AM    EDT       Findings: The lung bases are clear. There are multiple tiny gallstones within the gallbladder. The common bile    duct measures up to 7 mm in diameter with interval increased since the    prior study. There is a new 2 mm calculus within the common bile duct just    proximal to the ampulla. Multiple splenic granulomas. Unremarkable liver and bilateral adrenal    glands. Moderate pancreatic volume loss with partial fatty replacement of    parenchyma. Numerous peripelvic cysts within the kidneys. No bowel obstruction, pneumoperitoneum, or pneumatosis. Status post hysterectomy. Unremarkable urinary bladder. The appendix is    not definitively seen. There are no secondary findings to suggest    appendicitis. There is an hemangioma within the L3 vertebral body. No acute fracture or    suspicious bone lesion. Impression   1. There is evidence of cholelithiasis and choledocholithiasis. Borderline    dilatation of the common bile duct with interval increase. 2. Right-sided posterior triangle hernia containing fat.        This document has been electronically signed by: Sirisha Rodriguez MD on    08/24/2022 06:38 PM All CTs at this facility use dose modulation techniques and iterative    reconstructions, and/or weight-based dosing   when appropriate to reduce radiation to a low as reasonably achievable. US abdomen limited       Comparison: 05/10/2016       Findings:       Liver normal size and echotexture. Right lobe 13.7 cm length. Main portal vein antegrade. Visualized pancreas unremarkable. Gallstones noted without wall thickening. Common duct 8.3 mm diameter. Punctate stone noted within the common bile duct. No ultrasonographic Du sign. Impression   Impression:       Cholelithiasis and common duct dilation with small stone in common duct       Endoscopy Finding:  N/A    Cardiology:    Cardiac Perfusion Imaging      Demographics      Patient Name    Pat Parham Gender                  Female      MR #            [id number]      Race                                                         Ethnicity      Account #       [de-identified]      Room Number             0023      Accession       [id number]     Date of study           08/26/2022   Number      Date of Birth   1950     Referring Physician     Alena Hashimoto MD      Age             67 year(s)     NM Technologist         Araceli Pedro                                                          [facility]      Stress Staff                   Interpreting            Bruna Rosen MD                                  Cardiologist      The procedure was explained in detail to the patient. Risks,   complications and alternative treatments were reviewed. Written consent   was obtained. Procedure  Procedure Type:      Nuclear Stress Test:Pharmacological, Regadenoson     Indications: Chest pain. Medical History:Patient history obtained by: Pura Walton RN. Chest pain status: chest pain . The patient has known arrhythmias.       Risk Factors      The patient risk factors include:former tobacco use, prior MI and (pack   years: 40 years not smokin). Stress Protocols      Resting HR:63 bpm                Resting BP:99/59 mmHg     Stress Protocol:Pharmacologic - Lexiscan  +--------+-----+------+-------+-----------+--+----------+------+-----------+  ! Stage # ! Time ! Dosage! Heart  ! Blood      ! CP! Pain      ! Pain  ! Pain Action!  !        !     ! !Rate   ! Pressure   ! ! Location  ! Type  !           !  +--------+-----+------+-------+-----------+--+----------+------+-----------+  !1.0     !01:00!      !76     !104/51     !  !          !      !           !  +--------+-----+------+-------+-----------+--+----------+------+-----------+  !2.0     !02:00!      !74     !105/55     !  !          !      !           !  +--------+-----+------+-------+-----------+--+----------+------+-----------+  !3.0     ! 03:00!      !73     !110/57     !  !          !      !           !  +--------+-----+------+-------+-----------+--+----------+------+-----------+  ! Recovery! 01:00!      !66     !95/65      !  !          !      !           !  +--------+-----+------+-------+-----------+--+----------+------+-----------+  ! Recovery! 02:00!      !75     !105/66     !  !          !      !           !  +--------+-----+------+-------+-----------+--+----------+------+-----------+  ! Recovery! 03:00!      !68     !110/69     !  !          !      !           !  +--------+-----+------+-------+-----------+--+----------+------+-----------+  ! Recovery! 04:00!      !75     !112/70     !  !          !      !           !  +--------+-----+------+-------+-----------+--+----------+------+-----------+  ! Recovery! 05:00!      !77     !115/72     !  !          !      !           !  +--------+-----+------+-------+-----------+--+----------+------+-----------+      Peak HR:78 bpm                                      HR/BP product:8970   Peak BP:115/72 mmHg   Predicted HR: 147 bpm   % of predicted HR: 53   Test duration: 5 min   Reason for termination:Physician request      Imaging Protocols      Rest Stress      Isotope:Tc-99m Sestamibi            Isotope: Tc-99m Sestamibi   Isotope dose:8.8 mCi                Isotope dose:32.2 mCi   Date:2022 13:10               Date:2022 14:20      Technique:           SPECT          Technique:           Gated                                                            SPECT     Imaging Results:Calculated gated LVEF 44 %. The T.I.D. ratio was 1.27 . There was a small to moderate sized, mild in intensity, fixed myocardial  perfusion defect of the lateral wall most consistent with an infarct. Myocardial perfusion imaging is not suggestive for myocardial ischemia. Conclusions      Summary   Myocardial perfusion imaging is not suggestive for myocardial ischemia. Recommendation   Medical management-no intervention. Clinical correlation is recommended. Aggressive medical therapy for coronary artery disease. Signatures      ----------------------------------------------------------------   Electronically signed by Brice López MD (Interpreting   Cardiologist) on 2022 at 16:55   ----------------------------------------------------------------     Medical History      Accession#:                           [id number]     Admission Data  Admission date: 2022 Admission Time: 16:26    ENDOSCOPY :    [address]                    [place] [id number]                                 OPERATIVE REPORT     PATIENT NAME: Catia Baez                     :        1950  MED REC NO:   [id number]                           ROOM:       0023  ACCOUNT NO:   [de-identified]                           ADMIT DATE: 2022  PROVIDER:     [place]2022     INDICATION:  The patient with a history of abdominal discomfort,  obstructive jaundice, cholecystitis, abnormal liver function tests which  have improved, coronary artery disease.   Plan today for ERCP to  evaluate. For ASA classification, please see Anesthesia note. ESTIMATED BLOOD LOSS:  Minimum. SURGEON:  Damian Hunter MD     DESCRIPTION OF PROCEDURE:  The patient was brought to the GI lab. Consent was obtained. Risks involved with the procedure were explained  to the patient. Informed consent was obtained. The patient was  monitored during the procedure with pulse oximetry, blood pressure  monitoring. Sedation done by general anesthesia by the anesthesia  service with endotracheal intubation. The patient positioned in the  supine position, right side slightly up. ERCP scope was advanced to the antrum. I was not able to intubate the  duodenum. So, scope was withdrawn despite multiple attempts. I used an  upper scope to guide me with the area, seen moderate-size hiatus hernia. The patient was put in the left lateral decubitus position. Then, ERCP  scope advanced with slight difficulty up to the duodenum. Papilla was  identified. Appeared slightly deformed due to the presence of  diverticulitis just superior to the papilla. Selective cannulation of  the common bile duct achieved with difficulty. Pancreas was not  cannulated. Contrast injected, showed significant dilation of the  biliary tree with small stones seen anterior. Sphincterotomy performed  to a limited size due to presence of diverticulitis superior to the  papilla. Swept the common bile duct multiple times with balloon,  maximum dimension 12 mm. The balloon went up with no difficulty. Fragment of stone was seen. Balloon was withdrawn. Guidewire was  withdrawn and procedure was terminated with no immediate complications. IMPRESSION:  1. Duodenal diverticulitis superior to the papilla with deformity of  the papilla. 2.  Sphincterotomy performed, limited due to presence of diverticulum. 3.  Swept the common bile duct. Fragments of stones were seen. PLAN:  Keep the patient n.p.o. Reevaluate.   Repeat liver function  tests. Stephanie Moody M.D.     D: 08/29/2022 18:07:08       T: 08/29/2022 23:14:59     [email or web address]  Job#: [id number]     Doc#: [id number]     CC:  Lupis Ambrose M.D. Objective:   Vitals: /64   Pulse 70   Temp 98.2 °F (36.8 °C) (Oral)   Resp 16   Wt 142 lb (64.4 kg)   SpO2 92%   BMI 25.15 kg/m²     Intake/Output Summary (Last 24 hours) at 8/31/2022 0801  Last data filed at 8/30/2022 1742  Gross per 24 hour   Intake 776.26 ml   Output --   Net 776.26 ml     Weight:  Wt Readings from Last 3 Encounters:   08/31/22 142 lb (64.4 kg)   06/23/22 150 lb 3.2 oz (68.1 kg)   06/21/22 152 lb 6.4 oz (69.1 kg)     General appearance: alert and cooperative with exam  Lungs: clear to auscultation bilaterally  Heart: regularly irregular rhythm and atrial fibrillation rate controlled  Abdomen: abnormal findings:  tenderness mild in the epigastrium and in the upper abdomen  Extremities: extremities normal, atraumatic, no cyanosis or edema    Assessment and Plan:   Cholelithiasis-MRCP is completed and awaiting results. Surgery has been consulted but she is a high risk due to extensive heart history. ASA and Effient on hold. Cardiac clearance is moderate risk for ERCP.   ERCP done with sphincterotomy stone extractions the pathology for because of diverticulum small sphincterotomy performed swept of the common bile duct to clear from stone   Advance diet as tolerated stable to be discharged from GI point of view  Due to high risk both this patient with no cholecystomy performed her cardiologist felt her just cannot survive cholecystectomye    Follow up in [facility] after discharge in 2 week(s)        Justa Grande MD  8/31/2022  8:01 AM     Patient is high risk due to [address] , prefer to do on Monday

## 2022-08-31 NOTE — PLAN OF CARE
Problem: Safety - Adult  Goal: Free from fall injury  4/10/0031 [id number] by Juvenal Pearl RN  Outcome: Progressing  Flowsheets (Taken 8/31/2022 1444)  Free From Fall Injury: Instruct family/caregiver on patient safety     Problem: ABCDS Injury Assessment  Goal: Absence of physical injury  Outcome: Progressing  Flowsheets (Taken 8/31/2022 0958)  Absence of Physical Injury: Implement safety measures based on patient assessment     Problem: Chronic Conditions and Co-morbidities  Goal: Patient's chronic conditions and co-morbidity symptoms are monitored and maintained or improved  Outcome: Progressing  Flowsheets (Taken 8/31/2022 0810)  Care Plan - Patient's Chronic Conditions and Co-Morbidity Symptoms are Monitored and Maintained or Improved: Monitor and assess patient's chronic conditions and comorbid symptoms for stability, deterioration, or improvement     Problem: Discharge Planning  Goal: Discharge to home or other facility with appropriate resources  7/81/0027 0094 by Juvenal Pearl RN  Outcome: Progressing  Flowsheets (Taken 8/31/2022 0810)  Discharge to home or other facility with appropriate resources: Identify barriers to discharge with patient and caregiver     Problem: Cardiovascular - Adult  Goal: Maintains optimal cardiac output and hemodynamic stability  0/39/9771 [id number] by Juvenal Pearl RN  Outcome: Progressing  Flowsheets (Taken 8/31/2022 0810)  Maintains optimal cardiac output and hemodynamic stability: Monitor blood pressure and heart rate     Problem: Skin/Tissue Integrity - Adult  Goal: Skin integrity remains intact  7/10/0640 2493 by Juvenal Pearl RN  Outcome: Progressing  Flowsheets (Taken 8/31/2022 0810)  Skin Integrity Remains Intact: Monitor for areas of redness and/or skin breakdown     Problem: Skin/Tissue Integrity - Adult  Goal: Incisions, wounds, or drain sites healing without S/S of infection  Outcome: Progressing  Flowsheets (Taken 8/31/2022 0810)  Incisions, Wounds, or Drain Sites Healing Without Sign and Symptoms of Infection: TWICE DAILY: Assess and document skin integrity     Problem: Skin/Tissue Integrity - Adult  Goal: Oral mucous membranes remain intact  Outcome: Progressing  Flowsheets (Taken 8/31/2022 0958)  Oral Mucous Membranes Remain Intact: Assess oral mucosa and hygiene practices     Problem: Gastrointestinal - Adult  Goal: Minimal or absence of nausea and vomiting  4/13/3324 3596 by Gulshan Srinivasan RN  Outcome: Progressing  Flowsheets (Taken 8/31/2022 0810)  Minimal or absence of nausea and vomiting: Administer IV fluids as ordered to ensure adequate hydration   Care plan reviewed with patient. Patient verbalizes understanding of the plan of care and contributes to goal setting.

## 2022-10-25 ENCOUNTER — OFFICE VISIT (OUTPATIENT)
Dept: CARDIOLOGY CLINIC | Age: 72
End: 2022-10-25
Payer: MEDICARE

## 2022-10-25 VITALS
DIASTOLIC BLOOD PRESSURE: 69 MMHG | SYSTOLIC BLOOD PRESSURE: 136 MMHG | HEART RATE: 80 BPM | WEIGHT: 148 LBS | BODY MASS INDEX: 26.22 KG/M2 | HEIGHT: 63 IN

## 2022-10-25 DIAGNOSIS — I10 PRIMARY HYPERTENSION: ICD-10-CM

## 2022-10-25 DIAGNOSIS — I25.708 CORONARY ARTERY DISEASE OF BYPASS GRAFT OF NATIVE HEART WITH STABLE ANGINA PECTORIS (HCC): Primary | ICD-10-CM

## 2022-10-25 DIAGNOSIS — E78.01 FAMILIAL HYPERCHOLESTEROLEMIA: ICD-10-CM

## 2022-10-25 PROCEDURE — 99214 OFFICE O/P EST MOD 30 MIN: CPT | Performed by: NUCLEAR MEDICINE

## 2022-10-25 PROCEDURE — 1123F ACP DISCUSS/DSCN MKR DOCD: CPT | Performed by: NUCLEAR MEDICINE

## 2022-10-25 RX ORDER — ISOSORBIDE MONONITRATE 30 MG/1
TABLET, EXTENDED RELEASE ORAL
Qty: 90 TABLET | Refills: 3 | Status: SHIPPED | OUTPATIENT
Start: 2022-10-25

## 2022-10-25 RX ORDER — PRASUGREL 10 MG/1
TABLET, FILM COATED ORAL
Qty: 90 TABLET | Refills: 3 | Status: SHIPPED | OUTPATIENT
Start: 2022-10-25

## 2022-10-25 RX ORDER — FUROSEMIDE 20 MG/1
TABLET ORAL
Qty: 90 TABLET | Refills: 3 | Status: SHIPPED | OUTPATIENT
Start: 2022-10-25

## 2022-10-25 RX ORDER — PROPAFENONE HYDROCHLORIDE 150 MG/1
TABLET, FILM COATED ORAL
Qty: 180 TABLET | Refills: 3 | Status: CANCELLED | OUTPATIENT
Start: 2022-10-25

## 2022-10-25 RX ORDER — ATORVASTATIN CALCIUM 80 MG/1
80 TABLET, FILM COATED ORAL NIGHTLY
Qty: 90 TABLET | Refills: 3 | Status: SHIPPED | OUTPATIENT
Start: 2022-10-25

## 2022-10-25 NOTE — PROGRESS NOTES
Patient here for check up. Patient complains of hands turning purple. Left ankle swelling. Skin turns purple.

## 2022-10-25 NOTE — PROGRESS NOTES
OCTAVIANO/ Keanu Rodriguez 80 HEART  [address] [facility]  [place] [facility] [address]  Dept: [phone]  Dept Fax: [phone]  Loc: [phone]    Visit Date: 10/25/2022    Sage Marley is a 67 y.o. female who presents todayfor:  Chief Complaint   Patient presents with    Check-Up    Coronary Artery Disease    Hypertension    Hyperlipidemia     Known CABG and stents at [facility]  Admitted for gall bladder issue  Some bluish discoloration of the fingers  Some leg edema  Some baseline chest pain   No changes in breathing  Bp is stable   No dizziness  No syncope  ON STATINS for hyperlipidemia  No issues with that   On Rythmol  !!!!    HPI:  HPI  Past Medical History:   Diagnosis Date    Asthma     Atrial fibrillation (HCC)     Blood clot associated with vein wall inflammation     CAD (coronary artery disease)     Cerebral artery occlusion with cerebral infarction (HCC)     CHF (congestive heart failure) (HCC)     COPD (chronic obstructive pulmonary disease) (HCC)     History of blood transfusion     Hx of blood clots     Hyperlipidemia     Hypertension     MI, old     Migraine     Pneumonia     PONV (postoperative nausea and vomiting)     Psychiatric problem       Past Surgical History:   Procedure Laterality Date    [id number] [facility] in [facility]    pericardial window    [place] WITH STENT PLACEMENT  2013     [facility]    CORONARY ARTERY BYPASS GRAFT      ENDOSCOPY, COLON, DIAGNOSTIC      ERCP N/A 8/29/2022    ERCP DILATION BALLOON performed by Nichole Donnelly MD at [facility]    ERCP  8/29/2022    ERCP STONE REMOVAL performed by Nichole Donnelly MD at [facility]    EYE SURGERY Bilateral 07/2020    cataract removal    HERNIA REPAIR      long ago Dr. Westbrook @ [address] [facility] (CERVIX STATUS UNKNOWN)      OVARY REMOVAL      TONSILLECTOMY       Family History   Problem Relation Age of Onset Diabetes Mother     Heart Disease Mother     High Blood Pressure Mother     Cancer Sister         luekemia  passed age 28    Heart Disease Father     Cancer Brother         lung passed age 37    Heart Disease Maternal Aunt         MI    Heart Disease Maternal Uncle         MI    Heart Disease Maternal Grandmother         MI    Heart Disease Maternal Grandfather         MI    Stroke Paternal Grandfather     Cancer Sister     Heart Disease Sister         atrial fib    Breast Cancer Sister 34    Cancer Brother         throat cancer     Heart Disease Brother         needs stents and has atrial fib    Diabetes Brother     Stroke Sister     Breast Cancer Paternal Aunt 34    Breast Cancer Paternal Aunt 54     Social History     Tobacco Use    Smoking status: Former     Years: 27.00     Types: Cigarettes     Quit date: 3/1/1991     Years since quittin.6    Smokeless tobacco: Never   Substance Use Topics    Alcohol use: Yes     Comment: occasional      Current Outpatient Medications   Medication Sig Dispense Refill    prasugrel (EFFIENT) 10 MG TABS TAKE 1 TABLET DAILY 90 tablet 3    isosorbide mononitrate (IMDUR) 30 MG extended release tablet TAKE 1 TABLET DAILY 90 tablet 3    metoprolol tartrate (LOPRESSOR) 25 MG tablet TAKE 1 TABLET TWICE A  tablet 3    furosemide (LASIX) 20 MG tablet TAKE 1 TABLET DAILY 90 tablet 1    propafenone (RYTHMOL) 150 MG tablet TAKE 1 TABLET TWICE A  tablet 1    UNABLE TO FIND Liquid cough medicine with codeine per patient.       budesonide-formoterol (SYMBICORT) 160-4.5 MCG/ACT AERO Inhale 2 puffs into the lungs 2 times daily Rinse mouth after use and use with spacer 30.6 g 1    Spacer/Aero-Holding Chambers (AEROCHAMBER MV) MISC 1 each by Does not apply route in the morning and at bedtime With Symbicort inhaler 1 each 0    ALBUTEROL IN Inhale into the lungs      tiotropium (SPIRIVA RESPIMAT) 2.5 MCG/ACT AERS inhaler Inhale 2 puffs into the lungs daily 3 each 3    atorvastatin (LIPITOR) 80 MG tablet Take 1 tablet by mouth nightly 90 tablet 3    ondansetron (ZOFRAN-ODT) 4 MG disintegrating tablet Take 4 mg by mouth 4 times daily as needed for Nausea or Vomiting      aspirin 81 MG tablet Take 81 mg by mouth daily      nitroGLYCERIN (NITROSTAT) 0.4 MG SL tablet Place 1 tablet under the tongue every 5 minutes as needed for Chest pain. (Patient not taking: No sig reported) 25 tablet 0     No current facility-administered medications for this visit. Allergies   Allergen Reactions    Celebrex [Celecoxib] Anaphylaxis     Unable to breath Turns red all over    Contrast [Iodides] Hives    Statins      Will obtain lipid panel and re-evaluate.    Takes lipitor at home    Naprosyn [Naproxen] Nausea And Vomiting    Shrimp Flavor Nausea And Vomiting     Health Maintenance   Topic Date Due    Pneumococcal 65+ years Vaccine (1 - PCV) Never done    Depression Screen  Never done    Hepatitis C screen  Never done    DTaP/Tdap/Td vaccine (1 - Tdap) Never done    Colorectal Cancer Screen  Never done    Shingles vaccine (1 of 2) Never done    DEXA (modify frequency per FRAX score)  Never done    Lipids  06/29/2019    Annual Wellness Visit (AWV)  Never done    COVID-19 Vaccine (3 - Booster for Moderna series) 05/21/2021    Flu vaccine (1) Never done    Breast cancer screen  04/19/2023    Hepatitis A vaccine  Aged Out    Hib vaccine  Aged Out    Meningococcal (ACWY) vaccine  Aged Out       Subjective:  Review of Systems  General:   No fever, no chills, some fatigue or weight loss  Pulmonary:    some dyspnea, no wheezing  Cardiac:    Denies recent chest pain,   GI:     No nausea or vomiting, no abdominal pain  Neuro:     No dizziness or light headedness,   Musculoskeletal:  No recent active issues  Extremities:   some edema, no obvious claudication     Objective:  Physical Exam  /69   Pulse 80   Ht 5' 3\" (1.6 m)   Wt 148 lb (67.1 kg)   BMI 26.22 kg/m²   General:   Well developed, well nourished  Lungs:    Clear to auscultation  Heart:    Normal S1 S2, Slight murmur. no rubs, no gallops  Abdomen:   Soft, non tender, no organomegalies, positive bowel sounds  Extremities:   mild edema, no cyanosis, good peripheral pulses  Neurological:   Awake, alert, oriented. No obvious focal deficits  Musculoskelatal:  No obvious deformities    Assessment:      Diagnosis Orders   1. Coronary artery disease of bypass graft of native heart with stable angina pectoris ([address]        2. Primary hypertension        3. Familial hypercholesterolemia        As above  Cardiac seems stable   Concerning being on Rythmol  Risk for SCD     Plan:  No follow-ups on file. Discussed  Consider stopping Rythmol  Consider amiodarone  Continue risk factor modification and medical management  Thank you for allowing me to participate in the care of your patient. Please don't hesitate to contact me regarding any further issues related to the patient care    Orders Placed:  No orders of the defined types were placed in this encounter. Medications Prescribed:  No orders of the defined types were placed in this encounter. Discussed use, benefit, and side effects of prescribed medications. All patient questions answered. Pt voicedunderstanding. Instructed to continue current medications, diet and exercise. Continue risk factor modification and medical management. Patient agreed with treatment plan. Follow up as directed.     Electronically signedby Liza Shaw MD on 10/25/2022 at 8:57 AM

## 2022-10-31 ENCOUNTER — OFFICE VISIT (OUTPATIENT)
Dept: PULMONOLOGY | Age: 72
End: 2022-10-31
Payer: MEDICARE

## 2022-10-31 VITALS
WEIGHT: 148.2 LBS | HEIGHT: 63 IN | BODY MASS INDEX: 26.26 KG/M2 | OXYGEN SATURATION: 96 % | SYSTOLIC BLOOD PRESSURE: 122 MMHG | HEART RATE: 95 BPM | DIASTOLIC BLOOD PRESSURE: 74 MMHG

## 2022-10-31 DIAGNOSIS — R50.9 LOW GRADE FEVER: ICD-10-CM

## 2022-10-31 DIAGNOSIS — J44.1 COPD EXACERBATION (HCC): Primary | ICD-10-CM

## 2022-10-31 PROCEDURE — 1123F ACP DISCUSS/DSCN MKR DOCD: CPT | Performed by: NURSE PRACTITIONER

## 2022-10-31 PROCEDURE — 3078F DIAST BP <80 MM HG: CPT | Performed by: NURSE PRACTITIONER

## 2022-10-31 PROCEDURE — 99214 OFFICE O/P EST MOD 30 MIN: CPT | Performed by: NURSE PRACTITIONER

## 2022-10-31 PROCEDURE — 3074F SYST BP LT 130 MM HG: CPT | Performed by: NURSE PRACTITIONER

## 2022-10-31 RX ORDER — PROMETHAZINE HYDROCHLORIDE AND CODEINE PHOSPHATE 6.25; 1 MG/5ML; MG/5ML
5 SYRUP ORAL EVERY 4 HOURS PRN
Qty: 140 ML | Refills: 0 | Status: SHIPPED | OUTPATIENT
Start: 2022-10-31 | End: 2022-11-02 | Stop reason: SDUPTHER

## 2022-10-31 RX ORDER — AZITHROMYCIN 500 MG/1
500 TABLET, FILM COATED ORAL DAILY
Qty: 1 PACKET | Refills: 0 | Status: SHIPPED | OUTPATIENT
Start: 2022-10-31 | End: 2022-11-03

## 2022-10-31 RX ORDER — BENZONATATE 100 MG/1
100-200 CAPSULE ORAL 3 TIMES DAILY PRN
Qty: 60 CAPSULE | Refills: 0 | Status: SHIPPED | OUTPATIENT
Start: 2022-10-31 | End: 2022-11-07

## 2022-10-31 RX ORDER — PREDNISONE 50 MG/1
50 TABLET ORAL DAILY
Qty: 5 TABLET | Refills: 0 | Status: SHIPPED | OUTPATIENT
Start: 2022-10-31 | End: 2022-11-05

## 2022-10-31 ASSESSMENT — ENCOUNTER SYMPTOMS
NAUSEA: 0
ABDOMINAL PAIN: 0
CHEST TIGHTNESS: 1
COUGH: 1
VOMITING: 0
WHEEZING: 1
EYES NEGATIVE: 1
SHORTNESS OF BREATH: 1
DIARRHEA: 0

## 2022-10-31 NOTE — PROGRESS NOTES
[facility]     Patient: Elizabeth Chavez, 67 y.o.   : 1950  10/31/2022    Pt of Dr. Minerva Chahal     Chief Complaint   Patient presents with    Follow-up     4 month chronic bronchitis follow up, no testing. CORBY Shields is here for 4 months follow up for COPD/ chronic bronchitis   C/o feeling Exhausted/ fatigue,. Low grade fever 100.0 Friday and Saturday  , increased cough- bringing up clear phlegm, chest is sore from coughing   Symptoms started 2-3 days ago. Trouble sleeping d/t cough     Home COVID test from [facility] was negative. Using Symbicort BID, Spiriva respimat , albuterol HFA   Has not taken any OTC meds for sx   Had nebulizer machine , states DME was requesting money for her neb machine so she returned it. Current symptoms:   Is not on home O2   UTD with flu vaccine Yes  UTD with pneumonia vaccine Yes  UTD COVID vaccine no , had thee first 2 shots, never got boosted. SOCIAL HISTORY:  Social History     Tobacco Use    Smoking status: Former     Years: .00     Types: Cigarettes     Quit date: 3/1/1991     Years since quittin.6    Smokeless tobacco: Never   Vaping Use    Vaping Use: Never used   Substance Use Topics    Alcohol use: Yes     Comment: occasional    Drug use: No         CURRENT MEDICATIONS:  Current Outpatient Medications   Medication Sig Dispense Refill    predniSONE (DELTASONE) 50 MG tablet Take 1 tablet by mouth daily for 5 days 5 tablet 0    azithromycin (ZITHROMAX) 500 MG tablet Take 1 tablet by mouth daily for 3 days 1 packet 0    promethazine-codeine (PHENERGAN WITH CODEINE) 6.25-10 MG/5ML syrup Take 5 mLs by mouth every 4 hours as needed for Cough for up to 7 days.  140 mL 0    benzonatate (TESSALON) 100 MG capsule Take 1-2 capsules by mouth 3 times daily as needed for Cough 60 capsule 0    atorvastatin (LIPITOR) 80 MG tablet Take 1 tablet by mouth nightly 90 tablet 3    furosemide (LASIX) 20 MG tablet TAKE 1 TABLET DAILY 90 tablet 3    isosorbide mononitrate (IMDUR) 30 MG extended release tablet TAKE 1 TABLET DAILY 90 tablet 3    metoprolol tartrate (LOPRESSOR) 25 MG tablet TAKE 1 TABLET TWICE A  tablet 3    prasugrel (EFFIENT) 10 MG TABS TAKE 1 TABLET DAILY 90 tablet 3    UNABLE TO FIND Liquid cough medicine with codeine per patient. budesonide-formoterol (SYMBICORT) 160-4.5 MCG/ACT AERO Inhale 2 puffs into the lungs 2 times daily Rinse mouth after use and use with spacer 30.6 g 1    Spacer/Aero-Holding Chambers (AEROCHAMBER MV) MISC 1 each by Does not apply route in the morning and at bedtime With Symbicort inhaler 1 each 0    ALBUTEROL IN Inhale into the lungs      tiotropium (SPIRIVA RESPIMAT) 2.5 MCG/ACT AERS inhaler Inhale 2 puffs into the lungs daily 3 each 3    ondansetron (ZOFRAN-ODT) 4 MG disintegrating tablet Take 4 mg by mouth 4 times daily as needed for Nausea or Vomiting      aspirin 81 MG tablet Take 81 mg by mouth daily      nitroGLYCERIN (NITROSTAT) 0.4 MG SL tablet Place 1 tablet under the tongue every 5 minutes as needed for Chest pain. (Patient not taking: Reported on 10/31/2022) 25 tablet 0     No current facility-administered medications for this visit. Joshua ELLISON   Review of Systems   Constitutional:  Positive for fatigue. Negative for activity change, appetite change, chills, fever and unexpected weight change. HENT: Negative. Eyes: Negative. Respiratory:  Positive for cough, chest tightness, shortness of breath and wheezing. Cardiovascular:  Negative for chest pain, palpitations and leg swelling. Gastrointestinal:  Negative for abdominal pain, diarrhea, nausea and vomiting. Genitourinary: Negative. Musculoskeletal: Negative. Skin: Negative. Neurological: Negative. Hematological: Negative. Psychiatric/Behavioral:  Positive for sleep disturbance.        Physical exam   /74 (Site: Left Upper Arm, Position: Sitting, Cuff Size: Medium Adult)   Pulse 95   Ht 5' 3\" (1.6 m)   Wt 148 lb 3.2 oz (67.2 kg)   SpO2 96%   BMI 26.25 kg/m²       Wt Readings from Last 3 Encounters:   10/31/22 148 lb 3.2 oz (67.2 kg)   10/25/22 148 lb (67.1 kg)   08/31/22 142 lb (64.4 kg)     Physical Exam  Vitals and nursing note reviewed. Constitutional:       General: She is not in acute distress. Appearance: She is overweight. She is ill-appearing (acutely ill appearing). Interventions: Face mask in place. Comments: Wearing surgical face mask    HENT:      Head:      Comments: Crackling voice      Mouth/Throat:      Lips: Pink. Mouth: Mucous membranes are moist.      Pharynx: Oropharynx is clear. No oropharyngeal exudate or posterior oropharyngeal erythema. Eyes:      Conjunctiva/sclera: Conjunctivae normal.   Neck:      Vascular: No JVD. Cardiovascular:      Rate and Rhythm: Normal rate and regular rhythm. Heart sounds: No murmur heard. No friction rub. Pulmonary:      Effort: Pulmonary effort is normal. No accessory muscle usage or respiratory distress. Breath sounds: Decreased air movement present. Examination of the right-upper field reveals wheezing. Examination of the left-upper field reveals wheezing. Examination of the right-middle field reveals wheezing. Examination of the left-middle field reveals wheezing. Examination of the right-lower field reveals wheezing. Examination of the left-lower field reveals wheezing. Wheezing present. No rhonchi or rales. Comments: Dry cough throughout exam   Chest:      Chest wall: No tenderness. Musculoskeletal:      Right lower leg: No edema. Left lower leg: No edema. Skin:     General: Skin is warm and dry. Capillary Refill: Capillary refill takes less than 2 seconds. Nails: There is no clubbing. Neurological:      Mental Status: She is alert and oriented to person, place, and time.    Psychiatric:         Mood and Affect: Mood normal.         Behavior: Behavior normal.         Thought Content: Thought content normal.         Judgment: Judgment normal.        Test results   Lung Nodule Screening     [] Qualifies    [x]Does not qualify   [] Declined    [] Completed     Assessment       ICD-10-CM    1. COPD exacerbation (HCC)  J44.1 predniSONE (DELTASONE) 50 MG tablet     azithromycin (ZITHROMAX) 500 MG tablet     Rapid Influenza A/B Antigens     COVID-19     promethazine-codeine (PHENERGAN WITH CODEINE) 6.25-10 MG/5ML syrup     benzonatate (TESSALON) 100 MG capsule      2. Low grade fever  R50.9 Rapid Influenza A/B Antigens     COVID-19            Plan    -recommend flu/ COVID swab- orders given to patient   -[place] for full 5 days, or until results of swab are back   -continue to use face mask   -continue symbicort/ spiriva inhalers.  Along with PRN albuterol  -zithromax 500 mg PO daily x 3 days   -prednisone 50 mg PO daily x 5 days  -tessalon capsules prn  -phenergan w/ codeine for up to 7 days     (Please note that portions of this note may have been with a voice recognition program.  Efforts were made to edit the dictation but occasionally words are mis-transcribed )     Will see Fatmata Jones in: 3 months  billing based on medical decision making   Electronically signed by PEDRO LUIS Wright CNP on 10/31/2022 at 3:58 PM

## 2022-11-02 ENCOUNTER — TELEPHONE (OUTPATIENT)
Dept: PULMONOLOGY | Age: 72
End: 2022-11-02

## 2022-11-02 DIAGNOSIS — J44.1 COPD EXACERBATION (HCC): ICD-10-CM

## 2022-11-02 RX ORDER — PROMETHAZINE HYDROCHLORIDE AND CODEINE PHOSPHATE 6.25; 1 MG/5ML; MG/5ML
5 SYRUP ORAL EVERY 4 HOURS PRN
Qty: 140 ML | Refills: 0 | Status: SHIPPED | OUTPATIENT
Start: 2022-11-02 | End: 2022-11-09

## 2022-11-02 RX ORDER — PROMETHAZINE HYDROCHLORIDE AND CODEINE PHOSPHATE 6.25; 1 MG/5ML; MG/5ML
5 SYRUP ORAL EVERY 4 HOURS PRN
Qty: 140 ML | Refills: 0 | Status: SHIPPED | OUTPATIENT
Start: 2022-11-02 | End: 2022-11-02 | Stop reason: SDUPTHER

## 2022-11-02 NOTE — TELEPHONE ENCOUNTER
Pt called and [facility] doesn't carry the [place] she said. Is asking that it is sent to [facility]. Loaded it to chart.

## 2022-11-28 NOTE — TELEPHONE ENCOUNTER
Received refill request for BENZONATATE 100 MG CAPSULE. Medication was last ordered by Loretta. Medication was last ordered on 10/31/22 with 0 refills. Patient was last seen in the office 10/31/22. Patient has a scheduled follow up 2/6/23. Medication needs to be sent to [id number] [facility], [address] [facility].

## 2022-11-29 RX ORDER — BENZONATATE 100 MG/1
CAPSULE ORAL
Qty: 90 CAPSULE | Refills: 1 | Status: SHIPPED | OUTPATIENT
Start: 2022-11-29

## 2023-02-06 ENCOUNTER — OFFICE VISIT (OUTPATIENT)
Dept: PULMONOLOGY | Age: 73
End: 2023-02-06
Payer: MEDICARE

## 2023-02-06 VITALS
OXYGEN SATURATION: 98 % | WEIGHT: 152.2 LBS | HEIGHT: 63 IN | BODY MASS INDEX: 26.97 KG/M2 | HEART RATE: 71 BPM | SYSTOLIC BLOOD PRESSURE: 118 MMHG | TEMPERATURE: 97.8 F | DIASTOLIC BLOOD PRESSURE: 66 MMHG

## 2023-02-06 DIAGNOSIS — J44.9 MODERATE COPD (CHRONIC OBSTRUCTIVE PULMONARY DISEASE) (HCC): ICD-10-CM

## 2023-02-06 DIAGNOSIS — J43.2 CENTRILOBULAR EMPHYSEMA (HCC): ICD-10-CM

## 2023-02-06 DIAGNOSIS — J44.1 COPD EXACERBATION (HCC): Primary | ICD-10-CM

## 2023-02-06 PROCEDURE — 94618 PULMONARY STRESS TESTING: CPT | Performed by: NURSE PRACTITIONER

## 2023-02-06 PROCEDURE — 1123F ACP DISCUSS/DSCN MKR DOCD: CPT | Performed by: NURSE PRACTITIONER

## 2023-02-06 PROCEDURE — 3078F DIAST BP <80 MM HG: CPT | Performed by: NURSE PRACTITIONER

## 2023-02-06 PROCEDURE — 3074F SYST BP LT 130 MM HG: CPT | Performed by: NURSE PRACTITIONER

## 2023-02-06 PROCEDURE — 99215 OFFICE O/P EST HI 40 MIN: CPT | Performed by: NURSE PRACTITIONER

## 2023-02-06 RX ORDER — BENZONATATE 100 MG/1
CAPSULE ORAL
Qty: 90 CAPSULE | Refills: 3 | Status: SHIPPED | OUTPATIENT
Start: 2023-02-06

## 2023-02-06 RX ORDER — FLUTICASONE FUROATE, UMECLIDINIUM BROMIDE AND VILANTEROL TRIFENATATE 100; 62.5; 25 UG/1; UG/1; UG/1
1 POWDER RESPIRATORY (INHALATION) DAILY
Qty: 30 EACH | Refills: 11 | Status: SHIPPED | OUTPATIENT
Start: 2023-02-06

## 2023-02-06 RX ORDER — PROMETHAZINE HYDROCHLORIDE AND CODEINE PHOSPHATE 6.25; 1 MG/5ML; MG/5ML
5 SYRUP ORAL 4 TIMES DAILY PRN
Qty: 280 ML | Refills: 0 | Status: SHIPPED | OUTPATIENT
Start: 2023-02-06 | End: 2023-02-20

## 2023-02-06 ASSESSMENT — ENCOUNTER SYMPTOMS
EYES NEGATIVE: 1
ABDOMINAL PAIN: 0
DIARRHEA: 0
COUGH: 1
VOMITING: 0
WHEEZING: 1
BACK PAIN: 1
SHORTNESS OF BREATH: 1
NAUSEA: 0
CHEST TIGHTNESS: 1

## 2023-02-06 NOTE — PROGRESS NOTES
[facility]     Patient: Myron Drummond, 67 y.o.   : 1950  2023    Pt of Dr. Leana Villa   Patient presents with    Follow-up     3 month chronic bronchitis follow up, no testing. CORBY Torres is here for 3 months follow up for COPD    For acute COPD exacerbation last visit 10/13/2022 with oral prednisone 50 mg p.o. daily x5 days, Zithromax 500 mg tablet x3 days. Rapid influenza AB and COVID antigens were negative  Prescribe as needed Phenergan with codeine cough syrup and Tessalon capsules    Current symptoms: got better, now \"worse again\" symptoms -   Left upper back pain -started 1 month ago. Worse with certain movements. Better with heating pad. At it's worse pain gets to 8/10. Taking Tylenol with no benefit   Not able take Ibuprofen due to being on prasgrel and baby ASA     No history of pneumonthorax   History of \" blood clot behind heart\" denies h/o PE/DVT   History of gallstones : hospitalized in Aug 2022     Last pft   Patient is not currently on home oxygen therapy:     SOCIAL HISTORY:  Social History     Tobacco Use    Smoking status: Former     Years: 27.00     Types: Cigarettes     Quit date: 3/1/1991     Years since quittin.9    Smokeless tobacco: Never   Vaping Use    Vaping Use: Never used   Substance Use Topics    Alcohol use: Yes     Comment: occasional    Drug use: No         CURRENT MEDICATIONS:  Current Outpatient Medications   Medication Sig Dispense Refill    fluticasone-umeclidin-vilant (TRELEGY ELLIPTA) 100-62.5-25 MCG/ACT AEPB inhaler Inhale 1 puff into the lungs daily Rinse mouth after use 30 each 11    benzonatate (TESSALON) 100 MG capsule TAKE 2 CAPSULES BY MOUTH 3 TIMES DAILY AS NEEDED FOR COUGH 90 capsule 3    promethazine-codeine (PHENERGAN WITH CODEINE) 6.25-10 MG/5ML syrup Take 5 mLs by mouth 4 times daily as needed for Cough for up to 14 days.  Max Daily Amount: 20 mLs 280 mL 0 atorvastatin (LIPITOR) 80 MG tablet Take 1 tablet by mouth nightly 90 tablet 3    furosemide (LASIX) 20 MG tablet TAKE 1 TABLET DAILY 90 tablet 3    isosorbide mononitrate (IMDUR) 30 MG extended release tablet TAKE 1 TABLET DAILY 90 tablet 3    metoprolol tartrate (LOPRESSOR) 25 MG tablet TAKE 1 TABLET TWICE A  tablet 3    prasugrel (EFFIENT) 10 MG TABS TAKE 1 TABLET DAILY 90 tablet 3    UNABLE TO FIND Liquid cough medicine with codeine per patient. Spacer/Aero-Holding Chambers (AEROCHAMBER MV) MISC 1 each by Does not apply route in the morning and at bedtime With Symbicort inhaler 1 each 0    ALBUTEROL IN Inhale into the lungs      ondansetron (ZOFRAN-ODT) 4 MG disintegrating tablet Take 4 mg by mouth 4 times daily as needed for Nausea or Vomiting      aspirin 81 MG tablet Take 81 mg by mouth daily      nitroGLYCERIN (NITROSTAT) 0.4 MG SL tablet Place 1 tablet under the tongue every 5 minutes as needed for Chest pain. (Patient not taking: Reported on 2/6/2023) 25 tablet 0     No current facility-administered medications for this visit. Ashely ELLISON   Review of Systems   Constitutional:  Positive for fatigue. Negative for activity change, appetite change, chills, fever and unexpected weight change. HENT: Negative. Eyes: Negative. Respiratory:  Positive for cough, chest tightness, shortness of breath and wheezing. Cardiovascular:  Positive for chest pain. Negative for palpitations and leg swelling. Gastrointestinal:  Negative for abdominal pain, diarrhea, nausea and vomiting. Genitourinary: Negative. Musculoskeletal:  Positive for arthralgias and back pain. Skin: Negative. Neurological: Negative. Hematological: Negative. Psychiatric/Behavioral:  Positive for sleep disturbance.        Physical exam   /66 (Site: Right Upper Arm, Position: Sitting, Cuff Size: Medium Adult)   Pulse 71   Temp 97.8 °F (36.6 °C) (Oral)   Ht 5' 3\" (1.6 m)   Wt 152 lb 3.2 oz (69 kg)   SpO2 98%   BMI 26.96 kg/m²       Wt Readings from Last 3 Encounters:   02/06/23 152 lb 3.2 oz (69 kg)   10/31/22 148 lb 3.2 oz (67.2 kg)   10/25/22 148 lb (67.1 kg)     Physical Exam  Vitals and nursing note reviewed. Constitutional:       General: She is not in acute distress. Appearance: She is well-developed and overweight. HENT:      Mouth/Throat:      Lips: Pink. Mouth: Mucous membranes are moist.      Pharynx: Oropharynx is clear. No oropharyngeal exudate or posterior oropharyngeal erythema. Eyes:      Conjunctiva/sclera: Conjunctivae normal.   Neck:      Vascular: No JVD. Cardiovascular:      Rate and Rhythm: Normal rate and regular rhythm. Heart sounds: No murmur heard. No friction rub. Pulmonary:      Effort: Pulmonary effort is normal. No accessory muscle usage or respiratory distress. Breath sounds: Normal breath sounds. No wheezing, rhonchi or rales. Chest:      Chest wall: No tenderness. Musculoskeletal:         General: Tenderness (left flank tenderness) present. Right lower leg: No edema. Left lower leg: No edema. Skin:     General: Skin is warm and dry. Capillary Refill: Capillary refill takes less than 2 seconds. Nails: There is no clubbing. Neurological:      Mental Status: She is alert and oriented to person, place, and time. Psychiatric:         Mood and Affect: Mood normal.         Behavior: Behavior normal.         Thought Content: Thought content normal.         Judgment: Judgment normal.        Test results   Lung Nodule Screening     [] Qualifies    [x]Does not qualify   [] Declined    [] Completed     CXR 8/26/2023  FINDINGS:    Lines/tubes: None. Heart/mediastinum: The heart size and mediastinal contours are stable. The pulmonary vascularity is unremarkable. Lungs: Elevation of the left hemidiaphragm is stable. No focal consolidation, pleural effusion, or pneumothorax is observed.        Bones: Diffuse osteopenia is present. The visualized skeletal structures appear intact. Impression   No acute intrathoracic process. **This report has been created using voice recognition software. It may contain minor errors which are inherent in voice recognition technology. **       Final report electronically signed by Dr Santana Dates on 8/26/2022 9:15 AM     Ct abd Leo Amato 7/22/22  Findings: The lung bases are clear. There are multiple tiny gallstones within the gallbladder. The common bile    duct measures up to 7 mm in diameter with interval increased since the    prior study. There is a new 2 mm calculus within the common bile duct just    proximal to the ampulla. Multiple splenic granulomas. Unremarkable liver and bilateral adrenal    glands. Moderate pancreatic volume loss with partial fatty replacement of    parenchyma. Numerous peripelvic cysts within the kidneys. No bowel obstruction, pneumoperitoneum, or pneumatosis. Status post hysterectomy. Unremarkable urinary bladder. The appendix is    not definitively seen. There are no secondary findings to suggest    appendicitis. There is an hemangioma within the L3 vertebral body. No acute fracture or    suspicious bone lesion. Impression   1. There is evidence of cholelithiasis and choledocholithiasis. Borderline    dilatation of the common bile duct with interval increase. 2. Right-sided posterior triangle hernia containing fat. This document has been electronically signed by: Leti Mckeon MD on    08/24/2022 06:38 PM       All CTs at this facility use dose modulation techniques and iterative    reconstructions, and/or weight-based dosing   when appropriate to reduce radiation to a low as reasonably achievab     PFT 1/10/2022          Six Minute Walk Test  Aron Marquez 1950    Six minute walk test done in my office today by my medical assistant. Irene's oxygen saturation at rest on room air was 95%.  Her oxygen saturation dropped to 95% on room air with exertion after walking for 648 feet and within 6 minutes. Resting Dyspnea/Casie score was 3 / 10  and 3 / 10  upon completion of the walk. Resting heart rate was  72 bpm and 81 bpm upon completing the walk. Does not require oxygen       Assessment       ICD-10-CM    1. COPD exacerbation ([facility])  J44.1 6 Minute Walk Test     benzonatate (TESSALON) 100 MG capsule     promethazine-codeine (PHENERGAN WITH CODEINE) 6.25-10 MG/5ML syrup     6 Minute Walk Test      2. Moderate COPD (chronic obstructive pulmonary disease) ([facility])  J44.9 fluticasone-umeclidin-vilant (TRELEGY ELLIPTA) 100-62.5-25 MCG/ACT AEPB inhaler      3. Centrilobular emphysema ([facility])  J43.2           Plan    - 6 min walk completed. Pt requested to stop walk at 5 min due to fatigue. No hypoxia within 5 min walk     -COPD Symptoms currently sub- optimally controlled , having a hard time remembering her inhalers every single day, other days she does remember her Symbicort daily but forgets the second dose. Recommend stopping Symbicort and Spiriva   Start Trelegy 100/62.5/25 mcg, inhale 1 puff once daily. Instructed to rinse mouth well after use  I am hoping that the once a day dosing is going to help with her compliance and overall help with symptom control  Continue as needed albuterol  Promethazine/codeine cough syrup as needed for acute bronchitis cough symptoms uses minimal as possible to control symptoms  As needed Tessalon  -avoid ill contacts  -keep UTD on recommended vaccinations  -Recommend heating pad, Tylenol, over-the-counter lidocaine patches for left back pain.   If symptoms persist or get any worse call PCP for evaluation    (Please note that portions of this note may have been with a voice recognition program.  Efforts were made to edit the dictation but occasionally words are mis-transcribed )     Will see Eleni Rondon in: 3 months  billing based on time: total time on encounter 37 min   Electronically signed by PEDRO LUIS Fisher CNP on 2/6/2023 at 4:08 PM

## 2023-03-13 ENCOUNTER — TELEPHONE (OUTPATIENT)
Dept: PULMONOLOGY | Age: 73
End: 2023-03-13

## 2023-03-13 NOTE — TELEPHONE ENCOUNTER
Patient called in and stated that she had been in \" unbearable pain Thursday night, and yesterday it was bearable but just tolerating it\" regarding her kidneys . She stated you had mentioned cysts on her kidneys to her, and also that she has bleeding. She stated she wondered if you would be able to place a referral for her to be seen for her kidneys. I informed the patient that I would advise her to seek emergent care with severe pain like she stated, she voiced understanding but said she wondered if you would be able to make a referral because she likes you. I let her know I understood where she was coming from but that if she is having severe symptoms, it may be a day or so before josiah would be able to place a referral and then also some time before she is seen for a new patient appt for her kidneys. I restated that is why I would suggest urgent or emergency care in the mean time, but  I would route this message along to you. Pt asked if she should go to urgent care or emergency care. I let her know if she went to urgent care that they may end up suggesting emergency care just based on the dr. Brown Lacks , but it would be her choice. I informed the patient that you were out of office and would not see this right away, but that she could call her PCP for a refferal. Pt verbalized understanding. Please advise, thank you !

## 2023-03-15 NOTE — TELEPHONE ENCOUNTER
Called patient today to relay Loretta's message, had to leave voicemail for patient to call our office back .

## 2023-05-08 ENCOUNTER — TELEPHONE (OUTPATIENT)
Dept: UROLOGY | Age: 73
End: 2023-05-08

## 2023-05-08 RX ORDER — METHYLPREDNISOLONE 32 MG/1
32 TABLET ORAL SEE ADMIN INSTRUCTIONS
Qty: 2 TABLET | Refills: 0 | OUTPATIENT
Start: 2023-05-08

## 2023-05-08 NOTE — TELEPHONE ENCOUNTER
CAN YOU PLEASE CALL IN THE PRE MED FOR PATIENT (MEDROL 32 MG, TAKE 12 HOURS PRIOR TO SCAN, THEN TAKE 2 HOURS PRIOR TO SCAN). THIS WAS APPROVED BY DR Ace Muhammad

## 2023-05-08 NOTE — TELEPHONE ENCOUNTER
Attempted to call the patient to verify which pharmacy she uses so prescription can be sent to the pharmacy. Voicemail left to return the call to the office.

## 2023-05-08 NOTE — TELEPHONE ENCOUNTER
Patient voiced understanding to take medrol 32 mg 1 tablet 12 hours and 2 hours prior to the scan. Verbal order given to Taktio.

## 2023-06-05 ENCOUNTER — OFFICE VISIT (OUTPATIENT)
Dept: PULMONOLOGY | Age: 73
End: 2023-06-05
Payer: MEDICARE

## 2023-06-05 VITALS
TEMPERATURE: 98 F | HEIGHT: 63 IN | HEART RATE: 76 BPM | WEIGHT: 146.4 LBS | BODY MASS INDEX: 25.94 KG/M2 | SYSTOLIC BLOOD PRESSURE: 118 MMHG | DIASTOLIC BLOOD PRESSURE: 62 MMHG | OXYGEN SATURATION: 97 %

## 2023-06-05 DIAGNOSIS — Z87.891 EX-SMOKER: ICD-10-CM

## 2023-06-05 DIAGNOSIS — J44.9 MODERATE COPD (CHRONIC OBSTRUCTIVE PULMONARY DISEASE) (HCC): ICD-10-CM

## 2023-06-05 DIAGNOSIS — J43.2 CENTRILOBULAR EMPHYSEMA (HCC): Primary | ICD-10-CM

## 2023-06-05 PROCEDURE — 99213 OFFICE O/P EST LOW 20 MIN: CPT | Performed by: NURSE PRACTITIONER

## 2023-06-05 PROCEDURE — 3078F DIAST BP <80 MM HG: CPT | Performed by: NURSE PRACTITIONER

## 2023-06-05 PROCEDURE — 3074F SYST BP LT 130 MM HG: CPT | Performed by: NURSE PRACTITIONER

## 2023-06-05 PROCEDURE — 1123F ACP DISCUSS/DSCN MKR DOCD: CPT | Performed by: NURSE PRACTITIONER

## 2023-06-05 RX ORDER — FLUTICASONE FUROATE, UMECLIDINIUM BROMIDE AND VILANTEROL TRIFENATATE 100; 62.5; 25 UG/1; UG/1; UG/1
1 POWDER RESPIRATORY (INHALATION) DAILY
Qty: 30 EACH | Refills: 11 | Status: SHIPPED | OUTPATIENT
Start: 2023-06-05 | End: 2023-06-05 | Stop reason: SDUPTHER

## 2023-06-05 RX ORDER — FLUTICASONE FUROATE, UMECLIDINIUM BROMIDE AND VILANTEROL TRIFENATATE 100; 62.5; 25 UG/1; UG/1; UG/1
1 POWDER RESPIRATORY (INHALATION) DAILY
Qty: 3 EACH | Refills: 3 | Status: SHIPPED | OUTPATIENT
Start: 2023-06-05

## 2023-06-05 ASSESSMENT — ENCOUNTER SYMPTOMS
CHEST TIGHTNESS: 0
WHEEZING: 1
ABDOMINAL PAIN: 0
VOMITING: 0
COUGH: 0
EYES NEGATIVE: 1
SHORTNESS OF BREATH: 0
NAUSEA: 0
DIARRHEA: 0

## 2023-06-05 NOTE — PROGRESS NOTES
have been with a voice recognition program.  Efforts were made to edit the dictation but occasionally words are mis-transcribed )     Will see Jose Whiteside in: 1 year  billing based on time: total time on encounter 20 min   Electronically signed by PEDRO LUIS Mishra CNP on 6/5/2023 at 4:17 PM

## 2023-06-06 ENCOUNTER — TELEPHONE (OUTPATIENT)
Dept: CARDIOLOGY CLINIC | Age: 73
End: 2023-06-06

## 2023-06-06 NOTE — TELEPHONE ENCOUNTER
Patient has been scheduled for echo three times - 4/27/23, 5/05/23 and 6/05/23, and has now no-showed echo three times in a row. If patient calls to reschedule echo, she may do so, order will need reprinted from 4/13/23 encounter date.

## 2023-07-05 ENCOUNTER — HOSPITAL ENCOUNTER (OUTPATIENT)
Dept: MAMMOGRAPHY | Age: 73
Discharge: HOME | End: 2023-07-05
Payer: MEDICARE

## 2023-07-05 DIAGNOSIS — Z12.31 VISIT FOR SCREENING MAMMOGRAM: ICD-10-CM

## 2023-07-05 PROCEDURE — 77063 BREAST TOMOSYNTHESIS BI: CPT

## 2023-09-05 ENCOUNTER — OFFICE VISIT (OUTPATIENT)
Dept: CARDIOLOGY CLINIC | Age: 73
End: 2023-09-05
Payer: MEDICARE

## 2023-09-05 VITALS — HEART RATE: 91 BPM | SYSTOLIC BLOOD PRESSURE: 129 MMHG | DIASTOLIC BLOOD PRESSURE: 82 MMHG

## 2023-09-05 DIAGNOSIS — I10 PRIMARY HYPERTENSION: ICD-10-CM

## 2023-09-05 DIAGNOSIS — I48.0 PAROXYSMAL ATRIAL FIBRILLATION (HCC): ICD-10-CM

## 2023-09-05 DIAGNOSIS — I25.10 CORONARY ARTERY DISEASE INVOLVING NATIVE CORONARY ARTERY OF NATIVE HEART WITHOUT ANGINA PECTORIS: Primary | ICD-10-CM

## 2023-09-05 DIAGNOSIS — E78.01 FAMILIAL HYPERCHOLESTEROLEMIA: ICD-10-CM

## 2023-09-05 PROCEDURE — 3074F SYST BP LT 130 MM HG: CPT | Performed by: NUCLEAR MEDICINE

## 2023-09-05 PROCEDURE — 3079F DIAST BP 80-89 MM HG: CPT | Performed by: NUCLEAR MEDICINE

## 2023-09-05 PROCEDURE — 1123F ACP DISCUSS/DSCN MKR DOCD: CPT | Performed by: NUCLEAR MEDICINE

## 2023-09-05 PROCEDURE — 93000 ELECTROCARDIOGRAM COMPLETE: CPT | Performed by: NUCLEAR MEDICINE

## 2023-09-05 PROCEDURE — 99214 OFFICE O/P EST MOD 30 MIN: CPT | Performed by: NUCLEAR MEDICINE

## 2023-09-05 NOTE — PROGRESS NOTES
[facility]  [address] [place] [address]  Dept: [phone]  Dept Fax: [phone]  Loc: [phone]    Visit Date: 9/5/2023    Diana Karimi is a 75 y.o. female who presents todayfor:  Chief Complaint   Patient presents with    Hypertension    Coronary Artery Disease    Hyperlipidemia   Known CAD and CABG as well stents  No chest pain  Some lately URI  Some chronic dyspnea  Chronic angina  Some palpitation   Afib is stable  Known HTN   Seems under control   On statins for hyperlipidemia  No issues       HPI:  HPI  Past Medical History:   Diagnosis Date    Asthma     Atrial fibrillation ([address])     Blood clot associated with vein wall inflammation     CAD (coronary artery disease)     Cerebral artery occlusion with cerebral infarction (HCC)     CHF (congestive heart failure) (HCC)     COPD (chronic obstructive pulmonary disease) (HCC)     History of blood transfusion     Hx of blood clots     Hyperlipidemia     Hypertension     MI, old     Migraine     Pneumonia     PONV (postoperative nausea and vomiting)     Psychiatric problem       Past Surgical History:   Procedure Laterality Date    [address] in [place]    pericardial window    [address]  2013     [facility]    CORONARY ARTERY BYPASS GRAFT      ENDOSCOPY, COLON, DIAGNOSTIC      ERCP N/A 8/29/2022    ERCP DILATION BALLOON performed by Mary Anne Kunz MD at [facility]    ERCP  8/29/2022    ERCP STONE REMOVAL performed by Mary Anne Kunz MD at [facility]    EYE SURGERY Bilateral 07/2020    cataract removal    HERNIA REPAIR      long ago Dr. Ximena Bravo @ [facility] [facility] [facility]      OVARY REMOVAL      TONSILLECTOMY       Family History   Problem Relation Age of Onset    Diabetes Mother     Heart Disease Mother     High Blood Pressure Mother     Heart

## 2023-09-18 ENCOUNTER — TELEPHONE (OUTPATIENT)
Dept: CARDIOLOGY CLINIC | Age: 73
End: 2023-09-18

## 2023-09-18 NOTE — TELEPHONE ENCOUNTER
Pt called today saying ever since Thursday she her hand has been turning light purple and her mouth feels numb then her earring start to pop     She knows this is not Dr Ferdinand Nicole issues but she values his opinion   She was also advised to call her PCP as well

## 2023-10-18 RX ORDER — ISOSORBIDE MONONITRATE 30 MG/1
TABLET, EXTENDED RELEASE ORAL
Qty: 90 TABLET | Refills: 3 | Status: SHIPPED | OUTPATIENT
Start: 2023-10-18

## 2023-10-18 RX ORDER — PRASUGREL 10 MG/1
TABLET, FILM COATED ORAL
Qty: 90 TABLET | Refills: 3 | Status: SHIPPED | OUTPATIENT
Start: 2023-10-18

## 2023-10-18 RX ORDER — FUROSEMIDE 20 MG/1
TABLET ORAL
Qty: 90 TABLET | Refills: 3 | Status: SHIPPED | OUTPATIENT
Start: 2023-10-18

## 2023-10-18 RX ORDER — ATORVASTATIN CALCIUM 80 MG/1
80 TABLET, FILM COATED ORAL
Qty: 90 TABLET | Refills: 3 | Status: SHIPPED | OUTPATIENT
Start: 2023-10-18

## 2024-04-01 ENCOUNTER — HOSPITAL ENCOUNTER (EMERGENCY)
Age: 74
Discharge: HOME | End: 2024-04-01
Attending: EMERGENCY MEDICINE
Payer: MEDICARE

## 2024-04-01 ENCOUNTER — APPOINTMENT (OUTPATIENT)
Dept: GENERAL RADIOLOGY | Age: 74
End: 2024-04-01
Payer: MEDICARE

## 2024-04-01 ENCOUNTER — APPOINTMENT (OUTPATIENT)
Dept: CT IMAGING | Age: 74
End: 2024-04-01
Payer: MEDICARE

## 2024-04-01 VITALS
HEART RATE: 86 BPM | RESPIRATION RATE: 16 BRPM | OXYGEN SATURATION: 98 % | TEMPERATURE: 98.3 F | DIASTOLIC BLOOD PRESSURE: 76 MMHG | SYSTOLIC BLOOD PRESSURE: 125 MMHG

## 2024-04-01 DIAGNOSIS — S20.211A CONTUSION OF RIGHT CHEST WALL, INITIAL ENCOUNTER: ICD-10-CM
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
## 2024-04-01 LAB
ALBUMIN SERPL BCG-MCNC: 4.1 G/DL (ref 3.5–5.1)
ALP SERPL-CCNC: 102 U/L (ref 38–126)
ALT SERPL W/O P-5'-P-CCNC: 20 U/L (ref 11–66)
ANION GAP SERPL CALC-SCNC: 17 MEQ/L (ref 8–16)
AST SERPL-CCNC: 37 U/L (ref 5–40)
BASOPHILS ABSOLUTE: 0 THOU/MM3 (ref 0–0.1)
BASOPHILS NFR BLD AUTO: 0.4 %
BILIRUB CONJ SERPL-MCNC: < 0.2 MG/DL (ref 0–0.3)
BILIRUB SERPL-MCNC: 1.3 MG/DL (ref 0.3–1.2)
BUN SERPL-MCNC: 18 MG/DL (ref 7–22)
CALCIUM SERPL-MCNC: 8.7 MG/DL (ref 8.5–10.5)
CHLORIDE SERPL-SCNC: 104 MEQ/L (ref 98–111)
CO2 SERPL-SCNC: 22 MEQ/L (ref 23–33)
CREAT SERPL-MCNC: 1 MG/DL (ref 0.4–1.2)
DEPRECATED RDW RBC AUTO: 46.7 FL (ref 35–45)
EOSINOPHIL NFR BLD AUTO: 3.9 %
EOSINOPHILS ABSOLUTE: 0.3 THOU/MM3 (ref 0–0.4)
ERYTHROCYTE [DISTWIDTH] IN BLOOD BY AUTOMATED COUNT: 13.9 % (ref 11.5–14.5)
GFR SERPL CREATININE-BSD FRML MDRD: 59 ML/MIN/1.73M2
GLUCOSE SERPL-MCNC: 127 MG/DL (ref 70–108)
HCT VFR BLD AUTO: 40.9 % (ref 37–47)
HGB BLD-MCNC: 13.7 GM/DL (ref 12–16)
IMM GRANULOCYTES # BLD AUTO: 0.03 THOU/MM3 (ref 0–0.07)
IMM GRANULOCYTES NFR BLD AUTO: 0.4 %
LYMPHOCYTES ABSOLUTE: 2.4 THOU/MM3 (ref 1–4.8)
LYMPHOCYTES NFR BLD AUTO: 28.2 %
MCH RBC QN AUTO: 30.5 PG (ref 26–33)
MCHC RBC AUTO-ENTMCNC: 33.5 GM/DL (ref 32.2–35.5)
MCV RBC AUTO: 91.1 FL (ref 81–99)
MONOCYTES ABSOLUTE: 0.7 THOU/MM3 (ref 0.4–1.3)
MONOCYTES NFR BLD AUTO: 8.4 %
NEUTROPHILS NFR BLD AUTO: 58.7 %
NRBC BLD AUTO-RTO: 0 /100 WBC
OSMOLALITY SERPL CALC.SUM OF ELEC: 288.5 MOSMOL/KG (ref 275–300)
PLATELET # BLD AUTO: 209 THOU/MM3 (ref 130–400)
PMV BLD AUTO: 9.3 FL (ref 9.4–12.4)
POTASSIUM SERPL-SCNC: 3.4 MEQ/L (ref 3.5–5.2)
PROT SERPL-MCNC: 6.9 G/DL (ref 6.1–8)
RBC # BLD AUTO: 4.49 MILL/MM3 (ref 4.2–5.4)
SEGMENTED NEUTROPHILS ABSOLUTE COUNT: 4.9 THOU/MM3 (ref 1.8–7.7)
SODIUM SERPL-SCNC: 143 MEQ/L (ref 135–145)
TROPONIN, HIGH SENSITIVITY: 14 NG/L (ref 0–12)
WBC # BLD AUTO: 8.4 THOU/MM3 (ref 4.8–10.8)

## 2024-04-01 PROCEDURE — 96375 TX/PRO/DX INJ NEW DRUG ADDON: CPT

## 2024-04-01 PROCEDURE — 2580000003 HC RX 258: Performed by: STUDENT IN AN ORGANIZED HEALTH CARE EDUCATION/TRAINING PROGRAM

## 2024-04-01 PROCEDURE — 93005 ELECTROCARDIOGRAM TRACING: CPT | Performed by: STUDENT IN AN ORGANIZED HEALTH CARE EDUCATION/TRAINING PROGRAM

## 2024-04-01 PROCEDURE — 93010 ELECTROCARDIOGRAM REPORT: CPT | Performed by: INTERNAL MEDICINE

## 2024-04-01 PROCEDURE — 99285 EMERGENCY DEPT VISIT HI MDM: CPT

## 2024-04-01 PROCEDURE — 96374 THER/PROPH/DIAG INJ IV PUSH: CPT

## 2024-04-01 PROCEDURE — 73564 X-RAY EXAM KNEE 4 OR MORE: CPT

## 2024-04-01 PROCEDURE — 76376 3D RENDER W/INTRP POSTPROCES: CPT

## 2024-04-01 PROCEDURE — 85025 COMPLETE CBC W/AUTO DIFF WBC: CPT

## 2024-04-01 PROCEDURE — 80053 COMPREHEN METABOLIC PANEL: CPT

## 2024-04-01 PROCEDURE — 70450 CT HEAD/BRAIN W/O DYE: CPT

## 2024-04-01 PROCEDURE — 73552 X-RAY EXAM OF FEMUR 2/>: CPT

## 2024-04-01 PROCEDURE — 36415 COLL VENOUS BLD VENIPUNCTURE: CPT

## 2024-04-01 PROCEDURE — 6360000004 HC RX CONTRAST MEDICATION: Performed by: EMERGENCY MEDICINE

## 2024-04-01 PROCEDURE — 73590 X-RAY EXAM OF LOWER LEG: CPT

## 2024-04-01 PROCEDURE — 6360000002 HC RX W HCPCS: Performed by: STUDENT IN AN ORGANIZED HEALTH CARE EDUCATION/TRAINING PROGRAM

## 2024-04-01 PROCEDURE — 74177 CT ABD & PELVIS W/CONTRAST: CPT

## 2024-04-01 PROCEDURE — 71260 CT THORAX DX C+: CPT

## 2024-04-01 PROCEDURE — 72125 CT NECK SPINE W/O DYE: CPT

## 2024-04-01 PROCEDURE — 6370000000 HC RX 637 (ALT 250 FOR IP): Performed by: STUDENT IN AN ORGANIZED HEALTH CARE EDUCATION/TRAINING PROGRAM

## 2024-04-01 PROCEDURE — 73110 X-RAY EXAM OF WRIST: CPT

## 2024-04-01 PROCEDURE — 84484 ASSAY OF TROPONIN QUANT: CPT

## 2024-04-01 PROCEDURE — 82248 BILIRUBIN DIRECT: CPT

## 2024-04-01 RX ORDER — TIZANIDINE 4 MG/1
4 TABLET ORAL 3 TIMES DAILY
Qty: 15 TABLET | Refills: 0 | Status: SHIPPED | OUTPATIENT
Start: 2024-04-01 | End: 2024-04-06

## 2024-04-01 RX ORDER — MORPHINE SULFATE 2 MG/ML
2 INJECTION, SOLUTION INTRAMUSCULAR; INTRAVENOUS ONCE
Status: COMPLETED | OUTPATIENT
Start: 2024-04-01 | End: 2024-04-01

## 2024-04-01 RX ORDER — LIDOCAINE 50 MG/G
1 PATCH TOPICAL DAILY
Qty: 10 PATCH | Refills: 0 | Status: SHIPPED | OUTPATIENT
Start: 2024-04-01 | End: 2024-04-11

## 2024-04-01 RX ORDER — DEXAMETHASONE SODIUM PHOSPHATE 4 MG/ML
7.5 INJECTION, SOLUTION INTRA-ARTICULAR; INTRALESIONAL; INTRAMUSCULAR; INTRAVENOUS; SOFT TISSUE EVERY 4 HOURS
Status: DISCONTINUED | OUTPATIENT
Start: 2024-04-01 | End: 2024-04-01 | Stop reason: HOSPADM

## 2024-04-01 RX ORDER — 0.9 % SODIUM CHLORIDE 0.9 %
500 INTRAVENOUS SOLUTION INTRAVENOUS ONCE
Status: COMPLETED | OUTPATIENT
Start: 2024-04-01 | End: 2024-04-01

## 2024-04-01 RX ORDER — DIPHENHYDRAMINE HYDROCHLORIDE 50 MG/ML
25 INJECTION INTRAMUSCULAR; INTRAVENOUS ONCE
Status: COMPLETED | OUTPATIENT
Start: 2024-04-01 | End: 2024-04-01

## 2024-04-01 RX ORDER — LIDOCAINE 4 G/G
1 PATCH TOPICAL ONCE
Status: DISCONTINUED | OUTPATIENT
Start: 2024-04-01 | End: 2024-04-01 | Stop reason: HOSPADM

## 2024-04-01 RX ORDER — DEXAMETHASONE SODIUM PHOSPHATE 4 MG/ML
7.5 INJECTION, SOLUTION INTRA-ARTICULAR; INTRALESIONAL; INTRAMUSCULAR; INTRAVENOUS; SOFT TISSUE ONCE
Status: COMPLETED | OUTPATIENT
Start: 2024-04-01 | End: 2024-04-01

## 2024-04-01 RX ADMIN — DEXAMETHASONE SODIUM PHOSPHATE 7.5 MG: 4 INJECTION, SOLUTION INTRA-ARTICULAR; INTRALESIONAL; INTRAMUSCULAR; INTRAVENOUS; SOFT TISSUE at 13:56

## 2024-04-01 RX ADMIN — MORPHINE SULFATE 2 MG: 2 INJECTION, SOLUTION INTRAMUSCULAR; INTRAVENOUS at 15:11

## 2024-04-01 RX ADMIN — DEXAMETHASONE SODIUM PHOSPHATE 7.5 MG: 4 INJECTION, SOLUTION INTRA-ARTICULAR; INTRALESIONAL; INTRAMUSCULAR; INTRAVENOUS; SOFT TISSUE at 14:03

## 2024-04-01 RX ADMIN — HYDROMORPHONE HYDROCHLORIDE 0.5 MG: 1 INJECTION, SOLUTION INTRAMUSCULAR; INTRAVENOUS; SUBCUTANEOUS at 13:57

## 2024-04-01 RX ADMIN — SODIUM CHLORIDE 500 ML: 9 INJECTION, SOLUTION INTRAVENOUS at 14:43

## 2024-04-01 RX ADMIN — DIPHENHYDRAMINE HYDROCHLORIDE 25 MG: 50 INJECTION INTRAMUSCULAR; INTRAVENOUS at 13:57

## 2024-04-01 RX ADMIN — IOPAMIDOL 80 ML: 755 INJECTION, SOLUTION INTRAVENOUS at 14:11

## 2024-04-01 ASSESSMENT — PAIN DESCRIPTION - DESCRIPTORS: DESCRIPTORS: SHARP

## 2024-04-01 ASSESSMENT — PAIN DESCRIPTION - ORIENTATION: ORIENTATION: RIGHT

## 2024-04-01 ASSESSMENT — PAIN SCALES - GENERAL
PAINLEVEL_OUTOF10: 6
PAINLEVEL_OUTOF10: 6
PAINLEVEL_OUTOF10: 8

## 2024-04-01 ASSESSMENT — PAIN - FUNCTIONAL ASSESSMENT
PAIN_FUNCTIONAL_ASSESSMENT: 0-10
PAIN_FUNCTIONAL_ASSESSMENT: 0-10

## 2024-04-01 NOTE — ED NOTES
Presents to ER from home by [facility]. She reports she was putting up picture framed standing up on bed trying to hang a picture. She reports she has a slippery comforter on bed. She reports she fell off bed onto more pictures and frames. She reports pain on right side of chest. Pt on effient. Denies hitting head. Swelling noted to left wrist. Pain 8/10. Breathing spontaneous and chest ride and fall equal and bilateral. Pt splinting right side. Will monitor

## 2024-04-01 NOTE — ED PROVIDER NOTES
[facility] EMERGENCY DEPT      EMERGENCY MEDICINE     Pt Name: Irene Marquez  MRN: [id number]  Birthdate 1950  Date of evaluation: 4/1/2024  Provider: Julio Lewis MD    CHIEF COMPLAINT       Chief Complaint   Patient presents with    Fall    Chest Injury     HISTORY OF PRESENT ILLNESS   Irene Marquez is a pleasant 74 y.o. female who presents to the emergency department from from home, brought in by EMS for evaluation of fall.  Patient presents emergency department brought by EMS after falling; patient states she was above her bed hanging some pictures, last step falling above some pictures frame on the bed and bouncing to the floor; patient states severe pain on right hemithorax after falling, pain increases with deep breaths, not radiates associated with nausea; she also states left knee pain left calf pain although she was able to ambulate after falling; patient denies head trauma, no loss conscious, currently no abdominal pain or other symptoms.  Patient is on prasugrel.    PASTMEDICAL HISTORY     Past Medical History:   Diagnosis Date    Asthma     Atrial fibrillation (HCC)     Blood clot associated with vein wall inflammation     CAD (coronary artery disease)     Cerebral artery occlusion with cerebral infarction (HCC)     CHF (congestive heart failure) (HCC)     COPD (chronic obstructive pulmonary disease) ([facility])     History of blood transfusion     Hx of blood clots     Hyperlipidemia     Hypertension     MI, old     Migraine     Pneumonia     PONV (postoperative nausea and vomiting)     Psychiatric problem        Patient Active Problem List   Diagnosis Code    Unstable angina ([facility]) I20.0    H/O prior ablation treatment Z98.890    Hx of atrial fibrillation, no current medication Z86.79    Ex-smoker Z87.891    Presence of stent in LAD coronary artery Z95.5    S/P cardiac catheterization Z98.890    Hyperlipidemia with target LDL less than 70 E78.5    Atrial fibrillation ([facility]) I48.91    Chest

## 2024-04-01 NOTE — DISCHARGE INSTRUCTIONS
Come back to the emergency department if severe chest pain, severe shortness of breath, persistence of fever, intractable vomiting, severe dizziness or lightheadedness, fainting.  Schedule an appointment with your primary care physician as recommended previously.  Read the documents attached.

## 2024-04-02 ENCOUNTER — TELEPHONE (OUTPATIENT)
Dept: CARDIOLOGY CLINIC | Age: 74
End: 2024-04-02

## 2024-04-02 NOTE — TELEPHONE ENCOUNTER
[place]. Needs follow up    The patient has been re-examined and I agree with the above assessment or I updated with my findings.

## 2024-04-02 NOTE — TELEPHONE ENCOUNTER
Pt calling, went to ED yesterday for fall. Review chart.   States she was out of rhythm, she was discharged from ED.  EKG shows Afib, rate 90s.  Denies SOB, gets some chest discomfort with coughing and deep breathing. Does not check BP routinely. Little elevated in ED.   Not on OAC.  States she had an ablation in the past.  She is asking if any changes are needed at this time?

## 2024-04-03 ENCOUNTER — OFFICE VISIT (OUTPATIENT)
Dept: CARDIOLOGY CLINIC | Age: 74
End: 2024-04-03
Payer: MEDICARE

## 2024-04-03 VITALS
HEART RATE: 86 BPM | DIASTOLIC BLOOD PRESSURE: 62 MMHG | WEIGHT: 150 LBS | HEIGHT: 63 IN | SYSTOLIC BLOOD PRESSURE: 140 MMHG | BODY MASS INDEX: 26.58 KG/M2

## 2024-04-03 DIAGNOSIS — I48.91 ATRIAL FIBRILLATION, CONTROLLED (HCC): ICD-10-CM

## 2024-04-03 DIAGNOSIS — E78.01 FAMILIAL HYPERCHOLESTEROLEMIA: ICD-10-CM

## 2024-04-03 DIAGNOSIS — I25.810 CORONARY ARTERY DISEASE INVOLVING CORONARY BYPASS GRAFT OF NATIVE HEART WITHOUT ANGINA PECTORIS: ICD-10-CM

## 2024-04-03 DIAGNOSIS — I10 PRIMARY HYPERTENSION: Primary | ICD-10-CM

## 2024-04-03 PROCEDURE — 99214 OFFICE O/P EST MOD 30 MIN: CPT | Performed by: NUCLEAR MEDICINE

## 2024-04-03 PROCEDURE — 1123F ACP DISCUSS/DSCN MKR DOCD: CPT | Performed by: NUCLEAR MEDICINE

## 2024-04-03 PROCEDURE — 3077F SYST BP >= 140 MM HG: CPT | Performed by: NUCLEAR MEDICINE

## 2024-04-03 PROCEDURE — 93000 ELECTROCARDIOGRAM COMPLETE: CPT | Performed by: NUCLEAR MEDICINE

## 2024-04-03 PROCEDURE — 3078F DIAST BP <80 MM HG: CPT | Performed by: NUCLEAR MEDICINE

## 2024-04-03 NOTE — PROGRESS NOTES
[facility] [place] [facility]  [facility] CARDIOLOGY  [address]  [address]  [place] [address]  Dept: [phone]  Dept Fax: [phone]  Loc: [phone]    Visit Date: 4/3/2024    Irene Marquez is a 74 y.o. female who presents todayfor:  Chief Complaint   Patient presents with    Follow-up    Hypertension    Coronary Artery Disease    Hyperlipidemia   Had a fall   Bruises  Was in A fib   Does have PAF   Previous ablation   Has been in rhythm  since the ablation   Now in aches and pains from the fall   Hurts all over  Some baseline dyspnea  Know CABg and stents  BP is stable  No dizziness  No syncope        HPI:  HPI  Past Medical History:   Diagnosis Date    Asthma     Atrial fibrillation (HCC)     Blood clot associated with vein wall inflammation     CAD (coronary artery disease)     Cerebral artery occlusion with cerebral infarction (HCC)     CHF (congestive heart failure) (HCC)     COPD (chronic obstructive pulmonary disease) (HCC)     History of blood transfusion     Hx of blood clots     Hyperlipidemia     Hypertension     MI, old     Migraine     Pneumonia     PONV (postoperative nausea and vomiting)     Psychiatric problem       Past Surgical History:   Procedure Laterality Date    APPENDECTOMY      CARDIAC SURGERY  1998 in [place]    pericardial window    CARDIAC SURGERY  2011 [facility]    COLONOSCOPY      CORONARY ANGIOPLASTY WITH STENT PLACEMENT  2013     [facility]    CORONARY ARTERY BYPASS GRAFT      ENDOSCOPY, COLON, DIAGNOSTIC      ERCP N/A 8/29/2022    ERCP DILATION BALLOON performed by Andrew Handley MD at [facility]    ERCP  8/29/2022    ERCP STONE REMOVAL performed by Andrew Handley MD at [facility]    EYE SURGERY Bilateral 07/2020    cataract removal    HERNIA REPAIR      long ago Dr. Washburn @ [facility]    HYSTERECTOMY (CERVIX STATUS UNKNOWN)      OVARY REMOVAL      TONSILLECTOMY       Family History   Problem Relation Age of Onset    Diabetes

## 2024-04-04 LAB
EKG Q-T INTERVAL: 366 MS
EKG QRS DURATION: 90 MS
EKG QTC CALCULATION (BAZETT): 462 MS
EKG R AXIS: 17 DEGREES
EKG T AXIS: 25 DEGREES
EKG VENTRICULAR RATE: 96 BPM

## 2024-04-22 ENCOUNTER — HOSPITAL ENCOUNTER (OUTPATIENT)
Age: 74
Discharge: HOME | End: 2024-04-22
Payer: MEDICARE

## 2024-04-22 ENCOUNTER — HOSPITAL ENCOUNTER (OUTPATIENT)
Dept: GENERAL RADIOLOGY | Age: 74
Discharge: HOME | End: 2024-04-22
Payer: MEDICARE

## 2024-04-22 DIAGNOSIS — M79.605 LEFT LEG PAIN: ICD-10-CM

## 2024-04-22 DIAGNOSIS — R07.9 CHEST PAIN, UNSPECIFIED TYPE: ICD-10-CM

## 2024-04-22 PROCEDURE — 73590 X-RAY EXAM OF LOWER LEG: CPT

## 2024-04-22 PROCEDURE — 71100 X-RAY EXAM RIBS UNI 2 VIEWS: CPT

## 2024-05-08 DIAGNOSIS — I48.91 ATRIAL FIBRILLATION, CONTROLLED (HCC): ICD-10-CM

## 2024-05-08 DIAGNOSIS — I25.810 CORONARY ARTERY DISEASE INVOLVING CORONARY BYPASS GRAFT OF NATIVE HEART WITHOUT ANGINA PECTORIS: ICD-10-CM

## 2024-05-08 DIAGNOSIS — I10 PRIMARY HYPERTENSION: ICD-10-CM

## 2024-05-08 DIAGNOSIS — E78.01 FAMILIAL HYPERCHOLESTEROLEMIA: ICD-10-CM

## 2024-05-13 RX ORDER — AMIODARONE HYDROCHLORIDE 200 MG/1
200 TABLET ORAL SEE ADMIN INSTRUCTIONS
Qty: 37 TABLET | Refills: 1 | Status: SHIPPED | OUTPATIENT
Start: 2024-05-13

## 2024-05-13 RX ORDER — AMIODARONE HYDROCHLORIDE 200 MG/1
200 TABLET ORAL SEE ADMIN INSTRUCTIONS
COMMUNITY
End: 2024-05-13 | Stop reason: SDUPTHER

## 2024-05-13 NOTE — TELEPHONE ENCOUNTER
Pt calling for event monitor results, given.  She is asking if anything else should be done or if she should keep going as is? She feels most symptomatic when she is doing hard work, mowing, cleaning bath tubs. She feels SOB and palpitations. She thinks the Afib is coming and going. Please advise.

## 2024-05-28 ENCOUNTER — TELEPHONE (OUTPATIENT)
Dept: CARDIOLOGY CLINIC | Age: 74
End: 2024-05-28

## 2024-05-28 ENCOUNTER — TELEPHONE (OUTPATIENT)
Dept: PULMONOLOGY | Age: 74
End: 2024-05-28

## 2024-05-28 DIAGNOSIS — J18.9 PNEUMONIA OF RIGHT LOWER LOBE DUE TO INFECTIOUS ORGANISM: ICD-10-CM

## 2024-05-28 DIAGNOSIS — J20.9 ACUTE BRONCHITIS, UNSPECIFIED ORGANISM: Primary | ICD-10-CM

## 2024-05-28 RX ORDER — AMIODARONE HYDROCHLORIDE 200 MG/1
100 TABLET ORAL DAILY
Qty: 37 TABLET | Refills: 1 | Status: SHIPPED
Start: 2024-05-28

## 2024-05-28 NOTE — TELEPHONE ENCOUNTER
Pt called in stating she feels like the Amiodarone is making her dizzy, lightheaded and upset stomach.   Please advise.

## 2024-05-28 NOTE — TELEPHONE ENCOUNTER
Patient was last seen 6/5/2023 and her next appt is 6/10/2023 with Loretta Madrigal.  She is calling in asking for sooner appt or advise.  She said she has had a cough for approx 2-3 weeks.  She is having wheezing, mainly dry cough, but sometimes when coughs hard enough there is a greenish/yellow mucous, no head congestion, no fever.  She did say she was seen at hospital approx 1-2 months ago for broken ribs and her pcp gave her prednisone at that time, but nothing since.  She said her hands and feet are occasionally turning colors, almost looking bruised or purple/black and she has not seen her pcp for this yet either.  Ph[facility]acy [facility], please advise.

## 2024-05-29 ENCOUNTER — HOSPITAL ENCOUNTER (OUTPATIENT)
Dept: GENERAL RADIOLOGY | Age: 74
Discharge: HOME | End: 2024-05-29
Payer: MEDICARE

## 2024-05-29 ENCOUNTER — HOSPITAL ENCOUNTER (OUTPATIENT)
Age: 74
Discharge: HOME | End: 2024-05-29
Payer: MEDICARE

## 2024-05-29 DIAGNOSIS — J20.9 ACUTE BRONCHITIS, UNSPECIFIED ORGANISM: ICD-10-CM

## 2024-05-29 LAB
DEPRECATED RDW RBC AUTO: 45.8 FL (ref 35–45)
ERYTHROCYTE [DISTWIDTH] IN BLOOD BY AUTOMATED COUNT: 13.7 % (ref 11.5–14.5)
HCT VFR BLD AUTO: 37.3 % (ref 37–47)
HGB BLD-MCNC: 12.3 GM/DL (ref 12–16)
MCH RBC QN AUTO: 30.2 PG (ref 26–33)
MCHC RBC AUTO-ENTMCNC: 33 GM/DL (ref 32.2–35.5)
MCV RBC AUTO: 91.6 FL (ref 81–99)
PLATELET # BLD AUTO: 236 THOU/MM3 (ref 130–400)
PMV BLD AUTO: 9.1 FL (ref 9.4–12.4)
RBC # BLD AUTO: 4.07 MILL/MM3 (ref 4.2–5.4)
WBC # BLD AUTO: 8.2 THOU/MM3 (ref 4.8–10.8)

## 2024-05-29 PROCEDURE — 36415 COLL VENOUS BLD VENIPUNCTURE: CPT

## 2024-05-29 PROCEDURE — 71046 X-RAY EXAM CHEST 2 VIEWS: CPT

## 2024-05-29 PROCEDURE — 85027 COMPLETE CBC AUTOMATED: CPT

## 2024-05-29 RX ORDER — PREDNISONE 20 MG/1
40 TABLET ORAL DAILY
Qty: 14 TABLET | Refills: 0 | Status: SHIPPED | OUTPATIENT
Start: 2024-05-29 | End: 2024-06-05

## 2024-05-29 RX ORDER — CEFUROXIME AXETIL 500 MG/1
500 TABLET ORAL 2 TIMES DAILY
Qty: 14 TABLET | Refills: 0 | Status: SHIPPED | OUTPATIENT
Start: 2024-05-29 | End: 2024-06-05

## 2024-05-29 RX ORDER — BENZONATATE 100 MG/1
100 CAPSULE ORAL 3 TIMES DAILY PRN
Qty: 30 CAPSULE | Refills: 2 | Status: SHIPPED | OUTPATIENT
Start: 2024-05-29 | End: 2024-06-28

## 2024-05-29 NOTE — TELEPHONE ENCOUNTER
Called patient to go over symptoms. This has been going on for about a week. She states she has a cough and she is not able to get much up. When she does get stuff up occasionally it is yellowish green. Hands and feet are turning purple at times. She is wheezing and has chest tightness. She states she does feel short of breath at times, however this is not often. She denied fevers or being around anyone that has been sick.   She is taking her Trelegy as prescribed. She is using albuterol 3-4 times a day over the last week. She does not have a pulse ox at home to check oxygen. Even on the phone she was coughing a lot. She states when she coughs, she is coughing so hard she feels like she is going to pass out.   I advised patient that I will reach out to her once I hear back from you, however I advised her that if her breathing gets worse she will need to go to urgent care, ED or reach out to her PCP.   Thanks!       She uses [facility] pharmacy.

## 2024-05-29 NOTE — TELEPHONE ENCOUNTER
I am sending prednisone ( steroid ) and tessalon perles to pharmacy , I would also like patient to get a chest xray and cbc lab draw done , I will call with results to see if atb needed.   Needs to talk to primary care asap about purple coloring hands / feet

## 2024-05-30 NOTE — TELEPHONE ENCOUNTER
Called and informed patient to keep appointment on the 10th with Loretta. She verbalized understanding.

## 2024-06-10 ENCOUNTER — OFFICE VISIT (OUTPATIENT)
Dept: PULMONOLOGY | Age: 74
End: 2024-06-10
Payer: MEDICARE

## 2024-06-10 VITALS
TEMPERATURE: 98.3 F | WEIGHT: 149.8 LBS | BODY MASS INDEX: 26.54 KG/M2 | OXYGEN SATURATION: 97 % | DIASTOLIC BLOOD PRESSURE: 74 MMHG | SYSTOLIC BLOOD PRESSURE: 126 MMHG | HEART RATE: 116 BPM | HEIGHT: 63 IN

## 2024-06-10 DIAGNOSIS — J43.2 CENTRILOBULAR EMPHYSEMA (HCC): ICD-10-CM

## 2024-06-10 DIAGNOSIS — J18.9 PNEUMONIA OF RIGHT LOWER LOBE DUE TO INFECTIOUS ORGANISM: Primary | ICD-10-CM

## 2024-06-10 PROCEDURE — 1123F ACP DISCUSS/DSCN MKR DOCD: CPT | Performed by: NURSE PRACTITIONER

## 2024-06-10 PROCEDURE — 3078F DIAST BP <80 MM HG: CPT | Performed by: NURSE PRACTITIONER

## 2024-06-10 PROCEDURE — 3074F SYST BP LT 130 MM HG: CPT | Performed by: NURSE PRACTITIONER

## 2024-06-10 PROCEDURE — 99214 OFFICE O/P EST MOD 30 MIN: CPT | Performed by: NURSE PRACTITIONER

## 2024-06-10 RX ORDER — PROMETHAZINE HYDROCHLORIDE AND CODEINE PHOSPHATE 6.25; 1 MG/5ML; MG/5ML
5 SYRUP ORAL 4 TIMES DAILY PRN
Qty: 140 ML | Refills: 0 | Status: SHIPPED | OUTPATIENT
Start: 2024-06-10 | End: 2024-06-17

## 2024-06-10 ASSESSMENT — ENCOUNTER SYMPTOMS
SHORTNESS OF BREATH: 0
CHEST TIGHTNESS: 0
DIARRHEA: 0
VOMITING: 0
WHEEZING: 0
COLOR CHANGE: 1
COUGH: 1
ABDOMINAL PAIN: 0
EYES NEGATIVE: 1
CHOKING: 0
NAUSEA: 0

## 2024-06-10 NOTE — PROGRESS NOTES
[facility]     Patient: ELVI GARCIA, 74 y.o.   : 1950  6/10/2024    Pt of Dr. Menjivar     Subjective     Chief Complaint   Patient presents with    Follow-up     1 year COPD follow up with recent chest CT 24 & chest XR 24.        HPI       Patient presents for 1 year COPD  follow up  She was seen at [facility] emergency room on 2024 s/p  a fall at home, contusion of right chest wall  CT of the chest showing no lung consolidations, no pneumothorax identified.  There are calcified mediastinal and right hilar lymph nodes.  No acute intrathoracic findings.  CT thoracic spine showing diffuse osteopenia with some superimposed degenerative changes at C7-T1, T5-T6 and T7-8.  No acute fractures noted.    The patient reports symptoms of \"pneumonia\" approximately 3 weeks ago.  She is requesting a cough syrup.  She called into our office on 2024 with complaints of a cough that had been ongoing for 2 to 3 weeks.  She reports wheezing, mainly dry cough, sometimes when she will cough hard enough there will be a green to yellowish mucus.  Denies any head congestion and denies fever.  Cough and SOB has significantly improved, but still some lingering cough and sob   Still having trouble sleeping due to cough     She is taking her Trelegy inhaler once a day with good compliance.  She is been using her albuterol HFA 3-4 times per day  5-day prednisone burst was called in along with some Tessalon Perles to the pharmacy.  A chest x-ray and CBC were done  WBC normal 2024, chest x-ray with a mild right middle lobe atelectasis/pneumonia.  With findings of chest x-ray Ceftin antibiotic was sent in.  Patient did take this to completion    Any new medical issues since last visit : no      Immunization History   Administered Date(s) Administered    COVID-19, MODERNA BLUE border, Primary or Immunocompromised, (age 12y+), IM, 100 mcg/0.5mL 2021, 2021

## 2024-07-17 ENCOUNTER — TELEPHONE (OUTPATIENT)
Dept: CARDIOLOGY CLINIC | Age: 74
End: 2024-07-17

## 2024-07-17 NOTE — TELEPHONE ENCOUNTER
Pre op Risk Assessment    Procedure Dental Extractions and Lower Denture  Physician Dr. Joaquina Driscoll  Date of surgery/procedure TBD    Last OV 04/03/2024  Last Stress 08/26/2022  Last Echo 08/26/2022  Last Cath 12/15/2020  Last Stent 11/25/2013  Is patient on blood thinners Eliquis  Hold Meds/how many days Please advise.      Fax: [phone]

## 2024-08-29 RX ORDER — FUROSEMIDE 20 MG
TABLET ORAL
Qty: 90 TABLET | Refills: 0 | Status: SHIPPED | OUTPATIENT
Start: 2024-08-29

## 2024-08-29 RX ORDER — PRASUGREL 10 MG/1
TABLET, FILM COATED ORAL
Qty: 90 TABLET | Refills: 3 | Status: SHIPPED | OUTPATIENT
Start: 2024-08-29

## 2024-08-29 RX ORDER — ISOSORBIDE MONONITRATE 30 MG/1
TABLET, EXTENDED RELEASE ORAL
Qty: 90 TABLET | Refills: 0 | Status: SHIPPED | OUTPATIENT
Start: 2024-08-29

## 2024-08-29 RX ORDER — METOPROLOL TARTRATE 25 MG/1
TABLET, FILM COATED ORAL
Qty: 180 TABLET | Refills: 0 | Status: SHIPPED | OUTPATIENT
Start: 2024-08-29

## 2024-09-05 RX ORDER — ATORVASTATIN CALCIUM 80 MG/1
80 TABLET, FILM COATED ORAL NIGHTLY
Qty: 90 TABLET | Refills: 0 | Status: SHIPPED | OUTPATIENT
Start: 2024-09-05

## 2024-09-09 ENCOUNTER — HOSPITAL ENCOUNTER (OUTPATIENT)
Age: 74
Discharge: HOME | End: 2024-09-09
Payer: MEDICARE

## 2024-09-09 ENCOUNTER — OFFICE VISIT (OUTPATIENT)
Dept: PULMONOLOGY | Age: 74
End: 2024-09-09
Payer: MEDICARE

## 2024-09-09 ENCOUNTER — HOSPITAL ENCOUNTER (OUTPATIENT)
Dept: GENERAL RADIOLOGY | Age: 74
Discharge: HOME | End: 2024-09-09
Payer: MEDICARE

## 2024-09-09 VITALS
BODY MASS INDEX: 26.51 KG/M2 | HEART RATE: 75 BPM | WEIGHT: 149.6 LBS | OXYGEN SATURATION: 94 % | HEIGHT: 63 IN | DIASTOLIC BLOOD PRESSURE: 66 MMHG | SYSTOLIC BLOOD PRESSURE: 116 MMHG | TEMPERATURE: 98.7 F

## 2024-09-09 DIAGNOSIS — J43.2 CENTRILOBULAR EMPHYSEMA (HCC): ICD-10-CM

## 2024-09-09 DIAGNOSIS — J18.9 PNEUMONIA OF RIGHT LOWER LOBE DUE TO INFECTIOUS ORGANISM: ICD-10-CM

## 2024-09-09 DIAGNOSIS — J44.1 COPD EXACERBATION (HCC): Primary | ICD-10-CM

## 2024-09-09 PROCEDURE — 3074F SYST BP LT 130 MM HG: CPT | Performed by: NURSE PRACTITIONER

## 2024-09-09 PROCEDURE — 1123F ACP DISCUSS/DSCN MKR DOCD: CPT | Performed by: NURSE PRACTITIONER

## 2024-09-09 PROCEDURE — 99214 OFFICE O/P EST MOD 30 MIN: CPT | Performed by: NURSE PRACTITIONER

## 2024-09-09 PROCEDURE — 71046 X-RAY EXAM CHEST 2 VIEWS: CPT

## 2024-09-09 PROCEDURE — 3078F DIAST BP <80 MM HG: CPT | Performed by: NURSE PRACTITIONER

## 2024-09-09 RX ORDER — BENZONATATE 100 MG/1
CAPSULE ORAL
Qty: 90 CAPSULE | Refills: 3 | Status: SHIPPED | OUTPATIENT
Start: 2024-09-09

## 2024-09-09 RX ORDER — PROMETHAZINE HYDROCHLORIDE AND CODEINE PHOSPHATE 6.25; 1 MG/5ML; MG/5ML
5 SYRUP ORAL NIGHTLY PRN
Qty: 35 ML | Refills: 0 | Status: SHIPPED | OUTPATIENT
Start: 2024-09-09 | End: 2024-09-16

## 2024-09-09 RX ORDER — PREDNISONE 10 MG/1
40 TABLET ORAL DAILY
Qty: 20 TABLET | Refills: 0 | Status: SHIPPED | OUTPATIENT
Start: 2024-09-09 | End: 2024-09-14

## 2024-09-09 RX ORDER — ALBUTEROL SULFATE 0.83 MG/ML
2.5 SOLUTION RESPIRATORY (INHALATION) 4 TIMES DAILY PRN
Qty: 120 EACH | Refills: 3 | Status: SHIPPED | OUTPATIENT
Start: 2024-09-09

## 2024-09-09 ASSESSMENT — ENCOUNTER SYMPTOMS
ABDOMINAL PAIN: 0
SHORTNESS OF BREATH: 1
COUGH: 1
NAUSEA: 0
WHEEZING: 0
DIARRHEA: 0
VOMITING: 0
EYES NEGATIVE: 1

## 2024-10-22 ENCOUNTER — OFFICE VISIT (OUTPATIENT)
Dept: CARDIOLOGY CLINIC | Age: 74
End: 2024-10-22
Payer: MEDICARE

## 2024-10-22 VITALS
WEIGHT: 146 LBS | HEART RATE: 71 BPM | DIASTOLIC BLOOD PRESSURE: 74 MMHG | SYSTOLIC BLOOD PRESSURE: 124 MMHG | HEIGHT: 62 IN | BODY MASS INDEX: 26.87 KG/M2

## 2024-10-22 DIAGNOSIS — E78.01 FAMILIAL HYPERCHOLESTEROLEMIA: ICD-10-CM

## 2024-10-22 DIAGNOSIS — I48.0 PAROXYSMAL ATRIAL FIBRILLATION (HCC): Primary | ICD-10-CM

## 2024-10-22 DIAGNOSIS — I25.810 CORONARY ARTERY DISEASE INVOLVING CORONARY BYPASS GRAFT OF NATIVE HEART WITHOUT ANGINA PECTORIS: ICD-10-CM

## 2024-10-22 DIAGNOSIS — I10 PRIMARY HYPERTENSION: ICD-10-CM

## 2024-10-22 PROCEDURE — 1123F ACP DISCUSS/DSCN MKR DOCD: CPT | Performed by: NUCLEAR MEDICINE

## 2024-10-22 PROCEDURE — 99214 OFFICE O/P EST MOD 30 MIN: CPT | Performed by: NUCLEAR MEDICINE

## 2024-10-22 PROCEDURE — 3074F SYST BP LT 130 MM HG: CPT | Performed by: NUCLEAR MEDICINE

## 2024-10-22 PROCEDURE — 3078F DIAST BP <80 MM HG: CPT | Performed by: NUCLEAR MEDICINE

## 2024-10-22 RX ORDER — PRASUGREL 10 MG/1
TABLET, FILM COATED ORAL
Qty: 90 TABLET | Refills: 3 | Status: CANCELLED | OUTPATIENT
Start: 2024-10-22

## 2024-10-22 RX ORDER — CLOPIDOGREL BISULFATE 75 MG/1
75 TABLET ORAL DAILY
Qty: 90 TABLET | Refills: 3 | Status: SHIPPED | OUTPATIENT
Start: 2024-10-22

## 2024-10-22 NOTE — PROGRESS NOTES
Pt C/O sob on exertions, swelling at times in legs       Pt denies CP, Headache, dizziness, heart palpitations, fatigue

## 2024-10-22 NOTE — PROGRESS NOTES
[facility] [place] [facility]  [facility] CARDIOLOGY  [address] [place] [address]  Dept: [phone]  Dept Fax: [phone]  Loc: [phone]    Visit Date: 10/22/2024    Irene Marquez is a 74 y.o. female who presents todayfor:  Chief Complaint   Patient presents with    Follow-up     1 year     Atrial Fibrillation    Hypertension    Coronary Artery Disease    Hyperlipidemia   She stopped her eliquis due to concern about bleeding   Her sister had CNS bleed with eliquis   No chest pain   No changes in breathing  Known CABG and stents  BP is stable  No dizziness  No syncope  On statins for hyperlipidemia  No issues with the meds  Did have a monitor with PAF          HPI:  HPI  Past Medical History:   Diagnosis Date    Asthma     Atrial fibrillation (HCC)     Blood clot associated with vein wall inflammation     CAD (coronary artery disease)     Cerebral artery occlusion with cerebral infarction (HCC)     CHF (congestive heart failure) (HCC)     COPD (chronic obstructive pulmonary disease) (HCC)     History of blood transfusion     Hx of blood clots     Hyperlipidemia     Hypertension     MI, old     Migraine     Pneumonia     PONV (postoperative nausea and vomiting)     Psychiatric problem       Past Surgical History:   Procedure Laterality Date    APPENDECTOMY      CARDIAC SURGERY  1998 in [place]    pericardial window    CARDIAC SURGERY  2011 [facility]    COLONOSCOPY      CORONARY ANGIOPLASTY WITH STENT PLACEMENT  2013     [facility]    CORONARY ARTERY BYPASS GRAFT      ENDOSCOPY, COLON, DIAGNOSTIC      ERCP N/A 8/29/2022    ERCP DILATION BALLOON performed by Andrew Handley MD at [facility]    ERCP  8/29/2022    ERCP STONE REMOVAL performed by Andrew Handley MD at [facility]    EYE SURGERY Bilateral 07/2020    cataract removal    HERNIA REPAIR      long ago Dr. Washburn @ [facility]    HYSTERECTOMY (CERVIX STATUS UNKNOWN)      OVARY REMOVAL      TONSILLECTOMY

## 2024-10-31 RX ORDER — METOPROLOL TARTRATE 25 MG/1
TABLET, FILM COATED ORAL
Qty: 180 TABLET | Refills: 3 | Status: SHIPPED | OUTPATIENT
Start: 2024-10-31

## 2024-10-31 RX ORDER — FUROSEMIDE 20 MG/1
TABLET ORAL
Qty: 90 TABLET | Refills: 3 | Status: SHIPPED | OUTPATIENT
Start: 2024-10-31

## 2024-10-31 RX ORDER — ISOSORBIDE MONONITRATE 30 MG/1
TABLET, EXTENDED RELEASE ORAL
Qty: 90 TABLET | Refills: 3 | Status: SHIPPED | OUTPATIENT
Start: 2024-10-31

## 2024-11-01 ENCOUNTER — HOSPITAL ENCOUNTER (OUTPATIENT)
Dept: GENERAL RADIOLOGY | Age: 74
Discharge: HOME | End: 2024-11-01
Payer: MEDICARE

## 2024-11-01 ENCOUNTER — HOSPITAL ENCOUNTER (OUTPATIENT)
Age: 74
Discharge: HOME | End: 2024-11-01
Payer: MEDICARE

## 2024-11-01 DIAGNOSIS — M54.50 LOW BACK PAIN, NON-SPECIFIC: ICD-10-CM

## 2024-11-01 DIAGNOSIS — J18.9 PNEUMONIA OF RIGHT LOWER LOBE DUE TO INFECTIOUS ORGANISM: ICD-10-CM

## 2024-11-01 PROCEDURE — 72100 X-RAY EXAM L-S SPINE 2/3 VWS: CPT

## 2024-11-07 RX ORDER — ATORVASTATIN CALCIUM 80 MG/1
80 TABLET, FILM COATED ORAL NIGHTLY
Qty: 90 TABLET | Refills: 3 | Status: SHIPPED | OUTPATIENT
Start: 2024-11-07

## 2025-03-17 ENCOUNTER — TELEPHONE (OUTPATIENT)
Dept: CARDIOLOGY CLINIC | Age: 75
End: 2025-03-17

## 2025-03-17 NOTE — PATIENT INSTRUCTIONS
You may receive a survey regarding the care you received during your visit. We encourage you to complete and return your survey, as your input is valuable to us. We hope you will choose us in the future for your healthcare needs. Thank you!    Your Medical Assistant's today: Padmini Gtz  Thank you for coming to our office! It was a pleasure to serve you.

## 2025-03-17 NOTE — TELEPHONE ENCOUNTER
Pt states that she had some severe/intense chest pain on Friday, which has mostly subsided but she is still feeling very exhausted. She would like to know if she can be seen this week and have some blood work done? Please advise.

## 2025-03-17 NOTE — TELEPHONE ENCOUNTER
Spoke with patient scheduled her for follow up tomorrow with Saw. Advised to go to ED if chest pain continues.

## 2025-03-18 ENCOUNTER — OFFICE VISIT (OUTPATIENT)
Dept: CARDIOLOGY CLINIC | Age: 75
End: 2025-03-18
Payer: MEDICARE

## 2025-03-18 VITALS
HEART RATE: 80 BPM | WEIGHT: 150 LBS | DIASTOLIC BLOOD PRESSURE: 77 MMHG | HEIGHT: 62 IN | SYSTOLIC BLOOD PRESSURE: 128 MMHG | BODY MASS INDEX: 27.6 KG/M2

## 2025-03-18 DIAGNOSIS — Z95.820 S/P ANGIOPLASTY WITH STENT: ICD-10-CM

## 2025-03-18 DIAGNOSIS — Z95.1 S/P CABG (CORONARY ARTERY BYPASS GRAFT): ICD-10-CM

## 2025-03-18 DIAGNOSIS — R07.9 CHEST PAIN, UNSPECIFIED TYPE: Primary | ICD-10-CM

## 2025-03-18 DIAGNOSIS — I48.0 PAROXYSMAL ATRIAL FIBRILLATION (HCC): ICD-10-CM

## 2025-03-18 PROCEDURE — 3017F COLORECTAL CA SCREEN DOC REV: CPT | Performed by: PHYSICIAN ASSISTANT

## 2025-03-18 PROCEDURE — G8427 DOCREV CUR MEDS BY ELIG CLIN: HCPCS | Performed by: PHYSICIAN ASSISTANT

## 2025-03-18 PROCEDURE — 93000 ELECTROCARDIOGRAM COMPLETE: CPT | Performed by: PHYSICIAN ASSISTANT

## 2025-03-18 PROCEDURE — G8400 PT W/DXA NO RESULTS DOC: HCPCS | Performed by: PHYSICIAN ASSISTANT

## 2025-03-18 PROCEDURE — 1159F MED LIST DOCD IN RCRD: CPT | Performed by: PHYSICIAN ASSISTANT

## 2025-03-18 PROCEDURE — 3074F SYST BP LT 130 MM HG: CPT | Performed by: PHYSICIAN ASSISTANT

## 2025-03-18 PROCEDURE — 1090F PRES/ABSN URINE INCON ASSESS: CPT | Performed by: PHYSICIAN ASSISTANT

## 2025-03-18 PROCEDURE — 99214 OFFICE O/P EST MOD 30 MIN: CPT | Performed by: PHYSICIAN ASSISTANT

## 2025-03-18 PROCEDURE — 1036F TOBACCO NON-USER: CPT | Performed by: PHYSICIAN ASSISTANT

## 2025-03-18 PROCEDURE — 1123F ACP DISCUSS/DSCN MKR DOCD: CPT | Performed by: PHYSICIAN ASSISTANT

## 2025-03-18 PROCEDURE — 3078F DIAST BP <80 MM HG: CPT | Performed by: PHYSICIAN ASSISTANT

## 2025-03-18 PROCEDURE — G8419 CALC BMI OUT NRM PARAM NOF/U: HCPCS | Performed by: PHYSICIAN ASSISTANT

## 2025-03-18 RX ORDER — PRASUGREL 10 MG/1
10 TABLET, FILM COATED ORAL DAILY
COMMUNITY
End: 2025-03-18 | Stop reason: ALTCHOICE

## 2025-03-18 RX ORDER — ISOSORBIDE MONONITRATE 60 MG/1
60 TABLET, EXTENDED RELEASE ORAL DAILY
Qty: 90 TABLET | Refills: 3 | Status: SHIPPED | OUTPATIENT
Start: 2025-03-18

## 2025-03-18 RX ORDER — CLOPIDOGREL BISULFATE 75 MG/1
75 TABLET ORAL DAILY
Qty: 90 TABLET | Refills: 3 | Status: SHIPPED | OUTPATIENT
Start: 2025-03-18

## 2025-03-18 RX ORDER — NITROGLYCERIN 0.4 MG/1
0.4 TABLET SUBLINGUAL EVERY 5 MIN PRN
Qty: 25 TABLET | Refills: 0 | Status: SHIPPED | OUTPATIENT
Start: 2025-03-18

## 2025-03-18 NOTE — PROGRESS NOTES
valve was trileaflet with normal thickness and cuspal   separation. DOPPLER: Transaortic velocity was within the normal range with   no evidence of aortic stenosis. There was no evidence of aortic   regurgitation.      Tricuspid Valve   The tricuspid valve structure was normal with normal leaflet separation.   DOPPLER: There was no evidence of tricuspid stenosis. There was evidence   of Mild tricuspid regurgitation.      Pulmonic Valve   Pulmonic valve is structurally normal.   Trivial pulmonic regurgitation visualized.      Left Atrium   Left atrial size was normal.      Left Ventricle   Left ventricle size is normal.   Normal left ventricular wall thickness.   Normal left ventricular wall thickness.   There was mild global hypokinesis of the left ventricle.   Ejection fraction is visually estimated in the range of 45% to 50%.      Right Atrium   Right atrial size was normal.      Right Ventricle   The right ventricular size was normal with normal systolic function and   wall thickness.      Pericardial Effusion   The pericardium was normal in appearance with no evidence of a pericardial   effusion.      Pleural Effusion   No evidence of pleural effusion.      Aorta / Great Vessels   -Aortic root dimension within normal limits.   -The Pulmonary artery is within normal limits.   -IVC size is within normal limits with normal respiratory phasic changes.     M-Mode/2D Measurements & Calculations      LV Diastolic   LV Systolic Dimension:    AV Cusp Separation: 1.8 cmLA   Dimension: 4.4 3.7 cm                    Dimension: 3.2 cmAO Root   cm             LV Volume Diastolic: 73.6 Dimension: 3.1 cmLA Area: 14.4   LV FS:15.9 %   ml                        cm^2   LV PW          LV Volume Systolic: 44 ml   Diastolic: 1.1 LV EDV/LV EDV Index: 73.6   cm             ml/43 m^2LV ESV/LV ESV   Septum         Index: 44 ml/26 m^2       RV Diastolic Dimension: 3.2 cm   Diastolic: 0.9 EF Calculated: 40.2 %   cm

## 2025-03-24 ENCOUNTER — HOSPITAL ENCOUNTER (OUTPATIENT)
Dept: ULTRASOUND IMAGING | Age: 75
Discharge: HOME | End: 2025-03-24
Attending: FAMILY MEDICINE
Payer: MEDICARE

## 2025-03-24 DIAGNOSIS — R10.11 ABDOMINAL PAIN, RIGHT UPPER QUADRANT: ICD-10-CM

## 2025-03-24 PROCEDURE — 76705 ECHO EXAM OF ABDOMEN: CPT

## 2025-03-28 ENCOUNTER — TELEPHONE (OUTPATIENT)
Dept: CARDIOLOGY CLINIC | Age: 75
End: 2025-03-28

## 2025-03-28 NOTE — TELEPHONE ENCOUNTER
Pt LMOVM stating she had questions for Dr. Salinas.     Returned call, no answer. LMOVM requesting CB to see what questions she has.

## 2025-03-31 NOTE — TELEPHONE ENCOUNTER
Spoke to patient, she may have to have surgery for gall stones.  She is asking for a surgeon recommendation from Dr Salinas at [facility] or [place] if he doesn't know anyone at [facility].  Advised patient that this will likely be better discussed with PCP as Dr Salinas may not be able to recommend anyone.  She trusts Dr Salinas and wants his opinion ///

## 2025-03-31 NOTE — TELEPHONE ENCOUNTER
Spoke with pt.  Pt refuses to see Dr. Yeh due to doing a surgery wrong on her .  Pt is wanting to see someone at [facility].    Any recommendations?

## 2025-04-01 NOTE — TELEPHONE ENCOUNTER
Pt notified.  She is going to check with her brother in law today and call us back so we can send the referral.

## 2025-04-03 ENCOUNTER — APPOINTMENT (OUTPATIENT)
Dept: CT IMAGING | Age: 75
DRG: 392 | End: 2025-04-03
Payer: MEDICARE

## 2025-04-03 ENCOUNTER — HOSPITAL ENCOUNTER (INPATIENT)
Age: 75
LOS: 2 days | Discharge: HOME | DRG: 392 | End: 2025-04-05
Attending: EMERGENCY MEDICINE | Admitting: STUDENT IN AN ORGANIZED HEALTH CARE EDUCATION/TRAINING PROGRAM
Payer: MEDICARE

## 2025-04-03 DIAGNOSIS — R79.89 ELEVATED TROPONIN: Primary | ICD-10-CM

## 2025-04-03 DIAGNOSIS — R79.89 ELEVATED BRAIN NATRIURETIC PEPTIDE (BNP) LEVEL: ICD-10-CM

## 2025-04-03 DIAGNOSIS — K52.9 ILEITIS: ICD-10-CM

## 2025-04-03 DIAGNOSIS — R11.2 NAUSEA AND VOMITING, UNSPECIFIED VOMITING TYPE: ICD-10-CM

## 2025-04-03 LAB
ALBUMIN SERPL BCG-MCNC: 3.4 G/DL (ref 3.4–4.9)
ALP SERPL-CCNC: 89 U/L (ref 35–104)
ALT SERPL W/O P-5'-P-CCNC: 11 U/L (ref 10–35)
AMYLASE SERPL-CCNC: 36 U/L (ref 28–100)
ANION GAP SERPL CALC-SCNC: 10 MEQ/L (ref 8–16)
AST SERPL-CCNC: 19 U/L (ref 10–35)
BASOPHILS ABSOLUTE: 0 THOU/MM3 (ref 0–0.1)
BASOPHILS NFR BLD AUTO: 0.3 %
BILIRUB CONJ SERPL-MCNC: 0.4 MG/DL (ref 0–0.2)
BILIRUB SERPL-MCNC: 0.9 MG/DL (ref 0.3–1.2)
BILIRUB UR QL STRIP.AUTO: NEGATIVE
BUN SERPL-MCNC: 12 MG/DL (ref 8–23)
CA-I BLD ISE-SCNC: 1.15 MMOL/L (ref 1.12–1.32)
CALCIUM SERPL-MCNC: 8.5 MG/DL (ref 8.8–10.2)
CHARACTER UR: CLEAR
CHLORIDE SERPL-SCNC: 106 MEQ/L (ref 98–111)
CO2 SERPL-SCNC: 25 MEQ/L (ref 22–29)
COLOR, UA: YELLOW
CREAT SERPL-MCNC: 0.9 MG/DL (ref 0.5–0.9)
CRP SERPL-MCNC: 2.26 MG/DL (ref 0–0.5)
DEPRECATED RDW RBC AUTO: 47.7 FL (ref 35–45)
EKG ATRIAL RATE: 97 BPM
EKG P-R INTERVAL: 160 MS
EKG Q-T INTERVAL: 370 MS
EKG QRS DURATION: 90 MS
EKG QTC CALCULATION (BAZETT): 469 MS
EKG R AXIS: 31 DEGREES
EKG T AXIS: 80 DEGREES
EKG VENTRICULAR RATE: 97 BPM
EOSINOPHIL NFR BLD AUTO: 0.5 %
EOSINOPHILS ABSOLUTE: 0 THOU/MM3 (ref 0–0.4)
ERYTHROCYTE [DISTWIDTH] IN BLOOD BY AUTOMATED COUNT: 14.3 % (ref 11.5–14.5)
ERYTHROCYTE [SEDIMENTATION RATE] IN BLOOD BY WESTERGREN METHOD: 8 MM/HR (ref 0–20)
GFR SERPL CREATININE-BSD FRML MDRD: 66 ML/MIN/1.73M2
GLUCOSE BLD STRIP.AUTO-MCNC: 106 MG/DL (ref 70–108)
GLUCOSE SERPL-MCNC: 118 MG/DL (ref 74–109)
GLUCOSE UR QL STRIP.AUTO: NEGATIVE MG/DL
HCT VFR BLD AUTO: 39.9 % (ref 37–47)
HGB BLD-MCNC: 13 GM/DL (ref 12–16)
HGB UR QL STRIP.AUTO: NEGATIVE
IMM GRANULOCYTES # BLD AUTO: 0.03 THOU/MM3 (ref 0–0.07)
IMM GRANULOCYTES NFR BLD AUTO: 0.4 %
KETONES UR QL STRIP.AUTO: ABNORMAL
LACTATE SERPL-SCNC: 1.3 MMOL/L (ref 0.5–2)
LIPASE SERPL-CCNC: 16 U/L (ref 13–60)
LYMPHOCYTES ABSOLUTE: 0.6 THOU/MM3 (ref 1–4.8)
LYMPHOCYTES NFR BLD AUTO: 7.4 %
MAGNESIUM SERPL-MCNC: 1.7 MG/DL (ref 1.6–2.6)
MCH RBC QN AUTO: 29.4 PG (ref 26–33)
MCHC RBC AUTO-ENTMCNC: 32.6 GM/DL (ref 32.2–35.5)
MCV RBC AUTO: 90.3 FL (ref 81–99)
MONOCYTES ABSOLUTE: 0.3 THOU/MM3 (ref 0.4–1.3)
MONOCYTES NFR BLD AUTO: 3.5 %
NEUTROPHILS ABSOLUTE: 6.8 THOU/MM3 (ref 1.8–7.7)
NEUTROPHILS NFR BLD AUTO: 87.9 %
NITRITE UR QL STRIP: NEGATIVE
NRBC BLD AUTO-RTO: 0 /100 WBC
NT-PROBNP SERPL IA-MCNC: 3801 PG/ML (ref 0–449)
OSMOLALITY SERPL CALC.SUM OF ELEC: 282.1 MOSMOL/KG (ref 275–300)
OSMOLALITY SERPL: 307 MOSMOL/KG (ref 275–295)
PH UR STRIP.AUTO: 7 [PH] (ref 5–9)
PHOSPHATE SERPL-MCNC: 3.5 MG/DL (ref 2.5–4.5)
PLATELET # BLD AUTO: 227 THOU/MM3 (ref 130–400)
PMV BLD AUTO: 9.1 FL (ref 9.4–12.4)
POTASSIUM SERPL-SCNC: 4.5 MEQ/L (ref 3.5–5.2)
PROT SERPL-MCNC: 5.8 G/DL (ref 6.4–8.3)
PROT UR STRIP.AUTO-MCNC: NEGATIVE MG/DL
RBC # BLD AUTO: 4.42 MILL/MM3 (ref 4.2–5.4)
SODIUM SERPL-SCNC: 141 MEQ/L (ref 135–145)
SP GR UR REFRACT.AUTO: > 1.03 (ref 1–1.03)
TROPONIN, HIGH SENSITIVITY: 18 NG/L (ref 0–12)
TROPONIN, HIGH SENSITIVITY: 29 NG/L (ref 0–12)
UROBILINOGEN, URINE: 0.2 EU/DL (ref 0–1)
WBC # BLD AUTO: 7.7 THOU/MM3 (ref 4.8–10.8)
WBC #/AREA URNS HPF: NEGATIVE /[HPF]

## 2025-04-03 PROCEDURE — 99285 EMERGENCY DEPT VISIT HI MDM: CPT

## 2025-04-03 PROCEDURE — 80053 COMPREHEN METABOLIC PANEL: CPT

## 2025-04-03 PROCEDURE — 96375 TX/PRO/DX INJ NEW DRUG ADDON: CPT

## 2025-04-03 PROCEDURE — 84484 ASSAY OF TROPONIN QUANT: CPT

## 2025-04-03 PROCEDURE — 83930 ASSAY OF BLOOD OSMOLALITY: CPT

## 2025-04-03 PROCEDURE — 93005 ELECTROCARDIOGRAM TRACING: CPT | Performed by: STUDENT IN AN ORGANIZED HEALTH CARE EDUCATION/TRAINING PROGRAM

## 2025-04-03 PROCEDURE — 6360000002 HC RX W HCPCS

## 2025-04-03 PROCEDURE — 81003 URINALYSIS AUTO W/O SCOPE: CPT

## 2025-04-03 PROCEDURE — 85025 COMPLETE CBC W/AUTO DIFF WBC: CPT

## 2025-04-03 PROCEDURE — 83605 ASSAY OF LACTIC ACID: CPT

## 2025-04-03 PROCEDURE — 74177 CT ABD & PELVIS W/CONTRAST: CPT

## 2025-04-03 PROCEDURE — 82248 BILIRUBIN DIRECT: CPT

## 2025-04-03 PROCEDURE — 99223 1ST HOSP IP/OBS HIGH 75: CPT | Performed by: STUDENT IN AN ORGANIZED HEALTH CARE EDUCATION/TRAINING PROGRAM

## 2025-04-03 PROCEDURE — 93010 ELECTROCARDIOGRAM REPORT: CPT | Performed by: INTERNAL MEDICINE

## 2025-04-03 PROCEDURE — 84100 ASSAY OF PHOSPHORUS: CPT

## 2025-04-03 PROCEDURE — 83690 ASSAY OF LIPASE: CPT

## 2025-04-03 PROCEDURE — 2580000003 HC RX 258: Performed by: STUDENT IN AN ORGANIZED HEALTH CARE EDUCATION/TRAINING PROGRAM

## 2025-04-03 PROCEDURE — 82948 REAGENT STRIP/BLOOD GLUCOSE: CPT

## 2025-04-03 PROCEDURE — 6360000004 HC RX CONTRAST MEDICATION

## 2025-04-03 PROCEDURE — 36415 COLL VENOUS BLD VENIPUNCTURE: CPT

## 2025-04-03 PROCEDURE — 82150 ASSAY OF AMYLASE: CPT

## 2025-04-03 PROCEDURE — 96374 THER/PROPH/DIAG INJ IV PUSH: CPT

## 2025-04-03 PROCEDURE — 85651 RBC SED RATE NONAUTOMATED: CPT

## 2025-04-03 PROCEDURE — 83880 ASSAY OF NATRIURETIC PEPTIDE: CPT

## 2025-04-03 PROCEDURE — 82330 ASSAY OF CALCIUM: CPT

## 2025-04-03 PROCEDURE — 2580000003 HC RX 258

## 2025-04-03 PROCEDURE — 83735 ASSAY OF MAGNESIUM: CPT

## 2025-04-03 PROCEDURE — 6370000000 HC RX 637 (ALT 250 FOR IP)

## 2025-04-03 PROCEDURE — 1200000003 HC TELEMETRY R&B

## 2025-04-03 PROCEDURE — 86140 C-REACTIVE PROTEIN: CPT

## 2025-04-03 RX ORDER — POLYETHYLENE GLYCOL 3350 17 G/17G
17 POWDER, FOR SOLUTION ORAL DAILY PRN
Status: DISCONTINUED | OUTPATIENT
Start: 2025-04-03 | End: 2025-04-05 | Stop reason: HOSPADM

## 2025-04-03 RX ORDER — DIPHENHYDRAMINE HYDROCHLORIDE 50 MG/ML
25 INJECTION, SOLUTION INTRAMUSCULAR; INTRAVENOUS ONCE
Status: COMPLETED | OUTPATIENT
Start: 2025-04-03 | End: 2025-04-03

## 2025-04-03 RX ORDER — BUDESONIDE AND FORMOTEROL FUMARATE DIHYDRATE 160; 4.5 UG/1; UG/1
2 AEROSOL RESPIRATORY (INHALATION)
Status: DISCONTINUED | OUTPATIENT
Start: 2025-04-03 | End: 2025-04-05 | Stop reason: HOSPADM

## 2025-04-03 RX ORDER — ONDANSETRON 2 MG/ML
4 INJECTION INTRAMUSCULAR; INTRAVENOUS ONCE
Status: COMPLETED | OUTPATIENT
Start: 2025-04-03 | End: 2025-04-03

## 2025-04-03 RX ORDER — SODIUM CHLORIDE 9 MG/ML
INJECTION, SOLUTION INTRAVENOUS PRN
Status: DISCONTINUED | OUTPATIENT
Start: 2025-04-03 | End: 2025-04-05 | Stop reason: HOSPADM

## 2025-04-03 RX ORDER — ALBUTEROL SULFATE 0.83 MG/ML
2.5 SOLUTION RESPIRATORY (INHALATION) EVERY 6 HOURS PRN
Status: DISCONTINUED | OUTPATIENT
Start: 2025-04-03 | End: 2025-04-05 | Stop reason: HOSPADM

## 2025-04-03 RX ORDER — AMIODARONE HYDROCHLORIDE 200 MG/1
100 TABLET ORAL DAILY
Status: CANCELLED | OUTPATIENT
Start: 2025-04-03

## 2025-04-03 RX ORDER — POTASSIUM CHLORIDE 7.45 MG/ML
10 INJECTION INTRAVENOUS PRN
Status: DISCONTINUED | OUTPATIENT
Start: 2025-04-03 | End: 2025-04-05 | Stop reason: HOSPADM

## 2025-04-03 RX ORDER — POTASSIUM CHLORIDE 1500 MG/1
40 TABLET, EXTENDED RELEASE ORAL PRN
Status: DISCONTINUED | OUTPATIENT
Start: 2025-04-03 | End: 2025-04-05 | Stop reason: HOSPADM

## 2025-04-03 RX ORDER — SODIUM CHLORIDE 9 MG/ML
INJECTION, SOLUTION INTRAVENOUS CONTINUOUS
Status: ACTIVE | OUTPATIENT
Start: 2025-04-03 | End: 2025-04-04

## 2025-04-03 RX ORDER — MORPHINE SULFATE 4 MG/ML
4 INJECTION, SOLUTION INTRAMUSCULAR; INTRAVENOUS ONCE
Refills: 0 | Status: COMPLETED | OUTPATIENT
Start: 2025-04-03 | End: 2025-04-03

## 2025-04-03 RX ORDER — METOPROLOL TARTRATE 25 MG/1
25 TABLET, FILM COATED ORAL 2 TIMES DAILY
Status: DISCONTINUED | OUTPATIENT
Start: 2025-04-04 | End: 2025-04-05 | Stop reason: HOSPADM

## 2025-04-03 RX ORDER — ONDANSETRON 2 MG/ML
4 INJECTION INTRAMUSCULAR; INTRAVENOUS ONCE
Status: DISCONTINUED | OUTPATIENT
Start: 2025-04-03 | End: 2025-04-03

## 2025-04-03 RX ORDER — ONDANSETRON 4 MG/1
4 TABLET, ORALLY DISINTEGRATING ORAL EVERY 8 HOURS PRN
Status: DISCONTINUED | OUTPATIENT
Start: 2025-04-03 | End: 2025-04-05 | Stop reason: HOSPADM

## 2025-04-03 RX ORDER — MAGNESIUM SULFATE IN WATER 40 MG/ML
2000 INJECTION, SOLUTION INTRAVENOUS PRN
Status: DISCONTINUED | OUTPATIENT
Start: 2025-04-03 | End: 2025-04-05 | Stop reason: HOSPADM

## 2025-04-03 RX ORDER — ACETAMINOPHEN 325 MG/1
650 TABLET ORAL EVERY 6 HOURS PRN
Status: DISCONTINUED | OUTPATIENT
Start: 2025-04-03 | End: 2025-04-05 | Stop reason: HOSPADM

## 2025-04-03 RX ORDER — CLOPIDOGREL BISULFATE 75 MG/1
75 TABLET ORAL DAILY
Status: DISCONTINUED | OUTPATIENT
Start: 2025-04-04 | End: 2025-04-05 | Stop reason: HOSPADM

## 2025-04-03 RX ORDER — FUROSEMIDE 40 MG/1
20 TABLET ORAL DAILY
Status: CANCELLED | OUTPATIENT
Start: 2025-04-03

## 2025-04-03 RX ORDER — ALPRAZOLAM 0.5 MG
0.5 TABLET ORAL NIGHTLY PRN
Status: DISCONTINUED | OUTPATIENT
Start: 2025-04-03 | End: 2025-04-04 | Stop reason: ALTCHOICE

## 2025-04-03 RX ORDER — ONDANSETRON 2 MG/ML
4 INJECTION INTRAMUSCULAR; INTRAVENOUS EVERY 6 HOURS PRN
Status: DISCONTINUED | OUTPATIENT
Start: 2025-04-03 | End: 2025-04-05 | Stop reason: HOSPADM

## 2025-04-03 RX ORDER — ACETAMINOPHEN 650 MG/1
650 SUPPOSITORY RECTAL EVERY 6 HOURS PRN
Status: DISCONTINUED | OUTPATIENT
Start: 2025-04-03 | End: 2025-04-05 | Stop reason: HOSPADM

## 2025-04-03 RX ORDER — SODIUM CHLORIDE 0.9 % (FLUSH) 0.9 %
5-40 SYRINGE (ML) INJECTION PRN
Status: DISCONTINUED | OUTPATIENT
Start: 2025-04-03 | End: 2025-04-05 | Stop reason: HOSPADM

## 2025-04-03 RX ORDER — AMIODARONE HYDROCHLORIDE 200 MG/1
200 TABLET ORAL DAILY
Status: DISCONTINUED | OUTPATIENT
Start: 2025-04-03 | End: 2025-04-05 | Stop reason: HOSPADM

## 2025-04-03 RX ORDER — ATORVASTATIN CALCIUM 80 MG/1
80 TABLET, FILM COATED ORAL NIGHTLY
Status: DISCONTINUED | OUTPATIENT
Start: 2025-04-04 | End: 2025-04-05 | Stop reason: HOSPADM

## 2025-04-03 RX ORDER — IOPAMIDOL 755 MG/ML
80 INJECTION, SOLUTION INTRAVASCULAR
Status: COMPLETED | OUTPATIENT
Start: 2025-04-03 | End: 2025-04-03

## 2025-04-03 RX ORDER — SODIUM CHLORIDE 0.9 % (FLUSH) 0.9 %
5-40 SYRINGE (ML) INJECTION EVERY 12 HOURS SCHEDULED
Status: DISCONTINUED | OUTPATIENT
Start: 2025-04-03 | End: 2025-04-05 | Stop reason: HOSPADM

## 2025-04-03 RX ADMIN — DIPHENHYDRAMINE HYDROCHLORIDE 25 MG: 50 INJECTION INTRAMUSCULAR; INTRAVENOUS at 10:45

## 2025-04-03 RX ADMIN — SODIUM CHLORIDE: 0.9 INJECTION, SOLUTION INTRAVENOUS at 19:03

## 2025-04-03 RX ADMIN — AZITHROMYCIN MONOHYDRATE 500 MG: 500 INJECTION, POWDER, LYOPHILIZED, FOR SOLUTION INTRAVENOUS at 22:01

## 2025-04-03 RX ADMIN — MORPHINE SULFATE 4 MG: 4 INJECTION, SOLUTION INTRAMUSCULAR; INTRAVENOUS at 10:46

## 2025-04-03 RX ADMIN — ONDANSETRON 4 MG: 2 INJECTION, SOLUTION INTRAMUSCULAR; INTRAVENOUS at 13:52

## 2025-04-03 RX ADMIN — ONDANSETRON 4 MG: 4 TABLET, ORALLY DISINTEGRATING ORAL at 18:02

## 2025-04-03 RX ADMIN — IOPAMIDOL 80 ML: 755 INJECTION, SOLUTION INTRAVENOUS at 11:57

## 2025-04-03 ASSESSMENT — PAIN DESCRIPTION - FREQUENCY: FREQUENCY: CONTINUOUS

## 2025-04-03 ASSESSMENT — PAIN SCALES - GENERAL
PAINLEVEL_OUTOF10: 5
PAINLEVEL_OUTOF10: 4
PAINLEVEL_OUTOF10: 6
PAINLEVEL_OUTOF10: 5
PAINLEVEL_OUTOF10: 2

## 2025-04-03 ASSESSMENT — PAIN DESCRIPTION - LOCATION
LOCATION: ABDOMEN

## 2025-04-03 ASSESSMENT — PAIN DESCRIPTION - PAIN TYPE: TYPE: ACUTE PAIN

## 2025-04-03 ASSESSMENT — PAIN DESCRIPTION - ONSET: ONSET: ON-GOING

## 2025-04-03 ASSESSMENT — PAIN - FUNCTIONAL ASSESSMENT: PAIN_FUNCTIONAL_ASSESSMENT: ACTIVITIES ARE NOT PREVENTED

## 2025-04-03 ASSESSMENT — PAIN DESCRIPTION - DESCRIPTORS: DESCRIPTORS: TENDER

## 2025-04-03 ASSESSMENT — HEART SCORE: ECG: NON-SPECIFC REPOLARIZATION DISTURBANCE/LBTB/PM

## 2025-04-03 ASSESSMENT — PAIN DESCRIPTION - ORIENTATION: ORIENTATION: LEFT;UPPER

## 2025-04-03 NOTE — ED NOTES
Pt appears restful on cart in position of comfort, RR easy and non labored. States pain mostly unchanged but nausea resolved. Updated on need for urine when able- provided ice water per request.

## 2025-04-03 NOTE — ED NOTES
Pt states nausea came back after she got up to use that bathroom. Provider aware, orders received.

## 2025-04-03 NOTE — ED PROVIDER NOTES
[facility] EMERGENCY DEPARTMENT  EMERGENCY DEPARTMENT ENCOUNTER          Pt Name: Irene Marquez  MRN: [id number]  Birthdate 1950  Date of evaluation: 4/3/2025  Resident Physician: Mickey Rubio MD EM Resident PGY-3  Attending Physician: Cherelle Carmichael MD      CHIEF COMPLAINT       Chief Complaint   Patient presents with    Abdominal Pain    Nausea         HISTORY OF PRESENT ILLNESS    HPI  Irene Marquez is a 75 y.o. female who presents to the emergency department from home, brought in by EMS for evaluation of abdominal pain, emesis.    Patient with cholelithiasis last performed by outpatient ultrasound and HIDA scan performed within the last month.  Patient was told that she is not a surgical candidate due to poor cardiac function and risk factors.  Patient states that she began to have right upper quadrant abdominal pain with nonbloody emesis starting early this morning that has not gotten better at home.  Denies any fall or injury.  The patient has no other acute complaints at this time.    ROS negative except as stated above.    PAST MEDICAL AND SURGICAL HISTORY     Past Medical History:   Diagnosis Date    Asthma     Atrial fibrillation (HCC)     Blood clot associated with vein wall inflammation     CAD (coronary artery disease)     Cerebral artery occlusion with cerebral infarction (HCC)     CHF (congestive heart failure) (HCC)     COPD (chronic obstructive pulmonary disease) (HCC)     History of blood transfusion     Hx of blood clots     Hyperlipidemia     Hypertension     MI, old     Migraine     Pneumonia     PONV (postoperative nausea and vomiting)     Psychiatric problem      Past Surgical History:   Procedure Laterality Date    APPENDECTOMY      CARDIAC SURGERY  1998 in [place]    pericardial window    CARDIAC SURGERY  2011 [facility]    COLONOSCOPY      CORONARY ANGIOPLASTY WITH STENT PLACEMENT  2013     [facility]    CORONARY ARTERY BYPASS GRAFT      ENDOSCOPY, COLON,  Place 1 tablet under the tongue every 5 minutes as needed for Chest pain    SPACER/AERO-HOLDING CHAMBERS (AEROCHAMBER MV) MISC    1 each by Does not apply route in the morning and at bedtime With Symbicort inhaler    TRAMADOL (ULTRAM) 50 MG TABLET        UNABLE TO FIND    Liquid cough medicine with codeine per patient.         SOCIAL HISTORY     Social History     Social History Narrative    Not on file     Social History     Tobacco Use    Smoking status: Former     Current packs/day: 0.00     Average packs/day: 1 pack/day for 27.0 years (27.0 ttl pk-yrs)     Types: Cigarettes     Start date: 3/1/1964     Quit date: 3/1/1991     Years since quittin.1    Smokeless tobacco: Never   Vaping Use    Vaping status: Never Used   Substance Use Topics    Alcohol use: Yes     Comment: occasional    Drug use: No         ALLERGIES     Allergies   Allergen Reactions    Celebrex [Celecoxib] Anaphylaxis     Unable to breath Turns red all over    Contrast [Iodides] Hives    Statins      Will obtain lipid panel and re-evaluate.   Takes lipitor at home    Naprosyn [Naproxen] Nausea And Vomiting    Shrimp Flavor Agent (Non-Screening) Nausea And Vomiting         FAMILY HISTORY     Family History   Problem Relation Age of Onset    Diabetes Mother     Heart Disease Mother     High Blood Pressure Mother     Heart Disease Father     Cancer Sister         luekemia  passed age 32    Cancer Sister     Heart Disease Sister         atrial fib    Breast Cancer Sister 70    Stroke Sister     Cancer Brother         lung passed age 43    Cancer Brother         throat cancer     Heart Disease Brother         needs stents and has atrial fib    Diabetes Brother     Heart Disease Maternal Grandmother         MI    Heart Disease Maternal Grandfather         MI    Stroke Paternal Grandfather     Heart Disease Maternal Aunt         MI    Heart Disease Maternal Uncle         MI    Breast Cancer Paternal Aunt 29    Breast Cancer Paternal Aunt 55  (None if blank):  CT ABDOMEN PELVIS W IV CONTRAST Additional Contrast? None   Final Result   1.  Long segment wall wall thickening is seen in the ileum. This can relate to   inflammatory/infectious enteritis. Ischemic enteritis is not excluded. Correlate   with lactate level.   2. Small ascites and small pelvic fluid.   3. Cholelithiasis.   4. Parapelvic renal cyst. No imaging follow up is required based on ACR   consensus guidelines. Any further follow up should be based on patient's risk of   malignancy.         **This report has been created using voice recognition software.  It may contain   minor errors which are inherent in voice recognition technology.**      Electronically signed by Dr. Dulce Newman        See ED course below for my interpretation if applicable.  All radiology images independently reviewed by me in the clinical context of this patient, in addition to interpretation provided by the radiologist.      EKG interpretation (none if blank):  EKG shows normal sinus rhythm rate 97 bpm, NM interval 160, QRS 90, QTc 469, nonspecific ST changes leads V1, V2 similar to prior EKG.    All EKG results are individually reviewed and interpreted by me in the clinical context of this patient.  All EKGs are also interpreted by our Cardiology department, final interpretation may not be available as of the writing of this note.      MEDICAL DECISION MAKING   Initial Assessment Summary:   Patient 75-year-old female present to emergency department today for abdominal pain nausea vomiting.  Please see ED course section below for continuation and resolution of this initial assessment if applicable.      Comorbid conditions pertinent to this ED encounter:  CAD s/p stent LAD, CVA, s/p CABG, COPD, HTN, cholelithiasis, GERD    Differential Diagnosis includes but is not limited to:  ACS  Cholecystitis  Cholangitis  Cholelithiasis  CHF  Pancreatitis  Diverticulitis  Colitis      Decision Rules/Clinical Scores utilized:   unspecified vomiting type   Ileitis   Elevated troponin   Elevated brain natriuretic peptide (BNP) level       Condition: condition: stable  Dispo: Admit to hospitalist  DISPOSITION Decision To Admit 04/03/2025 02:12:31 PM   DISPOSITION CONDITION Stable           This transcription was electronically signed. It was dictated by use of voice recognition software and electronically transcribed. The transcription may contain errors not detected in proofreading.

## 2025-04-03 NOTE — ED TRIAGE NOTES
Pt to rm 06 per EMS reports n/v/d since 0200 this morning- states similar episode 2 weeks ago where she states she was dx w/gallstones but she is not a surgical candidate d/t cardiac problems. Pt states she tried taking her phenergan at home but continued to vomit so she thinks it could have come up. On arrival pt appears in no acute distress, VSS. States pain 5/10 at this time. States having a  salad for dinner last night from [employer]- last emesis was before she called EMS. EKG and assessment complete. Pt given 4mg Zofran PTA per EMS.

## 2025-04-03 NOTE — TELEPHONE ENCOUNTER
I called patient, she states she is very sick with vomiting and diarrhea.   I advised ER.  She states she will call her sister to see if she can help get her to ER.  I advised her if her sister unable to help she needs to call 911 for help, she verbalized understanding.   Will reach out to patient later today to check on her.

## 2025-04-03 NOTE — ED PROVIDER NOTES

## 2025-04-03 NOTE — H&P
History & Physical  Internal Medicine Resident         Patient: Irene Marquez 75 y.o. female      : 1950  Date of Admission: 4/3/2025  Date of Service: Pt seen/examined on 25 and Admitted to Inpatient with expected LOS greater than two midnights due to medical therapy.       ASSESSMENT AND PLAN  Acute gastroenteritis: CTAP revealed long segment wall thickening in the ileum concerning for infectious versus inflammatory ileitis.  Ischemic component essentially ruled out with normal lactic acid.  Keep n.p.o. with sips of clear liquids, meds, ice chips  Maintenance IVF NS 75 cc/hour  Strict I's and O's, daily weight standing  GI panel ordered  Empiric azithromycin 500 mg IV every 24 hours for 3 total doses, which covers for common causes of infectious gastroenteritis as well providing anti-inflammatory effect  Elevated troponin: Mild elevation with downtrend, 29 => 18.  EKG without notable changes from previous, patient did not endorse chest pain.  No ischemic workup indicated.  Pancreatic atrophy: Visualized on CTAP, exocrine pancreatic insufficiency could theoretically have contributed to diarrhea although unlikely to be accompanied by significant vomiting and unlikely to have presented in such acute fashion.  Despite that, will check for pancreatic insufficiency.  Fecal elastase ordered to assess for pancreatic insufficiency  Paroxysmal A-fib: [id number], has bled 3.  Follows with cardiology.  Home meds include amiodarone, Lopressor, Eliquis.  Patient had previously stopped Eliquis due to concerns for bleeding, heart electrical  Continue amiodarone 200 mg daily  Continue Lopressor 25 mg twice daily  Continue Eliquis 2.5 mg twice daily  If patient is not tolerating oral intake consider heparin drip in lieu of Eliquis   Cholelithiasis: Several gallstones visualized on CTAP, no elevation in alk phos, no transaminitis, no fever, hemodynamically stable, no leukocytosis.  Cholecystitis and/or    Case was discussed with the Attending, Dr. More Estevez.

## 2025-04-03 NOTE — TELEPHONE ENCOUNTER
Pt currently admitted.   Keep encounter open.   She was supposed to get back to us regarding who she wanted referred to.   Will need to call patient once discharged from hospital.

## 2025-04-03 NOTE — ED NOTES
ED to inpatient nurses report      Chief Complaint:  Chief Complaint   Patient presents with    Abdominal Pain    Nausea     Present to ED from: Home    MOA:     LOC: alert and orientated to name, place, date  Mobility: Independent  Oxygen Baseline: RA    Current needs required: RA     Code Status:   Prior    What abnormal results were found and what did you give/do to treat them? Medicated per MAR. CT scan complete. No vomiting since arrival to ER after EMS medicated w/Zofran PTA. Nausea came back after ambulation to BR.   Any procedures or intervention occur?     Mental Status:  Level of Consciousness: Alert (0)    Psych Assessment:        Vitals:  Patient Vitals for the past 24 hrs:   BP Temp Temp src Pulse Resp SpO2 Weight   04/03/25 1350 (!) 116/56 -- -- 88 16 95 % --   04/03/25 1250 122/69 -- -- 87 16 95 % --   04/03/25 1209 138/63 -- -- 84 16 96 % --   04/03/25 1050 120/68 -- -- 94 16 97 % --   04/03/25 1005 131/70 99 °F (37.2 °C) Oral 90 16 98 % 66.2 kg (146 lb)        LDAs:   Peripheral IV 04/03/25 Left;Proximal Forearm (Active)   Site Assessment Clean, dry & intact 04/03/25 1048   Line Status Flushed;Capped 04/03/25 1048       Ambulatory Status:  No data recorded    Diagnosis:  DISPOSITION Decision To Admit 04/03/2025 02:12:31 PM   Final diagnoses:   Nausea and vomiting, unspecified vomiting type   Ileitis   Elevated troponin   Elevated brain natriuretic peptide (BNP) level        Consults:  IP CONSULT TO HOSPITALIST     Pain Score:  Pain Assessment  Pain Assessment: 0-10  Pain Level: 6  Pain Location: Abdomen    C-SSRS:   Risk of Suicide: No Risk    Sepsis Screening:       Heaters Fall Risk:       Swallow Screening        Preferred Language:   English      ALLERGIES     Celebrex [celecoxib], Contrast [iodides], Statins, Naprosyn [naproxen], and Shrimp flavor agent (non-screening)    SURGICAL HISTORY       Past Surgical History:   Procedure Laterality Date    APPENDECTOMY      CARDIAC SURGERY  1998 in

## 2025-04-04 ENCOUNTER — APPOINTMENT (OUTPATIENT)
Dept: MRI IMAGING | Age: 75
DRG: 392 | End: 2025-04-04
Payer: MEDICARE

## 2025-04-04 PROBLEM — R11.2 NAUSEA AND VOMITING: Status: ACTIVE | Noted: 2025-04-04

## 2025-04-04 LAB
ANION GAP SERPL CALC-SCNC: 8 MEQ/L (ref 8–16)
BUN SERPL-MCNC: 17 MG/DL (ref 8–23)
CALCIUM SERPL-MCNC: 8 MG/DL (ref 8.8–10.2)
CHLORIDE SERPL-SCNC: 107 MEQ/L (ref 98–111)
CO2 SERPL-SCNC: 25 MEQ/L (ref 22–29)
CREAT SERPL-MCNC: 1 MG/DL (ref 0.5–0.9)
DEPRECATED RDW RBC AUTO: 50 FL (ref 35–45)
EKG ATRIAL RATE: 71 BPM
EKG P AXIS: -3 DEGREES
EKG P-R INTERVAL: 186 MS
EKG Q-T INTERVAL: 406 MS
EKG QRS DURATION: 108 MS
EKG QTC CALCULATION (BAZETT): 441 MS
EKG R AXIS: 27 DEGREES
EKG T AXIS: 36 DEGREES
EKG VENTRICULAR RATE: 71 BPM
ERYTHROCYTE [DISTWIDTH] IN BLOOD BY AUTOMATED COUNT: 14.9 % (ref 11.5–14.5)
GFR SERPL CREATININE-BSD FRML MDRD: 58 ML/MIN/1.73M2
GLUCOSE SERPL-MCNC: 98 MG/DL (ref 74–109)
HCT VFR BLD AUTO: 34.6 % (ref 37–47)
HGB BLD-MCNC: 11.2 GM/DL (ref 12–16)
LACTATE SERPL-SCNC: 1 MMOL/L (ref 0.5–2)
MAGNESIUM SERPL-MCNC: 2.1 MG/DL (ref 1.6–2.6)
MCH RBC QN AUTO: 29.6 PG (ref 26–33)
MCHC RBC AUTO-ENTMCNC: 32.4 GM/DL (ref 32.2–35.5)
MCV RBC AUTO: 91.3 FL (ref 81–99)
PLATELET # BLD AUTO: 199 THOU/MM3 (ref 130–400)
PMV BLD AUTO: 9.4 FL (ref 9.4–12.4)
POTASSIUM SERPL-SCNC: 3.9 MEQ/L (ref 3.5–5.2)
RBC # BLD AUTO: 3.79 MILL/MM3 (ref 4.2–5.4)
SODIUM SERPL-SCNC: 140 MEQ/L (ref 135–145)
WBC # BLD AUTO: 8.2 THOU/MM3 (ref 4.8–10.8)

## 2025-04-04 PROCEDURE — A9579 GAD-BASE MR CONTRAST NOS,1ML: HCPCS

## 2025-04-04 PROCEDURE — 83735 ASSAY OF MAGNESIUM: CPT

## 2025-04-04 PROCEDURE — 6360000002 HC RX W HCPCS

## 2025-04-04 PROCEDURE — 6370000000 HC RX 637 (ALT 250 FOR IP)

## 2025-04-04 PROCEDURE — 2580000003 HC RX 258: Performed by: STUDENT IN AN ORGANIZED HEALTH CARE EDUCATION/TRAINING PROGRAM

## 2025-04-04 PROCEDURE — 93010 ELECTROCARDIOGRAM REPORT: CPT | Performed by: INTERNAL MEDICINE

## 2025-04-04 PROCEDURE — 1200000003 HC TELEMETRY R&B

## 2025-04-04 PROCEDURE — 93005 ELECTROCARDIOGRAM TRACING: CPT

## 2025-04-04 PROCEDURE — 97535 SELF CARE MNGMENT TRAINING: CPT

## 2025-04-04 PROCEDURE — 74183 MRI ABD W/O CNTR FLWD CNTR: CPT

## 2025-04-04 PROCEDURE — 6360000004 HC RX CONTRAST MEDICATION

## 2025-04-04 PROCEDURE — 80048 BASIC METABOLIC PNL TOTAL CA: CPT

## 2025-04-04 PROCEDURE — 99232 SBSQ HOSP IP/OBS MODERATE 35: CPT | Performed by: STUDENT IN AN ORGANIZED HEALTH CARE EDUCATION/TRAINING PROGRAM

## 2025-04-04 PROCEDURE — 36415 COLL VENOUS BLD VENIPUNCTURE: CPT

## 2025-04-04 PROCEDURE — 83605 ASSAY OF LACTIC ACID: CPT

## 2025-04-04 PROCEDURE — 85027 COMPLETE CBC AUTOMATED: CPT

## 2025-04-04 PROCEDURE — 97165 OT EVAL LOW COMPLEX 30 MIN: CPT

## 2025-04-04 RX ORDER — ALPRAZOLAM 0.5 MG
0.25 TABLET ORAL NIGHTLY PRN
Status: DISCONTINUED | OUTPATIENT
Start: 2025-04-04 | End: 2025-04-05 | Stop reason: HOSPADM

## 2025-04-04 RX ORDER — METRONIDAZOLE 500 MG/1
500 TABLET ORAL EVERY 8 HOURS SCHEDULED
Status: DISCONTINUED | OUTPATIENT
Start: 2025-04-04 | End: 2025-04-05 | Stop reason: HOSPADM

## 2025-04-04 RX ORDER — PANTOPRAZOLE SODIUM 40 MG/10ML
40 INJECTION, POWDER, LYOPHILIZED, FOR SOLUTION INTRAVENOUS DAILY
Status: DISCONTINUED | OUTPATIENT
Start: 2025-04-04 | End: 2025-04-05 | Stop reason: HOSPADM

## 2025-04-04 RX ORDER — DIPHENHYDRAMINE HYDROCHLORIDE 50 MG/ML
25 INJECTION, SOLUTION INTRAMUSCULAR; INTRAVENOUS ONCE
Status: DISCONTINUED | OUTPATIENT
Start: 2025-04-04 | End: 2025-04-04

## 2025-04-04 RX ORDER — CIPROFLOXACIN 500 MG/1
500 TABLET, FILM COATED ORAL EVERY 12 HOURS SCHEDULED
Status: DISCONTINUED | OUTPATIENT
Start: 2025-04-04 | End: 2025-04-05 | Stop reason: HOSPADM

## 2025-04-04 RX ADMIN — ONDANSETRON 4 MG: 2 INJECTION, SOLUTION INTRAMUSCULAR; INTRAVENOUS at 21:10

## 2025-04-04 RX ADMIN — ONDANSETRON 4 MG: 2 INJECTION, SOLUTION INTRAMUSCULAR; INTRAVENOUS at 08:41

## 2025-04-04 RX ADMIN — ALPRAZOLAM 0.25 MG: 0.5 TABLET ORAL at 17:11

## 2025-04-04 RX ADMIN — CLOPIDOGREL BISULFATE 75 MG: 75 TABLET ORAL at 10:00

## 2025-04-04 RX ADMIN — SODIUM CHLORIDE: 0.9 INJECTION, SOLUTION INTRAVENOUS at 08:26

## 2025-04-04 RX ADMIN — APIXABAN 2.5 MG: 2.5 TABLET, FILM COATED ORAL at 10:00

## 2025-04-04 RX ADMIN — ATORVASTATIN CALCIUM 80 MG: 80 TABLET, FILM COATED ORAL at 21:05

## 2025-04-04 RX ADMIN — AMIODARONE HYDROCHLORIDE 200 MG: 200 TABLET ORAL at 09:59

## 2025-04-04 RX ADMIN — APIXABAN 2.5 MG: 2.5 TABLET, FILM COATED ORAL at 21:06

## 2025-04-04 RX ADMIN — METOPROLOL TARTRATE 25 MG: 25 TABLET, FILM COATED ORAL at 10:00

## 2025-04-04 RX ADMIN — PANTOPRAZOLE SODIUM 40 MG: 40 INJECTION, POWDER, FOR SOLUTION INTRAVENOUS at 13:07

## 2025-04-04 RX ADMIN — CIPROFLOXACIN HYDROCHLORIDE 500 MG: 500 TABLET, FILM COATED ORAL at 21:06

## 2025-04-04 RX ADMIN — METRONIDAZOLE 500 MG: 500 TABLET ORAL at 21:05

## 2025-04-04 RX ADMIN — METOPROLOL TARTRATE 25 MG: 25 TABLET, FILM COATED ORAL at 21:06

## 2025-04-04 RX ADMIN — GADOTERIDOL 10 ML: 279.3 INJECTION, SOLUTION INTRAVENOUS at 18:23

## 2025-04-04 ASSESSMENT — PAIN DESCRIPTION - DESCRIPTORS: DESCRIPTORS: TENDER

## 2025-04-04 ASSESSMENT — PAIN SCALES - GENERAL
PAINLEVEL_OUTOF10: 0
PAINLEVEL_OUTOF10: 5

## 2025-04-04 ASSESSMENT — ENCOUNTER SYMPTOMS
ABDOMINAL PAIN: 1
EYES NEGATIVE: 1
ALLERGIC/IMMUNOLOGIC NEGATIVE: 1
BLOOD IN STOOL: 0
DIARRHEA: 1
NAUSEA: 1
ABDOMINAL DISTENTION: 0
TROUBLE SWALLOWING: 0
ANAL BLEEDING: 0
CONSTIPATION: 0
VOMITING: 1
RESPIRATORY NEGATIVE: 1
RECTAL PAIN: 0

## 2025-04-04 ASSESSMENT — PAIN - FUNCTIONAL ASSESSMENT: PAIN_FUNCTIONAL_ASSESSMENT: ACTIVITIES ARE NOT PREVENTED

## 2025-04-04 ASSESSMENT — PAIN DESCRIPTION - LOCATION: LOCATION: ABDOMEN

## 2025-04-04 ASSESSMENT — PAIN DESCRIPTION - ONSET: ONSET: ON-GOING

## 2025-04-04 ASSESSMENT — PAIN DESCRIPTION - FREQUENCY: FREQUENCY: CONTINUOUS

## 2025-04-04 ASSESSMENT — PAIN DESCRIPTION - ORIENTATION: ORIENTATION: LEFT;UPPER

## 2025-04-04 ASSESSMENT — PAIN DESCRIPTION - PAIN TYPE: TYPE: ACUTE PAIN

## 2025-04-04 NOTE — PROGRESS NOTES
Alert and oriented to person, place, time and situation. Speech clear. Skin warm and dry. Capillary refill less than 3 seconds. Arm drift negative. Denies numbness and tingling to upper extremities. Radial pulses present. Heart sound regular. Respirations regular and unlabored. Lungs sounds clear and diminished anterior and posterior. No cough noted. Bowel sound hypoactive in all 4 quadrants. Last bowel movement 4/3. Abdomen soft and round. Discomfort with palpation of lower abdomen and left upper quadrant. States nauseated with movement. Denies numbness and tingling of upper extremities. Pedal push and pull strong bilaterally. Pedal pulses present. No edema noted. Scattered ecchymosis of lower extremities noted. Patient has history of cirrhosis, scattered crusty peeling areas on abdomen and on lower back above buttocks. Call light and possessions within reach of patient. Sister at bedside. No further needs voiced at this time.

## 2025-04-04 NOTE — SIGNIFICANT EVENT
Spoke with primary team, and confirmed with nurse no reported diarrhea today during dayshift, instructed to start Cipro and Flagyl p.o.  Will order EKG to ensure QTc is not prolonged.    Electronically signed by Jayme Hernandez MD on 4/4/25 at 7:47 PM EDT

## 2025-04-04 NOTE — CARE COORDINATION
Case Management Assessment Initial Evaluation    Date/Time of Evaluation: 2025 11:01 AM  Assessment Completed by: Cherelle Alarcon RN    If patient is discharged prior to next notation, then this note serves as note for discharge by case management.    Patient Name: Irene Marquez                   YOB: 1950  Diagnosis: Acute gastroenteritis [K52.9]  Ileitis [K52.9]  Elevated troponin [R79.89]  Elevated brain natriuretic peptide (BNP) level [R79.89]  Nausea and vomiting, unspecified vomiting type [R11.2]                   Date / Time: 4/3/2025 10:04 AM  Location: [address] [place] [address]     Patient Admission Status: Inpatient   Readmission Risk Low 0-14, Mod 15-19), High > 20: Readmission Risk Score: 13.9    Current PCP: Jerry Bryant MD  Health Care Decision Makers:     Additional Case Management Notes: Patient presented to ED with abdominal pain, N/V, diarrhea and fever. Admitted by hospitalist. GI panel ordered with no BM's since admission. IVF. PRN IV Zofran. NPO with sips and chips. Strict I&O. S/p empiric IV azithromycin.     Procedures: n/a    Imagin/3 CT A/P:  1.  Long segment wall wall thickening is seen in the ileum. This can relate to inflammatory/infectious enteritis. Ischemic enteritis is not excluded. Correlate with lactate level.  2. Small ascites and small pelvic fluid.  3. Cholelithiasis.  4. Parapelvic renal cyst. No imaging follow up is required based on ACR consensus guidelines. Any further follow up should be based on patient's risk of malignancy.       Patient Goals/Plan/Treatment Preferences: Irene plans to return home alone at discharge. She is independent and drives. Denies needs.      25 [id number]   Service Assessment   Patient Orientation Alert and Oriented   Cognition Alert   History Provided By Patient;Child/Family   Primary Caregiver Self   Accompanied By/Relationship sisters   Support Systems Family Members   Patient's Healthcare Decision Maker is: Named in Scanned ACP

## 2025-04-04 NOTE — PROGRESS NOTES
Hospitalist Progress Note  Internal Medicine Resident      Patient: Irene Marquez 75 y.o. female      Unit/Bed: 7K-16/016-A    Admit Date: 4/3/2025      ASSESSMENT AND PLAN  Active Problems    Nausea, vomiting, diarrhea: Some symptoms started abruptly at 2 AM and woke the patient from sleep with nausea, vomiting, and diarrhea.  Vomit was nonbloody.  Diarrhea was nonbloody with no clots and no dark tarry character.  Stools described as watery and brown.  Diarrhea was associated with some cramping.  She had a very similar episode couple weeks prior presented very similarly and woke her up from sleep around 1:30 AM.  CTAP showed long segment of the ileum with thickening of the bowel wall.  Lactic acid was normal.  Patient does have significant history of gallstones and was considered to be a high risk patient for any surgical intervention due to significant cardiac history dependence on anticoagulation and antiplatelets.   -Continue IV fluids  -GI consulted, MRCP ordered  -Gastrointestinal molecular panel ordered.  C. difficile toxin antigen ordered(patient has had recent antibiotic exposure)  -Strict intake and output  -Nursing to document all stool for color and consistency    Resolved Problems    Elevated troponin-likely secondary to demand ischemia:  Troponin 29, followed by 18.  EKG with no significant ischemic changes compared to previous sinus rhythm EKGs.  Symptoms would be atypical for ACS or better explained by primary GI pathology    Chronic Conditions (reviewed and stable unless otherwise stated)  Paroxysmal atrial fibrillation:  Follows with cardiology.  Medications include amiodarone Lopressor and Eliquis 2.5 mg daily..  History of acute Calculus cholecystitis status post ERCP with balloon dilation and stone removal 2022  COPD: Continue Trelegy and Symbicort  CAD status post CABG and PCI: Continue clopidogrel 75 mg daily as well as statin, Imdur 60 mg daily  History of migraines  History of  acetaminophen    Exam:  BP (!) 104/54   Pulse 69   Temp 98.3 °F (36.8 °C) (Oral)   Resp 16   Wt 66.2 kg (146 lb)   SpO2 95%   BMI 26.70 kg/m²   General: No distress, appears stated age.  Eyes:  PERRL. Conjunctivae/corneas clear.  HENT: Head normal appearing. Nares normal. Oral mucosa moist.  Hearing intact.   Neck: Supple, with full range of motion. Trachea midline.  No gross JVD appreciated.  Respiratory:  Normal effort. Clear to auscultation, without rales or wheezes or rhonchi.  Cardiovascular: Normal rate, regular rhythm with normal S1/S2 without murmurs.    No lower extremity edema.   Abdomen: Soft, non-tender, non-distended with normal bowel sounds.  Musculoskeletal: No joint swelling or tenderness. Normal tone. No abnormal movements.   Skin: Warm and dry. No rashes or lesions.  Neurologic:  No focal sensory/motor deficits in the upper or lower extremities. Cranial nerves:  grossly non-focal 2-12.     Psychiatric: Alert and oriented, normal insight and thought content.   Capillary Refill: Brisk,< 3 seconds.  Peripheral Pulses: +2 palpable, equal bilaterally.       Labs/Radiology: See chart or assessment above.     Electronically signed by Kan Fernandez MD on 4/4/2025 at 5:18 PM  Case was discussed with Attending, Dr. Dumont.

## 2025-04-04 NOTE — PROGRESS NOTES
Occupational Therapy  [facility]  INPATIENT OCCUPATIONAL THERAPY  [facility] 7K  EVALUATION      Discharge Recommendations: Home independently  Equipment Recommendations: No      Time In: 09  Time Out: 0940  Timed Code Treatment Minutes: 10 Minutes  Minutes: 18          Date: 2025  Patient Name: Irene Marquez,   Gender: female      MRN: [id number]  : 1950  (75 y.o.)  Referring Practitioner: More Estevez MD  Diagnosis: Acute gastroenteritis  Additional Pertinent Hx: 75 y.o. female who presents with c/o abdominal pain, emesis. Patient with cholelithiasis last performed by outpatient ultrasound and HIDA scan performed within the last month.  Patient was told that she is not a surgical candidate due to poor cardiac function and risk factors.  Patient states that she began to have right upper quadrant abdominal pain with nonbloody emesis    Restrictions/Precautions:  Restrictions/Precautions: General Precautions    Subjective  Chart Reviewed: Yes, Orders, Progress Notes, History and Physical, Imaging  Patient assessed for rehabilitation services?: Yes  Family / Caregiver Present: Yes         Pain: nauseous    Vitals: Vitals not assessed per clinical judgement, see nursing flowsheet    Social/Functional History:  Lives With: Alone  Type of Home: House  Home Layout: One level  Home Access: Stairs to enter without rails  Entrance Stairs - Number of Steps: 2   Bathroom Shower/Tub: Tub/Shower unit  Bathroom Toilet: Standard       Prior Level of Assist for ADLs: Independent  Prior Level of Assist for Homemaking: Independent  Homemaking Responsibilities: Yes  Prior Level of Assist for Transfers: Independent  Prior Level of Assist for Ambulation: Independent household ambulator, with or without device  Has the patient had two or more falls in the past year or any fall with injury in the past year?: No    Active : Yes     Additional Comments: Pt reports IND with ADLs and

## 2025-04-04 NOTE — PLAN OF CARE
Problem: Chronic Conditions and Co-morbidities  Goal: Patient's chronic conditions and co-morbidity symptoms are monitored and maintained or improved  Outcome: Progressing  Flowsheets (Taken 4/4/2025 1707)  Care Plan - Patient's Chronic Conditions and Co-Morbidity Symptoms are Monitored and Maintained or Improved: Monitor and assess patient's chronic conditions and comorbid symptoms for stability, deterioration, or improvement     Problem: Discharge Planning  Goal: Discharge to home or other facility with appropriate resources  Outcome: Progressing  Flowsheets (Taken 4/4/2025 1707)  Discharge to home or other facility with appropriate resources:   Identify barriers to discharge with patient and caregiver   Arrange for needed discharge resources and transportation as appropriate     Problem: Pain  Goal: Verbalizes/displays adequate comfort level or baseline comfort level  Outcome: Progressing  Flowsheets (Taken 4/4/2025 1707)  Verbalizes/displays adequate comfort level or baseline comfort level:   Encourage patient to monitor pain and request assistance   Assess pain using appropriate pain scale     Problem: Safety - Adult  Goal: Free from fall injury  Outcome: Progressing  Flowsheets (Taken 4/4/2025 1707)  Free From Fall Injury: Instruct family/caregiver on patient safety    Care plan reviewed with patient.  Patient verbalize understanding of the plan of care and contribute to goal setting.

## 2025-04-04 NOTE — PROGRESS NOTES
No changes from morning assessment. Vitals Report off to primary nurse Jenny NGUYEN. 104/54, 85, 98.7 oral, 18. 96% room air. Sisters at bedside. Call light and possessions within reach of patient

## 2025-04-04 NOTE — CONSULTS
Consult History & Physical      Patient:  Irene Marquez  YOB: 1950  MRN: [id number]     Acct: [id number]  Code Status: Limited  PCP: Jerry Bryant MD      Chief Complaint:    Chief Complaint   Patient presents with    Abdominal Pain    Nausea       Date of Service: Pt seen/examined in consultation on 4/4/2025    History Of Present Illness:      Irene Marquez  is a 75 y.o. female who we are asked to see/evaluate by Chandana Dumont MD for medical management of RUQ abdominal pain with intractable nausea and vomiting and diarrhea.  Per chart review patient was told last month after a HIDA scan that she had cholelithiasis but did not want to have surgery done here wanted referral to [facility].  Patient reports she has been having heartburn the past two weeks.  RUQ pain resolved.  Diarrhea resolved. Reports she still has dry heaves when she gets up and moves around.      Past Medical History:    Past Medical History:   Diagnosis Date    Asthma     Atrial fibrillation (HCC)     Blood clot associated with vein wall inflammation     CAD (coronary artery disease)     Cerebral artery occlusion with cerebral infarction (HCC)     CHF (congestive heart failure) (HCC)     COPD (chronic obstructive pulmonary disease) (HCC)     History of blood transfusion     Hx of blood clots     Hyperlipidemia     Hypertension     MI, old     Migraine     Pneumonia     PONV (postoperative nausea and vomiting)     Psychiatric problem        Home Medications:  Prior to Admission medications    Medication Sig Start Date End Date Taking? Authorizing Provider   albuterol (PROVENTIL) (2.5 MG/3ML) 0.083% nebulizer solution Take 3 mLs by nebulization 4 times daily as needed for Wheezing 9/9/24  Yes Loretta Madrigal APRN - CNP   benzonatate (TESSALON) 100 MG capsule TAKE 2 CAPSULES BY MOUTH 3 TIMES DAILY AS NEEDED FOR COUGH 9/9/24  Yes Loretta Madrigal APRN - CNP   fluticasone-umeclidin-vilant (TRELEGY ELLIPTA) 100-62.5-25 MCG/ACT AEPB  inhaler Inhale 1 puff into the lungs daily Rinse mouth after use 6/5/23  Yes Loretta Madrigal APRN - CNP   nitroGLYCERIN (NITROSTAT) 0.4 MG SL tablet Place 1 tablet under the tongue every 5 minutes as needed for Chest pain 3/18/25   Saw Benitez PA-C   apixaban (ELIQUIS) 2.5 MG TABS tablet Take 1 tablet by mouth 2 times daily 3/18/25   Saw Benitez PA-C   isosorbide mononitrate (IMDUR) 60 MG extended release tablet Take 1 tablet by mouth daily 3/18/25   Saw Benitez PA-C   atorvastatin (LIPITOR) 80 MG tablet TAKE 1 TABLET BY MOUTH NIGHTLY 11/7/24   Carrie Gilman MD   furosemide (LASIX) 20 MG tablet TAKE 1 TABLET BY MOUTH ONCE  DAILY 10/31/24   Carrie Gilman MD   metoprolol tartrate (LOPRESSOR) 25 MG tablet TAKE 1 TABLET BY MOUTH TWICE  DAILY  Patient not taking: Reported on 4/3/2025 10/31/24   Carrie Gilman MD   clopidogrel (PLAVIX) 75 MG tablet Take 1 tablet by mouth daily  Patient taking differently: Take 1 tablet by mouth in the morning and at bedtime 10/22/24   Carrie Gilman MD   amiodarone (CORDARONE) 200 MG tablet Take 0.5 tablets by mouth daily Take 1 tablet twice a day for 7 days then 1 tablet daily. 5/28/24   Carrie Gilman MD   ALPRAZolam (XANAX) 0.5 MG tablet Take 1 tablet by mouth at bedtime. 2/27/23   Marshall Clark MD   traMADol (ULTRAM) 50 MG tablet  3/14/23   Marshall Clark MD   UNABLE TO FIND Liquid cough medicine with codeine per patient.  Patient not taking: Reported on 3/18/2025    Marshall Clark MD   Spacer/Aero-Holding Chambers (AEROCHAMBER MV) MISC 1 each by Does not apply route in the morning and at bedtime With Symbicort inhaler 4/11/22   Loretta Madrigal APRN - CNP   ALBUTEROL IN Inhale into the lungs    Marshall Clark MD       Surgical History:  Past Surgical History:   Procedure Laterality Date    APPENDECTOMY      CARDIAC SURGERY  1998 in [place]    pericardial window    CARDIAC SURGERY  2011 [facility]  and diarrhea.  She had a similar episode last month and was told she has cholelithiasis and that she is not a surgical candidate due to her poor cardiac function and her chronic medical conditions.  CT AP shows long segment wall thickening in the ileum correlating with inflammatory/infectious enteritis.  Patient follows with Dr. Handley- who no longer sees inpatients so we have been asked to see.     RUQ abdominal pain  Intractable nausea and vomiting  Diarrhea  Elevated troponin- mild elevation, down trending.   Paroxysmal Afib- on Eliquis  Cholelithiasis  Distal esophageal wall thickening.   Hx of acute calculous cholecystitis s/p ERCP with balloon dilation and stone removal 2022 with Dr. Handley  CAD s/p CABG and stents - on Plavix  COPD  HTN  HLD on statin  History of CVA      PLAN:    Supportive care per primary  MRCP stat to rule out choledocholithiasis - patient to receive 0.25mg xanax po prior to MRI due to claustraphobia.   GI panel pending  If patient has evidence of choledocholithiasis then she will need to be transferred to [facility] center ([facility] for [facility] given her cardiac history and risk factors along with anticoagulant usage.   Patient was awaiting Gen Surg referral at [facility] for cholecystectomy.   GI following.        Case reviewed and impression/plan reviewed in collaboration with Dr. Pete Erickson.   Electronically signed by PEDRO LUIS Chacon - CNP on 4/4/2025 at 11:14 AM    [facility]  Thank you for the consultation.

## 2025-04-05 ENCOUNTER — APPOINTMENT (OUTPATIENT)
Dept: CT IMAGING | Age: 75
DRG: 392 | End: 2025-04-05
Payer: MEDICARE

## 2025-04-05 VITALS
RESPIRATION RATE: 16 BRPM | DIASTOLIC BLOOD PRESSURE: 72 MMHG | TEMPERATURE: 97.8 F | SYSTOLIC BLOOD PRESSURE: 114 MMHG | WEIGHT: 146 LBS | BODY MASS INDEX: 26.7 KG/M2 | OXYGEN SATURATION: 97 % | HEART RATE: 73 BPM

## 2025-04-05 LAB
ANION GAP SERPL CALC-SCNC: 9 MEQ/L (ref 8–16)
BUN SERPL-MCNC: 18 MG/DL (ref 8–23)
CA-I BLD ISE-SCNC: 1.24 MMOL/L (ref 1.12–1.32)
CALCIUM SERPL-MCNC: 8.3 MG/DL (ref 8.8–10.2)
CHLORIDE SERPL-SCNC: 108 MEQ/L (ref 98–111)
CO2 SERPL-SCNC: 23 MEQ/L (ref 22–29)
CREAT SERPL-MCNC: 0.9 MG/DL (ref 0.5–0.9)
DEPRECATED RDW RBC AUTO: 49.2 FL (ref 35–45)
ERYTHROCYTE [DISTWIDTH] IN BLOOD BY AUTOMATED COUNT: 14.8 % (ref 11.5–14.5)
GFR SERPL CREATININE-BSD FRML MDRD: 66 ML/MIN/1.73M2
GLUCOSE SERPL-MCNC: 90 MG/DL (ref 74–109)
HCT VFR BLD AUTO: 35.3 % (ref 37–47)
HGB BLD-MCNC: 11.4 GM/DL (ref 12–16)
MAGNESIUM SERPL-MCNC: 2.1 MG/DL (ref 1.6–2.6)
MCH RBC QN AUTO: 29.5 PG (ref 26–33)
MCHC RBC AUTO-ENTMCNC: 32.3 GM/DL (ref 32.2–35.5)
MCV RBC AUTO: 91.2 FL (ref 81–99)
PLATELET # BLD AUTO: 196 THOU/MM3 (ref 130–400)
PMV BLD AUTO: 9.3 FL (ref 9.4–12.4)
POTASSIUM SERPL-SCNC: 3.8 MEQ/L (ref 3.5–5.2)
RBC # BLD AUTO: 3.87 MILL/MM3 (ref 4.2–5.4)
SODIUM SERPL-SCNC: 140 MEQ/L (ref 135–145)
WBC # BLD AUTO: 6.1 THOU/MM3 (ref 4.8–10.8)

## 2025-04-05 PROCEDURE — 6360000004 HC RX CONTRAST MEDICATION

## 2025-04-05 PROCEDURE — 74174 CTA ABD&PLVS W/CONTRAST: CPT

## 2025-04-05 PROCEDURE — 6370000000 HC RX 637 (ALT 250 FOR IP)

## 2025-04-05 PROCEDURE — 83735 ASSAY OF MAGNESIUM: CPT

## 2025-04-05 PROCEDURE — 36415 COLL VENOUS BLD VENIPUNCTURE: CPT

## 2025-04-05 PROCEDURE — 85027 COMPLETE CBC AUTOMATED: CPT

## 2025-04-05 PROCEDURE — 6360000002 HC RX W HCPCS

## 2025-04-05 PROCEDURE — 82330 ASSAY OF CALCIUM: CPT

## 2025-04-05 PROCEDURE — 80048 BASIC METABOLIC PNL TOTAL CA: CPT

## 2025-04-05 PROCEDURE — 99239 HOSP IP/OBS DSCHRG MGMT >30: CPT | Performed by: STUDENT IN AN ORGANIZED HEALTH CARE EDUCATION/TRAINING PROGRAM

## 2025-04-05 RX ORDER — CLOPIDOGREL BISULFATE 75 MG/1
75 TABLET ORAL DAILY
Qty: 90 TABLET | Refills: 3 | Status: SHIPPED | OUTPATIENT
Start: 2025-04-05

## 2025-04-05 RX ORDER — DIPHENHYDRAMINE HCL 25 MG
50 TABLET ORAL ONCE
Status: COMPLETED | OUTPATIENT
Start: 2025-04-05 | End: 2025-04-05

## 2025-04-05 RX ORDER — CIPROFLOXACIN 500 MG/1
500 TABLET, FILM COATED ORAL EVERY 12 HOURS SCHEDULED
Qty: 8 TABLET | Refills: 0 | Status: SHIPPED | OUTPATIENT
Start: 2025-04-05 | End: 2025-04-09

## 2025-04-05 RX ORDER — METRONIDAZOLE 500 MG/1
500 TABLET ORAL EVERY 8 HOURS SCHEDULED
Qty: 12 TABLET | Refills: 0 | Status: SHIPPED | OUTPATIENT
Start: 2025-04-05 | End: 2025-04-09

## 2025-04-05 RX ORDER — IOPAMIDOL 755 MG/ML
80 INJECTION, SOLUTION INTRAVASCULAR
Status: COMPLETED | OUTPATIENT
Start: 2025-04-05 | End: 2025-04-05

## 2025-04-05 RX ORDER — DIPHENHYDRAMINE HCL 25 MG
50 TABLET ORAL ONCE
Status: DISCONTINUED | OUTPATIENT
Start: 2025-04-05 | End: 2025-04-05 | Stop reason: SDUPTHER

## 2025-04-05 RX ADMIN — PANTOPRAZOLE SODIUM 40 MG: 40 INJECTION, POWDER, FOR SOLUTION INTRAVENOUS at 11:05

## 2025-04-05 RX ADMIN — METRONIDAZOLE 500 MG: 500 TABLET ORAL at 05:55

## 2025-04-05 RX ADMIN — CIPROFLOXACIN HYDROCHLORIDE 500 MG: 500 TABLET, FILM COATED ORAL at 11:04

## 2025-04-05 RX ADMIN — IOPAMIDOL 80 ML: 755 INJECTION, SOLUTION INTRAVENOUS at 15:52

## 2025-04-05 RX ADMIN — APIXABAN 2.5 MG: 2.5 TABLET, FILM COATED ORAL at 11:05

## 2025-04-05 RX ADMIN — METOPROLOL TARTRATE 25 MG: 25 TABLET, FILM COATED ORAL at 11:04

## 2025-04-05 RX ADMIN — ONDANSETRON 4 MG: 2 INJECTION, SOLUTION INTRAMUSCULAR; INTRAVENOUS at 05:52

## 2025-04-05 RX ADMIN — CLOPIDOGREL BISULFATE 75 MG: 75 TABLET ORAL at 11:06

## 2025-04-05 RX ADMIN — DIPHENHYDRAMINE HYDROCHLORIDE 50 MG: 25 TABLET ORAL at 15:07

## 2025-04-05 RX ADMIN — METRONIDAZOLE 500 MG: 500 TABLET ORAL at 15:10

## 2025-04-05 RX ADMIN — AMIODARONE HYDROCHLORIDE 200 MG: 200 TABLET ORAL at 11:05

## 2025-04-05 ASSESSMENT — PAIN SCALES - GENERAL
PAINLEVEL_OUTOF10: 0
PAINLEVEL_OUTOF10: 0

## 2025-04-05 NOTE — DISCHARGE SUMMARY
Resident Discharge Summary (Hospitalist)      Patient: Irene Marquez 75 y.o. female  : 1950  MRN: [id number]   Account: [id number]   Patient's PCP: Jerry Bryant MD    Admit Date: 4/3/2025   Discharge Date:       Admitting Physician: No admitting provider for patient encounter.  Discharge Physician: Kan Fernandez MD       Discharge Diagnoses:    Nausea, vomiting, diarrhea-resolved rapidly with fluids: Some symptoms started abruptly at 2 AM and woke the patient from sleep with nausea, vomiting, and diarrhea.  Vomit was nonbloody.  Diarrhea was nonbloody with no clots and no dark tarry character.  Stools described as watery and brown.  Diarrhea was associated with some cramping.  She had a very similar episode couple weeks prior presented very similarly and woke her up from sleep around 1:30 AM.  CTAP showed long segment of the ileum with thickening of the bowel wall.  Lactic acid was normal.  Patient does have significant history of gallstones and was considered to be a high risk patient for any surgical intervention due to significant cardiac history dependence on anticoagulation and antiplatelets.  Patient's symptoms improved over the course of the day and she was able to tolerate advancement back to normal diet.  She did have some significant abdominal tenderness that also improved rapidly over the course of 1 day.              -Symptoms improved rapidly with IV fluids  -MRCP was negative for choledocholithiasis  -Gastrointestinal molecular panel ordered.  C. difficile toxin antigen ordered, but diarrhea resolved before studies could be collected  -CTA abdomen pelvis was obtained after resolution of symptoms to evaluate for mesenteric ischemia and bowel ischemia.  It showed minimal atherosclerotic calcifications are present in the abdominal aorta. There is no aneurysm, dissection or intramural hematoma. There is adequate opacification of the branch vessels, including the celiac, superior  mesenteric, bilateral renal and inferior mesenteric arteries.       Elevated troponin-likely secondary to demand ischemia:  Troponin 29, followed by 18.  EKG with no significant ischemic changes compared to previous sinus rhythm EKGs.  Symptoms would be atypical for ACS or better explained by primary GI pathology     Chronic Conditions (reviewed and stable unless otherwise stated)  Paroxysmal atrial fibrillation:  Follows with cardiology.  Medications include amiodarone Lopressor and Eliquis 2.5 mg daily..  History of acute Calculus cholecystitis status post ERCP with balloon dilation and stone removal 2022  COPD: Continue Trelegy and Symbicort  CAD status post CABG and PCI: Continue clopidogrel 75 mg daily as well as statin, Imdur 60 mg daily  History of migraines  History of CVA  History of anxiety: Alprazolam 0.5 mg daily        ===================================================================     Chief Complaint: Nausea, vomiting, diarrhea       HPI / Hospital Course:  Per HPI:  \"Irene Marquez is a 75 y.o. female with PMHx of cholelithiasis, acute calculus cholecystitis s/p ERCP with balloon dilation and stone removal 2022, paroxysmal A-fib, MI, CAD s/p CABG and stents, CHF with diastolic dysfunction, COPD, HTN, HLD, CVA, migraines, anxiety who presents to [facility] with abdominal pain, nausea/vomiting and diarrhea.       Patient presents with approximately 1 day of severe abdominal pain, nausea/vomiting and diarrhea.  Upon awakening on the morning of/30, patient became nauseous and vomited several times, proceeded to have profuse diarrhea as well with concurrent right upper quadrant abdominal pain.  Patient reports similar symptoms spurred a 2022 admission where she was found to have acute calculus cholecystitis that ultimately required ERCP with balloon dilatation and stone removal.  She has followed with GI, had known cholelithiasis without cholecystitis since that time, and had a recent  [phone] - F [phone]  [address] [place] [id number]      Phone: [phone]   ciprofloxacin 500 MG tablet  clopidogrel 75 MG tablet  metroNIDAZOLE 500 MG tablet            Time Spent on discharge is 30 minutes in the examination, evaluation, counseling and review of medications and discharge plan.    Thank you Jerry Bryant MD for the opportunity to be involved in this patient's care.      Signed:    Electronically signed by Kan Fernandez MD on 4/5/25 at 7:27 PM EDT     Case was discussed with Attending, Dr. Dumont

## 2025-04-05 NOTE — FLOWSHEET NOTE
Discharge paperwork reviewed with pt and sister.  No questions per pt.  Pt verbalizes understanding.  RN checked left forearm iv removal site.  No bleeding noted.  Pt taken down in wheelchair, sister driving pt to her home.

## 2025-04-05 NOTE — PROCEDURES
PROCEDURE NOTE  Date: 4/4/2025   Name: Irene Marquez  YOB: 1950    Procedures  12 lead EKG completed. Results placed in patients chart.

## 2025-04-05 NOTE — PROGRESS NOTES
Gastroenterology Progress Note:     Patient Name:  Irene Marquez   MRN: [id number]  [id number]  YOB: 1950  Admit Date: 4/3/2025 10:04 AM  Primary Care Physician: Jerry Bryant MD   7K-16/016-A     Patient seen and examined.  24 hours events and chart reviewed.    Subjective: Doing better.   Started on regular diet.   Reviewed MRCP  No N/V.     Objective:  BP (!) 123/56   Pulse 70   Temp 98.1 °F (36.7 °C) (Oral)   Resp 17   Wt 66.2 kg (146 lb)   SpO2 94%   BMI 26.70 kg/m²     Review of Systems - Gastrointestinal ROS: no abdominal pain, change in bowel habits, or black or bloody stools     Physical Exam:    General:  Nourished in no distress  HEENT: Atraumatic, normocephalic. Moist oral mucous membranes.  Neck: Supple without adenopathy, JVD, thyromegaly or masses. Trachea midline.  CV: Heart RRR, no murmurs, rubs, gallops.  Resp: Even, easy without cough or accessory use. Lungs clear to ascultation bilaterally.   Abd: Round, soft, non-tender. No hepatosplenomegaly or mass present. Active bowel sounds heard. No distention noted.   Ext:  Without cyanosis, clubbing, edema.   Skin: Pink, warm, dry  Neuro:  Alert, oriented x 3 with no obvious deficits.       Rectal: deferred    Labs:   CBC:   Lab Results   Component Value Date/Time    WBC 6.1 04/05/2025 06:50 AM    HGB 11.4 04/05/2025 06:50 AM    HCT 35.3 04/05/2025 06:50 AM    MCV 91.2 04/05/2025 06:50 AM     04/05/2025 06:50 AM     BMP:   Lab Results   Component Value Date/Time     04/05/2025 06:50 AM    K 3.8 04/05/2025 06:50 AM    K 4.6 08/25/2022 05:05 AM     04/05/2025 06:50 AM    CO2 23 04/05/2025 06:50 AM    PHOS 3.5 04/03/2025 02:44 PM    BUN 18 04/05/2025 06:50 AM    CREATININE 0.9 04/05/2025 06:50 AM    CALCIUM 8.3 04/05/2025 06:50 AM     PT/INR:   Lab Results   Component Value Date/Time    INR 0.95 12/14/2020 03:30 PM     Lipids:   Lab Results   Component Value Date/Time    ALKPHOS 89 04/03/2025 10:24 AM    ALT 11  liver labs. Primary service ordered CTA to R/O ischemic etiology. Pt is not wanting cholecystectomy unless absolutely necessary. Wants to go home today.      RUQ abdominal pain  Intractable nausea and vomiting  Diarrhea  Elevated troponin- mild elevation, down trending.   Paroxysmal Afib- on Eliquis  Cholelithiasis  Distal esophageal wall thickening.   Hx of acute calculous cholecystitis s/p ERCP with balloon dilation and stone removal 2022 with Dr. Handley  CAD s/p CABG and stents - on Plavix  COPD  HTN  HLD on statin  History of CVA        Plan:    Supportive care per primary  MRCP negative for choledocholithiasis, CTA has been ordered to R/O mesenteric ischemia/ischemic bowel  Liver labs are fairly normal  GI panel has not been collected  Patient was awaiting Gen Surg referral at [facility] for cholecystectomy due to complexity as a poor surgical candidate  - she now is not sure she wishes to do this  Ok for discharge if negative CTA.  Patient of Dr Black - follow up with him in 4 weeks  GI signing off, call us back if needed or CTA is positive.      Case reviewed and impression/plan reviewed in collaboration with Dr. Williamson    Electronically signed by PEDRO LUIS Biggs - CNP on 4/5/2025 at 11:44 AM    [facility]

## 2025-04-05 NOTE — PROGRESS NOTES
Physician Progress Note      PATIENT:               ELVI GARCIA  CSN #:                  [id number]  :                       1950  ADMIT DATE:       4/3/2025 10:04 AM  DISCH DATE:  RESPONDING  PROVIDER #:        Chandana Dumont MD          QUERY TEXT:    Elevated cardiac troponin (cTc) levels are documented in the medical record,   per lab results, of 29----18. Please clarify the cause:    The clinical indicators include:  *Per H&P, \" Elevated troponin: Mild elevation with downtrend, 29 => 18.  EKG   without notable changes from previous, patient did not endorse chest pain.  No   ischemic workup indicated.....CTAP revealed long segment wall thickening in   the ileum concerning for inflammatory or infectious enteritis.\"  *H&P exam: \"Mild apparent distress... diffuse tenderness most prominent in   right and left upper quadrants.\"  --- EKG 4/3:  Sinus rhythm with Premature atrial complexes  Nonspecific ST and T wave abnormality  Abnormal ECG  When compared with ECG of 2024 13:33,  Sinus rhythm has replaced Atrial fibrillation    Risk Factors: age 75, HTN, CHF, pancreatic insufficiency, PAF, HLD, CAD  Treatment: EKG, labs, card consult  Options provided:  -- Myocardial injury, non-ischemic (non-traumatic) related to acute   gastroenteritis.  -- Demand ischemia due to acute gastroenteritis.  -- Type 2 myocardial infarction related to acute gastroenteritis.  -- Elevated cardiac troponin levels only without corresponding diagnosis  -- Other - I will add my own diagnosis  -- Disagree - Not applicable / Not valid  -- Disagree - Clinically unable to determine / Unknown  -- Refer to Clinical Documentation Reviewer    PROVIDER RESPONSE TEXT:    This patient has elevated cardiac troponin levels without corresponding   diagnosis.    Query created by: Lisa Christian on 2025 2:10 PM      Electronically signed by:  Chandana Dumont MD 2025 2:05 PM

## 2025-04-05 NOTE — PLAN OF CARE
Problem: Chronic Conditions and Co-morbidities  Goal: Patient's chronic conditions and co-morbidity symptoms are monitored and maintained or improved  Outcome: Progressing  Flowsheets (Taken 4/5/2025 1047)  Care Plan - Patient's Chronic Conditions and Co-Morbidity Symptoms are Monitored and Maintained or Improved: Monitor and assess patient's chronic conditions and comorbid symptoms for stability, deterioration, or improvement     Problem: Discharge Planning  Goal: Discharge to home or other facility with appropriate resources  Outcome: Progressing  Flowsheets (Taken 4/5/2025 1047)  Discharge to home or other facility with appropriate resources: Identify barriers to discharge with patient and caregiver     Problem: Pain  Goal: Verbalizes/displays adequate comfort level or baseline comfort level  Outcome: Progressing  Flowsheets (Taken 4/5/2025 1030)  Verbalizes/displays adequate comfort level or baseline comfort level: Encourage patient to monitor pain and request assistance     Problem: Safety - Adult  Goal: Free from fall injury  Outcome: Progressing    .Care plan reviewed with patient and sisters.  Patient and sisters verbalize understanding of the plan of care and contribute to goal setting.

## 2025-04-05 NOTE — PLAN OF CARE
Problem: Chronic Conditions and Co-morbidities  Goal: Patient's chronic conditions and co-morbidity symptoms are monitored and maintained or improved  4/5/2025 1859 by Britta Braun RN  Outcome: Adequate for Discharge  4/5/2025 1639 by Britta Braun RN  Outcome: Progressing  Flowsheets (Taken 4/5/2025 1047)  Care Plan - Patient's Chronic Conditions and Co-Morbidity Symptoms are Monitored and Maintained or Improved: Monitor and assess patient's chronic conditions and comorbid symptoms for stability, deterioration, or improvement     Problem: Discharge Planning  Goal: Discharge to home or other facility with appropriate resources  4/5/2025 1859 by Britta Braun RN  Outcome: Adequate for Discharge  4/5/2025 1639 by Britta Braun RN  Outcome: Progressing  Flowsheets (Taken 4/5/2025 1047)  Discharge to home or other facility with appropriate resources: Identify barriers to discharge with patient and caregiver     Problem: Pain  Goal: Verbalizes/displays adequate comfort level or baseline comfort level  4/5/2025 1859 by Britta Braun RN  Outcome: Adequate for Discharge  4/5/2025 1639 by Britta Braun RN  Outcome: Progressing  Flowsheets (Taken 4/5/2025 1030)  Verbalizes/displays adequate comfort level or baseline comfort level: Encourage patient to monitor pain and request assistance     Problem: Safety - Adult  Goal: Free from fall injury  4/5/2025 1859 by Britta Braun RN  Outcome: Adequate for Discharge  4/5/2025 1639 by Britta Braun RN  Outcome: Progressing     Problem: Skin/Tissue Integrity  Goal: Skin integrity remains intact  Description: 1.  Monitor for areas of redness and/or skin breakdown  2.  Assess vascular access sites hourly  3.  Every 4-6 hours minimum:  Change oxygen saturation probe site  4.  Every 4-6 hours:  If on nasal continuous positive airway pressure, respiratory therapy assess nares and determine need for appliance change or resting period  Outcome: Adequate for Discharge   ..Care plan reviewed with  patient and sister.  Patient and sister verbalize understanding of the plan of care and contribute to goal setting.

## 2025-05-09 NOTE — PROGRESS NOTES
Physician Progress Note      PATIENT:               ELVI GARCIA  CSN #:                  [id number]  :                       1950  ADMIT DATE:       4/3/2025 10:04 AM  DISCH DATE:        2025 8:05 PM  RESPONDING  PROVIDER #:        Chandana Dumont MD          QUERY TEXT:    Gastroenteritis is documented in the IM notes.  Please specify the type of   colitis such as:    The clinical indicators include:  Per H&P:  \"-75 y.o...  -Acute gastroenteritis: CTAP revealed long segment wall thickening in the   ileum concerning for infectious versus inflammatory ileitis.  Ischemic   component essentially ruled out with normal lactic acid.  -Keep n.p.o. with sips of clear liquids, meds, ice chips  -Maintenance IVF NS 75 cc/hour  -Strict I's and O's, daily weight standing  -GI panel ordered  -Empiric azithromycin 500 mg IV every 24 hours for 3 total doses, which covers   for common causes of infectious gastroenteritis as well providing   anti-inflammatory effect.\"    Discharge summary: \"diarrhea resolved before studies could be collected.\"    Per MAR: IV Zithromax, po Cipro, po Flagyl, IVF, Zofran    Per flowsheet and labs: T 99.9, WBC wnl, lactic wnl, CRP 2.26  Options provided:  -- Possible bacterial gastroenteritis  -- Other - I will add my own diagnosis  -- Disagree - Not applicable / Not valid  -- Disagree - Clinically unable to determine / Unknown  -- Refer to Clinical Documentation Reviewer    PROVIDER RESPONSE TEXT:    This patient was treated for possible bacterial gastroenteritis.    Query created by: Nidhi Garcia on 2025 2:11 PM      Electronically signed by:  Chandana Dumont MD 2025 8:25 AM

## 2025-05-21 NOTE — PROGRESS NOTES
Physician Progress Note      PATIENT:               ELVI GARCIA  CSN #:                  [id number]  :                       1950  ADMIT DATE:       4/3/2025 10:04 AM  DISCH DATE:        2025 8:05 PM  RESPONDING  PROVIDER #:        Chandana Dumont MD          QUERY TEXT:    Based on your medical judgment, please clarify these findings and document if   any of the following are being evaluated and/or treated:    The clinical indicators include:  Per H&P, \" Paroxysmal A-fib: [id number]\"  Per  Gastro consult note and  gastro progress note, \"afib, on Eliquis.\"    4/3 EKG  Sinus rhythm with Premature atrial complexes  Nonspecific ST and T wave abnormality  Abnormal ECG  When compared with ECG of 2024 13:33,  Sinus rhythm has replaced Atrial fibrillation      Risk Factors: afib, female, age 75, CHF, HTN, CVA history  Treatment: Eliquis  Options provided:  -- Secondary hypercoagulable state in a patient with atrial fibrillation  -- Other - I will add my own diagnosis  -- Disagree - Not applicable / Not valid  -- Disagree - Clinically unable to determine / Unknown  -- Refer to Clinical Documentation Reviewer    PROVIDER RESPONSE TEXT:    This patient has secondary hypercoagulable state in a patient with atrial   fibrillation.    Query created by: Lisa Christian on 2025 12:46 PM      Electronically signed by:  Chandana Dumont MD 2025 3:15 PM

## (undated) DEVICE — RETRIEVAL BALLOON CATHETER: Brand: EXTRACTOR™ PRO RX

## (undated) DEVICE — SPHINCTEROTOME: Brand: HYDRATOME RX 44

## (undated) DEVICE — GLOVE ORTHO 8   MSG9480

## (undated) DEVICE — ADAPTER CLEANING PORPOISE CLEANING